# Patient Record
Sex: FEMALE | Race: WHITE | NOT HISPANIC OR LATINO | Employment: OTHER | URBAN - METROPOLITAN AREA
[De-identification: names, ages, dates, MRNs, and addresses within clinical notes are randomized per-mention and may not be internally consistent; named-entity substitution may affect disease eponyms.]

---

## 2017-04-06 ENCOUNTER — HOSPITAL ENCOUNTER (OUTPATIENT)
Dept: FAMILY MEDICINE CLINIC | Facility: CLINIC | Age: 67
Setting detail: SPECIMEN
Discharge: HOME | End: 2017-04-06
Attending: FAMILY MEDICINE | Admitting: FAMILY MEDICINE

## 2017-04-06 LAB
AMPICILLIN SUSC ISLT: ABNORMAL
AZTREONAM SUSC ISLT: ABNORMAL
BACTERIA ISLT: ABNORMAL
BACTERIA SPEC AEROBE CULT: ABNORMAL
CEFAZOLIN SUSC ISLT: ABNORMAL
CEFEPIME SUSC ISLT: ABNORMAL
CEFTRIAXONE SUSC ISLT: ABNORMAL
CIPROFLOXACIN SUSC ISLT: ABNORMAL
COLONY COUNT: ABNORMAL
ERTAPENEM SUSC ISLT: ABNORMAL
LEVOFLOXACIN SUSC ISLT: ABNORMAL
Lab: ABNORMAL
MEROPENEM SUSC ISLT: ABNORMAL
MICRO REPORT STATUS: ABNORMAL
NITROFURANTOIN SUSC ISLT: ABNORMAL
PIP+TAZO SUSC ISLT: ABNORMAL
SPECIMEN SOURCE: ABNORMAL
SUSC METH SPEC: ABNORMAL
TETRACYCLINE SUSC ISLT: ABNORMAL
TOBRAMYCIN SUSC ISLT: ABNORMAL
TRIMETHOPRIM/SULFA: ABNORMAL

## 2017-08-08 ENCOUNTER — HOSPITAL ENCOUNTER (OUTPATIENT)
Dept: FAMILY MEDICINE CLINIC | Facility: CLINIC | Age: 67
Setting detail: SPECIMEN
Discharge: HOME | End: 2017-08-08
Attending: FAMILY MEDICINE | Admitting: FAMILY MEDICINE

## 2017-08-08 LAB
ALBUMIN SERPL-MCNC: 4.4 G/DL (ref 3.5–4.8)
ALBUMIN/GLOB SERPL: 1.4 {RATIO} (ref 1–1.7)
ALP SERPL-CCNC: 77 IU/L (ref 32–91)
ALT SERPL-CCNC: 73 IU/L (ref 14–54)
ANION GAP SERPL CALC-SCNC: 17.2 MMOL/L (ref 10–20)
AST SERPL-CCNC: 62 IU/L (ref 15–41)
BASOPHILS # BLD AUTO: 0 10*3/UL (ref 0–0.2)
BASOPHILS NFR BLD AUTO: 1 % (ref 0–2)
BILIRUB SERPL-MCNC: 0.7 MG/DL (ref 0.3–1.2)
BUN SERPL-MCNC: 19 MG/DL (ref 8–20)
BUN/CREAT SERPL: 17.3 (ref 5.4–26.2)
CALCIUM SERPL-MCNC: 9.8 MG/DL (ref 8.9–10.3)
CHLORIDE SERPL-SCNC: 101 MMOL/L (ref 101–111)
CHOLEST SERPL-MCNC: 152 MG/DL
CHOLEST/HDLC SERPL: 4.1 {RATIO}
CONV CO2: 24 MMOL/L (ref 22–32)
CONV LDL CHOLESTEROL DIRECT: 89 MG/DL (ref 0–100)
CONV MICROALBUM.,U,RANDOM: 32 MG/L
CONV TOTAL PROTEIN: 7.5 G/DL (ref 6.1–7.9)
CREAT 24H UR-MCNC: 272.9 MG/DL
CREAT UR-MCNC: 1.1 MG/DL (ref 0.4–1)
DIFFERENTIAL METHOD BLD: (no result)
EOSINOPHIL # BLD AUTO: 0.2 10*3/UL (ref 0–0.3)
EOSINOPHIL # BLD AUTO: 4 % (ref 0–3)
ERYTHROCYTE [DISTWIDTH] IN BLOOD BY AUTOMATED COUNT: 14.2 % (ref 11.5–14.5)
GLOBULIN UR ELPH-MCNC: 3.1 G/DL (ref 2.5–3.8)
GLUCOSE SERPL-MCNC: 195 MG/DL (ref 65–99)
HCT VFR BLD AUTO: 39.7 % (ref 35–49)
HDLC SERPL-MCNC: 37 MG/DL
HGB BLD-MCNC: 13.4 G/DL (ref 12–15)
LDLC/HDLC SERPL: 2.4 {RATIO}
LIPID INTERPRETATION: ABNORMAL
LYMPHOCYTES # BLD AUTO: 1.7 10*3/UL (ref 0.8–4.8)
LYMPHOCYTES NFR BLD AUTO: 31 % (ref 18–42)
MCH RBC QN AUTO: 28.3 PG (ref 26–32)
MCHC RBC AUTO-ENTMCNC: 33.9 G/DL (ref 32–36)
MCV RBC AUTO: 83.5 FL (ref 80–94)
MICROALBUMIN/CREAT UR: 11.7 UG/MG
MONOCYTES # BLD AUTO: 0.4 10*3/UL (ref 0.1–1.3)
MONOCYTES NFR BLD AUTO: 7 % (ref 2–11)
NEUTROPHILS # BLD AUTO: 3.1 10*3/UL (ref 2.3–8.6)
NEUTROPHILS NFR BLD AUTO: 57 % (ref 50–75)
NRBC BLD AUTO-RTO: 0 /100{WBCS}
NRBC/RBC NFR BLD MANUAL: 0 10*3/UL
PLATELET # BLD AUTO: 237 10*3/UL (ref 150–450)
PMV BLD AUTO: 8.5 FL (ref 7.4–10.4)
POTASSIUM SERPL-SCNC: 4.2 MMOL/L (ref 3.6–5.1)
RBC # BLD AUTO: 4.75 10*6/UL (ref 4–5.4)
SODIUM SERPL-SCNC: 138 MMOL/L (ref 136–144)
TRIGL SERPL-MCNC: 220 MG/DL
TSH SERPL-ACNC: 4.56 UIU/ML (ref 0.34–5.6)
VLDLC SERPL CALC-MCNC: 26.1 MG/DL
WBC # BLD AUTO: 5.4 10*3/UL (ref 4.5–11.5)

## 2017-08-15 ENCOUNTER — HOSPITAL ENCOUNTER (OUTPATIENT)
Dept: ULTRASOUND IMAGING | Facility: HOSPITAL | Age: 67
Discharge: HOME | End: 2017-08-15
Attending: FAMILY MEDICINE | Admitting: FAMILY MEDICINE

## 2018-01-24 ENCOUNTER — HOSPITAL ENCOUNTER (OUTPATIENT)
Dept: FAMILY MEDICINE CLINIC | Facility: CLINIC | Age: 68
Setting detail: SPECIMEN
Discharge: HOME | End: 2018-01-24
Attending: FAMILY MEDICINE | Admitting: FAMILY MEDICINE

## 2018-01-24 LAB
ALBUMIN SERPL-MCNC: 4.3 G/DL (ref 3.5–4.8)
ALBUMIN/GLOB SERPL: 1.4 {RATIO} (ref 1–1.7)
ALP SERPL-CCNC: 64 IU/L (ref 32–91)
ALT SERPL-CCNC: 79 IU/L (ref 14–54)
ANION GAP SERPL CALC-SCNC: 13.7 MMOL/L (ref 10–20)
AST SERPL-CCNC: 77 IU/L (ref 15–41)
BASOPHILS # BLD AUTO: 0 10*3/UL (ref 0–0.2)
BASOPHILS NFR BLD AUTO: 1 % (ref 0–2)
BILIRUB SERPL-MCNC: 0.9 MG/DL (ref 0.3–1.2)
BUN SERPL-MCNC: 17 MG/DL (ref 8–20)
BUN/CREAT SERPL: 15.5 (ref 5.4–26.2)
CALCIUM SERPL-MCNC: 9.8 MG/DL (ref 8.9–10.3)
CHLORIDE SERPL-SCNC: 103 MMOL/L (ref 101–111)
CHOLEST SERPL-MCNC: 113 MG/DL
CHOLEST/HDLC SERPL: 3.1 {RATIO}
CONV CO2: 28 MMOL/L (ref 22–32)
CONV LDL CHOLESTEROL DIRECT: 62 MG/DL (ref 0–100)
CONV MICROALBUM.,U,RANDOM: 28 MG/L
CONV TOTAL PROTEIN: 7.4 G/DL (ref 6.1–7.9)
CREAT 24H UR-MCNC: 315.1 MG/DL
CREAT UR-MCNC: 1.1 MG/DL (ref 0.4–1)
DIFFERENTIAL METHOD BLD: (no result)
EOSINOPHIL # BLD AUTO: 0.2 10*3/UL (ref 0–0.3)
EOSINOPHIL # BLD AUTO: 3 % (ref 0–3)
ERYTHROCYTE [DISTWIDTH] IN BLOOD BY AUTOMATED COUNT: 14.2 % (ref 11.5–14.5)
GLOBULIN UR ELPH-MCNC: 3.1 G/DL (ref 2.5–3.8)
GLUCOSE SERPL-MCNC: 150 MG/DL (ref 65–99)
HCT VFR BLD AUTO: 39.5 % (ref 35–49)
HCV AB SER DONR QL: NORMAL
HCV AB SER DONR QL: NORMAL
HDLC SERPL-MCNC: 36 MG/DL
HGB BLD-MCNC: 13.3 G/DL (ref 12–15)
LDLC/HDLC SERPL: 1.7 {RATIO}
LIPID INTERPRETATION: ABNORMAL
LYMPHOCYTES # BLD AUTO: 1.6 10*3/UL (ref 0.8–4.8)
LYMPHOCYTES NFR BLD AUTO: 31 % (ref 18–42)
MCH RBC QN AUTO: 28.3 PG (ref 26–32)
MCHC RBC AUTO-ENTMCNC: 33.6 G/DL (ref 32–36)
MCV RBC AUTO: 84.3 FL (ref 80–94)
MICROALBUMIN/CREAT UR: 8.9 UG/MG
MONOCYTES # BLD AUTO: 0.4 10*3/UL (ref 0.1–1.3)
MONOCYTES NFR BLD AUTO: 8 % (ref 2–11)
NEUTROPHILS # BLD AUTO: 3.1 10*3/UL (ref 2.3–8.6)
NEUTROPHILS NFR BLD AUTO: 57 % (ref 50–75)
NRBC BLD AUTO-RTO: 0 /100{WBCS}
NRBC/RBC NFR BLD MANUAL: 0 10*3/UL
PLATELET # BLD AUTO: 275 10*3/UL (ref 150–450)
PMV BLD AUTO: 8.4 FL (ref 7.4–10.4)
POTASSIUM SERPL-SCNC: 4.7 MMOL/L (ref 3.6–5.1)
RBC # BLD AUTO: 4.69 10*6/UL (ref 4–5.4)
SODIUM SERPL-SCNC: 140 MMOL/L (ref 136–144)
TRIGL SERPL-MCNC: 165 MG/DL
VLDLC SERPL CALC-MCNC: 14.9 MG/DL
WBC # BLD AUTO: 5.3 10*3/UL (ref 4.5–11.5)

## 2018-05-16 ENCOUNTER — HOSPITAL ENCOUNTER (OUTPATIENT)
Dept: FAMILY MEDICINE CLINIC | Facility: CLINIC | Age: 68
Setting detail: SPECIMEN
Discharge: HOME | End: 2018-05-16
Attending: FAMILY MEDICINE | Admitting: FAMILY MEDICINE

## 2018-05-16 LAB
ALBUMIN SERPL-MCNC: 4.6 G/DL (ref 3.5–4.8)
ALBUMIN/GLOB SERPL: 1.5 {RATIO} (ref 1–1.7)
ALP SERPL-CCNC: 80 IU/L (ref 32–91)
ALT SERPL-CCNC: 97 IU/L (ref 14–54)
ANION GAP SERPL CALC-SCNC: 13.5 MMOL/L (ref 10–20)
AST SERPL-CCNC: 110 IU/L (ref 15–41)
BILIRUB SERPL-MCNC: 0.7 MG/DL (ref 0.3–1.2)
BUN SERPL-MCNC: 17 MG/DL (ref 8–20)
BUN/CREAT SERPL: 15.5 (ref 5.4–26.2)
CALCIUM SERPL-MCNC: 10 MG/DL (ref 8.9–10.3)
CHLORIDE SERPL-SCNC: 100 MMOL/L (ref 101–111)
CHOLEST SERPL-MCNC: 133 MG/DL
CHOLEST/HDLC SERPL: 3.6 {RATIO}
CONV CO2: 29 MMOL/L (ref 22–32)
CONV LDL CHOLESTEROL DIRECT: 78 MG/DL (ref 0–100)
CONV TOTAL PROTEIN: 7.6 G/DL (ref 6.1–7.9)
CREAT UR-MCNC: 1.1 MG/DL (ref 0.4–1)
GLOBULIN UR ELPH-MCNC: 3 G/DL (ref 2.5–3.8)
GLUCOSE SERPL-MCNC: 192 MG/DL (ref 65–99)
HDLC SERPL-MCNC: 37 MG/DL
LDLC/HDLC SERPL: 2.1 {RATIO}
LIPID INTERPRETATION: ABNORMAL
POTASSIUM SERPL-SCNC: 4.5 MMOL/L (ref 3.6–5.1)
SODIUM SERPL-SCNC: 138 MMOL/L (ref 136–144)
TRIGL SERPL-MCNC: 195 MG/DL
VLDLC SERPL CALC-MCNC: 18.3 MG/DL

## 2018-06-28 ENCOUNTER — OFFICE VISIT (OUTPATIENT)
Dept: MAMMOGRAPHY | Facility: CLINIC | Age: 68
End: 2018-06-28

## 2018-06-28 VITALS
DIASTOLIC BLOOD PRESSURE: 85 MMHG | SYSTOLIC BLOOD PRESSURE: 145 MMHG | HEART RATE: 80 BPM | HEIGHT: 63 IN | OXYGEN SATURATION: 96 % | WEIGHT: 195 LBS | TEMPERATURE: 97.8 F | BODY MASS INDEX: 34.55 KG/M2

## 2018-06-28 DIAGNOSIS — Z17.0 MALIGNANT NEOPLASM OF UPPER-OUTER QUADRANT OF LEFT BREAST IN FEMALE, ESTROGEN RECEPTOR POSITIVE (HCC): Primary | ICD-10-CM

## 2018-06-28 DIAGNOSIS — C50.412 MALIGNANT NEOPLASM OF UPPER-OUTER QUADRANT OF LEFT BREAST IN FEMALE, ESTROGEN RECEPTOR POSITIVE (HCC): Primary | ICD-10-CM

## 2018-06-28 DIAGNOSIS — C50.412 MALIGNANT NEOPLASM OF UPPER-OUTER QUADRANT OF LEFT FEMALE BREAST, UNSPECIFIED ESTROGEN RECEPTOR STATUS (HCC): Primary | ICD-10-CM

## 2018-06-28 PROCEDURE — 99205 OFFICE O/P NEW HI 60 MIN: CPT | Performed by: SURGERY

## 2018-06-28 RX ORDER — METOPROLOL TARTRATE AND HYDROCHLOROTHIAZIDE 50; 25 MG/1; MG/1
0.5 TABLET ORAL DAILY
COMMUNITY
Start: 2018-05-16 | End: 2019-08-08

## 2018-06-28 RX ORDER — FLUTICASONE PROPIONATE 50 MCG
2 SPRAY, SUSPENSION (ML) NASAL DAILY
COMMUNITY

## 2018-06-28 RX ORDER — LOPERAMIDE HYDROCHLORIDE 2 MG/1
2 TABLET ORAL 4 TIMES DAILY PRN
COMMUNITY

## 2018-06-28 RX ORDER — METFORMIN HYDROCHLORIDE 500 MG/1
500 TABLET, EXTENDED RELEASE ORAL 2 TIMES DAILY
COMMUNITY
Start: 2018-04-27 | End: 2019-09-16

## 2018-06-28 RX ORDER — ESCITALOPRAM OXALATE 20 MG/1
20 TABLET ORAL DAILY
COMMUNITY
Start: 2018-04-27 | End: 2019-07-20 | Stop reason: SDUPTHER

## 2018-06-28 RX ORDER — DIPHENHYDRAMINE HCL 25 MG
25 CAPSULE ORAL EVERY 6 HOURS PRN
COMMUNITY
End: 2019-09-16

## 2018-06-28 RX ORDER — ROSUVASTATIN CALCIUM 20 MG/1
10 TABLET, COATED ORAL NIGHTLY
COMMUNITY
End: 2019-09-16 | Stop reason: SDUPTHER

## 2018-06-28 RX ORDER — CETIRIZINE HYDROCHLORIDE 10 MG/1
10 TABLET ORAL DAILY
COMMUNITY

## 2018-06-28 RX ORDER — MELATONIN
1000 NIGHTLY
COMMUNITY

## 2018-06-28 RX ORDER — OMEPRAZOLE 40 MG/1
40 CAPSULE, DELAYED RELEASE ORAL DAILY
COMMUNITY
Start: 2018-04-27 | End: 2019-09-16 | Stop reason: SDUPTHER

## 2018-06-28 RX ORDER — LEVOTHYROXINE SODIUM 0.05 MG/1
50 TABLET ORAL EVERY MORNING
COMMUNITY
Start: 2018-04-27 | End: 2019-08-08 | Stop reason: SDUPTHER

## 2018-06-28 RX ORDER — CHOLECALCIFEROL (VITAMIN D3) 125 MCG
10 CAPSULE ORAL NIGHTLY PRN
COMMUNITY

## 2018-06-28 NOTE — PROGRESS NOTES
Chief Complaint: Mariana Ansari is a 68 y.o.. female here today for Breast Cancer (left breast)        History of Present Illness:  Patient presents with newly diagnosed breast cancer.  Approximately 4 weeks ago the patient just happened to palpate a lump in the upper outer quadrant of the left breast.  It was not particularly painful and there was no associated nipple discharge.  She thought the mass measured about 1 cm in size.  Imaging studies were obtained.  Her mammogram suggested a persistent asymmetric density in the upper outer quadrant of the left breast at the area of the palpable abnormality.  An ultrasound revealed a 1.8 cm irregular solid mass.  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  No DCIS was identified.  The tumor was ER positive at 100%, IA positive and under percent and HER-2 negative.  I have personally reviewed the imaging studies and agree with the suspicious findings.  The patient has not had any prior breast biopsies.  She did take some hormone replacement therapy for very short period of time and has been off of them for years.  The family history is negative for breast or ovarian cancer.      Review of Systems:  Review of Systems   Respiratory: Positive for shortness of breath and wheezing.    Endocrine: Positive for hot flashes.   Skin: Positive for itching.        The patient denies any noticeable changes to the skin of the breast.    Hematological: Bruises/bleeds easily.   All other systems reviewed and are negative.       Past Medical and Surgical History:  Breast Biopsy History:  Patient has had the following breast biopsies:left breast 2018-malignant   Breast Cancer HIstory:  Patient does not have a past medical history of breast cancer.  Breast Operations, and year:  None  History   Smoking Status   • Never Smoker   Smokeless Tobacco   • Never Used     Obstetric History:  Patient is postmenopausal due to removal of her uterus in the following  year:1989   Number of pregnancies:2  Number of live births: 2  Number of abortions or miscarriages: 0  Age of delivery of first child: 23  Patient did not breast feed.  Length of time taking birth control pills:2 years  Patient took hormone replacement during the following dates:Took premarin for 3 years    Past Surgical History:   Procedure Laterality Date   • HYSTERECTOMY     • KNEE SURGERY Right 2014   • KNEE SURGERY Left 2016   • ROTATOR CUFF REPAIR Left 2009   • ROTATOR CUFF REPAIR Right 2012   • TOE SURGERY      ingrown        Past Medical History:   Diagnosis Date   • Diabetes mellitus    • Hypertension    • Seasonal allergies        Prior Hospitalizations, other than for surgery or childbirth, and year:  none    Social History:  Patient is .  Patient has 1 daughters. and Patient has 1 sons.    Family History:  Family History   Problem Relation Age of Onset   • Diabetes Mother    • Heart attack Mother    • Heart failure Mother    • Hyperlipidemia Mother    • Hyperthyroidism Mother    • Hypothyroidism Mother    • Asthma Father    • COPD Father    • Heart attack Maternal Uncle    • Heart failure Maternal Uncle    • Depression Maternal Grandmother    • Heart attack Maternal Grandfather    • Heart failure Maternal Grandfather    • Alcohol abuse Paternal Grandmother    • Alcohol abuse Paternal Grandfather        Vital Signs:  Vitals:    06/28/18 1257   BP: 145/85   Pulse: 80   Temp: 97.8 °F (36.6 °C)   SpO2: 96%       Medications:    Current Outpatient Prescriptions:     Current Outpatient Prescriptions:   •  albuterol (PROAIR RESPICLICK) 108 (90 Base) MCG/ACT inhaler, Inhale Every 4 (Four) Hours As Needed., Disp: , Rfl:   •  B Complex Vitamins (VITAMIN B COMPLEX PO), Take  by mouth., Disp: , Rfl:   •  cetirizine (zyrTEC) 10 MG tablet, Take 10 mg by mouth Daily., Disp: , Rfl:   •  cholecalciferol (VITAMIN D3) 1000 units tablet, Take 1,000 Units by mouth Daily., Disp: , Rfl:   •  diphenhydrAMINE (BENADRYL)  25 mg capsule, Take 25 mg by mouth Every 6 (Six) Hours As Needed., Disp: , Rfl:   •  escitalopram (LEXAPRO) 20 MG tablet, , Disp: , Rfl:   •  fluticasone (FLONASE) 50 MCG/ACT nasal spray, 2 sprays into each nostril Daily., Disp: , Rfl:   •  levothyroxine (SYNTHROID, LEVOTHROID) 50 MCG tablet, , Disp: , Rfl:   •  loperamide (IMODIUM A-D) 2 MG tablet, Take 2 mg by mouth 4 (Four) Times a Day As Needed., Disp: , Rfl:   •  melatonin 5 MG tablet tablet, Take 10 mg by mouth., Disp: , Rfl:   •  metFORMIN ER (GLUCOPHAGE-XR) 500 MG 24 hr tablet, , Disp: , Rfl:   •  metoprolol-hydrochlorothiazide (LOPRESSOR HCT) 50-25 MG per tablet, , Disp: , Rfl:   •  omeprazole (priLOSEC) 40 MG capsule, , Disp: , Rfl:   •  rosuvastatin (CRESTOR) 20 MG tablet, Take 20 mg by mouth Daily., Disp: , Rfl:     Physical Examination:  General Appearance:   Patient is in no distress.  She is well kept and has an average build.   Psychiatric:  Patient with appropriate mood and affect. Alert and oriented to self, time, and place.    Breast, RIGHT:  medium sized, symmetric with the contralateral side.  Breast skin is without erythema, edema, rashes.  There are no visible abnormalities upon inspection during the arm-raising maneuver or with hands on hips in the sitting position. There is no nipple retraction, discharge or nipple/areolar skin changes.There are no masses palpable in the sitting or supine positions.    Breast, LEFT:  medium sized, symmetric with the contralateral side.  Breast skin is without erythema, edema, rashes.  There are no visible abnormalities upon inspection during the arm-raising maneuver or with hands on hips in the sitting position. There is no nipple retraction, discharge or nipple/areolar skin changes.There is a small biopsy scar in the lower outer quadrant but a easily palpable mass in the 1 o'clock position just a little out from the edge of the areola.  It is very close to the skin and there may be a small amount of skin  retraction associated with it but the skin does not appear to be involved by this tumor.    Lymphatic:  There is no axillary, cervical, infraclavicular, or supraclavicular adenopathy bilaterally.  Eyes:  Pupils are round and reactive to light.  Cardiovascular:  Heart rate and rhythm are regular.  Respiratory:  Lungs are clear bilaterally with no crackles or wheezes in any lung field.  Gastrointestinal:  Abdomen is soft, nondistended, and nontender.  There was no evidence of hepatosplenomegaly or abdominal mass.      Musculoskeletal:  Good strength in all 4 extremities.   There is good range of motion in both shoulders. the patient has had bilateral rotator cuff repairs.    Skin:  No new skin lesions or rashes on the skin excluding the breast (see breast exam above).    Assessment:  1. Malignant neoplasm of upper-outer quadrant of left breast in female, estrogen receptor positive          Plan:  The patient was accompanied by her  and daughter.  The face-to-face office visit lasted 1 hour with 50 minutes spent in consultation.    We began the conversation discussing her pathology report.  We talked about the origin of most of breast cancers from either the ducts or the lobules.  The difference between invasive and in situ disease was explained and the visual was drawn for her.  When invasion has occurred, the lymph nodes need to be evaluated.  When there is in situ disease the lymph nodes should be considered if the area of concern is widespread or it is high-grade.  We also discussed the significance of hormone receptors.  They often can give us some idea how the breast cancer will behave and potentially lead us to offer neoadjuvant chemotherapy.    Next we discussed the surgical options which include breast conserving therapy versus a mastectomy.  With breast conserving therapy we are talking about a lumpectomy with margins, lymph node evaluation, and radiation treatment.  The radiation treatment is  generally given to the entire breast for 6 weeks.  The side effects consist of local skin change and potential injury to nearby structures such as the lung or the heart.  A mastectomy would involve removing the breast tissues with preservation of the pectoral muscles and evaluation of the lymph nodes if indicated.  The potential for reconstruction by either an implant or autologous tissue was discussed.  This could be performed on a delayed basis or immediately depending on a multitude of factors.  The survival rates for these 2 procedures are equivalent but there are incidences where one may be favored over the other.  There are times when a lumpectomy is not possible due to the large tumor to breast ratio or the location of the tumor.  Previous radiation to the chest wall or collagen disorders such as scleroderma would make radiation treatment and possible and therefore exclude breast conserving therapy as an option.    I would like to obtain an MRI on her to be certain there is no skin involvement.  I also think it would be wise to check the other breast.  If there are no worrisome findings on this study, the patient would prefer to have breast conserving surgery and I think that would be a good option for her.  We have discussed the need for a sentinel lymph node biopsy.  The patient also understands the small chance of infection, bleeding, anesthesia, and the need to return to surgery for a positive margin.  CPT coding:    Next Appointment:  No Follow-up on file.            EMR Dragon/transcription disclaimer:    Much of this encounter note is an electronic transcription/translocation of spoken language to printed text.  The electronic translation of spoken language may permit erroneous, or at times, nonsensical words or phrases to be inadvertently transcribed.  Although I have reviewed the note from such areas, some may still exist.

## 2018-07-02 ENCOUNTER — PREP FOR SURGERY (OUTPATIENT)
Dept: OTHER | Facility: HOSPITAL | Age: 68
End: 2018-07-02

## 2018-07-02 DIAGNOSIS — Z17.0 MALIGNANT NEOPLASM OF UPPER-OUTER QUADRANT OF LEFT BREAST IN FEMALE, ESTROGEN RECEPTOR POSITIVE (HCC): Primary | ICD-10-CM

## 2018-07-02 DIAGNOSIS — C50.412 MALIGNANT NEOPLASM OF UPPER-OUTER QUADRANT OF LEFT BREAST IN FEMALE, ESTROGEN RECEPTOR POSITIVE (HCC): Primary | ICD-10-CM

## 2018-07-02 RX ORDER — LIDOCAINE AND PRILOCAINE 25; 25 MG/G; MG/G
CREAM TOPICAL ONCE
Status: CANCELLED | OUTPATIENT
Start: 2018-07-18 | End: 2018-07-18

## 2018-07-02 RX ORDER — CEFAZOLIN SODIUM 2 G/100ML
2 INJECTION, SOLUTION INTRAVENOUS ONCE
Status: CANCELLED | OUTPATIENT
Start: 2018-07-18 | End: 2018-07-18

## 2018-07-02 RX ORDER — DIAZEPAM 5 MG/1
10 TABLET ORAL ONCE
Status: CANCELLED | OUTPATIENT
Start: 2018-07-18 | End: 2018-07-18

## 2018-07-03 PROBLEM — Z17.0 MALIGNANT NEOPLASM OF UPPER-OUTER QUADRANT OF LEFT BREAST IN FEMALE, ESTROGEN RECEPTOR POSITIVE: Status: ACTIVE | Noted: 2018-07-03

## 2018-07-03 PROBLEM — C50.412 MALIGNANT NEOPLASM OF UPPER-OUTER QUADRANT OF LEFT BREAST IN FEMALE, ESTROGEN RECEPTOR POSITIVE: Status: ACTIVE | Noted: 2018-07-03

## 2018-07-06 ENCOUNTER — HOSPITAL ENCOUNTER (OUTPATIENT)
Dept: MRI IMAGING | Facility: HOSPITAL | Age: 68
Discharge: HOME | End: 2018-07-06
Attending: SURGERY | Admitting: SURGERY

## 2018-07-06 DIAGNOSIS — C50.412 MALIGNANT NEOPLASM OF UPPER-OUTER QUADRANT OF LEFT FEMALE BREAST, UNSPECIFIED ESTROGEN RECEPTOR STATUS (HCC): ICD-10-CM

## 2018-07-06 LAB — CREAT BLDA-MCNC: 1 MG/DL (ref 0.6–1.3)

## 2018-07-06 PROCEDURE — 82565 ASSAY OF CREATININE: CPT

## 2018-07-06 PROCEDURE — 0 GADOBENATE DIMEGLUMINE 529 MG/ML SOLUTION: Performed by: SURGERY

## 2018-07-06 PROCEDURE — C8908 MRI W/O FOL W/CONT, BREAST,: HCPCS

## 2018-07-06 PROCEDURE — A9577 INJ MULTIHANCE: HCPCS | Performed by: SURGERY

## 2018-07-06 PROCEDURE — 0159T HC MRI BREAST COMPUTER ANALYSIS: CPT

## 2018-07-06 RX ADMIN — GADOBENATE DIMEGLUMINE 18 ML: 529 INJECTION, SOLUTION INTRAVENOUS at 15:50

## 2018-07-11 ENCOUNTER — APPOINTMENT (OUTPATIENT)
Dept: PREADMISSION TESTING | Facility: HOSPITAL | Age: 68
End: 2018-07-11

## 2018-07-11 VITALS
DIASTOLIC BLOOD PRESSURE: 79 MMHG | WEIGHT: 194 LBS | OXYGEN SATURATION: 97 % | BODY MASS INDEX: 34.38 KG/M2 | RESPIRATION RATE: 18 BRPM | SYSTOLIC BLOOD PRESSURE: 126 MMHG | HEART RATE: 76 BPM | TEMPERATURE: 98.3 F | HEIGHT: 63 IN

## 2018-07-11 LAB
ANION GAP SERPL CALCULATED.3IONS-SCNC: 13.2 MMOL/L
BUN BLD-MCNC: 18 MG/DL (ref 8–23)
BUN/CREAT SERPL: 17.1 (ref 7–25)
CALCIUM SPEC-SCNC: 9.4 MG/DL (ref 8.6–10.5)
CHLORIDE SERPL-SCNC: 97 MMOL/L (ref 98–107)
CO2 SERPL-SCNC: 26.8 MMOL/L (ref 22–29)
CREAT BLD-MCNC: 1.05 MG/DL (ref 0.57–1)
DEPRECATED RDW RBC AUTO: 43.2 FL (ref 37–54)
ERYTHROCYTE [DISTWIDTH] IN BLOOD BY AUTOMATED COUNT: 13.6 % (ref 11.7–13)
GFR SERPL CREATININE-BSD FRML MDRD: 52 ML/MIN/1.73
GLUCOSE BLD-MCNC: 204 MG/DL (ref 65–99)
HCT VFR BLD AUTO: 40.6 % (ref 35.6–45.5)
HGB BLD-MCNC: 13.1 G/DL (ref 11.9–15.5)
MCH RBC QN AUTO: 28.2 PG (ref 26.9–32)
MCHC RBC AUTO-ENTMCNC: 32.3 G/DL (ref 32.4–36.3)
MCV RBC AUTO: 87.3 FL (ref 80.5–98.2)
PLATELET # BLD AUTO: 254 10*3/MM3 (ref 140–500)
PMV BLD AUTO: 10.5 FL (ref 6–12)
POTASSIUM BLD-SCNC: 4.2 MMOL/L (ref 3.5–5.2)
RBC # BLD AUTO: 4.65 10*6/MM3 (ref 3.9–5.2)
SODIUM BLD-SCNC: 137 MMOL/L (ref 136–145)
WBC NRBC COR # BLD: 5.35 10*3/MM3 (ref 4.5–10.7)

## 2018-07-11 PROCEDURE — 80048 BASIC METABOLIC PNL TOTAL CA: CPT | Performed by: SURGERY

## 2018-07-11 PROCEDURE — 85027 COMPLETE CBC AUTOMATED: CPT | Performed by: SURGERY

## 2018-07-11 PROCEDURE — 93005 ELECTROCARDIOGRAM TRACING: CPT

## 2018-07-11 PROCEDURE — 36415 COLL VENOUS BLD VENIPUNCTURE: CPT

## 2018-07-11 PROCEDURE — 93010 ELECTROCARDIOGRAM REPORT: CPT | Performed by: INTERNAL MEDICINE

## 2018-07-11 NOTE — DISCHARGE INSTRUCTIONS
Take the following medications the morning of surgery with a small sip of water: METOPROLOL AND OMEPRAZOLE    ARRIVE AT 1230    General Instructions:  • Do not eat solid food after midnight the night before surgery.  • You may drink clear liquids day of surgery but must stop at least one hour before your hospital arrival time.  • It is beneficial for you to have a clear drink that contains carbohydrates the day of surgery.  We suggest a 12 to 20 ounce bottle of Gatorade or Powerade for non-diabetic patients or a 12 to 20 ounce bottle of G2 or Powerade Zero for diabetic patients. (Pediatric patients, are not advised to drink a 12 to 20 ounce carbohydrate drink)    Clear liquids are liquids you can see through.  Nothing red in color.     Plain water                               Sports drinks  Sodas                                   Gelatin (Jell-O)  Fruit juices without pulp such as white grape juice and apple juice  Popsicles that contain no fruit or yogurt  Tea or coffee (no cream or milk added)  Gatorade / Powerade  G2 / Powerade Zero    • Infants may have breast milk up to four hours before surgery.  • Infants drinking formula may drink formula up to six hours before surgery.   • Patients who avoid smoking, chewing tobacco and alcohol for 4 weeks prior to surgery have a reduced risk of post-operative complications.  Quit smoking as many days before surgery as you can.  • Do not smoke, use chewing tobacco or drink alcohol the day of surgery.   • If applicable bring your C-PAP/ BI-PAP machine.  • Bring any papers given to you in the doctor’s office.  • Wear clean comfortable clothes and socks.  • Do not wear contact lenses or make-up.  Bring a case for your glasses.   • Bring crutches or walker if applicable.  • Remove all piercings.  Leave jewelry and any other valuables at home.  • Hair extensions with metal clips must be removed prior to surgery.  • The Pre-Admission Testing nurse will instruct you to bring  medications if unable to obtain an accurate list in Pre-Admission Testing.            Preventing a Surgical Site Infection:  • For 2 to 3 days before surgery, avoid shaving with a razor because the razor can irritate skin and make it easier to develop an infection.    • Any areas of open skin can increase the risk of a post-operative wound infection by allowing bacteria to enter and travel throughout the body.  Notify your surgeon if you have any skin wounds / rashes even if it is not near the expected surgical site.  The area will need assessed to determine if surgery should be delayed until it is healed.  • The night prior to surgery sleep in a clean bed with clean clothing.  Do not allow pets to sleep with you.  • Shower on the morning of surgery using a fresh bar of anti-bacterial soap (such as Dial) and clean washcloth.  Dry with a clean towel and dress in clean clothing.  • Ask your surgeon if you will be receiving antibiotics prior to surgery.  • Make sure you, your family, and all healthcare providers clean their hands with soap and water or an alcohol based hand  before caring for you or your wound.    Day of surgery:  Upon arrival, a Pre-op nurse and Anesthesiologist will review your health history, obtain vital signs, and answer questions you may have.  The only belongings needed at this time will be your home medications and if applicable your C-PAP/BI-PAP machine.  If you are staying overnight your family can leave the rest of your belongings in the car and bring them to your room later.  A Pre-op nurse will start an IV and you may receive medication in preparation for surgery, including something to help you relax.  Your family will be able to see you in the Pre-op area.  While you are in surgery your family should notify the waiting room  if they leave the waiting room area and provide a contact phone number.    Please be aware that surgery does come with discomfort.  We want to  make every effort to control your discomfort so please discuss any uncontrolled symptoms with your nurse.   Your doctor will most likely have prescribed pain medications.      If you are going home after surgery you will receive individualized written care instructions before being discharged.  A responsible adult must drive you to and from the hospital on the day of your surgery and stay with you for 24 hours.    If you are staying overnight following surgery, you will be transported to your hospital room following the recovery period.  [facility] has all private rooms.    You have received a list of surgical assistants for your reference.  If you have any questions please call Pre-Admission Testing at [phone].  Deductibles and co-payments are collected on the day of service. Please be prepared to pay the required co-pay, deductible or deposit on the day of service as defined by your plan.

## 2018-07-16 ENCOUNTER — TELEPHONE (OUTPATIENT)
Dept: MAMMOGRAPHY | Facility: CLINIC | Age: 68
End: 2018-07-16

## 2018-07-16 NOTE — TELEPHONE ENCOUNTER
I told her the MRI shows a unifocal tumor measuring 2.2 cm with no skin involvement.  I think we can proceed with breast conserving surgery.

## 2018-07-18 ENCOUNTER — ANESTHESIA (OUTPATIENT)
Dept: PERIOP | Facility: HOSPITAL | Age: 68
End: 2018-07-18

## 2018-07-18 ENCOUNTER — ANESTHESIA EVENT (OUTPATIENT)
Dept: PERIOP | Facility: HOSPITAL | Age: 68
End: 2018-07-18

## 2018-07-18 ENCOUNTER — HOSPITAL ENCOUNTER (OUTPATIENT)
Dept: NUCLEAR MEDICINE | Facility: HOSPITAL | Age: 68
Discharge: HOME | End: 2018-07-18
Attending: SURGERY

## 2018-07-18 ENCOUNTER — APPOINTMENT (OUTPATIENT)
Dept: GENERAL RADIOLOGY | Facility: HOSPITAL | Age: 68
End: 2018-07-18

## 2018-07-18 ENCOUNTER — HOSPITAL ENCOUNTER (OUTPATIENT)
Facility: HOSPITAL | Age: 68
Setting detail: HOSPITAL OUTPATIENT SURGERY
Discharge: HOME | End: 2018-07-18
Attending: SURGERY | Admitting: SURGERY

## 2018-07-18 VITALS
WEIGHT: 191.8 LBS | BODY MASS INDEX: 33.98 KG/M2 | OXYGEN SATURATION: 96 % | SYSTOLIC BLOOD PRESSURE: 133 MMHG | DIASTOLIC BLOOD PRESSURE: 95 MMHG | RESPIRATION RATE: 16 BRPM | HEIGHT: 63 IN | TEMPERATURE: 97.7 F | HEART RATE: 94 BPM

## 2018-07-18 DIAGNOSIS — Z17.0 MALIGNANT NEOPLASM OF UPPER-OUTER QUADRANT OF LEFT BREAST IN FEMALE, ESTROGEN RECEPTOR POSITIVE (HCC): ICD-10-CM

## 2018-07-18 DIAGNOSIS — C50.412 MALIGNANT NEOPLASM OF UPPER-OUTER QUADRANT OF LEFT BREAST IN FEMALE, ESTROGEN RECEPTOR POSITIVE (HCC): ICD-10-CM

## 2018-07-18 DIAGNOSIS — C50.412 MALIGNANT NEOPLASM OF UPPER-OUTER QUADRANT OF LEFT BREAST IN FEMALE, ESTROGEN RECEPTOR POSITIVE (HCC): Primary | ICD-10-CM

## 2018-07-18 DIAGNOSIS — Z17.0 MALIGNANT NEOPLASM OF UPPER-OUTER QUADRANT OF LEFT BREAST IN FEMALE, ESTROGEN RECEPTOR POSITIVE (HCC): Primary | ICD-10-CM

## 2018-07-18 LAB — GLUCOSE BLDC GLUCOMTR-MCNC: 164 MG/DL (ref 70–130)

## 2018-07-18 PROCEDURE — 19301 PARTIAL MASTECTOMY: CPT | Performed by: SURGERY

## 2018-07-18 PROCEDURE — 82962 GLUCOSE BLOOD TEST: CPT

## 2018-07-18 PROCEDURE — 25010000002 PROPOFOL 10 MG/ML EMULSION: Performed by: NURSE ANESTHETIST, CERTIFIED REGISTERED

## 2018-07-18 PROCEDURE — 25010000002 MIDAZOLAM PER 1 MG: Performed by: ANESTHESIOLOGY

## 2018-07-18 PROCEDURE — 0 TECHNETIUM FILTERED SULFUR COLLOID: Performed by: SURGERY

## 2018-07-18 PROCEDURE — A9541 TC99M SULFUR COLLOID: HCPCS | Performed by: SURGERY

## 2018-07-18 PROCEDURE — 25010000002 FENTANYL CITRATE (PF) 100 MCG/2ML SOLUTION: Performed by: NURSE ANESTHETIST, CERTIFIED REGISTERED

## 2018-07-18 PROCEDURE — 38525 BIOPSY/REMOVAL LYMPH NODES: CPT | Performed by: SURGERY

## 2018-07-18 PROCEDURE — 25010000002 ONDANSETRON PER 1 MG: Performed by: NURSE ANESTHETIST, CERTIFIED REGISTERED

## 2018-07-18 PROCEDURE — 25010000002 HYDROMORPHONE PER 4 MG: Performed by: NURSE ANESTHETIST, CERTIFIED REGISTERED

## 2018-07-18 PROCEDURE — 25010000002 DEXAMETHASONE PER 1 MG: Performed by: NURSE ANESTHETIST, CERTIFIED REGISTERED

## 2018-07-18 PROCEDURE — 25010000003 CEFAZOLIN IN DEXTROSE 2-4 GM/100ML-% SOLUTION: Performed by: SURGERY

## 2018-07-18 PROCEDURE — 38792 RA TRACER ID OF SENTINL NODE: CPT

## 2018-07-18 PROCEDURE — 88307 TISSUE EXAM BY PATHOLOGIST: CPT | Performed by: SURGERY

## 2018-07-18 PROCEDURE — 38900 IO MAP OF SENT LYMPH NODE: CPT | Performed by: SURGERY

## 2018-07-18 PROCEDURE — 76098 X-RAY EXAM SURGICAL SPECIMEN: CPT

## 2018-07-18 RX ORDER — SODIUM CHLORIDE 0.9 % (FLUSH) 0.9 %
1-10 SYRINGE (ML) INJECTION AS NEEDED
Status: DISCONTINUED | OUTPATIENT
Start: 2018-07-18 | End: 2018-07-18 | Stop reason: HOSPADM

## 2018-07-18 RX ORDER — LIDOCAINE HYDROCHLORIDE 20 MG/ML
INJECTION, SOLUTION INFILTRATION; PERINEURAL AS NEEDED
Status: DISCONTINUED | OUTPATIENT
Start: 2018-07-18 | End: 2018-07-18 | Stop reason: SURG

## 2018-07-18 RX ORDER — HYDROMORPHONE HYDROCHLORIDE 1 MG/ML
0.5 INJECTION, SOLUTION INTRAMUSCULAR; INTRAVENOUS; SUBCUTANEOUS
Status: DISCONTINUED | OUTPATIENT
Start: 2018-07-18 | End: 2018-07-18 | Stop reason: HOSPADM

## 2018-07-18 RX ORDER — MIDAZOLAM HYDROCHLORIDE 1 MG/ML
1 INJECTION INTRAMUSCULAR; INTRAVENOUS
Status: DISCONTINUED | OUTPATIENT
Start: 2018-07-18 | End: 2018-07-18 | Stop reason: HOSPADM

## 2018-07-18 RX ORDER — MAGNESIUM HYDROXIDE 1200 MG/15ML
LIQUID ORAL AS NEEDED
Status: DISCONTINUED | OUTPATIENT
Start: 2018-07-18 | End: 2018-07-18 | Stop reason: HOSPADM

## 2018-07-18 RX ORDER — FENTANYL CITRATE 50 UG/ML
50 INJECTION, SOLUTION INTRAMUSCULAR; INTRAVENOUS
Status: DISCONTINUED | OUTPATIENT
Start: 2018-07-18 | End: 2018-07-18 | Stop reason: HOSPADM

## 2018-07-18 RX ORDER — LIDOCAINE HYDROCHLORIDE 10 MG/ML
0.5 INJECTION, SOLUTION EPIDURAL; INFILTRATION; INTRACAUDAL; PERINEURAL ONCE AS NEEDED
Status: DISCONTINUED | OUTPATIENT
Start: 2018-07-18 | End: 2018-07-18 | Stop reason: HOSPADM

## 2018-07-18 RX ORDER — LABETALOL HYDROCHLORIDE 5 MG/ML
5 INJECTION, SOLUTION INTRAVENOUS
Status: DISCONTINUED | OUTPATIENT
Start: 2018-07-18 | End: 2018-07-18 | Stop reason: HOSPADM

## 2018-07-18 RX ORDER — FAMOTIDINE 10 MG/ML
20 INJECTION, SOLUTION INTRAVENOUS ONCE
Status: COMPLETED | OUTPATIENT
Start: 2018-07-18 | End: 2018-07-18

## 2018-07-18 RX ORDER — EPHEDRINE SULFATE 50 MG/ML
5 INJECTION, SOLUTION INTRAVENOUS ONCE AS NEEDED
Status: DISCONTINUED | OUTPATIENT
Start: 2018-07-18 | End: 2018-07-18 | Stop reason: HOSPADM

## 2018-07-18 RX ORDER — FLUMAZENIL 0.1 MG/ML
0.2 INJECTION INTRAVENOUS AS NEEDED
Status: DISCONTINUED | OUTPATIENT
Start: 2018-07-18 | End: 2018-07-18 | Stop reason: HOSPADM

## 2018-07-18 RX ORDER — PROPOFOL 10 MG/ML
VIAL (ML) INTRAVENOUS AS NEEDED
Status: DISCONTINUED | OUTPATIENT
Start: 2018-07-18 | End: 2018-07-18 | Stop reason: SURG

## 2018-07-18 RX ORDER — LIDOCAINE AND PRILOCAINE 25; 25 MG/G; MG/G
CREAM TOPICAL ONCE
Status: COMPLETED | OUTPATIENT
Start: 2018-07-18 | End: 2018-07-18

## 2018-07-18 RX ORDER — MIDAZOLAM HYDROCHLORIDE 1 MG/ML
2 INJECTION INTRAMUSCULAR; INTRAVENOUS
Status: DISCONTINUED | OUTPATIENT
Start: 2018-07-18 | End: 2018-07-18 | Stop reason: HOSPADM

## 2018-07-18 RX ORDER — BUPIVACAINE HYDROCHLORIDE 2.5 MG/ML
INJECTION, SOLUTION INFILTRATION; PERINEURAL AS NEEDED
Status: DISCONTINUED | OUTPATIENT
Start: 2018-07-18 | End: 2018-07-18 | Stop reason: HOSPADM

## 2018-07-18 RX ORDER — CEFAZOLIN SODIUM 2 G/100ML
2 INJECTION, SOLUTION INTRAVENOUS ONCE
Status: COMPLETED | OUTPATIENT
Start: 2018-07-18 | End: 2018-07-18

## 2018-07-18 RX ORDER — SODIUM CHLORIDE, SODIUM LACTATE, POTASSIUM CHLORIDE, CALCIUM CHLORIDE 600; 310; 30; 20 MG/100ML; MG/100ML; MG/100ML; MG/100ML
9 INJECTION, SOLUTION INTRAVENOUS CONTINUOUS
Status: DISCONTINUED | OUTPATIENT
Start: 2018-07-18 | End: 2018-07-18 | Stop reason: HOSPADM

## 2018-07-18 RX ORDER — PROMETHAZINE HYDROCHLORIDE 25 MG/ML
12.5 INJECTION, SOLUTION INTRAMUSCULAR; INTRAVENOUS ONCE AS NEEDED
Status: DISCONTINUED | OUTPATIENT
Start: 2018-07-18 | End: 2018-07-18 | Stop reason: HOSPADM

## 2018-07-18 RX ORDER — HYDROCODONE BITARTRATE AND ACETAMINOPHEN 5; 325 MG/1; MG/1
1-2 TABLET ORAL EVERY 4 HOURS PRN
Qty: 10 TABLET | Refills: 0 | Status: SHIPPED | OUTPATIENT
Start: 2018-07-18 | End: 2019-08-01

## 2018-07-18 RX ORDER — PROMETHAZINE HYDROCHLORIDE 25 MG/1
25 TABLET ORAL ONCE AS NEEDED
Status: DISCONTINUED | OUTPATIENT
Start: 2018-07-18 | End: 2018-07-18 | Stop reason: HOSPADM

## 2018-07-18 RX ORDER — FENTANYL CITRATE 50 UG/ML
INJECTION, SOLUTION INTRAMUSCULAR; INTRAVENOUS AS NEEDED
Status: DISCONTINUED | OUTPATIENT
Start: 2018-07-18 | End: 2018-07-18 | Stop reason: SURG

## 2018-07-18 RX ORDER — HYDROCODONE BITARTRATE AND ACETAMINOPHEN 7.5; 325 MG/1; MG/1
1 TABLET ORAL ONCE AS NEEDED
Status: COMPLETED | OUTPATIENT
Start: 2018-07-18 | End: 2018-07-18

## 2018-07-18 RX ORDER — ONDANSETRON 2 MG/ML
4 INJECTION INTRAMUSCULAR; INTRAVENOUS ONCE AS NEEDED
Status: DISCONTINUED | OUTPATIENT
Start: 2018-07-18 | End: 2018-07-18 | Stop reason: HOSPADM

## 2018-07-18 RX ORDER — DIAZEPAM 5 MG/1
10 TABLET ORAL ONCE
Status: COMPLETED | OUTPATIENT
Start: 2018-07-18 | End: 2018-07-18

## 2018-07-18 RX ORDER — HYDROMORPHONE HCL 110MG/55ML
PATIENT CONTROLLED ANALGESIA SYRINGE INTRAVENOUS AS NEEDED
Status: DISCONTINUED | OUTPATIENT
Start: 2018-07-18 | End: 2018-07-18 | Stop reason: SURG

## 2018-07-18 RX ORDER — OXYCODONE AND ACETAMINOPHEN 7.5; 325 MG/1; MG/1
1 TABLET ORAL ONCE AS NEEDED
Status: DISCONTINUED | OUTPATIENT
Start: 2018-07-18 | End: 2018-07-18 | Stop reason: HOSPADM

## 2018-07-18 RX ORDER — PROMETHAZINE HYDROCHLORIDE 25 MG/1
12.5 TABLET ORAL ONCE AS NEEDED
Status: DISCONTINUED | OUTPATIENT
Start: 2018-07-18 | End: 2018-07-18 | Stop reason: HOSPADM

## 2018-07-18 RX ORDER — SCOLOPAMINE TRANSDERMAL SYSTEM 1 MG/1
1 PATCH, EXTENDED RELEASE TRANSDERMAL ONCE
Status: DISCONTINUED | OUTPATIENT
Start: 2018-07-18 | End: 2018-07-18 | Stop reason: HOSPADM

## 2018-07-18 RX ORDER — DIPHENHYDRAMINE HYDROCHLORIDE 50 MG/ML
12.5 INJECTION INTRAMUSCULAR; INTRAVENOUS
Status: DISCONTINUED | OUTPATIENT
Start: 2018-07-18 | End: 2018-07-18 | Stop reason: HOSPADM

## 2018-07-18 RX ORDER — NALOXONE HCL 0.4 MG/ML
0.2 VIAL (ML) INJECTION AS NEEDED
Status: DISCONTINUED | OUTPATIENT
Start: 2018-07-18 | End: 2018-07-18 | Stop reason: HOSPADM

## 2018-07-18 RX ORDER — ONDANSETRON 2 MG/ML
INJECTION INTRAMUSCULAR; INTRAVENOUS AS NEEDED
Status: DISCONTINUED | OUTPATIENT
Start: 2018-07-18 | End: 2018-07-18 | Stop reason: SURG

## 2018-07-18 RX ORDER — DEXAMETHASONE SODIUM PHOSPHATE 10 MG/ML
INJECTION INTRAMUSCULAR; INTRAVENOUS AS NEEDED
Status: DISCONTINUED | OUTPATIENT
Start: 2018-07-18 | End: 2018-07-18 | Stop reason: SURG

## 2018-07-18 RX ORDER — ISOSULFAN BLUE 50 MG/5ML
INJECTION, SOLUTION SUBCUTANEOUS AS NEEDED
Status: DISCONTINUED | OUTPATIENT
Start: 2018-07-18 | End: 2018-07-18 | Stop reason: HOSPADM

## 2018-07-18 RX ORDER — PROMETHAZINE HYDROCHLORIDE 25 MG/1
25 SUPPOSITORY RECTAL ONCE AS NEEDED
Status: DISCONTINUED | OUTPATIENT
Start: 2018-07-18 | End: 2018-07-18 | Stop reason: HOSPADM

## 2018-07-18 RX ADMIN — ONDANSETRON 4 MG: 2 INJECTION INTRAMUSCULAR; INTRAVENOUS at 17:25

## 2018-07-18 RX ADMIN — TECHNETIUM TC 99M SULFUR COLLOID 1 DOSE: KIT at 14:40

## 2018-07-18 RX ADMIN — FAMOTIDINE 20 MG: 10 INJECTION, SOLUTION INTRAVENOUS at 14:06

## 2018-07-18 RX ADMIN — HYDROMORPHONE HYDROCHLORIDE 0.5 MG: 2 INJECTION INTRAMUSCULAR; INTRAVENOUS; SUBCUTANEOUS at 18:00

## 2018-07-18 RX ADMIN — MIDAZOLAM 1 MG: 1 INJECTION INTRAMUSCULAR; INTRAVENOUS at 15:40

## 2018-07-18 RX ADMIN — HYDROMORPHONE HYDROCHLORIDE 0.5 MG: 2 INJECTION INTRAMUSCULAR; INTRAVENOUS; SUBCUTANEOUS at 17:01

## 2018-07-18 RX ADMIN — HYDROCODONE BITARTRATE AND ACETAMINOPHEN 1 TABLET: 7.5; 325 TABLET ORAL at 18:51

## 2018-07-18 RX ADMIN — MIDAZOLAM 1 MG: 1 INJECTION INTRAMUSCULAR; INTRAVENOUS at 14:51

## 2018-07-18 RX ADMIN — SODIUM CHLORIDE, POTASSIUM CHLORIDE, SODIUM LACTATE AND CALCIUM CHLORIDE: 600; 310; 30; 20 INJECTION, SOLUTION INTRAVENOUS at 17:02

## 2018-07-18 RX ADMIN — PROPOFOL 200 MG: 10 INJECTION, EMULSION INTRAVENOUS at 16:39

## 2018-07-18 RX ADMIN — SODIUM CHLORIDE, POTASSIUM CHLORIDE, SODIUM LACTATE AND CALCIUM CHLORIDE: 600; 310; 30; 20 INJECTION, SOLUTION INTRAVENOUS at 16:29

## 2018-07-18 RX ADMIN — CEFAZOLIN SODIUM 2 G: 2 INJECTION, SOLUTION INTRAVENOUS at 16:31

## 2018-07-18 RX ADMIN — SODIUM CHLORIDE, POTASSIUM CHLORIDE, SODIUM LACTATE AND CALCIUM CHLORIDE 9 ML/HR: 600; 310; 30; 20 INJECTION, SOLUTION INTRAVENOUS at 14:07

## 2018-07-18 RX ADMIN — DIAZEPAM 10 MG: 5 TABLET ORAL at 13:53

## 2018-07-18 RX ADMIN — LIDOCAINE HYDROCHLORIDE 100 MG: 20 INJECTION, SOLUTION INFILTRATION; PERINEURAL at 16:39

## 2018-07-18 RX ADMIN — LIDOCAINE AND PRILOCAINE: 25; 25 CREAM TOPICAL at 13:01

## 2018-07-18 RX ADMIN — FENTANYL CITRATE 50 MCG: 50 INJECTION, SOLUTION INTRAMUSCULAR; INTRAVENOUS at 19:04

## 2018-07-18 RX ADMIN — SCOPALAMINE 1 PATCH: 1 PATCH, EXTENDED RELEASE TRANSDERMAL at 14:06

## 2018-07-18 RX ADMIN — DEXAMETHASONE SODIUM PHOSPHATE 8 MG: 10 INJECTION INTRAMUSCULAR; INTRAVENOUS at 16:58

## 2018-07-18 RX ADMIN — FENTANYL CITRATE 100 MCG: 50 INJECTION INTRAMUSCULAR; INTRAVENOUS at 16:32

## 2018-07-18 NOTE — H&P (VIEW-ONLY)
Chief Complaint: Mariana Ansari is a 68 y.o.. female here today for Breast Cancer (left breast)        History of Present Illness:  Patient presents with newly diagnosed breast cancer.  Approximately 4 weeks ago the patient just happened to palpate a lump in the upper outer quadrant of the left breast.  It was not particularly painful and there was no associated nipple discharge.  She thought the mass measured about 1 cm in size.  Imaging studies were obtained.  Her mammogram suggested a persistent asymmetric density in the upper outer quadrant of the left breast at the area of the palpable abnormality.  An ultrasound revealed a 1.8 cm irregular solid mass.  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  No DCIS was identified.  The tumor was ER positive at 100%, AL positive and under percent and HER-2 negative.  I have personally reviewed the imaging studies and agree with the suspicious findings.  The patient has not had any prior breast biopsies.  She did take some hormone replacement therapy for very short period of time and has been off of them for years.  The family history is negative for breast or ovarian cancer.      Review of Systems:  Review of Systems   Respiratory: Positive for shortness of breath and wheezing.    Endocrine: Positive for hot flashes.   Skin: Positive for itching.        The patient denies any noticeable changes to the skin of the breast.    Hematological: Bruises/bleeds easily.   All other systems reviewed and are negative.       Past Medical and Surgical History:  Breast Biopsy History:  Patient has had the following breast biopsies:left breast 2018-malignant   Breast Cancer HIstory:  Patient does not have a past medical history of breast cancer.  Breast Operations, and year:  None  History   Smoking Status   • Never Smoker   Smokeless Tobacco   • Never Used     Obstetric History:  Patient is postmenopausal due to removal of her uterus in the following  year:1989   Number of pregnancies:2  Number of live births: 2  Number of abortions or miscarriages: 0  Age of delivery of first child: 23  Patient did not breast feed.  Length of time taking birth control pills:2 years  Patient took hormone replacement during the following dates:Took premarin for 3 years    Past Surgical History:   Procedure Laterality Date   • HYSTERECTOMY     • KNEE SURGERY Right 2014   • KNEE SURGERY Left 2016   • ROTATOR CUFF REPAIR Left 2009   • ROTATOR CUFF REPAIR Right 2012   • TOE SURGERY      ingrown        Past Medical History:   Diagnosis Date   • Diabetes mellitus    • Hypertension    • Seasonal allergies        Prior Hospitalizations, other than for surgery or childbirth, and year:  none    Social History:  Patient is .  Patient has 1 daughters. and Patient has 1 sons.    Family History:  Family History   Problem Relation Age of Onset   • Diabetes Mother    • Heart attack Mother    • Heart failure Mother    • Hyperlipidemia Mother    • Hyperthyroidism Mother    • Hypothyroidism Mother    • Asthma Father    • COPD Father    • Heart attack Maternal Uncle    • Heart failure Maternal Uncle    • Depression Maternal Grandmother    • Heart attack Maternal Grandfather    • Heart failure Maternal Grandfather    • Alcohol abuse Paternal Grandmother    • Alcohol abuse Paternal Grandfather        Vital Signs:  Vitals:    06/28/18 1257   BP: 145/85   Pulse: 80   Temp: 97.8 °F (36.6 °C)   SpO2: 96%       Medications:    Current Outpatient Prescriptions:     Current Outpatient Prescriptions:   •  albuterol (PROAIR RESPICLICK) 108 (90 Base) MCG/ACT inhaler, Inhale Every 4 (Four) Hours As Needed., Disp: , Rfl:   •  B Complex Vitamins (VITAMIN B COMPLEX PO), Take  by mouth., Disp: , Rfl:   •  cetirizine (zyrTEC) 10 MG tablet, Take 10 mg by mouth Daily., Disp: , Rfl:   •  cholecalciferol (VITAMIN D3) 1000 units tablet, Take 1,000 Units by mouth Daily., Disp: , Rfl:   •  diphenhydrAMINE (BENADRYL)  25 mg capsule, Take 25 mg by mouth Every 6 (Six) Hours As Needed., Disp: , Rfl:   •  escitalopram (LEXAPRO) 20 MG tablet, , Disp: , Rfl:   •  fluticasone (FLONASE) 50 MCG/ACT nasal spray, 2 sprays into each nostril Daily., Disp: , Rfl:   •  levothyroxine (SYNTHROID, LEVOTHROID) 50 MCG tablet, , Disp: , Rfl:   •  loperamide (IMODIUM A-D) 2 MG tablet, Take 2 mg by mouth 4 (Four) Times a Day As Needed., Disp: , Rfl:   •  melatonin 5 MG tablet tablet, Take 10 mg by mouth., Disp: , Rfl:   •  metFORMIN ER (GLUCOPHAGE-XR) 500 MG 24 hr tablet, , Disp: , Rfl:   •  metoprolol-hydrochlorothiazide (LOPRESSOR HCT) 50-25 MG per tablet, , Disp: , Rfl:   •  omeprazole (priLOSEC) 40 MG capsule, , Disp: , Rfl:   •  rosuvastatin (CRESTOR) 20 MG tablet, Take 20 mg by mouth Daily., Disp: , Rfl:     Physical Examination:  General Appearance:   Patient is in no distress.  She is well kept and has an average build.   Psychiatric:  Patient with appropriate mood and affect. Alert and oriented to self, time, and place.    Breast, RIGHT:  medium sized, symmetric with the contralateral side.  Breast skin is without erythema, edema, rashes.  There are no visible abnormalities upon inspection during the arm-raising maneuver or with hands on hips in the sitting position. There is no nipple retraction, discharge or nipple/areolar skin changes.There are no masses palpable in the sitting or supine positions.    Breast, LEFT:  medium sized, symmetric with the contralateral side.  Breast skin is without erythema, edema, rashes.  There are no visible abnormalities upon inspection during the arm-raising maneuver or with hands on hips in the sitting position. There is no nipple retraction, discharge or nipple/areolar skin changes.There is a small biopsy scar in the lower outer quadrant but a easily palpable mass in the 1 o'clock position just a little out from the edge of the areola.  It is very close to the skin and there may be a small amount of skin  retraction associated with it but the skin does not appear to be involved by this tumor.    Lymphatic:  There is no axillary, cervical, infraclavicular, or supraclavicular adenopathy bilaterally.  Eyes:  Pupils are round and reactive to light.  Cardiovascular:  Heart rate and rhythm are regular.  Respiratory:  Lungs are clear bilaterally with no crackles or wheezes in any lung field.  Gastrointestinal:  Abdomen is soft, nondistended, and nontender.  There was no evidence of hepatosplenomegaly or abdominal mass.      Musculoskeletal:  Good strength in all 4 extremities.   There is good range of motion in both shoulders. the patient has had bilateral rotator cuff repairs.    Skin:  No new skin lesions or rashes on the skin excluding the breast (see breast exam above).    Assessment:  1. Malignant neoplasm of upper-outer quadrant of left breast in female, estrogen receptor positive          Plan:  The patient was accompanied by her  and daughter.  The face-to-face office visit lasted 1 hour with 50 minutes spent in consultation.    We began the conversation discussing her pathology report.  We talked about the origin of most of breast cancers from either the ducts or the lobules.  The difference between invasive and in situ disease was explained and the visual was drawn for her.  When invasion has occurred, the lymph nodes need to be evaluated.  When there is in situ disease the lymph nodes should be considered if the area of concern is widespread or it is high-grade.  We also discussed the significance of hormone receptors.  They often can give us some idea how the breast cancer will behave and potentially lead us to offer neoadjuvant chemotherapy.    Next we discussed the surgical options which include breast conserving therapy versus a mastectomy.  With breast conserving therapy we are talking about a lumpectomy with margins, lymph node evaluation, and radiation treatment.  The radiation treatment is  generally given to the entire breast for 6 weeks.  The side effects consist of local skin change and potential injury to nearby structures such as the lung or the heart.  A mastectomy would involve removing the breast tissues with preservation of the pectoral muscles and evaluation of the lymph nodes if indicated.  The potential for reconstruction by either an implant or autologous tissue was discussed.  This could be performed on a delayed basis or immediately depending on a multitude of factors.  The survival rates for these 2 procedures are equivalent but there are incidences where one may be favored over the other.  There are times when a lumpectomy is not possible due to the large tumor to breast ratio or the location of the tumor.  Previous radiation to the chest wall or collagen disorders such as scleroderma would make radiation treatment and possible and therefore exclude breast conserving therapy as an option.    I would like to obtain an MRI on her to be certain there is no skin involvement.  I also think it would be wise to check the other breast.  If there are no worrisome findings on this study, the patient would prefer to have breast conserving surgery and I think that would be a good option for her.  We have discussed the need for a sentinel lymph node biopsy.  The patient also understands the small chance of infection, bleeding, anesthesia, and the need to return to surgery for a positive margin.  CPT coding:    Next Appointment:  No Follow-up on file.            EMR Dragon/transcription disclaimer:    Much of this encounter note is an electronic transcription/translocation of spoken language to printed text.  The electronic translation of spoken language may permit erroneous, or at times, nonsensical words or phrases to be inadvertently transcribed.  Although I have reviewed the note from such areas, some may still exist.

## 2018-07-18 NOTE — ANESTHESIA PREPROCEDURE EVALUATION
Anesthesia Evaluation     Patient summary reviewed   history of anesthetic complications: PONV  NPO Solid Status: > 8 hours  NPO Liquid Status: > 2 hours           Airway   Mallampati: II  TM distance: >3 FB  Neck ROM: full  no difficulty expected  Dental      Pulmonary     breath sounds clear to auscultation  (+) asthma,   (-) shortness of breath, not a smoker  Cardiovascular   Exercise tolerance: good (4-7 METS)    Rhythm: regular  Rate: normal    (+) hypertension, hyperlipidemia,   (-) angina, COREA      Neuro/Psych  (+) psychiatric history Anxiety and Depression,     GI/Hepatic/Renal/Endo    (+)  GERD well controlled,  diabetes mellitus,     Musculoskeletal     Abdominal    Substance History      OB/GYN          Other      history of cancer                    Anesthesia Plan    ASA 3     general     intravenous induction   Anesthetic plan and risks discussed with patient and spouse/significant other.

## 2018-07-18 NOTE — ANESTHESIA PROCEDURE NOTES
Airway  Urgency: elective    Date/Time: 7/18/2018 4:40 PM  Airway not difficult    General Information and Staff    Patient location during procedure: OR  Anesthesiologist: GETACHEW JAMES  CRNA: OMA MENDOZA    Indications and Patient Condition  Indications for airway management: airway protection    Preoxygenated: yes  MILS not maintained throughout  Mask difficulty assessment: 0 - not attempted    Final Airway Details  Final airway type: supraglottic airway      Successful airway: unique  Size 3    Number of attempts at approach: 1    Additional Comments  LMA inserted with ease, assist to SV

## 2018-07-18 NOTE — ANESTHESIA POSTPROCEDURE EVALUATION
Patient: Mariana Ansari    Procedure Summary     Date:  07/18/18 Room / Location:  [facility] OR 02 / [facility] MAIN OR    Anesthesia Start:  1629 Anesthesia Stop:  1824    Procedure:  BREAST LUMPECTOMY WITH SENTINEL NODE BIOPSY (Left Breast) Diagnosis:       Malignant neoplasm of upper-outer quadrant of left breast in female, estrogen receptor positive (CMS/HCC)      (Malignant neoplasm of upper-outer quadrant of left breast in female, estrogen receptor positive [C50.412, Z17.0])    Surgeon:  João Zazueta MD Provider:  Jeffery Fournier MD    Anesthesia Type:  general ASA Status:  3          Anesthesia Type: general  Last vitals  BP   128/83 (07/18/18 1931)   Temp   36.5 °C (97.7 °F) (07/18/18 1925)   Pulse   92 (07/18/18 1931)   Resp   16 (07/18/18 1931)     SpO2   94 % (07/18/18 1931)     Post Anesthesia Care and Evaluation    Patient location during evaluation: PHASE II  Patient participation: complete - patient participated  Level of consciousness: awake and alert  Pain management: adequate  Airway patency: patent  Anesthetic complications: No anesthetic complications  PONV Status: none  Cardiovascular status: acceptable  Respiratory status: acceptable  Hydration status: acceptable

## 2018-07-18 NOTE — OP NOTE
Needle Localization Breast lumpectomy, [place] Node Biopsy Procedure Note    Name: Mariana Ansari  Age: 68 y.o.  Sex: female  :  1950  MRN: [id number]      Pre-operative Diagnosis:leftBreast cancer, clinical  stage IIA    Post-operative Diagnosis: Same    Procedure:left  Breast lumpectomy, [place] Node Biopsy with blue dye and radioactive sulfur colloid injection    Surgeon: João Zazueta MD    Assistants:none      Anesthesia: General    Indications: This patient recently was diagnosed with left breast cancer after presenting with a  palpable mass in the upper outer quadrant of the left breast.  Biopsy has revealed invasive ductal cancer and she prefers breast conserving surgery.  The tumor is palpable therefore it will not require needle localization.      Procedure Details   The patient was seen in the Holding Room. The risks, benefits, complications, treatment options, and expected outcomes were discussed with the patient. The possibilities of reaction to medication, pulmonary aspiration, bleeding, infection, the need for additional procedures, failure to diagnose a condition, and creating a complication requiring transfusion or operation were discussed with the patient. The patient concurred with the proposed plan, giving informed consent.  The site of surgery properly noted/marked.      The patient was  taken to the nuclear med department where a radioisotope injection was performed in the periareolar area of the affected breast  in the usual fashion.     The patient was then taken to Operating Room; identified patient as Mariana Ansari  and the procedure verified as left  Breast lumpectomy, with sentinel node biopsy, possible axillary dissection.   A Time Out was held and the above information confirmed.    5 cc of blue dye were injected into the breast near the localization site.     The breast was prepped and draped in standard fashion. Marcaine  0.50% with epinephrine was used to  anesthetize the skin at the site of the guidewire placement and in the axilla.  A total of 30 cc was used.  An axillary incision was made in the area of the hotspot, found with the neoprobe.  1 sentinel node(s) was/were found and removed.  This lymph node was blue and had counts as high as 1600. . No other nodes were found with counts over 160 and there were no other blue nodes.  Frozen section was not performed.   Skin closure was performed in layers with vicryl.     A curvilinear incision was created over the palpable lump.  Dissection was carried down around the palpable tumor.  The tumor was fairly close to the skin but clearly not involving it.  I dissected specimen as close to the skin as I possibly could.  A specimen radiograph confirmed inclusion of the preoperatively identified mammographic lesion/ clip.  Additional shave margins were obtained from areas thought to be close.  This included the medial margin.. Hemostasis was achieved with cautery.  Closure was performed  with interrupted 3-0 Vicryl sutures and a 4-0 Vicryl subcuticular closure.      Steri-Strips were applied. At the end of the operation, all sponge, instrument, and needle counts were correct.    Findings:  There were no unexpected findings.  Estimated Blood Loss:  Minimal           Drains: none          Specimens:   ID Type Source Tests Collected by Time   A : left breast mass Tissue Breast, Left TISSUE PATHOLOGY EXAM oJão Zazueta MD 7/18/2018 1726   B : left breast mass medial margin Tissue Breast, Left TISSUE PATHOLOGY EXAM João Zazueta MD 7/18/2018 1731   C : sentinel node #1 Tissue [facility] Lymph Node TISSUE PATHOLOGY EXAM João Zazueta MD 7/18/2018 4840       Complications:  None; patient tolerated the procedure well.           Condition: stable

## 2018-07-20 ENCOUNTER — TELEPHONE (OUTPATIENT)
Dept: MAMMOGRAPHY | Facility: CLINIC | Age: 68
End: 2018-07-20

## 2018-07-20 NOTE — TELEPHONE ENCOUNTER
I told her the path report showed a 2.5 cm invasive ductal cancer with some associated DCIS.  The closest margin for the invasive cancer is the anterior margin at 1 mm.  I scraped this right off of the skin and really can't take any more in this location.  The DCIS is also 1 mm from the anterior, posterior, and inferior margins.  The path report does not state whether this is high-grade DCIS or not.  I will check with the pathologist about the greater this tumor to decide whether we need to go back for a bigger margin or not.

## 2018-07-24 ENCOUNTER — TELEPHONE (OUTPATIENT)
Dept: MAMMOGRAPHY | Facility: CLINIC | Age: 68
End: 2018-07-24

## 2018-07-24 NOTE — TELEPHONE ENCOUNTER
I spoke with the patient regarding her lumpectomy.  The addended pathology report reveals that the DCIS is intermediate grade.  We have 1 mm margins in multiple areas and even greater ones around the invasive cancer.  The location of the tumor and going back to take additional margins would lead to potential the form of the I think.  Therefore I would be in favor of not taking additional margins we will just send her to the medical oncologists and the radiation oncologist.

## 2018-07-30 ENCOUNTER — OFFICE VISIT (OUTPATIENT)
Dept: MAMMOGRAPHY | Facility: CLINIC | Age: 68
End: 2018-07-30

## 2018-07-30 VITALS
DIASTOLIC BLOOD PRESSURE: 80 MMHG | HEART RATE: 85 BPM | TEMPERATURE: 97.5 F | OXYGEN SATURATION: 97 % | SYSTOLIC BLOOD PRESSURE: 112 MMHG

## 2018-07-30 DIAGNOSIS — C50.412 MALIGNANT NEOPLASM OF UPPER-OUTER QUADRANT OF LEFT BREAST IN FEMALE, ESTROGEN RECEPTOR POSITIVE (HCC): Primary | ICD-10-CM

## 2018-07-30 DIAGNOSIS — Z17.0 MALIGNANT NEOPLASM OF UPPER-OUTER QUADRANT OF LEFT BREAST IN FEMALE, ESTROGEN RECEPTOR POSITIVE (HCC): Primary | ICD-10-CM

## 2018-07-30 PROCEDURE — 99024 POSTOP FOLLOW-UP VISIT: CPT | Performed by: SURGERY

## 2018-07-30 NOTE — PROGRESS NOTES
Chief Complaint: Mariana Ansari is a  68 y.o. female, initially referred by No ref. provider found , who is here today for a postoperative visit.    History of Present Illness:  In the interim,Mariana Ansari has had the following procedure and resultant pathology report: Her lumpectomy reveals a 2.5 cm invasive ductal cancer that is grade 3.  She has associated DCIS that is grade 2.  The closest margin for invasive cancer is the anterior margin which measures a millimeter.  I took this very close to the skin in really don't feel that we can obtain any better margins in this location.  The DCIS was also close at the anterior inferior and posterior margins.  This margin was right at a millimeter.  I do not think that we would benefit from additional margins for the DCIS from the standpoint of cosmesis.  The specimen we already took out was 7 x 6 x 3 cm.    She has noted no redness, warmth,drainage, swelling at the incision site. Denies fever or chills.      Current Outpatient Prescriptions:   •  albuterol (PROAIR RESPICLICK) 108 (90 Base) MCG/ACT inhaler, Inhale 1 puff Every 4 (Four) Hours As Needed for Wheezing or Shortness of Air., Disp: , Rfl:   •  B Complex Vitamins (VITAMIN B COMPLEX PO), Take 1 tablet by mouth Every Night., Disp: , Rfl:   •  cetirizine (zyrTEC) 10 MG tablet, Take 10 mg by mouth Daily., Disp: , Rfl:   •  cholecalciferol (VITAMIN D3) 1000 units tablet, Take 1,000 Units by mouth Every Night., Disp: , Rfl:   •  diphenhydrAMINE (BENADRYL) 25 mg capsule, Take 25 mg by mouth Every 6 (Six) Hours As Needed for Sleep., Disp: , Rfl:   •  escitalopram (LEXAPRO) 20 MG tablet, Take 20 mg by mouth Daily., Disp: , Rfl:   •  fluticasone (FLONASE) 50 MCG/ACT nasal spray, 2 sprays into each nostril Daily., Disp: , Rfl:   •  HYDROcodone-acetaminophen (NORCO) 5-325 MG per tablet, Take 1-2 tablets by mouth Every 4 (Four) Hours As Needed (Pain)., Disp: 10 tablet, Rfl: 0  •  levothyroxine (SYNTHROID, LEVOTHROID) 50  MCG tablet, Take 50 mcg by mouth Every Morning., Disp: , Rfl:   •  loperamide (IMODIUM A-D) 2 MG tablet, Take 2 mg by mouth 4 (Four) Times a Day As Needed for Diarrhea., Disp: , Rfl:   •  melatonin 5 MG tablet tablet, Take 10 mg by mouth At Night As Needed., Disp: , Rfl:   •  metFORMIN ER (GLUCOPHAGE-XR) 500 MG 24 hr tablet, Take 500 mg by mouth 2 (Two) Times a Day., Disp: , Rfl:   •  metoprolol-hydrochlorothiazide (LOPRESSOR HCT) 50-25 MG per tablet, Take 0.5 tablets by mouth Daily., Disp: , Rfl:   •  omeprazole (priLOSEC) 40 MG capsule, Take 40 mg by mouth Daily., Disp: , Rfl:   •  rosuvastatin (CRESTOR) 20 MG tablet, Take 10 mg by mouth Every Night., Disp: , Rfl:   Physical examination  Left breast-I removed the Steri-Strips from both incisions.  They appear to be healing nicely without any signs of infection.  There is some significant firmness around the lumpectomy site in the low bit of bruising.  Assessment:  Left breast cancer status post breast conserving surgery.  The patient has appointments to see the medical and the radiation oncologists.  She appears to be healing quite nicely at this point in time.    Plan:  I will see her back in the office in 2 months.          EMR Dragon/transcription disclaimer:    Much of this encounter note is an electronic transcription/translocation of spoken language to printed text.  The electronic translation of spoken language may permit erroneous, or at times, nonsensical words or phrases to be inadvertently transcribed.  Although I have reviewed the note from such areas, some may still exist.

## 2018-08-06 NOTE — PROGRESS NOTES
Subjective concerned about findings    REASON FOR CONSULTATION:  Stage II a left breast cancer  Provide an opinion on any further workup or treatment                             REQUESTING PHYSICIAN:  Luna Whitfield M.D., João Zazueta M.D.    RECORDS OBTAINED:  Records of the patients history including those obtained from the referring provider were reviewed and summarized in detail.    HISTORY OF PRESENT ILLNESS:  The patient is a 68 y.o. year old female who is here for an opinion about the above issue.    History of Present Illness      Patient is a 68-year-old female with noted in late May 2018 a lump developing in the upper outer quadrant of the left breast without pain or nipple discharge.  This measured approximately 1 cm in size.  Mammogram indicated this asymmetric density in the same region and an ultrasound revealed a 1.8 cm irregular solid mass.  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  Additional history included no previous breast biopsies, a brief period of time with hormonal replacement and no history of breast or ovarian cancer with family.  She was seen by Dr. Zazueta on the 28th an MRI of both breasts was performed July 6.  Within the left anterior one third at the 12:30 position 3 cm from the nipple with irregular enhancing mass measuring 1.6 cm x 1.4 and 2.2 immediately lateral dimension.  There are other findings were seen in the left breast with no evidence of left axillary adenopathy and no findings suspicious for right breast.  The patient proceeded to a left breast lumpectomy July 18, 2018.      Pathologic findings were consistent with a 2.5 cm grade 3 invasive mammary ductal carcinoma with associated DCIS.  The closest margin was 1 mm.  Standish nodes were negative staging of pT2N0.  Dr. Zazueta saw the patient July 20 recognizing the closest margin was the anterior margin having taken this off  the skin with no further removal possible.  There was concern about the need for additional tissue though the addended pathology revealed the DCIS to be intermediate grade.  It was determined not to take additional margins and have her seen by both medical oncology and radiation therapy.  She now presents with her  .  Past Medical History:   Diagnosis Date   • Anxiety    • Anxiety and depression    • Asthma     SEASONAL ALLERGY RELATED   • Cancer of breast (CMS/HCC) 06/2018    LEFT   • Chronic kidney disease     Renal cyst, right kidney w/urology workup in past   • Depression    • Diabetes mellitus (CMS/HCC)     Type 2   • Disease of thyroid gland    • GERD (gastroesophageal reflux disease)    • History of prior pregnancies     x2   • History of transfusion     S/P HYST --AUTOLOGOUS    • Hyperlipidemia    • Hypertension    • IBS (irritable bowel syndrome)    • Seasonal allergies         Past Surgical History:   Procedure Laterality Date   • BREAST LUMPECTOMY WITH SENTINEL NODE BIOPSY Left 7/18/2018    Procedure: BREAST LUMPECTOMY WITH SENTINEL NODE BIOPSY;  Surgeon: João Zazueta MD;  Location: [facility]  Service: General   • HYSTERECTOMY  1989   • KNEE SURGERY Right 2014   • KNEE SURGERY Left 2016   • ROTATOR CUFF REPAIR Left 2009   • ROTATOR CUFF REPAIR Right 2012   • TOE SURGERY  2009    [place]         Current Outpatient Prescriptions on File Prior to Visit   Medication Sig Dispense Refill   • albuterol (PROAIR RESPICLICK) 108 (90 Base) MCG/ACT inhaler Inhale 1 puff Every 4 (Four) Hours As Needed for Wheezing or Shortness of Air.     • B Complex Vitamins (VITAMIN B COMPLEX PO) Take 1 tablet by mouth Every Night.     • cetirizine (zyrTEC) 10 MG tablet Take 10 mg by mouth Daily.     • cholecalciferol (VITAMIN D3) 1000 units tablet Take 1,000 Units by mouth Every Night.     • diphenhydrAMINE (BENADRYL) 25 mg capsule Take 25 mg by mouth Every 6 (Six) Hours As Needed for Sleep.     • escitalopram  (LEXAPRO) 20 MG tablet Take 20 mg by mouth Daily.     • fluticasone (FLONASE) 50 MCG/ACT nasal spray 2 sprays into each nostril Daily.     • HYDROcodone-acetaminophen (NORCO) 5-325 MG per tablet Take 1-2 tablets by mouth Every 4 (Four) Hours As Needed (Pain). 10 tablet 0   • levothyroxine (SYNTHROID, LEVOTHROID) 50 MCG tablet Take 50 mcg by mouth Every Morning.     • loperamide (IMODIUM A-D) 2 MG tablet Take 2 mg by mouth 4 (Four) Times a Day As Needed for Diarrhea.     • melatonin 5 MG tablet tablet Take 10 mg by mouth At Night As Needed.     • metFORMIN ER (GLUCOPHAGE-XR) 500 MG 24 hr tablet Take 500 mg by mouth 2 (Two) Times a Day.     • metoprolol-hydrochlorothiazide (LOPRESSOR HCT) 50-25 MG per tablet Take 0.5 tablets by mouth Daily.     • omeprazole (priLOSEC) 40 MG capsule Take 40 mg by mouth Daily.     • rosuvastatin (CRESTOR) 20 MG tablet Take 10 mg by mouth Every Night.       No current facility-administered medications on file prior to visit.         ALLERGIES:  No Known Allergies     Social History     Social History   • Marital status:      Spouse name: Mahin   • Number of children: 2   • Years of education: College     Occupational History   • Teacher Retired     Social History Main Topics   • Smoking status: Never Smoker   • Smokeless tobacco: Never Used   • Alcohol use 0.6 oz/week     1 Cans of beer per week      Comment: 2-3 a month, social only   • Drug use: No   • Sexual activity: Yes      Comment:      Other Topics Concern   • Not on file        Family History   Problem Relation Age of Onset   • Diabetes Mother    • Heart attack Mother    • Heart failure Mother    • Hyperlipidemia Mother    • Hyperthyroidism Mother         Has surgery   • Hypothyroidism Mother         Later in life   • Asthma Father    • COPD Father    • Heart attack Maternal Uncle    • Heart failure Maternal Uncle    • Depression Maternal Grandmother    • Heart attack Maternal Grandfather    • Heart failure  Maternal Grandfather    • Alcohol abuse Paternal Grandmother    • Alcohol abuse Paternal Grandfather    • No Known Problems Sister    • No Known Problems Daughter    • No Known Problems Son    • Malig Hyperthermia Neg Hx         Review of Systems   Constitutional: Positive for unexpected weight change.        Night sweats, excessive fatigue and weight gain   Respiratory: Positive for cough.    Skin:        Pruritus          Objective     There were no vitals filed for this visit.  No flowsheet data found.    Physical Exam   Constitutional: She is oriented to person, place, and time. She appears well-developed and well-nourished.   HENT:   Head: Normocephalic and atraumatic.   Nose: Nose normal.   Mouth/Throat: Oropharynx is clear and moist.   Eyes: Pupils are equal, round, and reactive to light. Conjunctivae and EOM are normal.   Neck: Normal range of motion. Neck supple.   Cardiovascular: Normal rate, regular rhythm, normal heart sounds and intact distal pulses.    Pulmonary/Chest: Effort normal and breath sounds normal.   Abdominal: Soft. Bowel sounds are normal.   Musculoskeletal: Normal range of motion.   Neurological: She is alert and oriented to person, place, and time.   Skin: Skin is warm and dry.   Psychiatric: She has a normal mood and affect. Her behavior is normal. Judgment and thought content normal.     Breast exam: Patient status post left breast lumpectomy well healing without evidence of inflammation or discharge from wound, status post left sentinel node assessment also without inflammation or discharge from    RECENT LABS:  Hematology WBC   Date Value Ref Range Status   07/11/2018 5.35 4.50 - 10.70 10*3/mm3 Final     RBC   Date Value Ref Range Status   07/11/2018 4.65 3.90 - 5.20 10*6/mm3 Final     Hemoglobin   Date Value Ref Range Status   07/11/2018 13.1 11.9 - 15.5 g/dL Final     Hematocrit   Date Value Ref Range Status   07/11/2018 40.6 35.6 - 45.5 % Final     Platelets   Date Value Ref Range  Status   07/11/2018 254 140 - 500 10*3/mm3 Final          Assessment/Plan      68-year-old female with previous medical history of seasonal allergies, diabetes mellitus type 2, CKD3, hypothyroidism, hyperlipidemia, hypertension, IBS, history of anxiety and depression with recent development of left breast abnormality found by the patient herself.  This led to mammogram ultrasound and stereotactic biopsy confirming invasive ductal carcinoma grade 2 though ER, NV positive HER-2 negative.  Subsequent assessments via surgical review your no additional abnormalities seen on breast MRI and the patient proceeded to lumpectomy July 18, 2018.  Her pathologic findings were consistent with a 2.5 cm grade 3 invasive mammary ductal carcinoma with associated DCIS.  The closest margin was 1 mm.  Preston nodes were negative with staging of pT2N0.  Dr. Zazueta saw the patient July 20 recognizing the closest margin was the anterior margin having taken this off the skin with no further removal possible.  There was concern about the need for additional tissue though the addended pathology revealed the DCIS to be intermediate grade.  It was ultimately determined that further surgery was not necessary.   The patient presents for medical oncology assessment and we discussed potential adjuvant therapy but in particular it is felt the patient requires further genomic assessment with Oncotype DX analysis.  We reviewed this in detail and she understands the additional information that we could obtain from such an approach in making decisions about adjuvant therapy.  Plan:  *Oncotype DX analysis  *Additional laboratory studies-FSH, LH, estradiol,, CA 15-3, CMP, TSH, hemoglobin A1c  *Radiation therapy consultation reschedule this week  * M.D. follow-up in 2-3 weeks .                  Dr. Gabrielle Baxter ([facility])

## 2018-08-07 ENCOUNTER — LAB (OUTPATIENT)
Dept: LAB | Facility: HOSPITAL | Age: 68
End: 2018-08-07

## 2018-08-07 ENCOUNTER — CONSULT (OUTPATIENT)
Dept: ONCOLOGY | Facility: CLINIC | Age: 68
End: 2018-08-07

## 2018-08-07 ENCOUNTER — CLINICAL SUPPORT (OUTPATIENT)
Dept: ONCOLOGY | Facility: CLINIC | Age: 68
End: 2018-08-07

## 2018-08-07 VITALS
DIASTOLIC BLOOD PRESSURE: 82 MMHG | HEIGHT: 63 IN | BODY MASS INDEX: 34.8 KG/M2 | RESPIRATION RATE: 16 BRPM | OXYGEN SATURATION: 96 % | WEIGHT: 196.4 LBS | HEART RATE: 91 BPM | SYSTOLIC BLOOD PRESSURE: 120 MMHG | TEMPERATURE: 98.8 F

## 2018-08-07 DIAGNOSIS — Z17.0 MALIGNANT NEOPLASM OF UPPER-OUTER QUADRANT OF LEFT BREAST IN FEMALE, ESTROGEN RECEPTOR POSITIVE (HCC): Primary | ICD-10-CM

## 2018-08-07 DIAGNOSIS — C50.412 MALIGNANT NEOPLASM OF UPPER-OUTER QUADRANT OF LEFT BREAST IN FEMALE, ESTROGEN RECEPTOR POSITIVE (HCC): Primary | ICD-10-CM

## 2018-08-07 DIAGNOSIS — C50.919 MALIGNANT NEOPLASM OF FEMALE BREAST, UNSPECIFIED ESTROGEN RECEPTOR STATUS, UNSPECIFIED LATERALITY, UNSPECIFIED SITE OF BREAST (HCC): Primary | ICD-10-CM

## 2018-08-07 DIAGNOSIS — E03.4 HYPOTHYROIDISM DUE TO ACQUIRED ATROPHY OF THYROID: ICD-10-CM

## 2018-08-07 DIAGNOSIS — E11.8 TYPE 2 DIABETES MELLITUS WITH COMPLICATION, WITHOUT LONG-TERM CURRENT USE OF INSULIN (HCC): ICD-10-CM

## 2018-08-07 PROBLEM — E11.9 TYPE 2 DIABETES MELLITUS (HCC): Status: ACTIVE | Noted: 2018-08-07

## 2018-08-07 LAB
ALBUMIN SERPL-MCNC: 4.4 G/DL (ref 3.5–5.2)
ALBUMIN/GLOB SERPL: 1.4 G/DL (ref 1.1–2.4)
ALP SERPL-CCNC: 88 U/L (ref 38–116)
ALT SERPL W P-5'-P-CCNC: 51 U/L (ref 0–33)
ANION GAP SERPL CALCULATED.3IONS-SCNC: 15.1 MMOL/L
AST SERPL-CCNC: 46 U/L (ref 0–32)
BASOPHILS # BLD AUTO: 0.03 10*3/MM3 (ref 0–0.1)
BASOPHILS NFR BLD AUTO: 0.6 % (ref 0–1.1)
BILIRUB SERPL-MCNC: 0.6 MG/DL (ref 0.1–1.2)
BUN BLD-MCNC: 19 MG/DL (ref 6–20)
BUN/CREAT SERPL: 18.6 (ref 7.3–30)
CALCIUM SPEC-SCNC: 9.8 MG/DL (ref 8.5–10.2)
CANCER AG15-3 SERPL-ACNC: 10.9 U/ML
CHLORIDE SERPL-SCNC: 98 MMOL/L (ref 98–107)
CO2 SERPL-SCNC: 27.9 MMOL/L (ref 22–29)
CREAT BLD-MCNC: 1.02 MG/DL (ref 0.6–1.1)
DEPRECATED RDW RBC AUTO: 38.8 FL (ref 37–49)
EOSINOPHIL # BLD AUTO: 0.19 10*3/MM3 (ref 0–0.36)
EOSINOPHIL NFR BLD AUTO: 3.9 % (ref 1–5)
ERYTHROCYTE [DISTWIDTH] IN BLOOD BY AUTOMATED COUNT: 12.9 % (ref 11.7–14.5)
FSH SERPL-ACNC: 57.61 MIU/ML
GFR SERPL CREATININE-BSD FRML MDRD: 54 ML/MIN/1.73
GLOBULIN UR ELPH-MCNC: 3.1 GM/DL (ref 1.8–3.5)
GLUCOSE BLD-MCNC: 194 MG/DL (ref 74–124)
HBA1C MFR BLD: 8.72 % (ref 4.8–5.6)
HCT VFR BLD AUTO: 37.2 % (ref 34–45)
HGB BLD-MCNC: 12.6 G/DL (ref 11.5–14.9)
IMM GRANULOCYTES # BLD: 0.03 10*3/MM3 (ref 0–0.03)
IMM GRANULOCYTES NFR BLD: 0.6 % (ref 0–0.5)
LH SERPL-ACNC: 27.46 MIU/ML
LYMPHOCYTES # BLD AUTO: 1.91 10*3/MM3 (ref 1–3.5)
LYMPHOCYTES NFR BLD AUTO: 39.1 % (ref 20–49)
MCH RBC QN AUTO: 28.2 PG (ref 27–33)
MCHC RBC AUTO-ENTMCNC: 33.9 G/DL (ref 32–35)
MCV RBC AUTO: 83.2 FL (ref 83–97)
MONOCYTES # BLD AUTO: 0.37 10*3/MM3 (ref 0.25–0.8)
MONOCYTES NFR BLD AUTO: 7.6 % (ref 4–12)
NEUTROPHILS # BLD AUTO: 2.36 10*3/MM3 (ref 1.5–7)
NEUTROPHILS NFR BLD AUTO: 48.2 % (ref 39–75)
NRBC BLD MANUAL-RTO: 0 /100 WBC (ref 0–0)
PLATELET # BLD AUTO: 246 10*3/MM3 (ref 150–375)
PMV BLD AUTO: 10.4 FL (ref 8.9–12.1)
POTASSIUM BLD-SCNC: 4.1 MMOL/L (ref 3.5–4.7)
PROT SERPL-MCNC: 7.5 G/DL (ref 6.3–8)
RBC # BLD AUTO: 4.47 10*6/MM3 (ref 3.9–5)
SODIUM BLD-SCNC: 141 MMOL/L (ref 134–145)
TSH SERPL DL<=0.05 MIU/L-ACNC: 4.64 MIU/ML (ref 0.27–4.2)
WBC NRBC COR # BLD: 4.89 10*3/MM3 (ref 4–10)

## 2018-08-07 PROCEDURE — 80053 COMPREHEN METABOLIC PANEL: CPT | Performed by: INTERNAL MEDICINE

## 2018-08-07 PROCEDURE — 86300 IMMUNOASSAY TUMOR CA 15-3: CPT | Performed by: INTERNAL MEDICINE

## 2018-08-07 PROCEDURE — 83001 ASSAY OF GONADOTROPIN (FSH): CPT | Performed by: INTERNAL MEDICINE

## 2018-08-07 PROCEDURE — 84443 ASSAY THYROID STIM HORMONE: CPT | Performed by: INTERNAL MEDICINE

## 2018-08-07 PROCEDURE — 99205 OFFICE O/P NEW HI 60 MIN: CPT | Performed by: INTERNAL MEDICINE

## 2018-08-07 PROCEDURE — 83036 HEMOGLOBIN GLYCOSYLATED A1C: CPT | Performed by: INTERNAL MEDICINE

## 2018-08-07 PROCEDURE — 83002 ASSAY OF GONADOTROPIN (LH): CPT | Performed by: INTERNAL MEDICINE

## 2018-08-07 NOTE — PROGRESS NOTES
NAAW met with the patient after her consultation with Dr. Guajardo about treatment for her breast cancer. She said that Dr. Guajardo has requested an oncotype test and more extensive blood work in order to decide on the best course of treatment for her. She has a consult with radiation later in the week, and knows that treatment has been recommended. She hopes that she will not need chemotherapy.     Pt said that she found her lump herself in May. She stated that she had to wait about 2 weeks between each appointment and/or test of the diagnostic process. She realizes why she has to wait for test results, and said it is better than just waiting for an appointment with a doctor. She can see that a decision is close at hand.     Pt has been  to her second  for 20 years. She said he has been exceptional in his encouragement and support of her through this process. He is encouraging, positive and patient. She has 2 children and 1 stepchild, and 8 grandchildren. They said they are all a close family. 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 She loved teaching, and only retired when she did due to some changes in the pension system. She said she and her  travel frequently, visiting [place] the [place]. [place] when she has completed treatment.     Pt is very alert, pleasant and friendly. She said she is relieved that the cancer has been surgically removed. She said she has 3 friends who have all been treated here for various types of cancer. She wanted to come to [facility] because of their positive experience.  services were explained and assistance offered as needed in the future.

## 2018-08-08 LAB — ESTRADIOL SERPL HS-MCNC: 9.9 PG/ML

## 2018-08-09 ENCOUNTER — CONSULT (OUTPATIENT)
Dept: RADIATION ONCOLOGY | Facility: HOSPITAL | Age: 68
End: 2018-08-09

## 2018-08-09 ENCOUNTER — APPOINTMENT (OUTPATIENT)
Dept: RADIATION ONCOLOGY | Facility: HOSPITAL | Age: 68
End: 2018-08-09

## 2018-08-09 VITALS
HEIGHT: 63 IN | OXYGEN SATURATION: 97 % | SYSTOLIC BLOOD PRESSURE: 139 MMHG | TEMPERATURE: 97.9 F | WEIGHT: 195 LBS | HEART RATE: 88 BPM | BODY MASS INDEX: 34.55 KG/M2 | DIASTOLIC BLOOD PRESSURE: 89 MMHG | RESPIRATION RATE: 16 BRPM

## 2018-08-09 DIAGNOSIS — C50.412 MALIGNANT NEOPLASM OF UPPER-OUTER QUADRANT OF LEFT BREAST IN FEMALE, ESTROGEN RECEPTOR POSITIVE (HCC): Primary | ICD-10-CM

## 2018-08-09 DIAGNOSIS — Z17.0 MALIGNANT NEOPLASM OF UPPER-OUTER QUADRANT OF LEFT BREAST IN FEMALE, ESTROGEN RECEPTOR POSITIVE (HCC): Primary | ICD-10-CM

## 2018-08-09 PROCEDURE — G0463 HOSPITAL OUTPT CLINIC VISIT: HCPCS | Performed by: RADIOLOGY

## 2018-08-09 PROCEDURE — 99204 OFFICE O/P NEW MOD 45 MIN: CPT | Performed by: RADIOLOGY

## 2018-08-09 PROCEDURE — 77263 THER RADIOLOGY TX PLNG CPLX: CPT | Performed by: RADIOLOGY

## 2018-08-09 NOTE — PROGRESS NOTES
DIAGNOSIS and REASON FOR CONSULTATION:  Malignant neoplasm of upper-outer quadrant of left breast in female, estrogen receptor positive (CMS/HCC) - for advice and recommendations regarding the diagnosis    Referring Provider:  João Zazueta MD  Patient Care Team:  Luna Whitfield MD as PCP - General (Family Medicine)  Michel Guajardo MD as Consulting Physician (Hematology and Oncology)  Kimberly Nguyen MD as Consulting Physician (Radiation Oncology)  João Zazueta MD as Referring Physician (Breast Surgery)    CHIEF COMPLAINT:  For advice and recommendations regarding Malignant neoplasm of upper-outer quadrant of left breast in female, estrogen receptor positive (CMS/HCC)     HISTORY OF PRESENT ILLNESS:  The patient is a 68 y.o. year old female who presented with a palpable abnormality in the left breast after a normal routine mammogram in August, 2017.  Diagnostic mammogram completed on May 24, 2018 showed an irregular area at the 12 o'clock position of the left breast measuring 1.8 x 1.0 x 1.4 cm with irregular margins.  She underwent ultrasound-guided biopsy of this area on June 7, 2018 which revealed an invasive ductal carcinoma, moderately differentiated measuring 4 mm in greatest dimension.  The tissue was found to be positive at nearly 100% for both estrogen and progesterone receptors and negative for the HER-2/mica oncogene.    She underwent an MRI of the breast on July 6, 2018 which showed the 1.6 x 1.4 x 2.2 cm mass at the 12:30 o'clock position of the left breast with a clip noted along the lateral margin.  No other areas of abnormality were appreciated in the left or right breast and no lymphadenopathy was appreciated.    She went to surgery on July 18, 2018 and underwent a left sided lumpectomy and sentinel lymph node biopsy.  The breast pathology revealed an invasive ductal carcinoma measuring 2.5 cm in greatest dimension, grade 3 with associated ductal carcinoma in situ.  The  margins were all negative though close at 1 mm.  Further medial margin showed no further evidence of malignancy and 0 of 1 sentinel nodes were involved.  Therefore she appeared to have a T2 N0 grade 3 invasive ductal carcinoma.    She has done well postoperatively and has already been to see Dr. Stout for medical oncology opinion and they decided to send off her Oncotype DX testing. She returns to get those results on August 23, 2018.  I was asked to see the patient at the request of the referring provider noted above for advice and recommendations regarding this diagnosis.     Past Medical History: she  has a past medical history of Anxiety; Anxiety and depression; Asthma; Cancer of breast (CMS/HCC) (06/2018); Chronic kidney disease; Depression; Diabetes mellitus (CMS/[facility]); Disease of thyroid gland; GERD (gastroesophageal reflux disease); History of prior pregnancies; History of transfusion; Hyperlipidemia; Hypertension; IBS (irritable bowel syndrome); and Seasonal allergies.    Past Surgical History:  she has a past surgical history that includes Rotator cuff repair (Left, 2009); Rotator cuff repair (Right, 2012); Toe Surgery (2009); Knee surgery (Right, 2014); Knee surgery (Left, 2016); Hysterectomy (1989); and breast lumpectomy with sentinel node biopsy (Left, 7/18/2018).    Meds:    Current Outpatient Prescriptions:   •  albuterol (PROAIR RESPICLICK) 108 (90 Base) MCG/ACT inhaler, Inhale 1 puff Every 4 (Four) Hours As Needed for Wheezing or Shortness of Air., Disp: , Rfl:   •  B Complex Vitamins (VITAMIN B COMPLEX PO), Take 1 tablet by mouth Every Night., Disp: , Rfl:   •  cetirizine (zyrTEC) 10 MG tablet, Take 10 mg by mouth Daily., Disp: , Rfl:   •  cholecalciferol (VITAMIN D3) 1000 units tablet, Take 1,000 Units by mouth Every Night., Disp: , Rfl:   •  diphenhydrAMINE (BENADRYL) 25 mg capsule, Take 25 mg by mouth Every 6 (Six) Hours As Needed for Sleep., Disp: , Rfl:   •  escitalopram (LEXAPRO) 20 MG  tablet, Take 20 mg by mouth Daily., Disp: , Rfl:   •  fluticasone (FLONASE) 50 MCG/ACT nasal spray, 2 sprays into each nostril Daily., Disp: , Rfl:   •  HYDROcodone-acetaminophen (NORCO) 5-325 MG per tablet, Take 1-2 tablets by mouth Every 4 (Four) Hours As Needed (Pain)., Disp: 10 tablet, Rfl: 0  •  levothyroxine (SYNTHROID, LEVOTHROID) 50 MCG tablet, Take 50 mcg by mouth Every Morning., Disp: , Rfl:   •  loperamide (IMODIUM A-D) 2 MG tablet, Take 2 mg by mouth 4 (Four) Times a Day As Needed for Diarrhea., Disp: , Rfl:   •  melatonin 5 MG tablet tablet, Take 10 mg by mouth At Night As Needed., Disp: , Rfl:   •  metFORMIN ER (GLUCOPHAGE-XR) 500 MG 24 hr tablet, Take 500 mg by mouth 2 (Two) Times a Day., Disp: , Rfl:   •  metoprolol-hydrochlorothiazide (LOPRESSOR HCT) 50-25 MG per tablet, Take 0.5 tablets by mouth Daily., Disp: , Rfl:   •  omeprazole (priLOSEC) 40 MG capsule, Take 40 mg by mouth Daily., Disp: , Rfl:   •  rosuvastatin (CRESTOR) 20 MG tablet, Take 10 mg by mouth Every Night., Disp: , Rfl:     Allergies:  No Known Allergies    Family History:  her family history includes Alcohol abuse in her paternal grandfather and paternal grandmother; Asthma in her father; COPD in her father; Depression in her maternal grandmother; Diabetes in her mother; Heart attack in her maternal grandfather, maternal uncle, and mother; Heart failure in her maternal grandfather, maternal uncle, and mother; Hyperlipidemia in her mother; Hyperthyroidism in her mother; Hypothyroidism in her mother; No Known Problems in her daughter, sister, and son.    Social History:  she  reports that she has never smoked. She has never used smokeless tobacco. She reports that she drinks about 0.6 oz of alcohol per week . She reports that she does not use drugs.    Pertinent Findings on   Review of Systems   Constitutional: Positive for diaphoresis, fatigue and unexpected weight change. Negative for appetite change, chills and fever.   HENT:    "Negative for hearing loss, lump/mass, mouth sores, nosebleeds, sore throat, tinnitus, trouble swallowing and voice change.    Eyes: Negative for eye problems and icterus.   Respiratory: Positive for cough and shortness of breath. Negative for chest tightness, hemoptysis and wheezing.    Cardiovascular: Negative for chest pain, leg swelling and palpitations.   Gastrointestinal: Positive for diarrhea. Negative for abdominal distention, abdominal pain, blood in stool, constipation, nausea, rectal pain and vomiting.   Endocrine: Negative for hot flashes.   Genitourinary: Negative for bladder incontinence, difficulty urinating, dyspareunia, dysuria, frequency, hematuria, menstrual problem, nocturia, pelvic pain, vaginal bleeding and vaginal discharge.    Musculoskeletal: Negative for arthralgias, back pain, flank pain, gait problem, myalgias, neck pain and neck stiffness.   Skin: Negative for itching, rash and wound.   Neurological: Negative for dizziness, extremity weakness, gait problem, headaches, light-headedness, numbness, seizures and speech difficulty.   Hematological: Negative for adenopathy. Does not bruise/bleed easily.   Psychiatric/Behavioral: Negative for confusion, decreased concentration, depression, sleep disturbance and suicidal ideas. The patient is not nervous/anxious.    :  Vitals:    08/09/18 0901   BP: 139/89   Pulse: 88   Resp: 16   Temp: 97.9 °F (36.6 °C)   SpO2: 97%   Weight: 88.5 kg (195 lb)   Height: 160 cm (63\")   PainSc:   2       Performance Status: (0) Fully active, able to carry on all predisease performance without restriction    Pertinent Findings on:  Physical Exam   Constitutional: She appears well-developed and well-nourished. No distress.   HENT:   Head: Normocephalic.   Neck: Normal range of motion. Neck supple.   Pulmonary/Chest: She exhibits no mass and no tenderness. Right breast exhibits no inverted nipple, no mass, no nipple discharge, no skin change and no tenderness. Left " breast exhibits no inverted nipple, no mass, no nipple discharge, no skin change and no tenderness.   Right breast is without abnormality as is the right axilla. The left breast shows a healing lumpectomy incision over the outer central aspect of the breast.  There is a significant fluid collection underlying but no warmth or erythema. There is no skin or nipple abnormality.   Musculoskeletal: Normal range of motion.   Lymphadenopathy:     She has no cervical adenopathy.     She has no axillary adenopathy.        Right axillary: No pectoral adenopathy present.        Left axillary: No pectoral adenopathy present.       Right: No supraclavicular adenopathy present.        Left: No supraclavicular adenopathy present.   Axillary incision is healing well. There is no palpable axillary, supraclavicular nor cervical lymphadenopathy appreciated. No lymphedema is noted in either upper extremity.   Skin: Skin is intact. She is not diaphoretic.   Psychiatric: She has a normal mood and affect. Her speech is normal and behavior is normal. Judgment and thought content normal. Cognition and memory are normal.       Assessment:   1. Malignant neoplasm of upper-outer quadrant of left breast in female, estrogen receptor positive (CMS/HCC)       This assessment comes from my review of the imaging, pathology, physician notes and other pertinent information as mentioned.    Plan:     We reviewed the specifics of her clinical, imaging and pathology findings today and also talked through the role of radiation therapy in her breast conservation treatment. We discussed the possibility of chemotherapy and the need to await the Oncotype DX testing results prior to finalizing our plans and she expressed understanding.    I recommended, given the above, that we embark on a course of postoperative whole breast radiation therapy consisting of 4256 cGy aimed at the breast given over 16 treatments. We will then plan on boosting the tumor bed  region as I will delineate it on her treatment planning scan with an additional 1000 cGy given in five treatments. The above course of treatment should be completed in 4 1/2 weeks.    We discussed the specifics, logistics and side effects of this course of treatment  which may involve acutely, irritation of the skin, including erythema and possibly moist desquamation, tenderness of the musculature of the chest wall and mild fatigue. We discussed the long term possibility of mild hyperpigmentation and fibrosis of the breast, mild increased incidence of rib fracture and radiation pneumonitis.  We also discussed the importance of our treatment planning process in protecting these underlying structures as much as possible, specifically heart, ribs and lung and I believe all her questions were answered to her satisfaction.      I asked her to stop by the department after her visit with Dr. Guajardo and if no chemo is planned, we can begin our treatment planning process then.    I spent greater than 45 minutes in face-to-face time with the patient and 30 minutes of that time was spent in counseling and coordination of care, including review of imaging and pathology; prognosis and differential diagnosis; indications, goals, logistics, benefits and risks of treatment as well as alternatives and surveillance options.

## 2018-08-15 LAB
CYTO UR: NORMAL
LAB AP CASE REPORT: NORMAL
LAB AP CLINICAL INFORMATION: NORMAL
LAB AP INTRADEPARTMENTAL CONSULT: NORMAL
LAB AP SYNOPTIC CHECKLIST: NORMAL
Lab: NORMAL
Lab: NORMAL
PATH REPORT.ADDENDUM SPEC: NORMAL
PATH REPORT.FINAL DX SPEC: NORMAL
PATH REPORT.GROSS SPEC: NORMAL

## 2018-08-23 ENCOUNTER — APPOINTMENT (OUTPATIENT)
Dept: LAB | Facility: HOSPITAL | Age: 68
End: 2018-08-23

## 2018-08-23 ENCOUNTER — OFFICE VISIT (OUTPATIENT)
Dept: ONCOLOGY | Facility: CLINIC | Age: 68
End: 2018-08-23

## 2018-08-23 VITALS
OXYGEN SATURATION: 98 % | TEMPERATURE: 98.5 F | RESPIRATION RATE: 18 BRPM | BODY MASS INDEX: 34.55 KG/M2 | HEIGHT: 63 IN | WEIGHT: 195 LBS | DIASTOLIC BLOOD PRESSURE: 76 MMHG | HEART RATE: 80 BPM | SYSTOLIC BLOOD PRESSURE: 144 MMHG

## 2018-08-23 DIAGNOSIS — C50.412 MALIGNANT NEOPLASM OF UPPER-OUTER QUADRANT OF LEFT BREAST IN FEMALE, ESTROGEN RECEPTOR POSITIVE (HCC): Primary | ICD-10-CM

## 2018-08-23 DIAGNOSIS — E28.39 OTHER PRIMARY OVARIAN FAILURE: ICD-10-CM

## 2018-08-23 DIAGNOSIS — Z17.0 MALIGNANT NEOPLASM OF UPPER-OUTER QUADRANT OF LEFT BREAST IN FEMALE, ESTROGEN RECEPTOR POSITIVE (HCC): Primary | ICD-10-CM

## 2018-08-23 DIAGNOSIS — E03.4 HYPOTHYROIDISM DUE TO ACQUIRED ATROPHY OF THYROID: ICD-10-CM

## 2018-08-23 DIAGNOSIS — Z79.899 HIGH RISK MEDICATION USE: ICD-10-CM

## 2018-08-23 PROCEDURE — G0463 HOSPITAL OUTPT CLINIC VISIT: HCPCS | Performed by: INTERNAL MEDICINE

## 2018-08-23 PROCEDURE — 77333 RADIATION TREATMENT AID(S): CPT | Performed by: RADIOLOGY

## 2018-08-23 PROCEDURE — 77290 THER RAD SIMULAJ FIELD CPLX: CPT | Performed by: RADIOLOGY

## 2018-08-23 PROCEDURE — 99214 OFFICE O/P EST MOD 30 MIN: CPT | Performed by: INTERNAL MEDICINE

## 2018-08-23 RX ORDER — ANASTROZOLE 1 MG/1
1 TABLET ORAL DAILY
Qty: 30 TABLET | Refills: 2 | Status: SHIPPED | OUTPATIENT
Start: 2018-08-23 | End: 2018-09-24 | Stop reason: SINTOL

## 2018-08-23 NOTE — PROGRESS NOTES
Subjective anxious about findings    REASON FOR CONSULTATION:  Stage II a left breast cancer  Provide an opinion on any further workup or treatment                             REQUESTING PHYSICIAN:  Lnua Whitfield M.D., João Zazueta M.D.    RECORDS OBTAINED:  Records of the patients history including those obtained from the referring provider were reviewed and summarized in detail.      History of Present Illness      Patient is a 68-year-old female with noted in late May 2018 a lump developing in the upper outer quadrant of the left breast without pain or nipple discharge.  This measured approximately 1 cm in size.  Mammogram indicated this asymmetric density in the same region and an ultrasound revealed a 1.8 cm irregular solid mass.  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  Additional history included no previous breast biopsies, a brief period of time with hormonal replacement and no history of breast or ovarian cancer with family.  She was seen by Dr. Zazueta on the 28th an MRI of both breasts was performed July 6.  Within the left anterior one third at the 12:30 position 3 cm from the nipple with irregular enhancing mass measuring 1.6 cm x 1.4 and 2.2 immediately lateral dimension.  There are other findings were seen in the left breast with no evidence of left axillary adenopathy and no findings suspicious for right breast.  The patient proceeded to a left breast lumpectomy July 18, 2018.      Pathologic findings were consistent with a 2.5 cm grade 3 invasive mammary ductal carcinoma with associated DCIS.  The closest margin was 1 mm.  Twisp nodes were negative staging of pT2N0.  Dr. Zazueta saw the patient July 20 recognizing the closest margin was the anterior margin having taken this off the skin with no further removal possible.  There was concern about the need for additional tissue though the addended  pathology revealed the DCIS to be intermediate grade.  It was determined not to take additional margins and have her seen by both medical oncology and radiation therapy.  She now presents with her  .      The patient's initial consultation was August 7 and potential adjuvant therapy discussed with the initial recommendation being an Oncotype DX analysis to be process.  This is now reviewed with the patient and her  may return August 23, 2018.  Her recurrence score 10 with 10 year risk of distance recurrence at 7%.  She is clearly in the low risk category additional studies revealing ER score of 10.5, MS score of 8.6 which are both positive and HER-2 score of 8.7 which is negative.   Additional studies include  LH of 27.5, FSH of 57.6, estradiol of 9.9, CA 15-3 of 10.9, TSH of 4.64 hemoglobin A1c of 8.72.   The patient is advised of the findings per her risk of recurrence and she and her  are understandably elated.  Chemotherapy is not recommended in her circumstance but anti-hormonal therapy is and we have discussed potential treatment with AI therapy being the most appropriate choice in this postmenopausal patient.  She's not additionally had any recent assessment for bone density, however we discussed having this done in the near future.   Past Medical History:   Diagnosis Date   • Anxiety    • Anxiety and depression    • Asthma     SEASONAL ALLERGY RELATED   • Cancer of breast (CMS/HCC) 06/2018    LEFT   • Chronic kidney disease     Renal cyst, right kidney w/urology workup in past   • Depression    • Diabetes mellitus (CMS/HCC)     Type 2   • Disease of thyroid gland    • GERD (gastroesophageal reflux disease)    • History of prior pregnancies     x2   • History of transfusion     S/P HYST --AUTOLOGOUS    • Hyperlipidemia    • Hypertension    • IBS (irritable bowel syndrome)    • Seasonal allergies         Past Surgical History:   Procedure Laterality Date   • BREAST LUMPECTOMY WITH SENTINEL  NODE BIOPSY Left 7/18/2018    Procedure: BREAST LUMPECTOMY WITH SENTINEL NODE BIOPSY;  Surgeon: João Zazueta MD;  Location: [facility] OR;  Service: General   • HYSTERECTOMY  1989   • KNEE SURGERY Right 2014   • KNEE SURGERY Left 2016   • ROTATOR CUFF REPAIR Left 2009   • ROTATOR CUFF REPAIR Right 2012   • TOE SURGERY  2009    ingrown         Current Outpatient Prescriptions on File Prior to Visit   Medication Sig Dispense Refill   • albuterol (PROAIR RESPICLICK) 108 (90 Base) MCG/ACT inhaler Inhale 1 puff Every 4 (Four) Hours As Needed for Wheezing or Shortness of Air.     • B Complex Vitamins (VITAMIN B COMPLEX PO) Take 1 tablet by mouth Every Night.     • cetirizine (zyrTEC) 10 MG tablet Take 10 mg by mouth Daily.     • cholecalciferol (VITAMIN D3) 1000 units tablet Take 1,000 Units by mouth Every Night.     • diphenhydrAMINE (BENADRYL) 25 mg capsule Take 25 mg by mouth Every 6 (Six) Hours As Needed for Sleep.     • escitalopram (LEXAPRO) 20 MG tablet Take 20 mg by mouth Daily.     • fluticasone (FLONASE) 50 MCG/ACT nasal spray 2 sprays into each nostril Daily.     • HYDROcodone-acetaminophen (NORCO) 5-325 MG per tablet Take 1-2 tablets by mouth Every 4 (Four) Hours As Needed (Pain). 10 tablet 0   • levothyroxine (SYNTHROID, LEVOTHROID) 50 MCG tablet Take 50 mcg by mouth Every Morning.     • loperamide (IMODIUM A-D) 2 MG tablet Take 2 mg by mouth 4 (Four) Times a Day As Needed for Diarrhea.     • melatonin 5 MG tablet tablet Take 10 mg by mouth At Night As Needed.     • metFORMIN ER (GLUCOPHAGE-XR) 500 MG 24 hr tablet Take 500 mg by mouth 2 (Two) Times a Day.     • metoprolol-hydrochlorothiazide (LOPRESSOR HCT) 50-25 MG per tablet Take 0.5 tablets by mouth Daily.     • omeprazole (priLOSEC) 40 MG capsule Take 40 mg by mouth Daily.     • rosuvastatin (CRESTOR) 20 MG tablet Take 10 mg by mouth Every Night.       No current facility-administered medications on file prior to visit.         ALLERGIES:  No  "Known Allergies     Social History     Social History   • Marital status:      Spouse name: Mahin   • Number of children: 2   • Years of education: College     Occupational History   • Teacher [employer]     Social History Main Topics   • Smoking status: Never Smoker   • Smokeless tobacco: Never Used   • Alcohol use 0.6 oz/week     1 Cans of beer per week      Comment: 2-3 a month, social only   • Drug use: No   • Sexual activity: Yes      Comment:      Other Topics Concern   • Not on file        Family History   Problem Relation Age of Onset   • Diabetes Mother    • Heart attack Mother    • Heart failure Mother    • Hyperlipidemia Mother    • Hyperthyroidism Mother         Has surgery   • Hypothyroidism Mother         Later in life   • Heart disease Mother    • Hypertension Mother    • Asthma Father    • COPD Father    • Hypertension Father    • Heart attack Maternal Uncle    • Heart failure Maternal Uncle    • Depression Maternal Grandmother    • Heart attack Maternal Grandfather    • Heart failure Maternal Grandfather    • Alcohol abuse Paternal Grandmother    • Alcohol abuse Paternal Grandfather    • Scleroderma Sister    • No Known Problems Daughter    • No Known Problems Son    • Malig Hyperthermia Neg Hx         Review of Systems   Constitutional: Positive for unexpected weight change.        Night sweats, excessive fatigue and weight gain   Respiratory: Positive for cough.    Skin:        Pruritus          Objective     Vitals:    08/23/18 1343   BP: 144/76   Pulse: 80   Resp: 18   Temp: 98.5 °F (36.9 °C)   TempSrc: Oral   SpO2: 98%   Weight: 88.5 kg (195 lb)   Height: 160 cm (62.99\")   PainSc: 0-No pain     Current Status 8/23/2018   ECOG score 0       Physical Exam   Constitutional: She is oriented to person, place, and time. She appears well-developed and well-nourished.   HENT:   Head: Normocephalic and atraumatic.   Nose: Nose normal.   Mouth/Throat: Oropharynx is clear " and moist.   Eyes: Pupils are equal, round, and reactive to light. Conjunctivae and EOM are normal.   Neck: Normal range of motion. Neck supple.   Cardiovascular: Normal rate, regular rhythm, normal heart sounds and intact distal pulses.    Pulmonary/Chest: Effort normal and breath sounds normal.   Abdominal: Soft. Bowel sounds are normal.   Musculoskeletal: Normal range of motion.   Neurological: She is alert and oriented to person, place, and time.   Skin: Skin is warm and dry.   Psychiatric: She has a normal mood and affect. Her behavior is normal. Judgment and thought content normal.     Breast exam: Patient status post left breast lumpectomy well healing without evidence of inflammation or discharge from wound, status post left sentinel node assessment also without inflammation or discharge from    RECENT LABS:  Hematology WBC   Date Value Ref Range Status   08/07/2018 4.89 4.00 - 10.00 10*3/mm3 Final     RBC   Date Value Ref Range Status   08/07/2018 4.47 3.90 - 5.00 10*6/mm3 Final     Hemoglobin   Date Value Ref Range Status   08/07/2018 12.6 11.5 - 14.9 g/dL Final     Hematocrit   Date Value Ref Range Status   08/07/2018 37.2 34.0 - 45.0 % Final     Platelets   Date Value Ref Range Status   08/07/2018 246 150 - 375 10*3/mm3 Final          Assessment/Plan      68-year-old female with previous medical history of seasonal allergies, diabetes mellitus type 2, CKD3, hypothyroidism, hyperlipidemia, hypertension, IBS, history of anxiety and depression with recent development of left breast abnormality found by the patient herself.  This led to mammogram ultrasound and stereotactic biopsy confirming invasive ductal carcinoma grade 2 though ER, WI positive HER-2 negative.  Subsequent assessments via surgical review your no additional abnormalities seen on breast MRI and the patient proceeded to lumpectomy July 18, 2018.  Her pathologic findings were consistent with a 2.5 cm grade 3 invasive mammary ductal carcinoma  with associated DCIS.  The closest margin was 1 mm.  Bergoo nodes were negative with staging of pT2N0.  Dr. Zazueta saw the patient July 20 recognizing the closest margin was the anterior margin having taken this off the skin with no further removal possible.  There was concern about the need for additional tissue though the addended pathology revealed the DCIS to be intermediate grade.  It was ultimately determined that further surgery was not necessary.   The patient presents for medical oncology assessment and we discussed potential adjuvant therapy but in particular it is felt the patient requires further genomic assessment with Oncotype DX analysis.  We reviewed this in detail and she understands the additional information that we could obtain from such an approach in making decisions about adjuvant therapy. We proceeded with additional assessment including Oncotype and laboratory studies that, fortunate, revealed that she has an excellent prognosis including a 7% risk of recurrence at 10 years with the use of tamoxifen.  Chemotherapy, therefore, is not appropriate and we have discussed an alternative therapy in this postmenopausal patient with AI therapy in particular her Arimidex over 5 years.  She is currently undergoing review by radiation therapy and this can proceed at any point as we also plan to initiate Arimidex with a trial of the medication given over the next month.  Plan:  *Arimidex 1 mg by mouth daily E scribed to pharmacy  *Bone density in 2 weeks  *M.D. review in 4 weeks.

## 2018-09-06 ENCOUNTER — HOSPITAL ENCOUNTER (OUTPATIENT)
Dept: BONE DENSITY | Facility: HOSPITAL | Age: 68
Discharge: HOME | End: 2018-09-06
Attending: INTERNAL MEDICINE | Admitting: INTERNAL MEDICINE

## 2018-09-06 ENCOUNTER — APPOINTMENT (OUTPATIENT)
Dept: RADIATION ONCOLOGY | Facility: HOSPITAL | Age: 68
End: 2018-09-06

## 2018-09-06 DIAGNOSIS — Z79.899 HIGH RISK MEDICATION USE: ICD-10-CM

## 2018-09-06 DIAGNOSIS — E28.39 OTHER PRIMARY OVARIAN FAILURE: ICD-10-CM

## 2018-09-06 PROCEDURE — 77427 RADIATION TX MANAGEMENT X5: CPT | Performed by: RADIOLOGY

## 2018-09-06 PROCEDURE — 77412 RADIATION TX DELIVERY LVL 3: CPT | Performed by: RADIOLOGY

## 2018-09-06 PROCEDURE — 77300 RADIATION THERAPY DOSE PLAN: CPT | Performed by: RADIOLOGY

## 2018-09-06 PROCEDURE — 77334 RADIATION TREATMENT AID(S): CPT | Performed by: RADIOLOGY

## 2018-09-06 PROCEDURE — 77295 3-D RADIOTHERAPY PLAN: CPT | Performed by: RADIOLOGY

## 2018-09-06 PROCEDURE — 77080 DXA BONE DENSITY AXIAL: CPT

## 2018-09-07 PROCEDURE — 77412 RADIATION TX DELIVERY LVL 3: CPT | Performed by: RADIOLOGY

## 2018-09-10 PROCEDURE — 77412 RADIATION TX DELIVERY LVL 3: CPT | Performed by: RADIOLOGY

## 2018-09-11 ENCOUNTER — RADIATION ONCOLOGY WEEKLY ASSESSMENT (OUTPATIENT)
Dept: RADIATION ONCOLOGY | Facility: HOSPITAL | Age: 68
End: 2018-09-11

## 2018-09-11 VITALS
OXYGEN SATURATION: 96 % | WEIGHT: 200 LBS | BODY MASS INDEX: 35.44 KG/M2 | HEIGHT: 63 IN | SYSTOLIC BLOOD PRESSURE: 133 MMHG | TEMPERATURE: 99.9 F | HEART RATE: 79 BPM | DIASTOLIC BLOOD PRESSURE: 85 MMHG

## 2018-09-11 DIAGNOSIS — C50.412 MALIGNANT NEOPLASM OF UPPER-OUTER QUADRANT OF LEFT BREAST IN FEMALE, ESTROGEN RECEPTOR POSITIVE (HCC): Primary | ICD-10-CM

## 2018-09-11 DIAGNOSIS — Z17.0 MALIGNANT NEOPLASM OF UPPER-OUTER QUADRANT OF LEFT BREAST IN FEMALE, ESTROGEN RECEPTOR POSITIVE (HCC): Primary | ICD-10-CM

## 2018-09-11 PROCEDURE — 77412 RADIATION TX DELIVERY LVL 3: CPT | Performed by: RADIOLOGY

## 2018-09-11 NOTE — PROGRESS NOTES
"Physician Weekly Management Note    Diagnosis:     1. Malignant neoplasm of upper-outer quadrant of left breast in female, estrogen receptor positive (CMS/HCC)      Stage IIA (cT2, cN0(sn), cM0, G3, ER: Positive, TX: Positive, HER2: Negative)    Reason for Visit: Tx: 4/21  Concurrent Chemo:   No    Notes on Treatment course, Films, Medical progress and Plan:  Doing well. HR high today. Corrected but she will check it tonight and asked Anastasia to check it tomorrow as well. Going well here. No problems or questions, cont on.    ROS - Other than as listed above, as follow:  Constitutional - Normal - Denies lack of appetite, fatigue, fever, night sweats and change in weight.  Neck - Normal - Denies neck masses, muscle weakness, neck pain, decreased range of motion and swelling of the neck.  Breasts - Normal - Denies breast masses, nipple discharge, nipple inversion and pain.  Respiratory - Normal - Denies cough, dyspnea, hemoptysis, hiccoughs, pleuritic chest pain and wheezing.  Gastrointestinal - Normal - Denies abdominal pain, constipation, diarrhea, heartburn / dyspepsia, hematemesis, hemorrhoids, melena / GI bleeding, nausea, pain / cramping, satiety and vomiting.  Musculoskeletal - Normal - Denies arthritis, bone pain, joint pain, muscle weakness and decreased range of motion.   Hematologic/Lymphatic - Normal - Denies easy bruising and tender or enlarged lymph nodes.    PYHSICAL EXAM - Other than listed above, as follows:  Vitals:    09/11/18 1536   BP: 133/85   Pulse: 79   Temp: 99.9 °F (37.7 °C)   TempSrc: Oral   SpO2: 96%   Weight: 90.7 kg (200 lb)   Height: 160 cm (62.99\")   PainSc: 0-No pain       Constitutional - Normal - No evidence of impaired alertness, inadequate appearance, premature or advanced chronologic age, uncooperativeness, altered mood and affect and disorientation.  Neck - Normal - No evidence of tender or enlarged lymph nodes, neck abnormalities, restricted range of motion and enlarged thyroid " "gland.  Breasts - Normal - No change in nipple or incision site.  Chest - Normal - No evidence of chest abnormalities and tender or enlarged lymph nodes.  Hematologic/Lymphatic -  Normal - No evidence of tender or enlarged axillae lymph nodes and tender or enlarged neck lymph nodes.    Performance Status: (1) Restricted in physically strenuous activity, ambulatory and able to do work of light nature  Problem added:  No problems updated.  Medications added: No orders of the defined types were placed in this encounter.    Ancillary referrals made: No orders of the defined types were placed in this encounter.      Technical aspects reviewed:  Weekly OBI approved if applicable? Yes  Weekly port films approved?   Yes  Change requests noted if applicable? Yes  Patient setup and plan reviewed?  Yes    Chart Reviewed:  Continue current treatment plan?   Yes  Treatment plan change requested?  No    I have reviewed and marked as \"reviewed\" the current medications, allergies and problem list in the patients EMR.    I have reviewed the patient's medical, surgical  history in detail, reviewed any pertinent lab work  and updated the computerized patient record if needed.    Patient's Care Team:  Patient Care Team:  Luna Whitfield MD as PCP - General (Family Medicine)  Michel Guajardo MD as Consulting Physician (Hematology and Oncology)  Kimberly Nguyen MD as Consulting Physician (Radiation Oncology)  João Zazueta MD as Referring Physician (Breast Surgery)    Seen and approved by:  Kimberly Nguyen MD, 09/11/2018  "

## 2018-09-12 PROCEDURE — 77336 RADIATION PHYSICS CONSULT: CPT | Performed by: RADIOLOGY

## 2018-09-12 PROCEDURE — 77412 RADIATION TX DELIVERY LVL 3: CPT | Performed by: RADIOLOGY

## 2018-09-13 PROCEDURE — 77427 RADIATION TX MANAGEMENT X5: CPT | Performed by: RADIOLOGY

## 2018-09-13 PROCEDURE — 77417 THER RADIOLOGY PORT IMAGE(S): CPT | Performed by: RADIOLOGY

## 2018-09-13 PROCEDURE — 77412 RADIATION TX DELIVERY LVL 3: CPT | Performed by: RADIOLOGY

## 2018-09-14 PROCEDURE — 77412 RADIATION TX DELIVERY LVL 3: CPT | Performed by: RADIOLOGY

## 2018-09-17 PROCEDURE — 77412 RADIATION TX DELIVERY LVL 3: CPT | Performed by: RADIOLOGY

## 2018-09-18 ENCOUNTER — RADIATION ONCOLOGY WEEKLY ASSESSMENT (OUTPATIENT)
Dept: RADIATION ONCOLOGY | Facility: HOSPITAL | Age: 68
End: 2018-09-18

## 2018-09-18 VITALS
TEMPERATURE: 98.1 F | WEIGHT: 195 LBS | HEART RATE: 76 BPM | DIASTOLIC BLOOD PRESSURE: 86 MMHG | OXYGEN SATURATION: 96 % | BODY MASS INDEX: 34.55 KG/M2 | SYSTOLIC BLOOD PRESSURE: 129 MMHG | HEIGHT: 63 IN

## 2018-09-18 DIAGNOSIS — C50.412 MALIGNANT NEOPLASM OF UPPER-OUTER QUADRANT OF LEFT BREAST IN FEMALE, ESTROGEN RECEPTOR POSITIVE (HCC): Primary | ICD-10-CM

## 2018-09-18 DIAGNOSIS — Z17.0 MALIGNANT NEOPLASM OF UPPER-OUTER QUADRANT OF LEFT BREAST IN FEMALE, ESTROGEN RECEPTOR POSITIVE (HCC): Primary | ICD-10-CM

## 2018-09-18 PROCEDURE — 77412 RADIATION TX DELIVERY LVL 3: CPT | Performed by: RADIOLOGY

## 2018-09-19 PROCEDURE — 77412 RADIATION TX DELIVERY LVL 3: CPT | Performed by: RADIOLOGY

## 2018-09-19 PROCEDURE — 77336 RADIATION PHYSICS CONSULT: CPT | Performed by: RADIOLOGY

## 2018-09-20 PROCEDURE — 77412 RADIATION TX DELIVERY LVL 3: CPT | Performed by: RADIOLOGY

## 2018-09-20 PROCEDURE — 77417 THER RADIOLOGY PORT IMAGE(S): CPT | Performed by: RADIOLOGY

## 2018-09-20 PROCEDURE — 77427 RADIATION TX MANAGEMENT X5: CPT | Performed by: RADIOLOGY

## 2018-09-21 PROCEDURE — 77412 RADIATION TX DELIVERY LVL 3: CPT | Performed by: RADIOLOGY

## 2018-09-24 ENCOUNTER — OFFICE VISIT (OUTPATIENT)
Dept: ONCOLOGY | Facility: CLINIC | Age: 68
End: 2018-09-24

## 2018-09-24 ENCOUNTER — LAB (OUTPATIENT)
Dept: LAB | Facility: HOSPITAL | Age: 68
End: 2018-09-24

## 2018-09-24 VITALS
WEIGHT: 194.6 LBS | OXYGEN SATURATION: 96 % | RESPIRATION RATE: 16 BRPM | HEART RATE: 69 BPM | BODY MASS INDEX: 34.48 KG/M2 | SYSTOLIC BLOOD PRESSURE: 124 MMHG | HEIGHT: 63 IN | DIASTOLIC BLOOD PRESSURE: 84 MMHG | TEMPERATURE: 99.2 F

## 2018-09-24 DIAGNOSIS — C50.412 MALIGNANT NEOPLASM OF UPPER-OUTER QUADRANT OF LEFT BREAST IN FEMALE, ESTROGEN RECEPTOR POSITIVE (HCC): Primary | ICD-10-CM

## 2018-09-24 DIAGNOSIS — Z17.0 MALIGNANT NEOPLASM OF UPPER-OUTER QUADRANT OF LEFT BREAST IN FEMALE, ESTROGEN RECEPTOR POSITIVE (HCC): Primary | ICD-10-CM

## 2018-09-24 DIAGNOSIS — E03.4 HYPOTHYROIDISM DUE TO ACQUIRED ATROPHY OF THYROID: ICD-10-CM

## 2018-09-24 DIAGNOSIS — M81.0 AGE-RELATED OSTEOPOROSIS WITHOUT CURRENT PATHOLOGICAL FRACTURE: ICD-10-CM

## 2018-09-24 LAB
25(OH)D3 SERPL-MCNC: 45.5 NG/ML (ref 30–100)
ALBUMIN SERPL-MCNC: 4.8 G/DL (ref 3.5–5.2)
ALBUMIN/GLOB SERPL: 1.6 G/DL (ref 1.1–2.4)
ALP SERPL-CCNC: 93 U/L (ref 38–116)
ALT SERPL W P-5'-P-CCNC: 86 U/L (ref 0–33)
ANION GAP SERPL CALCULATED.3IONS-SCNC: 14.7 MMOL/L
AST SERPL-CCNC: 92 U/L (ref 0–32)
BASOPHILS # BLD AUTO: 0.03 10*3/MM3 (ref 0–0.1)
BASOPHILS NFR BLD AUTO: 0.6 % (ref 0–1.1)
BILIRUB SERPL-MCNC: 0.6 MG/DL (ref 0.1–1.2)
BUN BLD-MCNC: 22 MG/DL (ref 6–20)
BUN/CREAT SERPL: 20.8 (ref 7.3–30)
CALCIUM SPEC-SCNC: 10.3 MG/DL (ref 8.5–10.2)
CHLORIDE SERPL-SCNC: 97 MMOL/L (ref 98–107)
CO2 SERPL-SCNC: 29.3 MMOL/L (ref 22–29)
CREAT BLD-MCNC: 1.06 MG/DL (ref 0.6–1.1)
DEPRECATED RDW RBC AUTO: 40.1 FL (ref 37–49)
EOSINOPHIL # BLD AUTO: 0.2 10*3/MM3 (ref 0–0.36)
EOSINOPHIL NFR BLD AUTO: 3.9 % (ref 1–5)
ERYTHROCYTE [DISTWIDTH] IN BLOOD BY AUTOMATED COUNT: 13.2 % (ref 11.7–14.5)
GFR SERPL CREATININE-BSD FRML MDRD: 52 ML/MIN/1.73
GLOBULIN UR ELPH-MCNC: 3 GM/DL (ref 1.8–3.5)
GLUCOSE BLD-MCNC: 216 MG/DL (ref 74–124)
HCT VFR BLD AUTO: 37.6 % (ref 34–45)
HGB BLD-MCNC: 12.9 G/DL (ref 11.5–14.9)
IMM GRANULOCYTES # BLD: 0.02 10*3/MM3 (ref 0–0.03)
IMM GRANULOCYTES NFR BLD: 0.4 % (ref 0–0.5)
LYMPHOCYTES # BLD AUTO: 1.16 10*3/MM3 (ref 1–3.5)
LYMPHOCYTES NFR BLD AUTO: 22.7 % (ref 20–49)
MCH RBC QN AUTO: 28.7 PG (ref 27–33)
MCHC RBC AUTO-ENTMCNC: 34.3 G/DL (ref 32–35)
MCV RBC AUTO: 83.6 FL (ref 83–97)
MONOCYTES # BLD AUTO: 0.35 10*3/MM3 (ref 0.25–0.8)
MONOCYTES NFR BLD AUTO: 6.8 % (ref 4–12)
NEUTROPHILS # BLD AUTO: 3.35 10*3/MM3 (ref 1.5–7)
NEUTROPHILS NFR BLD AUTO: 65.6 % (ref 39–75)
NRBC BLD MANUAL-RTO: 0 /100 WBC (ref 0–0)
PLATELET # BLD AUTO: 236 10*3/MM3 (ref 150–375)
PMV BLD AUTO: 10.6 FL (ref 8.9–12.1)
POTASSIUM BLD-SCNC: 4.6 MMOL/L (ref 3.5–4.7)
PROT SERPL-MCNC: 7.8 G/DL (ref 6.3–8)
RBC # BLD AUTO: 4.5 10*6/MM3 (ref 3.9–5)
SODIUM BLD-SCNC: 141 MMOL/L (ref 134–145)
TSH SERPL DL<=0.05 MIU/L-ACNC: 2.8 MIU/ML (ref 0.27–4.2)
WBC NRBC COR # BLD: 5.11 10*3/MM3 (ref 4–10)

## 2018-09-24 PROCEDURE — 80053 COMPREHEN METABOLIC PANEL: CPT | Performed by: INTERNAL MEDICINE

## 2018-09-24 PROCEDURE — 99214 OFFICE O/P EST MOD 30 MIN: CPT | Performed by: INTERNAL MEDICINE

## 2018-09-24 PROCEDURE — 36415 COLL VENOUS BLD VENIPUNCTURE: CPT | Performed by: INTERNAL MEDICINE

## 2018-09-24 PROCEDURE — 77412 RADIATION TX DELIVERY LVL 3: CPT | Performed by: RADIOLOGY

## 2018-09-24 PROCEDURE — 82306 VITAMIN D 25 HYDROXY: CPT | Performed by: INTERNAL MEDICINE

## 2018-09-24 PROCEDURE — 85025 COMPLETE CBC W/AUTO DIFF WBC: CPT | Performed by: INTERNAL MEDICINE

## 2018-09-24 PROCEDURE — 84443 ASSAY THYROID STIM HORMONE: CPT | Performed by: INTERNAL MEDICINE

## 2018-09-24 RX ORDER — TAMOXIFEN CITRATE 20 MG/1
20 TABLET ORAL DAILY
Qty: 30 TABLET | Refills: 5 | Status: SHIPPED | OUTPATIENT
Start: 2018-09-24 | End: 2018-10-24

## 2018-09-24 NOTE — PROGRESS NOTES
Subjective discussed findings per bone density  REASON FOR CONSULTATION:  Stage II a left breast cancer  Provide an opinion on any further workup or treatment                             REQUESTING PHYSICIAN:  Luan Whitfield M.D., João Zazueta M.D.        History of Present Illness      Patient is a 68-year-old female with noted in late May 2018 a lump developing in the upper outer quadrant of the left breast without pain or nipple discharge.  This measured approximately 1 cm in size.  Mammogram indicated this asymmetric density in the same region and an ultrasound revealed a 1.8 cm irregular solid mass.  Biopsy was consistent with invasive ductal carcinoma grade 2 without DCIS with a tumor %, [place] positive and HER-2 negative.  Additional history included no previous breast biopsies, a brief period of time with hormonal replacement and no history of breast or ovarian cancer with family.  She was seen by Dr. Zazueta on the 28th an MRI of both breasts was performed July 6.  Within the left anterior one third at the 12:30 position 3 cm from the nipple with irregular enhancing mass measuring 1.6 cm x 1.4 and 2.2 immediately lateral dimension.  There are other findings were seen in the left breast with no evidence of left axillary adenopathy and no findings suspicious for right breast.  The patient proceeded to a left breast lumpectomy July 18, 2018.      Pathologic findings were consistent with a 2.5 cm grade 3 invasive mammary ductal carcinoma with associated DCIS.  The closest margin was 1 mm.  Dequincy nodes were negative staging of pT2N0.  Dr. Zazueta saw the patient July 20 recognizing the closest margin was the anterior margin having taken this off the skin with no further removal possible.  There was concern about the need for additional tissue though the addended pathology revealed the DCIS to be intermediate grade.  It was determined not to take additional margins and have her seen by both medical  oncology and radiation therapy.  She now presents with her  .      The patient's initial consultation was August 7 and potential adjuvant therapy discussed with the initial recommendation being an Oncotype DX analysis to be process.  This is now reviewed with the patient and her  may return August 23, 2018.  Her recurrence score 10 with 10 year risk of distance recurrence at 7%.  She is clearly in the low risk category additional studies revealing ER score of 10.5, ME score of 8.6 which are both positive and HER-2 score of 8.7 which is negative.   Additional studies include  LH of 27.5, FSH of 57.6, estradiol of 9.9, CA 15-3 of 10.9, TSH of 4.64 hemoglobin A1c of 8.72.   The patient is advised of the findings per her risk of recurrence and she and her  are understandably elated.  Chemotherapy is not recommended in her circumstance but anti-hormonal therapy is and we have discussed potential treatment with AI therapy being the most appropriate choice in this postmenopausal patient.  She's not additionally had any recent assessment for bone density, however we discussed having this done in the near future.     As result the patient was scheduled for bone density and this was obtained September 6 revealing evidence of osteoporosis involving the lumbar with T score of -3.4 and right hip with T score of -2.7.  The patient has been using Arimidex which we'll discontinue and we discussed institution of tamoxifen at this point.  She'll also need assessment of her vitamin D level which we'll have drawn her lab today.  Past Medical History:   Diagnosis Date   • Anxiety    • Anxiety and depression    • Asthma     SEASONAL ALLERGY RELATED   • Cancer of breast (CMS/HCC) 06/2018    LEFT   • Chronic kidney disease     Renal cyst, right kidney w/urology workup in past   • Depression    • Diabetes mellitus (CMS/[facility])     Type 2   • Disease of thyroid gland    • GERD (gastroesophageal reflux disease)    • History  of prior pregnancies     x2   • History of transfusion     S/P HYST --AUTOLOGOUS    • Hyperlipidemia    • Hypertension    • IBS (irritable bowel syndrome)    • Seasonal allergies         Past Surgical History:   Procedure Laterality Date   • BREAST LUMPECTOMY WITH SENTINEL NODE BIOPSY Left 7/18/2018    Procedure: BREAST LUMPECTOMY WITH SENTINEL NODE BIOPSY;  Surgeon: João Zazueta MD;  Location: [facility];  Service: General   • HYSTERECTOMY  1989   • KNEE SURGERY Right 2014   • KNEE SURGERY Left 2016   • ROTATOR CUFF REPAIR Left 2009   • ROTATOR CUFF REPAIR Right 2012   • TOE SURGERY  2009    [place]         Current Outpatient Prescriptions on File Prior to Visit   Medication Sig Dispense Refill   • albuterol (PROAIR RESPICLICK) 108 (90 Base) MCG/ACT inhaler Inhale 1 puff Every 4 (Four) Hours As Needed for Wheezing or Shortness of Air.     • B Complex Vitamins (VITAMIN B COMPLEX PO) Take 1 tablet by mouth Every Night.     • cetirizine (zyrTEC) 10 MG tablet Take 10 mg by mouth Daily.     • cholecalciferol (VITAMIN D3) 1000 units tablet Take 1,000 Units by mouth Every Night.     • diphenhydrAMINE (BENADRYL) 25 mg capsule Take 25 mg by mouth Every 6 (Six) Hours As Needed for Sleep.     • escitalopram (LEXAPRO) 20 MG tablet Take 20 mg by mouth Daily.     • fluticasone (FLONASE) 50 MCG/ACT nasal spray 2 sprays into each nostril Daily.     • levothyroxine (SYNTHROID, LEVOTHROID) 50 MCG tablet Take 50 mcg by mouth Every Morning.     • loperamide (IMODIUM A-D) 2 MG tablet Take 2 mg by mouth 4 (Four) Times a Day As Needed for Diarrhea.     • melatonin 5 MG tablet tablet Take 10 mg by mouth At Night As Needed.     • metFORMIN ER (GLUCOPHAGE-XR) 500 MG 24 hr tablet Take 500 mg by mouth 2 (Two) Times a Day.     • metoprolol-hydrochlorothiazide (LOPRESSOR HCT) 50-25 MG per tablet Take 0.5 tablets by mouth Daily.     • omeprazole (priLOSEC) 40 MG capsule Take 40 mg by mouth Daily.     • rosuvastatin (CRESTOR) 20 MG  tablet Take 10 mg by mouth Every Night.     • [DISCONTINUED] anastrozole (ARIMIDEX) 1 MG tablet Take 1 tablet by mouth Daily. 30 tablet 2   • HYDROcodone-acetaminophen (NORCO) 5-325 MG per tablet Take 1-2 tablets by mouth Every 4 (Four) Hours As Needed (Pain). 10 tablet 0     No current facility-administered medications on file prior to visit.         ALLERGIES:  No Known Allergies     Social History     Social History   • Marital status:      Spouse name: Mahin   • Number of children: 2   • Years of education: College     Occupational History   • Teacher [employer]     Social History Main Topics   • Smoking status: Never Smoker   • Smokeless tobacco: Never Used   • Alcohol use 0.6 oz/week     1 Cans of beer per week      Comment: 2-3 a month, social only   • Drug use: No   • Sexual activity: Yes      Comment:      Other Topics Concern   • Not on file        Family History   Problem Relation Age of Onset   • Diabetes Mother    • Heart attack Mother    • Heart failure Mother    • Hyperlipidemia Mother    • Hyperthyroidism Mother         Has surgery   • Hypothyroidism Mother         Later in life   • Heart disease Mother    • Hypertension Mother    • Asthma Father    • COPD Father    • Hypertension Father    • Heart attack Maternal Uncle    • Heart failure Maternal Uncle    • Depression Maternal Grandmother    • Heart attack Maternal Grandfather    • Heart failure Maternal Grandfather    • Alcohol abuse Paternal Grandmother    • Alcohol abuse Paternal Grandfather    • Scleroderma Sister    • No Known Problems Daughter    • No Known Problems Son    • Malig Hyperthermia Neg Hx         Review of Systems   Constitutional: Positive for unexpected weight change.        Night sweats, excessive fatigue and weight gain   Respiratory: Positive for cough.    Skin:        Pruritus          Objective     Vitals:    09/24/18 1003   BP: 124/84   Pulse: 69   Resp: 16   Temp: 99.2 °F (37.3 °C)  "  TempSrc: Oral   SpO2: 96%   Weight: 88.3 kg (194 lb 9.6 oz)   Height: 161 cm (63.39\")   PainSc: 0-No pain     Current Status 9/24/2018   ECOG score 0       Physical Exam   Constitutional: She is oriented to person, place, and time. She appears well-developed and well-nourished.   HENT:   Head: Normocephalic and atraumatic.   Nose: Nose normal.   Mouth/Throat: Oropharynx is clear and moist.   Eyes: Pupils are equal, round, and reactive to light. Conjunctivae and EOM are normal.   Neck: Normal range of motion. Neck supple.   Cardiovascular: Normal rate, regular rhythm, normal heart sounds and intact distal pulses.    Pulmonary/Chest: Effort normal and breath sounds normal.   Abdominal: Soft. Bowel sounds are normal.   Musculoskeletal: Normal range of motion.   Neurological: She is alert and oriented to person, place, and time.   Skin: Skin is warm and dry.   Psychiatric: She has a normal mood and affect. Her behavior is normal. Judgment and thought content normal.     Breast exam: Patient status post left breast lumpectomy well healing without evidence of inflammation or discharge from wound, status post left sentinel node assessment also without inflammation or discharge from    RECENT LABS:  Hematology WBC   Date Value Ref Range Status   09/24/2018 5.11 4.00 - 10.00 10*3/mm3 Final     RBC   Date Value Ref Range Status   09/24/2018 4.50 3.90 - 5.00 10*6/mm3 Final     Hemoglobin   Date Value Ref Range Status   09/24/2018 12.9 11.5 - 14.9 g/dL Final     Hematocrit   Date Value Ref Range Status   09/24/2018 37.6 34.0 - 45.0 % Final     Platelets   Date Value Ref Range Status   09/24/2018 236 150 - 375 10*3/mm3 Final      BONE MINERAL DENSITOMETRY September 06, 2018     HISTORY:  68 years-old female. Menopause at age 63. Breast cancer.     COMPARISON:  None.     TECHNIQUE: The T score compares the patient's bone mineral density with  the peak bone mass of young normal patients. Patients with T-scores  between 1.0 and " 2.5 standard deviations below the mean are osteopenic.  Patients with T-scores greater than 2.5 standard deviation below the  mean are osteoporotic.     The Z score compares the patient's bone mineral density with sex and age  matched patients. Z score of -2.0 and lower is an indication of low  mineral density for the patient's age.     FINDINGS: Lumbar T score is  -3.4.     Left hip density is lowest at the  femoral neck where the T score is  -1.7.      Right hip density is lowest at the  femoral neck where the T score is  -2.7.      IMPRESSION:  Osteoporosis.    Assessment/Plan      68-year-old female with previous medical history of seasonal allergies, diabetes mellitus type 2, CKD3, hypothyroidism, hyperlipidemia, hypertension, IBS, history of anxiety and depression with recent development of left breast abnormality found by the patient herself.  This led to mammogram ultrasound and stereotactic biopsy confirming invasive ductal carcinoma grade 2 though ER, [place] positive HER-2 negative.  Subsequent assessments via surgical review your no additional abnormalities seen on breast MRI and the patient proceeded to lumpectomy July 18, 2018.  Her pathologic findings were consistent with a 2.5 cm grade 3 invasive mammary ductal carcinoma with associated DCIS.  The closest margin was 1 mm.  [place] nodes were negative with staging of pT2N0.  Dr. Zazueta saw the patient July 20 recognizing the closest margin was the anterior margin having taken this off the skin with no further removal possible.  There was concern about the need for additional tissue though the addended pathology revealed the DCIS to be intermediate grade.  It was ultimately determined that further surgery was not necessary.   The patient presents for medical oncology assessment and we discussed potential adjuvant therapy but in particular it is felt the patient requires further genomic assessment with Oncotype DX analysis.  We reviewed this in detail and  she understands the additional information that we could obtain from such an approach in making decisions about adjuvant therapy. We proceeded with additional assessment including Oncotype and laboratory studies that, fortunate, revealed that she has an excellent prognosis including a 7% risk of recurrence at 10 years with the use of tamoxifen.  Chemotherapy, therefore, is not appropriate and we have discussed an alternative therapy in this postmenopausal patient with AI therapy in particular her Arimidex over 5 years.  She is currently undergoing review by radiation therapy and this can proceed at any point as we also plan to initiate Arimidex with a trial of the medication given over the next month.  We went on to have the patient tested for bone density finding evidence, unfortunate, of osteoporosis.  She's been tolerating Arimidex well without discontinue this and institute Tamoxifen.  Plan:  *[place] Tamoxifen 20 mrem by mouth daily-E-scribed pharmacy  *Discontinue Arimidex  *Return to lab today to obtain TSH, vitamin D level, CMP   *Submitted for approval the use of Reclast  *Follow-up in 4 weeks to assess tolerance of Tamoxifen and provide Reclast

## 2018-09-25 PROCEDURE — 77412 RADIATION TX DELIVERY LVL 3: CPT | Performed by: RADIOLOGY

## 2018-09-26 PROCEDURE — 77412 RADIATION TX DELIVERY LVL 3: CPT | Performed by: RADIOLOGY

## 2018-09-26 PROCEDURE — 77336 RADIATION PHYSICS CONSULT: CPT | Performed by: RADIOLOGY

## 2018-09-27 ENCOUNTER — RADIATION ONCOLOGY WEEKLY ASSESSMENT (OUTPATIENT)
Dept: RADIATION ONCOLOGY | Facility: HOSPITAL | Age: 68
End: 2018-09-27

## 2018-09-27 VITALS
HEART RATE: 82 BPM | OXYGEN SATURATION: 95 % | WEIGHT: 193 LBS | BODY MASS INDEX: 34.2 KG/M2 | TEMPERATURE: 97.9 F | DIASTOLIC BLOOD PRESSURE: 93 MMHG | SYSTOLIC BLOOD PRESSURE: 138 MMHG | HEIGHT: 63 IN

## 2018-09-27 DIAGNOSIS — C50.412 MALIGNANT NEOPLASM OF UPPER-OUTER QUADRANT OF LEFT BREAST IN FEMALE, ESTROGEN RECEPTOR POSITIVE (HCC): Primary | ICD-10-CM

## 2018-09-27 DIAGNOSIS — Z17.0 MALIGNANT NEOPLASM OF UPPER-OUTER QUADRANT OF LEFT BREAST IN FEMALE, ESTROGEN RECEPTOR POSITIVE (HCC): Primary | ICD-10-CM

## 2018-09-27 PROCEDURE — 77412 RADIATION TX DELIVERY LVL 3: CPT | Performed by: RADIOLOGY

## 2018-09-27 PROCEDURE — 77417 THER RADIOLOGY PORT IMAGE(S): CPT | Performed by: RADIOLOGY

## 2018-09-27 PROCEDURE — 77427 RADIATION TX MANAGEMENT X5: CPT | Performed by: RADIOLOGY

## 2018-09-27 NOTE — PROGRESS NOTES
"Physician Weekly Management Note    Diagnosis:     1. Malignant neoplasm of upper-outer quadrant of left breast in female, estrogen receptor positive (CMS/HCC)      Stage IIA (cT2, cN0(sn), cM0, G3, ER: Positive, NE: Positive, HER2: Negative)    Reason for Visit: Tx: 16/21  Concurrent Chemo:   No    Notes on Treatment course, Films, Medical progress and Plan:  Doing well. Some muscle cramps in upper back/rib cage regions. Not sure if related to RT. Also started anti-hormonal therapy. Will watch after treatment - may be positional and/or muscle tension during treatment. Discussed follow up in 4 weeks. No problems or questions, cont on.    ROS - Other than as listed above, as follow:  Constitutional - Normal - Denies lack of appetite, fatigue, fever, night sweats and change in weight.  Neck - Normal - Denies neck masses, muscle weakness, neck pain, decreased range of motion and swelling of the neck.  Breasts - Normal - Denies breast masses, nipple discharge, nipple inversion and pain.  Respiratory - Normal - Denies cough, dyspnea, hemoptysis, hiccoughs, pleuritic chest pain and wheezing.  Gastrointestinal - Normal - Denies abdominal pain, constipation, diarrhea, heartburn / dyspepsia, hematemesis, hemorrhoids, melena / GI bleeding, nausea, pain / cramping, satiety and vomiting.  Musculoskeletal - Normal - Denies arthritis, bone pain, joint pain, muscle weakness and decreased range of motion.   Hematologic/Lymphatic - Normal - Denies easy bruising and tender or enlarged lymph nodes.    PYHSICAL EXAM - Other than listed above, as follows:  Vitals:    09/27/18 1519   BP: 138/93   Pulse: 82   Temp: 97.9 °F (36.6 °C)   TempSrc: Oral   SpO2: 95%   Weight: 87.5 kg (193 lb)   Height: 161 cm (63.39\")   PainSc:   4   PainLoc: Breast       Constitutional - Normal - No evidence of impaired alertness, inadequate appearance, premature or advanced chronologic age, uncooperativeness, altered mood and affect and disorientation.  Neck " "- Normal - No evidence of tender or enlarged lymph nodes, neck abnormalities, restricted range of motion and enlarged thyroid gland.  Breasts - Normal - No change in nipple or incision site.  Chest - Normal - No evidence of chest abnormalities and tender or enlarged lymph nodes.  Hematologic/Lymphatic -  Normal - No evidence of tender or enlarged axillae lymph nodes and tender or enlarged neck lymph nodes.    Performance Status: (1) Restricted in physically strenuous activity, ambulatory and able to do work of light nature  Problem added:  No problems updated.  Medications added: No orders of the defined types were placed in this encounter.    Ancillary referrals made: No orders of the defined types were placed in this encounter.      Technical aspects reviewed:  Weekly OBI approved if applicable? Yes  Weekly port films approved?   Yes  Change requests noted if applicable? Yes  Patient setup and plan reviewed?  Yes    Chart Reviewed:  Continue current treatment plan?   Yes  Treatment plan change requested?  No    I have reviewed and marked as \"reviewed\" the current medications, allergies and problem list in the patients EMR.    I have reviewed the patient's medical, surgical  history in detail, reviewed any pertinent lab work  and updated the computerized patient record if needed.    Patient's Care Team:  Patient Care Team:  Luna Whitfield MD as PCP - General (Family Medicine)  Michel Guajardo MD as Consulting Physician (Hematology and Oncology)  Kimberly Nguyen MD as Consulting Physician (Radiation Oncology)  João Zazueta MD as Referring Physician (Breast Surgery)    Seen and approved by:  Kimberly Nguyen MD, 09/11/2018  "

## 2018-09-28 PROCEDURE — G6002 STEREOSCOPIC X-RAY GUIDANCE: HCPCS | Performed by: RADIOLOGY

## 2018-09-28 PROCEDURE — 77280 THER RAD SIMULAJ FIELD SMPL: CPT | Performed by: RADIOLOGY

## 2018-09-28 PROCEDURE — 77412 RADIATION TX DELIVERY LVL 3: CPT | Performed by: RADIOLOGY

## 2018-10-01 ENCOUNTER — APPOINTMENT (OUTPATIENT)
Dept: RADIATION ONCOLOGY | Facility: HOSPITAL | Age: 68
End: 2018-10-01

## 2018-10-01 PROCEDURE — 77412 RADIATION TX DELIVERY LVL 3: CPT | Performed by: RADIOLOGY

## 2018-10-02 PROCEDURE — 77412 RADIATION TX DELIVERY LVL 3: CPT | Performed by: RADIOLOGY

## 2018-10-03 PROCEDURE — 77336 RADIATION PHYSICS CONSULT: CPT | Performed by: RADIOLOGY

## 2018-10-03 PROCEDURE — 77412 RADIATION TX DELIVERY LVL 3: CPT | Performed by: RADIOLOGY

## 2018-10-04 ENCOUNTER — RADIATION ONCOLOGY WEEKLY ASSESSMENT (OUTPATIENT)
Dept: RADIATION ONCOLOGY | Facility: HOSPITAL | Age: 68
End: 2018-10-04

## 2018-10-04 VITALS
DIASTOLIC BLOOD PRESSURE: 79 MMHG | TEMPERATURE: 97.9 F | BODY MASS INDEX: 34.38 KG/M2 | SYSTOLIC BLOOD PRESSURE: 129 MMHG | OXYGEN SATURATION: 96 % | HEIGHT: 63 IN | WEIGHT: 194 LBS | HEART RATE: 82 BPM

## 2018-10-04 DIAGNOSIS — Z17.0 MALIGNANT NEOPLASM OF UPPER-OUTER QUADRANT OF LEFT BREAST IN FEMALE, ESTROGEN RECEPTOR POSITIVE (HCC): Primary | ICD-10-CM

## 2018-10-04 DIAGNOSIS — C50.412 MALIGNANT NEOPLASM OF UPPER-OUTER QUADRANT OF LEFT BREAST IN FEMALE, ESTROGEN RECEPTOR POSITIVE (HCC): Primary | ICD-10-CM

## 2018-10-04 PROCEDURE — 77417 THER RADIOLOGY PORT IMAGE(S): CPT | Performed by: RADIOLOGY

## 2018-10-04 PROCEDURE — 77336 RADIATION PHYSICS CONSULT: CPT | Performed by: RADIOLOGY

## 2018-10-04 PROCEDURE — 77412 RADIATION TX DELIVERY LVL 3: CPT | Performed by: RADIOLOGY

## 2018-10-04 NOTE — PROGRESS NOTES
"Physician Weekly Management Note    Diagnosis:     1. Malignant neoplasm of upper-outer quadrant of left breast in female, estrogen receptor positive (CMS/HCC)    Stage IIA (cT2, cN0(sn), cM0, G3, ER: Positive, [place]: Positive, HER2: Negative)    Reason for Visit: Radiation (21/21)    Concurrent Chemo:   No    Notes on Treatment course, Films, Medical progress and Plan:  Doing well. Skin good, mild erythema. Follow up discussed. No problems or questions, cont on.    ROS - Other than as listed above, as follow:  Constitutional - Normal - Denies lack of appetite, fatigue, fever, night sweats and change in weight.  Neck - Normal - Denies neck masses, muscle weakness, neck pain, decreased range of motion and swelling of the neck.  Breasts - Normal - Denies breast masses, nipple discharge, nipple inversion and pain.  Respiratory - Normal - Denies cough, dyspnea, hemoptysis, hiccoughs, pleuritic chest pain and wheezing.  Gastrointestinal - Normal - Denies abdominal pain, constipation, diarrhea, heartburn / dyspepsia, hematemesis, hemorrhoids, melena / GI bleeding, nausea, pain / cramping, satiety and vomiting.  Musculoskeletal - Normal - Denies arthritis, bone pain, joint pain, muscle weakness and decreased range of motion.   Hematologic/Lymphatic - Normal - Denies easy bruising and tender or enlarged lymph nodes.    PYHSICAL EXAM - Other than listed above, as follows:  Vitals:    10/04/18 1511   BP: 129/79   Pulse: 82   Temp: 97.9 °F (36.6 °C)   TempSrc: Oral   SpO2: 96%   Weight: 88 kg (194 lb)   Height: 161 cm (63.39\")       Constitutional - Normal - No evidence of impaired alertness, inadequate appearance, premature or advanced chronologic age, uncooperativeness, altered mood and affect and disorientation.  Neck - Normal - No evidence of tender or enlarged lymph nodes, neck abnormalities, restricted range of motion and enlarged thyroid gland.  Breasts - Normal - No change in nipple or incision site.  Chest - Normal - No " "evidence of chest abnormalities and tender or enlarged lymph nodes.  Hematologic/Lymphatic -  Normal - No evidence of tender or enlarged axillae lymph nodes and tender or enlarged neck lymph nodes.    Performance Status: (1) Restricted in physically strenuous activity, ambulatory and able to do work of light nature  Problem added:  No problems updated.  Medications added: No orders of the defined types were placed in this encounter.    Ancillary referrals made: No orders of the defined types were placed in this encounter.      Technical aspects reviewed:  Weekly OBI approved if applicable? Yes  Weekly port films approved?   Yes  Change requests noted if applicable? Yes  Patient setup and plan reviewed?  Yes    Chart Reviewed:  Continue current treatment plan?   Yes  Treatment plan change requested?  No    I have reviewed and marked as \"reviewed\" the current medications, allergies and problem list in the patients EMR.    I have reviewed the patient's medical, surgical  history in detail, reviewed any pertinent lab work  and updated the computerized patient record if needed.    Patient's Care Team:  Patient Care Team:  Luna Whitfield MD as PCP - General (Family Medicine)  Michel Guajardo MD as Consulting Physician (Hematology and Oncology)  Kimberly Nguyen MD as Consulting Physician (Radiation Oncology)  João Zazueta MD as Referring Physician (Breast Surgery)    Seen and approved by:  Kimberly Nguyen MD, 10/04/2018  "

## 2018-10-11 ENCOUNTER — DOCUMENTATION (OUTPATIENT)
Dept: RADIATION ONCOLOGY | Facility: HOSPITAL | Age: 68
End: 2018-10-11

## 2018-10-11 NOTE — PROGRESS NOTES
RADIATION THERAPY TREATMENT SUMMARY    Diagnosis: Malignant neoplasm of upper-outer quadrant of left breast in female, estrogen receptor positive    Patient Care Team:  Luna Whitfield MD as PCP - General (Family Medicine)  Michel Guajardo MD as Consulting Physician (Hematology and Oncology)  Kimberly Nguyen MD as Consulting Physician (Radiation Oncology)  João Zazueta MD as Referring Physician (Breast Surgery)    Mariana Ansari has recently completed the course of radiation therapy prescribed for the above-mentioned diagnosis.  Below, please find the specifics of the course of treatment delivered:    Radiation Details:    Dates of treatment:  9/6/2018 - 10/4/2018.  Treatment Site - LEFT BREAST  Treatment Intent - Curative  Total Dose in cGy - 4256  Number of Treatments - 16  Dose per fraction - 266 cGy per fraction  Fractions per day - 1 fx/day  Fractions per week - 5 fx/week  Treatment Type - Tangents  Energy - 6 MVP, 18 MVP  Normalization - Calc point, Prescribed at 100%  Imaging/Field Verification - Simulation before first treatment to verify field, blocking, placement and positioning, Simulation for all ports of change, Weekly port films of all ports    Treatment Site - LEFT TUMOR BED BOOST  Treatment Intent - Curative  Total Dose in cGy - 1000  Number of Treatments - 5  Dose per fraction - 200 cGy per fraction  Fractions per day - 1 fx/day  Fractions per week - 5 fx/week  Treatment Type - 3D  Energy - 6 MVP  Normalization - Calc point, Prescribed at 95%  Imaging/Field Verification - Simulation before first treatment to verify field, blocking, placement and positioning, Simulation for all ports of change, Weekly port films of all ports    Tolerance and Toxicities: She tolerated the treatments very well, with only the mild skin reaction, requiring no treatment breaks. She completed the treatments in 27 elapsed days.    Follow-up Plans:  I have asked the patient to return to see me in  approximately 4 weeks.  I have also made a referral to our Survivorship Clinic.

## 2018-10-24 NOTE — PROGRESS NOTES
"Physician Weekly Management Note    Diagnosis:     No diagnosis found.  Stage IIA (cT2, cN0(sn), cM0, G3, ER: Positive, W[place]: Positive, HER2: Negative)    Reason for Visit: Tx: 10/21  Concurrent Chemo:   No    Notes on Treatment course, Films, Medical progress and Plan:  Doing well. Breast without change. Energy ok. No problems or questions, cont on.    ROS - Other than as listed above, as follow:  Constitutional - Normal - Denies lack of appetite, fatigue, fever, night sweats and change in weight.  Neck - Normal - Denies neck masses, muscle weakness, neck pain, decreased range of motion and swelling of the neck.  Breasts - Normal - Denies breast masses, nipple discharge, nipple inversion and pain.  Respiratory - Normal - Denies cough, dyspnea, hemoptysis, hiccoughs, pleuritic chest pain and wheezing.  Gastrointestinal - Normal - Denies abdominal pain, constipation, diarrhea, heartburn / dyspepsia, hematemesis, hemorrhoids, melena / GI bleeding, nausea, pain / cramping, satiety and vomiting.  Musculoskeletal - Normal - Denies arthritis, bone pain, joint pain, muscle weakness and decreased range of motion.   Hematologic/Lymphatic - Normal - Denies easy bruising and tender or enlarged lymph nodes.    PYHSICAL EXAM - Other than listed above, as follows:  Vitals:    09/18/18 1529   BP: 129/86   Pulse: 76   Temp: 98.1 °F (36.7 °C)   TempSrc: Oral   SpO2: 96%   Weight: 88.5 kg (195 lb)   Height: 160 cm (62.99\")   PainSc:   4   PainLoc: Head       Constitutional - Normal - No evidence of impaired alertness, inadequate appearance, premature or advanced chronologic age, uncooperativeness, altered mood and affect and disorientation.  Neck - Normal - No evidence of tender or enlarged lymph nodes, neck abnormalities, restricted range of motion and enlarged thyroid gland.  Breasts - Normal - No change in nipple or incision site.  Chest - Normal - No evidence of chest abnormalities and tender or enlarged lymph " "nodes.  Hematologic/Lymphatic -  Normal - No evidence of tender or enlarged axillae lymph nodes and tender or enlarged neck lymph nodes.    Performance Status: (1) Restricted in physically strenuous activity, ambulatory and able to do work of light nature  Problem added:  No problems updated.  Medications added: No orders of the defined types were placed in this encounter.    Ancillary referrals made: No orders of the defined types were placed in this encounter.      Technical aspects reviewed:  Weekly OBI approved if applicable? Yes  Weekly port films approved?   Yes  Change requests noted if applicable? Yes  Patient setup and plan reviewed?  Yes    Chart Reviewed:  Continue current treatment plan?   Yes  Treatment plan change requested?  No    I have reviewed and marked as \"reviewed\" the current medications, allergies and problem list in the patients EMR.    I have reviewed the patient's medical, surgical  history in detail, reviewed any pertinent lab work  and updated the computerized patient record if needed.    Patient's Care Team:  Patient Care Team:  Luna Whitfield MD as PCP - General (Family Medicine)  Michel Guajardo MD as Consulting Physician (Hematology and Oncology)  Kimberly Nguyen MD as Consulting Physician (Radiation Oncology)  João Zazueta MD as Referring Physician (Breast Surgery)    Seen and approved by:  Kimberly Nguyen MD, 09/11/2018  "

## 2018-10-26 DIAGNOSIS — M81.0 AGE-RELATED OSTEOPOROSIS WITHOUT CURRENT PATHOLOGICAL FRACTURE: Primary | ICD-10-CM

## 2018-10-26 DIAGNOSIS — Z17.0 MALIGNANT NEOPLASM OF UPPER-OUTER QUADRANT OF LEFT BREAST IN FEMALE, ESTROGEN RECEPTOR POSITIVE (HCC): ICD-10-CM

## 2018-10-26 DIAGNOSIS — C50.412 MALIGNANT NEOPLASM OF UPPER-OUTER QUADRANT OF LEFT BREAST IN FEMALE, ESTROGEN RECEPTOR POSITIVE (HCC): ICD-10-CM

## 2018-10-26 RX ORDER — ZOLEDRONIC ACID 5 MG/100ML
5 INJECTION, SOLUTION INTRAVENOUS ONCE
Status: CANCELLED | OUTPATIENT
Start: 2018-10-29

## 2018-10-26 RX ORDER — SODIUM CHLORIDE 9 MG/ML
250 INJECTION, SOLUTION INTRAVENOUS ONCE
Status: CANCELLED | OUTPATIENT
Start: 2018-10-29

## 2018-10-28 NOTE — PROGRESS NOTES
Subjective tolerating tamoxifen without any side effects thus far  REASON FOR CONSULTATION:  Stage II a left breast cancer  Provide an opinion on any further workup or treatment                             REQUESTING PHYSICIAN:  Luna Whitfield M.D., João Zazueta M.D.        History of Present Illness      Patient is a 68-year-old female with noted in late May 2018 a lump developing in the upper outer quadrant of the left breast without pain or nipple discharge.  This measured approximately 1 cm in size.  Mammogram indicated this asymmetric density in the same region and an ultrasound revealed a 1.8 cm irregular solid mass.  Biopsy was consistent with invasive ductal carcinoma grade 2 without DCIS with a tumor %, UT positive and HER-2 negative.  Additional history included no previous breast biopsies, a brief period of time with hormonal replacement and no history of breast or ovarian cancer with family.  She was seen by Dr. Zazueta on the 28th an MRI of both breasts was performed July 6.  Within the left anterior one third at the 12:30 position 3 cm from the nipple with irregular enhancing mass measuring 1.6 cm x 1.4 and 2.2 immediately lateral dimension.  There are other findings were seen in the left breast with no evidence of left axillary adenopathy and no findings suspicious for right breast.  The patient proceeded to a left breast lumpectomy July 18, 2018.      Pathologic findings were consistent with a 2.5 cm grade 3 invasive mammary ductal carcinoma with associated DCIS.  The closest margin was 1 mm.  [place] nodes were negative staging of pT2N0.  Dr. Zazueta saw the patient July 20 recognizing the closest margin was the anterior margin having taken this off the skin with no further removal possible.  There was concern about the need for additional tissue though the addended pathology revealed the DCIS to be intermediate grade.  It was determined not to take additional margins and have her  seen by both medical oncology and radiation therapy.  She now presents with her  .      The patient's initial consultation was August 7 and potential adjuvant therapy discussed with the initial recommendation being an Oncotype DX analysis to be process.  This is now reviewed with the patient and her  may return August 23, 2018.  Her recurrence score 10 with 10 year risk of distance recurrence at 7%.  She is clearly in the low risk category additional studies revealing ER score of 10.5, MO score of 8.6 which are both positive and HER-2 score of 8.7 which is negative.   Additional studies include  LH of 27.5, FSH of 57.6, estradiol of 9.9, CA 15-3 of 10.9, TSH of 4.64 hemoglobin A1c of 8.72.   The patient is advised of the findings per her risk of recurrence and she and her  are understandably elated.  Chemotherapy is not recommended in her circumstance but anti-hormonal therapy is and we have discussed potential treatment with AI therapy being the most appropriate choice in this postmenopausal patient.  She's not additionally had any recent assessment for bone density, however we discussed having this done in the near future.     As result the patient was scheduled for bone density and this was obtained September 6 revealing evidence of osteoporosis involving the lumbar with T score of -3.4 and right hip with T score of -2.7.  The patient has been using Arimidex which we'll discontinue and we discussed institution of tamoxifen at this point.  She'll also need assessment of her vitamin D level which we went on to measure documenting a level of 45.5.                                      The patient is now seen a month after switching to tamoxifen and is tolerating it well thus far without significant hot flashes.  We have also discussed the use of Reclast under the circumstances and she is agreeable to treatment today.  Past Medical History:   Diagnosis Date   • Anxiety    • Anxiety and depression     • Asthma     SEASONAL ALLERGY RELATED   • Cancer of breast (CMS/HCC) 06/2018    LEFT   • Chronic kidney disease     Renal cyst, right kidney w/urology workup in past   • Depression    • Diabetes mellitus (CMS/HCC)     Type 2   • Disease of thyroid gland    • GERD (gastroesophageal reflux disease)    • History of prior pregnancies     x2   • History of transfusion     S/P HYST --AUTOLOGOUS    • Hyperlipidemia    • Hypertension    • IBS (irritable bowel syndrome)    • Seasonal allergies         Past Surgical History:   Procedure Laterality Date   • BREAST LUMPECTOMY WITH SENTINEL NODE BIOPSY Left 7/18/2018    Procedure: BREAST LUMPECTOMY WITH SENTINEL NODE BIOPSY;  Surgeon: João Zazueta MD;  Location: [facility] OR;  Service: General   • HYSTERECTOMY  1989   • KNEE SURGERY Right 2014   • KNEE SURGERY Left 2016   • ROTATOR CUFF REPAIR Left 2009   • ROTATOR CUFF REPAIR Right 2012   • TOE SURGERY  2009    [place]         Current Outpatient Prescriptions on File Prior to Visit   Medication Sig Dispense Refill   • albuterol (PROAIR RESPICLICK) 108 (90 Base) MCG/ACT inhaler Inhale 1 puff Every 4 (Four) Hours As Needed for Wheezing or Shortness of Air.     • B Complex Vitamins (VITAMIN B COMPLEX PO) Take 1 tablet by mouth Every Night.     • cetirizine (zyrTEC) 10 MG tablet Take 10 mg by mouth Daily.     • cholecalciferol (VITAMIN D3) 1000 units tablet Take 1,000 Units by mouth Every Night.     • diphenhydrAMINE (BENADRYL) 25 mg capsule Take 25 mg by mouth Every 6 (Six) Hours As Needed for Sleep.     • escitalopram (LEXAPRO) 20 MG tablet Take 20 mg by mouth Daily.     • fluticasone (FLONASE) 50 MCG/ACT nasal spray 2 sprays into each nostril Daily.     • levothyroxine (SYNTHROID, LEVOTHROID) 50 MCG tablet Take 50 mcg by mouth Every Morning.     • loperamide (IMODIUM A-D) 2 MG tablet Take 2 mg by mouth 4 (Four) Times a Day As Needed for Diarrhea.     • melatonin 5 MG tablet tablet Take 10 mg by mouth At Night As  Needed.     • metFORMIN ER (GLUCOPHAGE-XR) 500 MG 24 hr tablet Take 500 mg by mouth 2 (Two) Times a Day.     • metoprolol-hydrochlorothiazide (LOPRESSOR HCT) 50-25 MG per tablet Take 0.5 tablets by mouth Daily.     • omeprazole (priLOSEC) 40 MG capsule Take 40 mg by mouth Daily.     • rosuvastatin (CRESTOR) 20 MG tablet Take 10 mg by mouth Every Night.     • HYDROcodone-acetaminophen (NORCO) 5-325 MG per tablet Take 1-2 tablets by mouth Every 4 (Four) Hours As Needed (Pain). 10 tablet 0     Current Facility-Administered Medications on File Prior to Visit   Medication Dose Route Frequency Provider Last Rate Last Dose   • [COMPLETED] sodium chloride 0.9 % infusion 250 mL  250 mL Intravenous Once Michel Guajardo MD   Stopped at 10/29/18 1127   • [COMPLETED] zoledronic acid (RECLAST) infusion 5 mg  5 mg Intravenous Once Michel Guajardo MD   Stopped at 10/29/18 1125        ALLERGIES:  No Known Allergies     Social History     Social History   • Marital status:      Spouse name: Mahin   • Number of children: 2   • Years of education: College     Occupational History   • Teacher [email or web address]     Social History Main Topics   • Smoking status: Never Smoker   • Smokeless tobacco: Never Used   • Alcohol use 0.6 oz/week     1 Cans of beer per week      Comment: 2-3 a month, social only   • Drug use: No   • Sexual activity: Yes      Comment:      Other Topics Concern   • Not on file        Family History   Problem Relation Age of Onset   • Diabetes Mother    • Heart attack Mother    • Heart failure Mother    • Hyperlipidemia Mother    • Hyperthyroidism Mother         Has surgery   • Hypothyroidism Mother         Later in life   • Heart disease Mother    • Hypertension Mother    • Asthma Father    • COPD Father    • Hypertension Father    • Heart attack Maternal Uncle    • Heart failure Maternal Uncle    • Depression Maternal Grandmother    • Heart attack Maternal Grandfather    • Heart  "failure Maternal Grandfather    • Alcohol abuse Paternal Grandmother    • Alcohol abuse Paternal Grandfather    • Scleroderma Sister    • No Known Problems Daughter    • No Known Problems Son    • Malig Hyperthermia Neg Hx         Review of Systems   Constitutional: Positive for unexpected weight change.        Night sweats, excessive fatigue and weight gain   Respiratory: Positive for cough.    Skin:        Pruritus          Objective     Vitals:    10/29/18 0940   BP: 120/70   Resp: 16   Temp: 99.3 °F (37.4 °C)   SpO2: 97%  Comment: at rest   Weight: 87.6 kg (193 lb 3.2 oz)   Height: 161 cm (63.39\")   PainSc: 0-No pain     Current Status 10/29/2018   ECOG score 0       Physical Exam   Constitutional: She is oriented to person, place, and time. She appears well-developed and well-nourished.   HENT:   Head: Normocephalic and atraumatic.   Nose: Nose normal.   Mouth/Throat: Oropharynx is clear and moist.   Eyes: Pupils are equal, round, and reactive to light. Conjunctivae and EOM are normal.   Neck: Normal range of motion. Neck supple.   Cardiovascular: Normal rate, regular rhythm, normal heart sounds and intact distal pulses.    Pulmonary/Chest: Effort normal and breath sounds normal.   Abdominal: Soft. Bowel sounds are normal.   Musculoskeletal: Normal range of motion.   Neurological: She is alert and oriented to person, place, and time.   Skin: Skin is warm and dry.   Psychiatric: She has a normal mood and affect. Her behavior is normal. Judgment and thought content normal.     Breast exam: Patient status post left breast lumpectomy well healing without evidence of inflammation or discharge from wound, status post left sentinel node assessment also without inflammation or discharge from    RECENT LABS:  Hematology WBC   Date Value Ref Range Status   10/29/2018 5.01 4.00 - 10.00 10*3/mm3 Final     RBC   Date Value Ref Range Status   10/29/2018 4.45 3.90 - 5.00 10*6/mm3 Final     Hemoglobin   Date Value Ref Range " Status   10/29/2018 12.6 11.5 - 14.9 g/dL Final     Hematocrit   Date Value Ref Range Status   10/29/2018 37.4 34.0 - 45.0 % Final     Platelets   Date Value Ref Range Status   10/29/2018 256 150 - 375 10*3/mm3 Final      BONE MINERAL DENSITOMETRY September 06, 2018     HISTORY:  68 years-old female. Menopause at age 63. Breast cancer.     COMPARISON:  None.     TECHNIQUE: The T score compares the patient's bone mineral density with  the peak bone mass of young normal patients. Patients with T-scores  between 1.0 and 2.5 standard deviations below the mean are osteopenic.  Patients with T-scores greater than 2.5 standard deviation below the  mean are osteoporotic.     The Z score compares the patient's bone mineral density with sex and age  matched patients. Z score of -2.0 and lower is an indication of low  mineral density for the patient's age.     FINDINGS: Lumbar T score is  -3.4.     Left hip density is lowest at the  femoral neck where the T score is  -1.7.      Right hip density is lowest at the  femoral neck where the T score is  -2.7.      IMPRESSION:  Osteoporosis.    Assessment/Plan      68-year-old female with previous medical history of seasonal allergies, diabetes mellitus type 2, CKD3, hypothyroidism, hyperlipidemia, hypertension, IBS, history of anxiety and depression with recent development of left breast abnormality found by the patient herself.  This led to mammogram ultrasound and stereotactic biopsy confirming invasive ductal carcinoma grade 2 though ER, ND positive HER-2 negative.  Subsequent assessments via surgical review your no additional abnormalities seen on breast MRI and the patient proceeded to lumpectomy July 18, 2018.  Her pathologic findings were consistent with a 2.5 cm grade 3 invasive mammary ductal carcinoma with associated DCIS.  The closest margin was 1 mm.  [place] nodes were negative with staging of pT2N0.  Dr. Zazueta saw the patient July 20 recognizing the closest margin  was the anterior margin having taken this off the skin with no further removal possible.  There was concern about the need for additional tissue though the addended pathology revealed the DCIS to be intermediate grade.  It was ultimately determined that further surgery was not necessary.   The patient presents for medical oncology assessment and we discussed potential adjuvant therapy but in particular it is felt the patient requires further genomic assessment with Oncotype DX analysis.  We reviewed this in detail and she understands the additional information that we could obtain from such an approach in making decisions about adjuvant therapy. We proceeded with additional assessment including Oncotype and laboratory studies that, fortunate, revealed that she has an excellent prognosis including a 7% risk of recurrence at 10 years with the use of tamoxifen.  Chemotherapy, therefore, is not appropriate and we have discussed an alternative therapy in this postmenopausal patient with AI therapy in particular her Arimidex over 5 years.  She is currently undergoing review by radiation therapy and this can proceed at any point as we also plan to initiate Arimidex with a trial of the medication given over the next month.  We went on to have the patient tested for bone density finding evidence, unfortunate, of osteoporosis.  She's been tolerating Arimidex well without discontinue this and institute Tamoxifen.  The patient went on to institute treatment and underwent testing for TSH, vitamin D level and serum chemistries.  These are found to be acceptable and she now next returns October 29 tolerating tamoxifen well.  Plan:  *Continue Tamoxifen with 3 month follow-up planned  *Continue other medications including Lexapro  *Reclast ×1 today and then to yearly

## 2018-10-29 ENCOUNTER — OFFICE VISIT (OUTPATIENT)
Dept: RADIATION ONCOLOGY | Facility: HOSPITAL | Age: 68
End: 2018-10-29

## 2018-10-29 ENCOUNTER — LAB (OUTPATIENT)
Dept: LAB | Facility: HOSPITAL | Age: 68
End: 2018-10-29

## 2018-10-29 ENCOUNTER — OFFICE VISIT (OUTPATIENT)
Dept: ONCOLOGY | Facility: CLINIC | Age: 68
End: 2018-10-29

## 2018-10-29 ENCOUNTER — INFUSION (OUTPATIENT)
Dept: ONCOLOGY | Facility: HOSPITAL | Age: 68
End: 2018-10-29

## 2018-10-29 VITALS
BODY MASS INDEX: 34.55 KG/M2 | OXYGEN SATURATION: 95 % | DIASTOLIC BLOOD PRESSURE: 86 MMHG | SYSTOLIC BLOOD PRESSURE: 136 MMHG | HEART RATE: 73 BPM | HEIGHT: 63 IN | TEMPERATURE: 97.8 F | WEIGHT: 195 LBS

## 2018-10-29 VITALS
OXYGEN SATURATION: 97 % | DIASTOLIC BLOOD PRESSURE: 70 MMHG | HEIGHT: 63 IN | TEMPERATURE: 99.3 F | SYSTOLIC BLOOD PRESSURE: 120 MMHG | WEIGHT: 193.2 LBS | RESPIRATION RATE: 16 BRPM | BODY MASS INDEX: 34.23 KG/M2

## 2018-10-29 DIAGNOSIS — Z17.0 MALIGNANT NEOPLASM OF UPPER-OUTER QUADRANT OF LEFT BREAST IN FEMALE, ESTROGEN RECEPTOR POSITIVE (HCC): ICD-10-CM

## 2018-10-29 DIAGNOSIS — C50.412 MALIGNANT NEOPLASM OF UPPER-OUTER QUADRANT OF LEFT BREAST IN FEMALE, ESTROGEN RECEPTOR POSITIVE (HCC): ICD-10-CM

## 2018-10-29 DIAGNOSIS — Z79.899 HIGH RISK MEDICATION USE: ICD-10-CM

## 2018-10-29 DIAGNOSIS — M81.0 AGE-RELATED OSTEOPOROSIS WITHOUT CURRENT PATHOLOGICAL FRACTURE: ICD-10-CM

## 2018-10-29 DIAGNOSIS — C50.412 MALIGNANT NEOPLASM OF UPPER-OUTER QUADRANT OF LEFT BREAST IN FEMALE, ESTROGEN RECEPTOR POSITIVE (HCC): Primary | ICD-10-CM

## 2018-10-29 DIAGNOSIS — M81.0 AGE-RELATED OSTEOPOROSIS WITHOUT CURRENT PATHOLOGICAL FRACTURE: Primary | ICD-10-CM

## 2018-10-29 DIAGNOSIS — Z17.0 MALIGNANT NEOPLASM OF UPPER-OUTER QUADRANT OF LEFT BREAST IN FEMALE, ESTROGEN RECEPTOR POSITIVE (HCC): Primary | ICD-10-CM

## 2018-10-29 LAB
ALBUMIN SERPL-MCNC: 4.4 G/DL (ref 3.5–5.2)
ALBUMIN/GLOB SERPL: 1.8 G/DL (ref 1.1–2.4)
ALP SERPL-CCNC: 80 U/L (ref 38–116)
ALT SERPL W P-5'-P-CCNC: 54 U/L (ref 0–33)
ANION GAP SERPL CALCULATED.3IONS-SCNC: 14.5 MMOL/L
AST SERPL-CCNC: 71 U/L (ref 0–32)
BASOPHILS # BLD AUTO: 0.02 10*3/MM3 (ref 0–0.1)
BASOPHILS NFR BLD AUTO: 0.4 % (ref 0–1.1)
BILIRUB SERPL-MCNC: 0.5 MG/DL (ref 0.1–1.2)
BUN BLD-MCNC: 21 MG/DL (ref 6–20)
BUN/CREAT SERPL: 18.3 (ref 7.3–30)
CALCIUM SPEC-SCNC: 9.6 MG/DL (ref 8.5–10.2)
CHLORIDE SERPL-SCNC: 101 MMOL/L (ref 98–107)
CO2 SERPL-SCNC: 25.5 MMOL/L (ref 22–29)
CREAT BLD-MCNC: 1.15 MG/DL (ref 0.6–1.1)
DEPRECATED RDW RBC AUTO: 41.3 FL (ref 37–49)
EOSINOPHIL # BLD AUTO: 0.14 10*3/MM3 (ref 0–0.36)
EOSINOPHIL NFR BLD AUTO: 2.8 % (ref 1–5)
ERYTHROCYTE [DISTWIDTH] IN BLOOD BY AUTOMATED COUNT: 13.4 % (ref 11.7–14.5)
GFR SERPL CREATININE-BSD FRML MDRD: 47 ML/MIN/1.73
GLOBULIN UR ELPH-MCNC: 2.4 GM/DL (ref 1.8–3.5)
GLUCOSE BLD-MCNC: 258 MG/DL (ref 74–124)
HCT VFR BLD AUTO: 37.4 % (ref 34–45)
HGB BLD-MCNC: 12.6 G/DL (ref 11.5–14.9)
IMM GRANULOCYTES # BLD: 0.02 10*3/MM3 (ref 0–0.03)
IMM GRANULOCYTES NFR BLD: 0.4 % (ref 0–0.5)
LYMPHOCYTES # BLD AUTO: 1.06 10*3/MM3 (ref 1–3.5)
LYMPHOCYTES NFR BLD AUTO: 21.2 % (ref 20–49)
MAGNESIUM SERPL-MCNC: 1.7 MG/DL (ref 1.8–2.5)
MCH RBC QN AUTO: 28.3 PG (ref 27–33)
MCHC RBC AUTO-ENTMCNC: 33.7 G/DL (ref 32–35)
MCV RBC AUTO: 84 FL (ref 83–97)
MONOCYTES # BLD AUTO: 0.39 10*3/MM3 (ref 0.25–0.8)
MONOCYTES NFR BLD AUTO: 7.8 % (ref 4–12)
NEUTROPHILS # BLD AUTO: 3.38 10*3/MM3 (ref 1.5–7)
NEUTROPHILS NFR BLD AUTO: 67.4 % (ref 39–75)
NRBC BLD MANUAL-RTO: 0 /100 WBC (ref 0–0)
PHOSPHATE SERPL-MCNC: 3.5 MG/DL (ref 2.5–4.5)
PLATELET # BLD AUTO: 256 10*3/MM3 (ref 150–375)
PMV BLD AUTO: 10.2 FL (ref 8.9–12.1)
POTASSIUM BLD-SCNC: 5.1 MMOL/L (ref 3.5–4.7)
PROT SERPL-MCNC: 6.8 G/DL (ref 6.3–8)
RBC # BLD AUTO: 4.45 10*6/MM3 (ref 3.9–5)
SODIUM BLD-SCNC: 141 MMOL/L (ref 134–145)
WBC NRBC COR # BLD: 5.01 10*3/MM3 (ref 4–10)

## 2018-10-29 PROCEDURE — 84100 ASSAY OF PHOSPHORUS: CPT | Performed by: INTERNAL MEDICINE

## 2018-10-29 PROCEDURE — 85025 COMPLETE CBC W/AUTO DIFF WBC: CPT | Performed by: INTERNAL MEDICINE

## 2018-10-29 PROCEDURE — 99214 OFFICE O/P EST MOD 30 MIN: CPT | Performed by: INTERNAL MEDICINE

## 2018-10-29 PROCEDURE — 36415 COLL VENOUS BLD VENIPUNCTURE: CPT | Performed by: INTERNAL MEDICINE

## 2018-10-29 PROCEDURE — 25010000002 ZOLEDRONIC ACID 5 MG/100ML SOLUTION: Performed by: INTERNAL MEDICINE

## 2018-10-29 PROCEDURE — 99024 POSTOP FOLLOW-UP VISIT: CPT | Performed by: RADIOLOGY

## 2018-10-29 PROCEDURE — 83735 ASSAY OF MAGNESIUM: CPT | Performed by: INTERNAL MEDICINE

## 2018-10-29 PROCEDURE — 96374 THER/PROPH/DIAG INJ IV PUSH: CPT | Performed by: INTERNAL MEDICINE

## 2018-10-29 PROCEDURE — 80053 COMPREHEN METABOLIC PANEL: CPT | Performed by: INTERNAL MEDICINE

## 2018-10-29 RX ORDER — INFLUENZA VACCINE, ADJUVANTED 15; 15; 15 UG/.5ML; UG/.5ML; UG/.5ML
INJECTION, SUSPENSION INTRAMUSCULAR
Refills: 0 | COMMUNITY
Start: 2018-10-26 | End: 2019-09-16

## 2018-10-29 RX ORDER — ZOLEDRONIC ACID 5 MG/100ML
5 INJECTION, SOLUTION INTRAVENOUS ONCE
Status: COMPLETED | OUTPATIENT
Start: 2018-10-29 | End: 2018-10-29

## 2018-10-29 RX ORDER — SODIUM CHLORIDE 9 MG/ML
250 INJECTION, SOLUTION INTRAVENOUS ONCE
Status: COMPLETED | OUTPATIENT
Start: 2018-10-29 | End: 2018-10-29

## 2018-10-29 RX ORDER — SODIUM CHLORIDE 9 MG/ML
250 INJECTION, SOLUTION INTRAVENOUS ONCE
Status: CANCELLED | OUTPATIENT
Start: 2018-10-29

## 2018-10-29 RX ORDER — HEPATITIS A VACCINE 1440 [IU]/ML
INJECTION, SUSPENSION INTRAMUSCULAR
Refills: 0 | COMMUNITY
Start: 2018-10-26 | End: 2019-09-16

## 2018-10-29 RX ORDER — ZOLEDRONIC ACID 5 MG/100ML
5 INJECTION, SOLUTION INTRAVENOUS ONCE
Status: CANCELLED | OUTPATIENT
Start: 2018-10-29

## 2018-10-29 RX ADMIN — ZOLEDRONIC ACID 5 MG: 0.05 INJECTION, SOLUTION INTRAVENOUS at 11:03

## 2018-10-29 RX ADMIN — SODIUM CHLORIDE 250 ML: 900 INJECTION, SOLUTION INTRAVENOUS at 11:03

## 2018-10-29 NOTE — PROGRESS NOTES
DIAGNOSIS and REASON FOR FOLLOW UP:   1. Malignant neoplasm of upper-outer quadrant of left breast in female, estrogen receptor positive (CMS/HCC)      Patient Care Team:  Luna Whitfield MD as PCP - General (Family Medicine)  Michel Guajardo MD as Consulting Physician (Hematology and Oncology)  Kimberly Nguyen MD as Consulting Physician (Radiation Oncology)  João Zazueta MD as Referring Physician (Breast Surgery)    CHIEF COMPLAINT:  4 week follow up  I had the pleasure of seeing Mariana Ansari  back in the department today, now approximately 4 weeks out from the radiation therapy portion of her treatment for the above mentioned diagnosis. Clinically she is doing wonderfully well.  Her energy has improved and she states her performance status is nearly back to baseline.  She has no new complaints regarding the breast.  She is tolerating the Tamoxifen well with only mild hot flashes which are bearable.     Past Medical History: she  has a past medical history of Anxiety; Anxiety and depression; Asthma; Cancer of breast (CMS/HCC) (06/2018); Chronic kidney disease; Depression; Diabetes mellitus (CMS/[facility]); Disease of thyroid gland; GERD (gastroesophageal reflux disease); History of prior pregnancies; History of transfusion; Hyperlipidemia; Hypertension; IBS (irritable bowel syndrome); and Seasonal allergies.    Past Surgical History:  she has a past surgical history that includes Rotator cuff repair (Left, 2009); Rotator cuff repair (Right, 2012); Toe Surgery (2009); Knee surgery (Right, 2014); Knee surgery (Left, 2016); Hysterectomy (1989); and breast lumpectomy with sentinel node biopsy (Left, 7/18/2018).    Meds:    Current Outpatient Prescriptions:   •  albuterol (PROAIR RESPICLICK) 108 (90 Base) MCG/ACT inhaler, Inhale 1 puff Every 4 (Four) Hours As Needed for Wheezing or Shortness of Air., Disp: , Rfl:   •  B Complex Vitamins (VITAMIN B COMPLEX PO), Take 1 tablet by mouth Every Night., Disp: ,  Rfl:   •  cetirizine (zyrTEC) 10 MG tablet, Take 10 mg by mouth Daily., Disp: , Rfl:   •  cholecalciferol (VITAMIN D3) 1000 units tablet, Take 1,000 Units by mouth Every Night., Disp: , Rfl:   •  diphenhydrAMINE (BENADRYL) 25 mg capsule, Take 25 mg by mouth Every 6 (Six) Hours As Needed for Sleep., Disp: , Rfl:   •  escitalopram (LEXAPRO) 20 MG tablet, Take 20 mg by mouth Daily., Disp: , Rfl:   •  FLUAD 0.5 ML suspension prefilled syringe, ADM 0.5ML IM UTD, Disp: , Rfl: 0  •  fluticasone (FLONASE) 50 MCG/ACT nasal spray, 2 sprays into each nostril Daily., Disp: , Rfl:   •  HAVRIX 1440 EL U/ML vaccine, ADM 1ML IM UTD, Disp: , Rfl: 0  •  HYDROcodone-acetaminophen (NORCO) 5-325 MG per tablet, Take 1-2 tablets by mouth Every 4 (Four) Hours As Needed (Pain)., Disp: 10 tablet, Rfl: 0  •  levothyroxine (SYNTHROID, LEVOTHROID) 50 MCG tablet, Take 50 mcg by mouth Every Morning., Disp: , Rfl:   •  loperamide (IMODIUM A-D) 2 MG tablet, Take 2 mg by mouth 4 (Four) Times a Day As Needed for Diarrhea., Disp: , Rfl:   •  melatonin 5 MG tablet tablet, Take 10 mg by mouth At Night As Needed., Disp: , Rfl:   •  metFORMIN ER (GLUCOPHAGE-XR) 500 MG 24 hr tablet, Take 500 mg by mouth 2 (Two) Times a Day., Disp: , Rfl:   •  metoprolol-hydrochlorothiazide (LOPRESSOR HCT) 50-25 MG per tablet, Take 0.5 tablets by mouth Daily., Disp: , Rfl:   •  omeprazole (priLOSEC) 40 MG capsule, Take 40 mg by mouth Daily., Disp: , Rfl:   •  rosuvastatin (CRESTOR) 20 MG tablet, Take 10 mg by mouth Every Night., Disp: , Rfl:   No current facility-administered medications for this visit.     Allergies:  No Known Allergies    Family History:  her family history includes Alcohol abuse in her paternal grandfather and paternal grandmother; Asthma in her father; COPD in her father; Depression in her maternal grandmother; Diabetes in her mother; Heart attack in her maternal grandfather, maternal uncle, and mother; Heart disease in her mother; Heart failure in her  "maternal grandfather, maternal uncle, and mother; Hyperlipidemia in her mother; Hypertension in her father and mother; Hyperthyroidism in her mother; Hypothyroidism in her mother; No Known Problems in her daughter and son; Scleroderma in her sister.    Social History:  she  reports that she has never smoked. She has never used smokeless tobacco. She reports that she drinks about 0.6 oz of alcohol per week . She reports that she does not use drugs.    Pertinent Findings on Review of Systems:  Fourteen systems were reviewed and are negative other than as mentioned above.    Pertinent Findings on Physical Exam:  Vitals:    10/29/18 1143   BP: 136/86   Pulse: 73   Temp: 97.8 °F (36.6 °C)   TempSrc: Oral   SpO2: 95%   Weight: 88.5 kg (195 lb)   Height: 161 cm (63.39\")   PainSc: 0-No pain       Performance Status:  (1) Restricted in physically strenuous activity, ambulatory and able to do work of light nature    Physical examination of the right breast reveals no abnormality and the right axilla is benign, without lymphadenopathy.     The left breast shows the well-healed lumpectomy incision and only slight and mild hyperpigmentation from the radiation therapy. The breast is quite soft and easy to examine. There are no worrisome nodules appreciated. The nipple is without change.     The axilla is benign on the left and there is no cervical or supraclavicular lymphadenopathy. There is no lymphedema noted in either upper extremity.    Assessment:    Malignant neoplasm of upper-outer quadrant of left breast in female, estrogen receptor positive (CMS/HCC)  Stage IIA (cT2, cN0(sn), cM0, G3, ER: Positive, [place]: Positive, HER2: Negative)   Doing wonderfully well, 4 weeks out from radiation therapy    Plan:   We reviewed today the current NCCN guidelines for her surveillance and follow up, specifically the need for physician visit every 4 to 6 months for 5 years, annual mammogram, annual gynecologic assessment, continued monitoring " of bone health and achieving and maintaining an ideal BMI.      She does continue on the Tamoxifen without difficulty.  She returns to see the [facility] physicians on January 21, 2019 and understands she will continue follow-up through that office while on the medication. She is also in the process of scheduling her follow up with Dr. Zazueta for this month.    Regarding her mammographic follow-up, she knows to continue on with her annual bilateral mammogram somewhere in July or August and she will move that imaging to [facility]. We discussed that scheduling process but she also knows to call if she needs help with an order.  Given the above, I have not given her an appointment to return here, but encouraged her to call if I can be of any further help in her care and thank you again for allowing us to participate in her care.

## 2018-10-31 ENCOUNTER — DOCUMENTATION (OUTPATIENT)
Dept: ONCOLOGY | Facility: CLINIC | Age: 68
End: 2018-10-31

## 2018-10-31 RX ORDER — TAMOXIFEN CITRATE 20 MG/1
20 TABLET ORAL DAILY
Qty: 90 TABLET | Refills: 2 | Status: SHIPPED | OUTPATIENT
Start: 2018-10-31 | End: 2019-09-12 | Stop reason: SDUPTHER

## 2018-10-31 NOTE — PROGRESS NOTES
Pt was in the office on 10/29/18 and [facility] speak with [facility]. Pt would like her Tamoxifen to be filled for a 90 day supply and sent to [facility]. I sent a staff message to Dr Guajardo for authorization. See below.    Michel Guajardo MD sent to Kiana Moreno.             We prescribed the tamoxifen dosing which can certainly be given is a 90 day supply. ThanksCAROLINA    Previous Messages      ----- Message -----   From: Kiana Moreno   Sent: 10/29/2018   1:27 PM   To: Michel Guajardo MD   Subject: 3 mo supply of Tamoxifen                         Mariana is asking for a 3 month supply of all medications you prescribe for her. The only one I see is the Tamoxifen. Can this be filled for a 3 mo supply?   Are there any others??   Let me know when you have a moment and I will take care of this.     Thank you,   Kiana         I have escribed a 90 day supply of Tamoxifen to [facility] in [place], [place].

## 2019-01-18 NOTE — PROGRESS NOTES
Subjective again tolerating tamoxifen, discussed weight loss, exercise, recognition of slowly worsening long-term bilateral hand tremor  REASON FOR FOLLOW-UP  Stage II a left breast cancer        History of Present Illness      Patient is a 68-year-old female with noted in late May 2018 a lump developing in the upper outer quadrant of the left breast without pain or nipple discharge.  This measured approximately 1 cm in size.  Mammogram indicated this asymmetric density in the same region and an ultrasound revealed a 1.8 cm irregular solid mass.  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  Additional history included no previous breast biopsies, a brief period of time with hormonal replacement and no history of breast or ovarian cancer with family.  She was seen by Dr. Zazueta on the 28th an MRI of both breasts was performed July 6.  Within the left anterior one third at the 12:30 position 3 cm from the nipple with irregular enhancing mass measuring 1.6 cm x 1.4 and 2.2 immediately lateral dimension.  There are other findings were seen in the left breast with no evidence of left axillary adenopathy and no findings suspicious for right breast.  The patient proceeded to a left breast lumpectomy July 18, 2018.      Pathologic findings were consistent with a 2.5 cm grade 3 invasive mammary ductal carcinoma with associated DCIS.  The closest margin was 1 mm.  [place] nodes were negative staging of pT2N0.  Dr. Zazueta saw the patient July 20 recognizing the closest margin was the anterior margin having taken this off the skin with no further removal possible.  There was concern about the need for additional tissue though the addended pathology revealed the DCIS to be intermediate grade.  It was determined not to take additional margins and have her seen by both medical oncology and radiation therapy.  She now presents with her   .      The patient's initial consultation was August 7 and potential adjuvant therapy discussed with the initial recommendation being an Oncotype DX analysis to be process.  This is now reviewed with the patient and her  may return August 23, 2018.  Her recurrence score 10 with 10 year risk of distance recurrence at 7%.  She is clearly in the low risk category additional studies revealing ER score of 10.5, IN score of 8.6 which are both positive and HER-2 score of 8.7 which is negative.   Additional studies include  LH of 27.5, FSH of 57.6, estradiol of 9.9, CA 15-3 of 10.9, TSH of 4.64 hemoglobin A1c of 8.72.   The patient is advised of the findings per her risk of recurrence and she and her  are understandably elated.  Chemotherapy is not recommended in her circumstance but anti-hormonal therapy is and we have discussed potential treatment with AI therapy being the most appropriate choice in this postmenopausal patient.  She's not additionally had any recent assessment for bone density, however we discussed having this done in the near future.     As result the patient was scheduled for bone density and this was obtained September 6 revealing evidence of osteoporosis involving the lumbar with T score of -3.4 and right hip with T score of -2.7.  The patient has been using Arimidex which we'll discontinue and we discussed institution of tamoxifen at this point.  She'll also need assessment of her vitamin D level which we went on to measure documenting a level of 45.5.                                      The patient is now seen a month after switching to tamoxifen and is tolerating it well thus far without significant hot flashes.  We have also discussed the use of Reclast under the circumstances and she is agreeable to treatment when seen October 29, 2018.   She did have myalgias and arthralgias following Reclast for several days but these then resolved.  As she seen January 21, 2019 she is  feeling well and we have discussed weight loss, exercise and also she and her 's recognition of slowly worsening bilateral hand tremor left greater than right.  This been present much before her diagnosis of breast cancer and actually is an issue in several members of her family.   Past Medical History:   Diagnosis Date   • Anxiety    • Anxiety and depression    • Asthma     SEASONAL ALLERGY RELATED   • Cancer of breast (CMS/HCC) 06/2018    LEFT   • Chronic kidney disease     Renal cyst, right kidney w/urology workup in past   • Depression    • Diabetes mellitus (CMS/HCC)     Type 2   • Disease of thyroid gland    • GERD (gastroesophageal reflux disease)    • History of prior pregnancies     x2   • History of transfusion     S/P HYST --AUTOLOGOUS    • Hyperlipidemia    • Hypertension    • IBS (irritable bowel syndrome)    • Seasonal allergies         Past Surgical History:   Procedure Laterality Date   • BREAST LUMPECTOMY WITH SENTINEL NODE BIOPSY Left 7/18/2018    Procedure: BREAST LUMPECTOMY WITH SENTINEL NODE BIOPSY;  Surgeon: João Zazueta MD;  Location: [facility];  Service: General   • HYSTERECTOMY  1989   • KNEE SURGERY Right 2014   • KNEE SURGERY Left 2016   • ROTATOR CUFF REPAIR Left 2009   • ROTATOR CUFF REPAIR Right 2012   • TOE SURGERY  2009    ingrown         Current Outpatient Medications on File Prior to Visit   Medication Sig Dispense Refill   • albuterol (PROAIR RESPICLICK) 108 (90 Base) MCG/ACT inhaler Inhale 1 puff Every 4 (Four) Hours As Needed for Wheezing or Shortness of Air.     • B Complex Vitamins (VITAMIN B COMPLEX PO) Take 1 tablet by mouth Every Night.     • cetirizine (zyrTEC) 10 MG tablet Take 10 mg by mouth Daily.     • cholecalciferol (VITAMIN D3) 1000 units tablet Take 1,000 Units by mouth Every Night.     • diphenhydrAMINE (BENADRYL) 25 mg capsule Take 25 mg by mouth Every 6 (Six) Hours As Needed for Sleep.     • escitalopram (LEXAPRO) 20 MG tablet Take 20 mg by  mouth Daily.     • FLUAD 0.5 ML suspension prefilled syringe ADM 0.5ML IM UTD  0   • fluticasone (FLONASE) 50 MCG/ACT nasal spray 2 sprays into each nostril Daily.     • HAVRIX 1440 EL U/ML vaccine ADM 1ML IM UTD  0   • HYDROcodone-acetaminophen (NORCO) 5-325 MG per tablet Take 1-2 tablets by mouth Every 4 (Four) Hours As Needed (Pain). 10 tablet 0   • levothyroxine (SYNTHROID, LEVOTHROID) 50 MCG tablet Take 50 mcg by mouth Every Morning.     • loperamide (IMODIUM A-D) 2 MG tablet Take 2 mg by mouth 4 (Four) Times a Day As Needed for Diarrhea.     • melatonin 5 MG tablet tablet Take 10 mg by mouth At Night As Needed.     • metFORMIN ER (GLUCOPHAGE-XR) 500 MG 24 hr tablet Take 500 mg by mouth 2 (Two) Times a Day.     • metoprolol-hydrochlorothiazide (LOPRESSOR HCT) 50-25 MG per tablet Take 0.5 tablets by mouth Daily.     • omeprazole (priLOSEC) 40 MG capsule Take 40 mg by mouth Daily.     • rosuvastatin (CRESTOR) 20 MG tablet Take 10 mg by mouth Every Night.     • tamoxifen (NOLVADEX) 20 MG chemo tablet Take 1 tablet by mouth Daily. 90 tablet 2     No current facility-administered medications on file prior to visit.         ALLERGIES:  No Known Allergies     Social History     Socioeconomic History   • Marital status:      Spouse name: Mahin   • Number of children: 2   • Years of education: College   • Highest education level: Not on file   Occupational History   • Occupation: Teacher     Employer: [employer]   Tobacco Use   • Smoking status: Never Smoker   • Smokeless tobacco: Never Used   Substance and Sexual Activity   • Alcohol use: Yes     Alcohol/week: 0.6 oz     Types: 1 Cans of beer per week     Comment: 2-3 a month, social only   • Drug use: No   • Sexual activity: Yes     Comment:         Family History   Problem Relation Age of Onset   • Diabetes Mother    • Heart attack Mother    • Heart failure Mother    • Hyperlipidemia Mother    • Hyperthyroidism Mother         Has  "surgery   • Hypothyroidism Mother         Later in life   • Heart disease Mother    • Hypertension Mother    • Asthma Father    • COPD Father    • Hypertension Father    • Heart attack Maternal Uncle    • Heart failure Maternal Uncle    • Depression Maternal Grandmother    • Heart attack Maternal Grandfather    • Heart failure Maternal Grandfather    • Alcohol abuse Paternal Grandmother    • Alcohol abuse Paternal Grandfather    • Scleroderma Sister    • No Known Problems Daughter    • No Known Problems Son    • Malig Hyperthermia Neg Hx         Review of Systems   Constitutional: Negative for unexpected weight change.        Modest hot flashes   Skin:        Pruritus   Neurological: Positive for tremors.          Objective     Vitals:    01/21/19 1355   Height: 161 cm (63.39\")     Current Status 10/29/2018   ECOG score 0       Physical Exam   Constitutional: She is oriented to person, place, and time. She appears well-developed and well-nourished.   HENT:   Head: Normocephalic and atraumatic.   Nose: Nose normal.   Mouth/Throat: Oropharynx is clear and moist.   Eyes: Conjunctivae and EOM are normal. Pupils are equal, round, and reactive to light.   Neck: Normal range of motion. Neck supple.   Cardiovascular: Normal rate, regular rhythm, normal heart sounds and intact distal pulses.   Pulmonary/Chest: Effort normal and breath sounds normal.   Abdominal: Soft. Bowel sounds are normal.   Musculoskeletal: Normal range of motion.   Neurological: She is alert and oriented to person, place, and time.   Resting tremor noted left greater than right upper extremities   Skin: Skin is warm and dry.   Psychiatric: She has a normal mood and affect. Her behavior is normal. Judgment and thought content normal.     Breast exam: Patient status post left breast lumpectomy well healing without evidence of inflammation or discharge from wound, status post left sentinel node assessment also without inflammation or discharge    RECENT " LABS:  Hematology WBC   Date Value Ref Range Status   01/21/2019 6.91 4.00 - 10.00 10*3/mm3 Final     RBC   Date Value Ref Range Status   01/21/2019 4.31 3.90 - 5.00 10*6/mm3 Final     Hemoglobin   Date Value Ref Range Status   01/21/2019 12.1 11.5 - 14.9 g/dL Final     Hematocrit   Date Value Ref Range Status   01/21/2019 36.8 34.0 - 45.0 % Final     Platelets   Date Value Ref Range Status   01/21/2019 245 150 - 375 10*3/mm3 Final      BONE MINERAL DENSITOMETRY September 06, 2018     HISTORY:  68 years-old female. Menopause at age 63. Breast cancer.     COMPARISON:  None.     TECHNIQUE: The T score compares the patient's bone mineral density with  the peak bone mass of young normal patients. Patients with T-scores  between 1.0 and 2.5 standard deviations below the mean are osteopenic.  Patients with T-scores greater than 2.5 standard deviation below the  mean are osteoporotic.     The Z score compares the patient's bone mineral density with sex and age  matched patients. Z score of -2.0 and lower is an indication of low  mineral density for the patient's age.     FINDINGS: Lumbar T score is  -3.4.     Left hip density is lowest at the  femoral neck where the T score is  -1.7.      Right hip density is lowest at the  femoral neck where the T score is  -2.7.      IMPRESSION:  Osteoporosis.    Assessment/Plan      68-year-old female with previous medical history of seasonal allergies, diabetes mellitus type 2, CKD3, hypothyroidism, hyperlipidemia, hypertension, IBS, history of anxiety and depression with recent development of left breast abnormality found by the patient herself.  This led to mammogram ultrasound and stereotactic biopsy confirming invasive ductal carcinoma grade 2 though ER, CO positive HER-2 negative.  Subsequent assessments via surgical review your no additional abnormalities seen on breast MRI and the patient proceeded to lumpectomy July 18, 2018.  Her pathologic findings were consistent with a 2.5  cm grade 3 invasive mammary ductal carcinoma with associated DCIS.  The closest margin was 1 mm.  [place] nodes were negative with staging of pT2N0.  Dr. Zazueta saw the patient July 20 recognizing the closest margin was the anterior margin having taken this off the skin with no further removal possible.  There was concern about the need for additional tissue though the addended pathology revealed the DCIS to be intermediate grade.  It was ultimately determined that further surgery was not necessary.   The patient presents for medical oncology assessment and we discussed potential adjuvant therapy but in particular it is felt the patient requires further genomic assessment with Oncotype DX analysis.  We reviewed this in detail and she understands the additional information that we could obtain from such an approach in making decisions about adjuvant therapy. We proceeded with additional assessment including Oncotype and laboratory studies that, fortunate, revealed that she has an excellent prognosis including a 7% risk of recurrence at 10 years with the use of tamoxifen.  Chemotherapy, therefore, is not appropriate and we have discussed an alternative therapy in this postmenopausal patient with AI therapy in particular her Arimidex over 5 years.  She is currently undergoing review by radiation therapy and this can proceed at any point as we also plan to initiate Arimidex with a trial of the medication given over the next month.  We went on to have the patient tested for bone density finding evidence, unfortunate, of osteoporosis.  She's been tolerating Arimidex well without discontinue this and institute Tamoxifen.  The patient went on to institute treatment and underwent testing for TSH, vitamin D level and serum chemistries.  These are found to be acceptable and she now next returns October 29 tolerating tamoxifen well. We went on to continue with Reclast at that visit.  The patient did have myalgias and  arthralgias following the treatment for several days that resolved.     As she is seen January 21, 2019 she is tolerating tamoxifen overall well though we have discussed that she should continue to exercise, manage her diet and try to achieve weight loss overall.  Additionally she has what appears to be essential tremor left greater than right.  Plans to see her primary care to review these issues but will start exercising immediately.  We'll plan to see her back here in approximately 6 months, plan repeat Reclast in late October and then yearly follow-ups as she completes 5 years of tamoxifen.

## 2019-01-21 ENCOUNTER — LAB (OUTPATIENT)
Dept: LAB | Facility: HOSPITAL | Age: 69
End: 2019-01-21

## 2019-01-21 ENCOUNTER — OFFICE VISIT (OUTPATIENT)
Dept: ONCOLOGY | Facility: CLINIC | Age: 69
End: 2019-01-21

## 2019-01-21 ENCOUNTER — OFFICE VISIT (OUTPATIENT)
Dept: MAMMOGRAPHY | Facility: CLINIC | Age: 69
End: 2019-01-21

## 2019-01-21 VITALS
HEART RATE: 91 BPM | BODY MASS INDEX: 33.68 KG/M2 | TEMPERATURE: 99.2 F | HEIGHT: 63 IN | WEIGHT: 190.1 LBS | OXYGEN SATURATION: 95 % | DIASTOLIC BLOOD PRESSURE: 75 MMHG | RESPIRATION RATE: 14 BRPM | SYSTOLIC BLOOD PRESSURE: 126 MMHG

## 2019-01-21 VITALS
OXYGEN SATURATION: 97 % | HEART RATE: 76 BPM | DIASTOLIC BLOOD PRESSURE: 70 MMHG | SYSTOLIC BLOOD PRESSURE: 110 MMHG | TEMPERATURE: 97.6 F

## 2019-01-21 DIAGNOSIS — M81.0 AGE-RELATED OSTEOPOROSIS WITHOUT CURRENT PATHOLOGICAL FRACTURE: ICD-10-CM

## 2019-01-21 DIAGNOSIS — C50.412 MALIGNANT NEOPLASM OF UPPER-OUTER QUADRANT OF LEFT BREAST IN FEMALE, ESTROGEN RECEPTOR POSITIVE (HCC): ICD-10-CM

## 2019-01-21 DIAGNOSIS — Z17.0 MALIGNANT NEOPLASM OF UPPER-OUTER QUADRANT OF LEFT BREAST IN FEMALE, ESTROGEN RECEPTOR POSITIVE (HCC): ICD-10-CM

## 2019-01-21 DIAGNOSIS — C50.412 MALIGNANT NEOPLASM OF UPPER-OUTER QUADRANT OF LEFT BREAST IN FEMALE, ESTROGEN RECEPTOR POSITIVE (HCC): Primary | ICD-10-CM

## 2019-01-21 DIAGNOSIS — Z17.0 MALIGNANT NEOPLASM OF UPPER-OUTER QUADRANT OF LEFT BREAST IN FEMALE, ESTROGEN RECEPTOR POSITIVE (HCC): Primary | ICD-10-CM

## 2019-01-21 DIAGNOSIS — R25.1 TREMOR OF BOTH HANDS: Primary | ICD-10-CM

## 2019-01-21 LAB
BASOPHILS # BLD AUTO: 0.03 10*3/MM3 (ref 0–0.1)
BASOPHILS NFR BLD AUTO: 0.4 % (ref 0–1.1)
DEPRECATED RDW RBC AUTO: 42.3 FL (ref 37–49)
EOSINOPHIL # BLD AUTO: 0.14 10*3/MM3 (ref 0–0.36)
EOSINOPHIL NFR BLD AUTO: 2 % (ref 1–5)
ERYTHROCYTE [DISTWIDTH] IN BLOOD BY AUTOMATED COUNT: 13.4 % (ref 11.7–14.5)
HCT VFR BLD AUTO: 36.8 % (ref 34–45)
HGB BLD-MCNC: 12.1 G/DL (ref 11.5–14.9)
IMM GRANULOCYTES # BLD AUTO: 0.04 10*3/MM3 (ref 0–0.03)
IMM GRANULOCYTES NFR BLD AUTO: 0.6 % (ref 0–0.5)
LYMPHOCYTES # BLD AUTO: 1.27 10*3/MM3 (ref 1–3.5)
LYMPHOCYTES NFR BLD AUTO: 18.4 % (ref 20–49)
MCH RBC QN AUTO: 28.1 PG (ref 27–33)
MCHC RBC AUTO-ENTMCNC: 32.9 G/DL (ref 32–35)
MCV RBC AUTO: 85.4 FL (ref 83–97)
MONOCYTES # BLD AUTO: 0.42 10*3/MM3 (ref 0.25–0.8)
MONOCYTES NFR BLD AUTO: 6.1 % (ref 4–12)
NEUTROPHILS # BLD AUTO: 5.01 10*3/MM3 (ref 1.5–7)
NEUTROPHILS NFR BLD AUTO: 72.5 % (ref 39–75)
NRBC BLD AUTO-RTO: 0 /100 WBC (ref 0–0)
PLATELET # BLD AUTO: 245 10*3/MM3 (ref 150–375)
PMV BLD AUTO: 10.6 FL (ref 8.9–12.1)
RBC # BLD AUTO: 4.31 10*6/MM3 (ref 3.9–5)
WBC NRBC COR # BLD: 6.91 10*3/MM3 (ref 4–10)

## 2019-01-21 PROCEDURE — 99212 OFFICE O/P EST SF 10 MIN: CPT | Performed by: SURGERY

## 2019-01-21 PROCEDURE — 36415 COLL VENOUS BLD VENIPUNCTURE: CPT | Performed by: INTERNAL MEDICINE

## 2019-01-21 PROCEDURE — 99214 OFFICE O/P EST MOD 30 MIN: CPT | Performed by: INTERNAL MEDICINE

## 2019-01-21 PROCEDURE — 85025 COMPLETE CBC W/AUTO DIFF WBC: CPT | Performed by: INTERNAL MEDICINE

## 2019-01-21 NOTE — PROGRESS NOTES
Subjective   Mariana Ansari is a 68 y.o. female     History of Present Illness she is 5 months out from her left breast conserving surgery.  She completed her radiation treatments in early November.  She has also been on hormone blocking therapy and following with the medical oncologists.  She has not had any problems at her surgical sites nor has she palpated anything in her breasts.        Review of Systems extremities-the patient has not had any problems with lymphedema.  Constitutional-she denies any weight loss, weight gain,.  She has had some hot flashes I think from her hormone blocking therapy.  Past Medical History:   Diagnosis Date   • Anxiety    • Anxiety and depression    • Asthma     SEASONAL ALLERGY RELATED   • Cancer of breast (CMS/HCC) 06/2018    LEFT   • Chronic kidney disease     Renal cyst, right kidney w/urology workup in past   • Depression    • Diabetes mellitus (CMS/HCC)     Type 2   • Disease of thyroid gland    • GERD (gastroesophageal reflux disease)    • History of prior pregnancies     x2   • History of transfusion     S/P HYST --AUTOLOGOUS    • Hyperlipidemia    • Hypertension    • IBS (irritable bowel syndrome)    • Seasonal allergies      Past Surgical History:   Procedure Laterality Date   • BREAST LUMPECTOMY WITH SENTINEL NODE BIOPSY Left 7/18/2018    Procedure: BREAST LUMPECTOMY WITH SENTINEL NODE BIOPSY;  Surgeon: João Zazueta MD;  Location: [facility] OR;  Service: General   • HYSTERECTOMY  1989   • KNEE SURGERY Right 2014   • KNEE SURGERY Left 2016   • ROTATOR CUFF REPAIR Left 2009   • ROTATOR CUFF REPAIR Right 2012   • TOE SURGERY  2009    ingrown      Family History   Problem Relation Age of Onset   • Diabetes Mother    • Heart attack Mother    • Heart failure Mother    • Hyperlipidemia Mother    • Hyperthyroidism Mother         Has surgery   • Hypothyroidism Mother         Later in life   • Heart disease Mother    • Hypertension Mother    • Asthma Father    • COPD  Father    • Hypertension Father    • Heart attack Maternal Uncle    • Heart failure Maternal Uncle    • Depression Maternal Grandmother    • Heart attack Maternal Grandfather    • Heart failure Maternal Grandfather    • Alcohol abuse Paternal Grandmother    • Alcohol abuse Paternal Grandfather    • Scleroderma Sister    • No Known Problems Daughter    • No Known Problems Son    • Malig Hyperthermia Neg Hx      Social History     Socioeconomic History   • Marital status:      Spouse name: Mahin   • Number of children: 2   • Years of education: College   • Highest education level: Not on file   Social Needs   • Financial resource strain: Not on file   • Food insecurity - worry: Not on file   • Food insecurity - inability: Not on file   • Transportation needs - medical: Not on file   • Transportation needs - non-medical: Not on file   Occupational History   • Occupation: Teacher     Employer: [id number]   Tobacco Use   • Smoking status: Never Smoker   • Smokeless tobacco: Never Used   Substance and Sexual Activity   • Alcohol use: Yes     Alcohol/week: 0.6 oz     Types: 1 Cans of beer per week     Comment: 2-3 a month, social only   • Drug use: No   • Sexual activity: Yes     Comment:    Other Topics Concern   • Not on file   Social History Narrative   • Not on file       Objective   Physical Exam  Vital signs-blood pressure 110/70, pulse 76, temperature 97.6  Left breast-there is some mild hyperpigmentation from her radiation.  I do not palpate any suspicious masses in the breast.  The lumpectomy scar is healing nicely near the edge of the areola in the upper outer quadrant.  The sentinel node incision is also healing well.  Extremities-no evidence of left upper extremity swelling  Lymphatics-no left axillary adenopathy  Assessment/Plan   She is doing quite well following her breast conserving surgery.  She will continue to follow with the medical oncologists and I will see her on an  as-needed basis.    The encounter diagnosis was Malignant neoplasm of upper-outer quadrant of left breast in female, estrogen receptor positive (CMS/HCC).

## 2019-01-31 ENCOUNTER — HOSPITAL ENCOUNTER (OUTPATIENT)
Dept: FAMILY MEDICINE CLINIC | Facility: CLINIC | Age: 69
Setting detail: SPECIMEN
Discharge: HOME | End: 2019-01-31
Attending: FAMILY MEDICINE | Admitting: FAMILY MEDICINE

## 2019-01-31 LAB
ALBUMIN SERPL-MCNC: 4 G/DL (ref 3.5–4.8)
ALBUMIN/GLOB SERPL: 1.3 {RATIO} (ref 1–1.7)
ALP SERPL-CCNC: 64 IU/L (ref 32–91)
ALT SERPL-CCNC: 77 IU/L (ref 14–54)
ANION GAP SERPL CALC-SCNC: 18.7 MMOL/L (ref 10–20)
AST SERPL-CCNC: 154 IU/L (ref 15–41)
BASOPHILS # BLD AUTO: 0 10*3/UL (ref 0–0.2)
BASOPHILS NFR BLD AUTO: 1 % (ref 0–2)
BILIRUB SERPL-MCNC: 0.5 MG/DL (ref 0.3–1.2)
BUN SERPL-MCNC: 15 MG/DL (ref 8–20)
BUN/CREAT SERPL: 13.6 (ref 5.4–26.2)
CALCIUM SERPL-MCNC: 9.8 MG/DL (ref 8.9–10.3)
CHLORIDE SERPL-SCNC: 99 MMOL/L (ref 101–111)
CHOLEST SERPL-MCNC: 113 MG/DL
CHOLEST/HDLC SERPL: 4.1 {RATIO}
CONV CO2: 25 MMOL/L (ref 22–32)
CONV LDL CHOLESTEROL DIRECT: 60 MG/DL (ref 0–100)
CONV MICROALBUM.,U,RANDOM: 50 MG/L
CONV TOTAL PROTEIN: 7.2 G/DL (ref 6.1–7.9)
CREAT 24H UR-MCNC: 246 MG/DL
CREAT UR-MCNC: 1.1 MG/DL (ref 0.4–1)
DIFFERENTIAL METHOD BLD: (no result)
EOSINOPHIL # BLD AUTO: 0.2 10*3/UL (ref 0–0.3)
EOSINOPHIL # BLD AUTO: 3 % (ref 0–3)
ERYTHROCYTE [DISTWIDTH] IN BLOOD BY AUTOMATED COUNT: 14.1 % (ref 11.5–14.5)
GLOBULIN UR ELPH-MCNC: 3.2 G/DL (ref 2.5–3.8)
GLUCOSE SERPL-MCNC: 235 MG/DL (ref 65–99)
HCT VFR BLD AUTO: 39.4 % (ref 35–49)
HDLC SERPL-MCNC: 28 MG/DL
HGB BLD-MCNC: 13.1 G/DL (ref 12–15)
LDLC/HDLC SERPL: 2.2 {RATIO}
LIPID INTERPRETATION: ABNORMAL
LYMPHOCYTES # BLD AUTO: 1.2 10*3/UL (ref 0.8–4.8)
LYMPHOCYTES NFR BLD AUTO: 22 % (ref 18–42)
MCH RBC QN AUTO: 28.5 PG (ref 26–32)
MCHC RBC AUTO-ENTMCNC: 33.3 G/DL (ref 32–36)
MCV RBC AUTO: 85.6 FL (ref 80–94)
MICROALBUMIN/CREAT UR: 20.3 UG/MG
MONOCYTES # BLD AUTO: 0.3 10*3/UL (ref 0.1–1.3)
MONOCYTES NFR BLD AUTO: 6 % (ref 2–11)
NEUTROPHILS # BLD AUTO: 3.8 10*3/UL (ref 2.3–8.6)
NEUTROPHILS NFR BLD AUTO: 68 % (ref 50–75)
NRBC BLD AUTO-RTO: 0 /100{WBCS}
NRBC/RBC NFR BLD MANUAL: 0 10*3/UL
PLATELET # BLD AUTO: 266 10*3/UL (ref 150–450)
PMV BLD AUTO: 8.8 FL (ref 7.4–10.4)
POTASSIUM SERPL-SCNC: 4.7 MMOL/L (ref 3.6–5.1)
RBC # BLD AUTO: 4.61 10*6/UL (ref 4–5.4)
SODIUM SERPL-SCNC: 138 MMOL/L (ref 136–144)
TRIGL SERPL-MCNC: 209 MG/DL
VLDLC SERPL CALC-MCNC: 24.7 MG/DL
WBC # BLD AUTO: 5.5 10*3/UL (ref 4.5–11.5)

## 2019-02-18 ENCOUNTER — OFFICE (OUTPATIENT)
Dept: URBAN - METROPOLITAN AREA CLINIC 64 | Facility: CLINIC | Age: 69
End: 2019-02-18

## 2019-02-18 VITALS
HEART RATE: 78 BPM | DIASTOLIC BLOOD PRESSURE: 67 MMHG | HEIGHT: 63 IN | SYSTOLIC BLOOD PRESSURE: 116 MMHG | WEIGHT: 186 LBS

## 2019-02-18 DIAGNOSIS — R94.5 ABNORMAL RESULTS OF LIVER FUNCTION STUDIES: ICD-10-CM

## 2019-02-18 PROCEDURE — 99203 OFFICE O/P NEW LOW 30 MIN: CPT | Performed by: NURSE PRACTITIONER

## 2019-04-15 ENCOUNTER — OFFICE (OUTPATIENT)
Dept: URBAN - METROPOLITAN AREA CLINIC 64 | Facility: CLINIC | Age: 69
End: 2019-04-15

## 2019-04-15 VITALS
WEIGHT: 184 LBS | HEIGHT: 63 IN | DIASTOLIC BLOOD PRESSURE: 67 MMHG | HEART RATE: 95 BPM | SYSTOLIC BLOOD PRESSURE: 118 MMHG

## 2019-04-15 DIAGNOSIS — R94.5 ABNORMAL RESULTS OF LIVER FUNCTION STUDIES: ICD-10-CM

## 2019-04-15 PROCEDURE — 99214 OFFICE O/P EST MOD 30 MIN: CPT | Performed by: NURSE PRACTITIONER

## 2019-04-16 ENCOUNTER — HOSPITAL ENCOUNTER (OUTPATIENT)
Dept: PREADMISSION TESTING | Facility: HOSPITAL | Age: 69
Discharge: HOME | End: 2019-04-17
Attending: INTERNAL MEDICINE | Admitting: INTERNAL MEDICINE

## 2019-04-16 LAB
ALBUMIN SERPL-MCNC: 3.9 G/DL (ref 3.5–4.8)
ALBUMIN/GLOB SERPL: 1.3 {RATIO} (ref 1–1.7)
ALP SERPL-CCNC: 44 IU/L (ref 32–91)
ALT SERPL-CCNC: 41 IU/L (ref 14–54)
ANION GAP SERPL CALC-SCNC: 15.5 MMOL/L (ref 10–20)
AST SERPL-CCNC: 60 IU/L (ref 15–41)
BASOPHILS # BLD AUTO: 0 10*3/UL (ref 0–0.2)
BASOPHILS NFR BLD AUTO: 1 % (ref 0–2)
BILIRUB SERPL-MCNC: 0.7 MG/DL (ref 0.3–1.2)
BUN SERPL-MCNC: 17 MG/DL (ref 8–20)
BUN/CREAT SERPL: 14.2 (ref 5.4–26.2)
CALCIUM SERPL-MCNC: 9.3 MG/DL (ref 8.9–10.3)
CHLORIDE SERPL-SCNC: 103 MMOL/L (ref 101–111)
CONV CO2: 26 MMOL/L (ref 22–32)
CONV TOTAL PROTEIN: 7 G/DL (ref 6.1–7.9)
CREAT UR-MCNC: 1.2 MG/DL (ref 0.4–1)
DIFFERENTIAL METHOD BLD: (no result)
EOSINOPHIL # BLD AUTO: 0.2 10*3/UL (ref 0–0.3)
EOSINOPHIL # BLD AUTO: 3 % (ref 0–3)
ERYTHROCYTE [DISTWIDTH] IN BLOOD BY AUTOMATED COUNT: 14.3 % (ref 11.5–14.5)
GLOBULIN UR ELPH-MCNC: 3.1 G/DL (ref 2.5–3.8)
GLUCOSE SERPL-MCNC: 162 MG/DL (ref 65–99)
HCT VFR BLD AUTO: 36.2 % (ref 35–49)
HGB BLD-MCNC: 12 G/DL (ref 12–15)
LYMPHOCYTES # BLD AUTO: 1.1 10*3/UL (ref 0.8–4.8)
LYMPHOCYTES NFR BLD AUTO: 17 % (ref 18–42)
MCH RBC QN AUTO: 28.7 PG (ref 26–32)
MCHC RBC AUTO-ENTMCNC: 33.1 G/DL (ref 32–36)
MCV RBC AUTO: 86.8 FL (ref 80–94)
MONOCYTES # BLD AUTO: 0.4 10*3/UL (ref 0.1–1.3)
MONOCYTES NFR BLD AUTO: 6 % (ref 2–11)
NEUTROPHILS # BLD AUTO: 4.8 10*3/UL (ref 2.3–8.6)
NEUTROPHILS NFR BLD AUTO: 73 % (ref 50–75)
NRBC BLD AUTO-RTO: 0 /100{WBCS}
NRBC/RBC NFR BLD MANUAL: 0 10*3/UL
PLATELET # BLD AUTO: 274 10*3/UL (ref 150–450)
PMV BLD AUTO: 8.7 FL (ref 7.4–10.4)
POTASSIUM SERPL-SCNC: 4.5 MMOL/L (ref 3.6–5.1)
RBC # BLD AUTO: 4.17 10*6/UL (ref 4–5.4)
SODIUM SERPL-SCNC: 140 MMOL/L (ref 136–144)
WBC # BLD AUTO: 6.5 10*3/UL (ref 4.5–11.5)

## 2019-04-17 ENCOUNTER — ON CAMPUS - OUTPATIENT (OUTPATIENT)
Dept: URBAN - METROPOLITAN AREA HOSPITAL 85 | Facility: HOSPITAL | Age: 69
End: 2019-04-17

## 2019-04-17 ENCOUNTER — HOSPITAL ENCOUNTER (OUTPATIENT)
Dept: GASTROENTEROLOGY | Facility: HOSPITAL | Age: 69
Setting detail: HOSPITAL OUTPATIENT SURGERY
Discharge: HOME | End: 2019-04-17
Attending: INTERNAL MEDICINE | Admitting: INTERNAL MEDICINE

## 2019-04-17 DIAGNOSIS — R94.5 ABNORMAL RESULTS OF LIVER FUNCTION STUDIES: ICD-10-CM

## 2019-04-17 DIAGNOSIS — K75.81 NONALCOHOLIC STEATOHEPATITIS (NASH): ICD-10-CM

## 2019-04-17 DIAGNOSIS — K86.81 EXOCRINE PANCREATIC INSUFFICIENCY: ICD-10-CM

## 2019-04-17 DIAGNOSIS — K86.89 OTHER SPECIFIED DISEASES OF PANCREAS: ICD-10-CM

## 2019-04-17 DIAGNOSIS — K82.4 CHOLESTEROLOSIS OF GALLBLADDER: ICD-10-CM

## 2019-04-17 LAB — GLUCOSE BLD-MCNC: ABNORMAL MG/DL (ref 70–105)

## 2019-04-17 PROCEDURE — 43238 EGD US FINE NEEDLE BX/ASPIR: CPT | Performed by: INTERNAL MEDICINE

## 2019-05-28 ENCOUNTER — OFFICE (OUTPATIENT)
Dept: URBAN - METROPOLITAN AREA CLINIC 64 | Facility: CLINIC | Age: 69
End: 2019-05-28

## 2019-05-28 VITALS
DIASTOLIC BLOOD PRESSURE: 73 MMHG | WEIGHT: 187 LBS | HEIGHT: 63 IN | HEART RATE: 79 BPM | SYSTOLIC BLOOD PRESSURE: 116 MMHG

## 2019-05-28 DIAGNOSIS — R93.2 ABNORMAL FINDINGS ON DIAGNOSTIC IMAGING OF LIVER AND BILIARY: ICD-10-CM

## 2019-05-28 DIAGNOSIS — K75.81 NONALCOHOLIC STEATOHEPATITIS (NASH): ICD-10-CM

## 2019-05-28 PROCEDURE — 99213 OFFICE O/P EST LOW 20 MIN: CPT | Performed by: NURSE PRACTITIONER

## 2019-07-20 RX ORDER — ESCITALOPRAM OXALATE 20 MG/1
TABLET ORAL
Qty: 90 TABLET | Refills: 0 | Status: SHIPPED | OUTPATIENT
Start: 2019-07-20 | End: 2019-09-16 | Stop reason: SDUPTHER

## 2019-08-01 ENCOUNTER — OFFICE VISIT (OUTPATIENT)
Dept: ONCOLOGY | Facility: CLINIC | Age: 69
End: 2019-08-01

## 2019-08-01 ENCOUNTER — LAB (OUTPATIENT)
Dept: LAB | Facility: HOSPITAL | Age: 69
End: 2019-08-01

## 2019-08-01 VITALS
DIASTOLIC BLOOD PRESSURE: 74 MMHG | TEMPERATURE: 98.7 F | RESPIRATION RATE: 18 BRPM | SYSTOLIC BLOOD PRESSURE: 118 MMHG | WEIGHT: 185.9 LBS | HEART RATE: 80 BPM | OXYGEN SATURATION: 99 % | HEIGHT: 63 IN | BODY MASS INDEX: 32.94 KG/M2

## 2019-08-01 DIAGNOSIS — E11.8 TYPE 2 DIABETES MELLITUS WITH COMPLICATION, WITHOUT LONG-TERM CURRENT USE OF INSULIN (HCC): ICD-10-CM

## 2019-08-01 DIAGNOSIS — Z17.0 MALIGNANT NEOPLASM OF UPPER-OUTER QUADRANT OF LEFT BREAST IN FEMALE, ESTROGEN RECEPTOR POSITIVE (HCC): Primary | ICD-10-CM

## 2019-08-01 DIAGNOSIS — R93.3 ABNORMAL FINDINGS ON DIAGNOSTIC IMAGING OF OTHER PARTS OF DIGESTIVE TRACT: ICD-10-CM

## 2019-08-01 DIAGNOSIS — C50.412 MALIGNANT NEOPLASM OF UPPER-OUTER QUADRANT OF LEFT BREAST IN FEMALE, ESTROGEN RECEPTOR POSITIVE (HCC): Primary | ICD-10-CM

## 2019-08-01 DIAGNOSIS — D64.9 ANEMIA, UNSPECIFIED TYPE: ICD-10-CM

## 2019-08-01 DIAGNOSIS — E11.9 TYPE 2 DIABETES MELLITUS WITHOUT COMPLICATION, WITHOUT LONG-TERM CURRENT USE OF INSULIN (HCC): ICD-10-CM

## 2019-08-01 LAB
ALBUMIN SERPL-MCNC: 4.5 G/DL (ref 3.5–5.2)
ALBUMIN/GLOB SERPL: 1.4 G/DL
ALP SERPL-CCNC: 68 U/L (ref 39–117)
ALT SERPL W P-5'-P-CCNC: 45 U/L (ref 1–33)
ANION GAP SERPL CALCULATED.3IONS-SCNC: 13.8 MMOL/L (ref 5–15)
AST SERPL-CCNC: 99 U/L (ref 1–32)
BASOPHILS # BLD AUTO: 0.02 10*3/MM3 (ref 0–0.2)
BASOPHILS NFR BLD AUTO: 0.4 % (ref 0–1.5)
BILIRUB SERPL-MCNC: 0.4 MG/DL (ref 0.2–1.2)
BUN BLD-MCNC: 21 MG/DL (ref 8–23)
BUN/CREAT SERPL: 15.4 (ref 7–25)
CALCIUM SPEC-SCNC: 9.9 MG/DL (ref 8.6–10.5)
CHLORIDE SERPL-SCNC: 104 MMOL/L (ref 98–107)
CHOLEST SERPL-MCNC: 113 MG/DL (ref 0–200)
CO2 SERPL-SCNC: 26.2 MMOL/L (ref 22–29)
CREAT BLD-MCNC: 1.36 MG/DL (ref 0.57–1)
DEPRECATED RDW RBC AUTO: 39.9 FL (ref 37–54)
EOSINOPHIL # BLD AUTO: 0.1 10*3/MM3 (ref 0–0.4)
EOSINOPHIL NFR BLD AUTO: 1.9 % (ref 0.3–6.2)
ERYTHROCYTE [DISTWIDTH] IN BLOOD BY AUTOMATED COUNT: 12.8 % (ref 12.3–15.4)
FERRITIN SERPL-MCNC: 298 NG/ML (ref 13–150)
FOLATE SERPL-MCNC: >20 NG/ML (ref 4.78–24.2)
GFR SERPL CREATININE-BSD FRML MDRD: 39 ML/MIN/1.73
GLOBULIN UR ELPH-MCNC: 3.2 GM/DL
GLUCOSE BLD-MCNC: 193 MG/DL (ref 65–99)
HBA1C MFR BLD: 7.1 % (ref 4.8–5.6)
HCT VFR BLD AUTO: 35.3 % (ref 34–46.6)
HDLC SERPL-MCNC: 24 MG/DL (ref 40–60)
HGB BLD-MCNC: 11.7 G/DL (ref 12–15.9)
IMM GRANULOCYTES # BLD AUTO: 0.06 10*3/MM3 (ref 0–0.05)
IMM GRANULOCYTES NFR BLD AUTO: 1.1 % (ref 0–0.5)
IRON 24H UR-MRATE: 116 MCG/DL (ref 37–145)
IRON SATN MFR SERPL: 24 % (ref 20–50)
LDLC SERPL CALC-MCNC: 36 MG/DL (ref 0–100)
LDLC/HDLC SERPL: 1.52 {RATIO}
LYMPHOCYTES # BLD AUTO: 1.06 10*3/MM3 (ref 0.7–3.1)
LYMPHOCYTES NFR BLD AUTO: 20.3 % (ref 19.6–45.3)
MCH RBC QN AUTO: 28.4 PG (ref 26.6–33)
MCHC RBC AUTO-ENTMCNC: 33.1 G/DL (ref 31.5–35.7)
MCV RBC AUTO: 85.7 FL (ref 79–97)
MONOCYTES # BLD AUTO: 0.33 10*3/MM3 (ref 0.1–0.9)
MONOCYTES NFR BLD AUTO: 6.3 % (ref 5–12)
NEUTROPHILS # BLD AUTO: 3.65 10*3/MM3 (ref 1.7–7)
NEUTROPHILS NFR BLD AUTO: 70 % (ref 42.7–76)
NRBC BLD AUTO-RTO: 0 /100 WBC (ref 0–0.2)
PLATELET # BLD AUTO: 236 10*3/MM3 (ref 140–450)
PMV BLD AUTO: 10.8 FL (ref 6–12)
POTASSIUM BLD-SCNC: 5 MMOL/L (ref 3.5–5.2)
PROT SERPL-MCNC: 7.7 G/DL (ref 6–8.5)
RBC # BLD AUTO: 4.12 10*6/MM3 (ref 3.77–5.28)
SODIUM BLD-SCNC: 144 MMOL/L (ref 136–145)
TIBC SERPL-MCNC: 477 MCG/DL (ref 298–536)
TRANSFERRIN SERPL-MCNC: 320 MG/DL (ref 200–360)
TRIGL SERPL-MCNC: 263 MG/DL (ref 0–150)
TSH SERPL DL<=0.05 MIU/L-ACNC: 4.76 MIU/ML (ref 0.27–4.2)
VIT B12 BLD-MCNC: 494 PG/ML (ref 211–946)
VLDLC SERPL-MCNC: 52.6 MG/DL (ref 5–40)
WBC NRBC COR # BLD: 5.22 10*3/MM3 (ref 3.4–10.8)

## 2019-08-01 PROCEDURE — 80061 LIPID PANEL: CPT | Performed by: INTERNAL MEDICINE

## 2019-08-01 PROCEDURE — 84466 ASSAY OF TRANSFERRIN: CPT | Performed by: INTERNAL MEDICINE

## 2019-08-01 PROCEDURE — 82728 ASSAY OF FERRITIN: CPT | Performed by: INTERNAL MEDICINE

## 2019-08-01 PROCEDURE — 84443 ASSAY THYROID STIM HORMONE: CPT | Performed by: INTERNAL MEDICINE

## 2019-08-01 PROCEDURE — 36415 COLL VENOUS BLD VENIPUNCTURE: CPT | Performed by: INTERNAL MEDICINE

## 2019-08-01 PROCEDURE — 82607 VITAMIN B-12: CPT | Performed by: INTERNAL MEDICINE

## 2019-08-01 PROCEDURE — 99214 OFFICE O/P EST MOD 30 MIN: CPT | Performed by: INTERNAL MEDICINE

## 2019-08-01 PROCEDURE — 85025 COMPLETE CBC W/AUTO DIFF WBC: CPT | Performed by: INTERNAL MEDICINE

## 2019-08-01 PROCEDURE — 80053 COMPREHEN METABOLIC PANEL: CPT | Performed by: INTERNAL MEDICINE

## 2019-08-01 PROCEDURE — 83036 HEMOGLOBIN GLYCOSYLATED A1C: CPT | Performed by: INTERNAL MEDICINE

## 2019-08-01 PROCEDURE — 82746 ASSAY OF FOLIC ACID SERUM: CPT | Performed by: INTERNAL MEDICINE

## 2019-08-01 PROCEDURE — 83540 ASSAY OF IRON: CPT | Performed by: INTERNAL MEDICINE

## 2019-08-03 LAB
Lab: NORMAL
METHYLMALONATE SERPL-SCNC: 273 NMOL/L (ref 0–378)

## 2019-08-08 RX ORDER — PROPRANOLOL HCL 60 MG
CAPSULE, EXTENDED RELEASE 24HR ORAL
Qty: 90 CAPSULE | Refills: 0 | Status: SHIPPED | OUTPATIENT
Start: 2019-08-08 | End: 2019-09-16 | Stop reason: SDUPTHER

## 2019-08-08 RX ORDER — LEVOTHYROXINE SODIUM 0.05 MG/1
TABLET ORAL
Qty: 90 TABLET | Refills: 0 | Status: SHIPPED | OUTPATIENT
Start: 2019-08-08 | End: 2019-09-16

## 2019-08-08 RX ORDER — LISINOPRIL AND HYDROCHLOROTHIAZIDE 20; 12.5 MG/1; MG/1
TABLET ORAL
Qty: 90 TABLET | Refills: 0 | Status: SHIPPED | OUTPATIENT
Start: 2019-08-08 | End: 2019-09-16 | Stop reason: SDUPTHER

## 2019-08-11 DIAGNOSIS — E11.8 TYPE 2 DIABETES MELLITUS WITH COMPLICATION, WITHOUT LONG-TERM CURRENT USE OF INSULIN (HCC): Primary | ICD-10-CM

## 2019-08-11 RX ORDER — SITAGLIPTIN AND METFORMIN HYDROCHLORIDE 1000; 50 MG/1; MG/1
TABLET, FILM COATED, EXTENDED RELEASE ORAL
Qty: 60 TABLET | Refills: 0 | Status: SHIPPED | OUTPATIENT
Start: 2019-08-11 | End: 2019-09-16 | Stop reason: SDUPTHER

## 2019-09-12 ENCOUNTER — OFFICE VISIT (OUTPATIENT)
Dept: ONCOLOGY | Facility: CLINIC | Age: 69
End: 2019-09-12

## 2019-09-12 ENCOUNTER — LAB (OUTPATIENT)
Dept: LAB | Facility: HOSPITAL | Age: 69
End: 2019-09-12

## 2019-09-12 ENCOUNTER — TELEPHONE (OUTPATIENT)
Dept: ONCOLOGY | Facility: CLINIC | Age: 69
End: 2019-09-12

## 2019-09-12 VITALS
WEIGHT: 183.3 LBS | SYSTOLIC BLOOD PRESSURE: 121 MMHG | TEMPERATURE: 98.4 F | HEART RATE: 84 BPM | DIASTOLIC BLOOD PRESSURE: 74 MMHG | OXYGEN SATURATION: 95 % | HEIGHT: 63 IN | RESPIRATION RATE: 16 BRPM | BODY MASS INDEX: 32.48 KG/M2

## 2019-09-12 DIAGNOSIS — C50.412 MALIGNANT NEOPLASM OF UPPER-OUTER QUADRANT OF LEFT BREAST IN FEMALE, ESTROGEN RECEPTOR POSITIVE (HCC): ICD-10-CM

## 2019-09-12 DIAGNOSIS — Z17.0 MALIGNANT NEOPLASM OF UPPER-OUTER QUADRANT OF LEFT BREAST IN FEMALE, ESTROGEN RECEPTOR POSITIVE (HCC): Primary | ICD-10-CM

## 2019-09-12 DIAGNOSIS — N30.00 ACUTE CYSTITIS WITHOUT HEMATURIA: Primary | ICD-10-CM

## 2019-09-12 DIAGNOSIS — D64.9 ANEMIA, UNSPECIFIED TYPE: ICD-10-CM

## 2019-09-12 DIAGNOSIS — C50.412 MALIGNANT NEOPLASM OF UPPER-OUTER QUADRANT OF LEFT BREAST IN FEMALE, ESTROGEN RECEPTOR POSITIVE (HCC): Primary | ICD-10-CM

## 2019-09-12 DIAGNOSIS — Z17.0 MALIGNANT NEOPLASM OF UPPER-OUTER QUADRANT OF LEFT BREAST IN FEMALE, ESTROGEN RECEPTOR POSITIVE (HCC): ICD-10-CM

## 2019-09-12 DIAGNOSIS — E11.8 TYPE 2 DIABETES MELLITUS WITH COMPLICATION, WITHOUT LONG-TERM CURRENT USE OF INSULIN (HCC): ICD-10-CM

## 2019-09-12 LAB
BACTERIA UR QL AUTO: ABNORMAL /HPF
BASOPHILS # BLD AUTO: 0.03 10*3/MM3 (ref 0–0.2)
BASOPHILS NFR BLD AUTO: 0.5 % (ref 0–1.5)
BILIRUB UR QL STRIP: ABNORMAL
CLARITY UR: ABNORMAL
COLOR UR: ABNORMAL
DEPRECATED RDW RBC AUTO: 42.1 FL (ref 37–54)
EOSINOPHIL # BLD AUTO: 0.52 10*3/MM3 (ref 0–0.4)
EOSINOPHIL NFR BLD AUTO: 9 % (ref 0.3–6.2)
ERYTHROCYTE [DISTWIDTH] IN BLOOD BY AUTOMATED COUNT: 13 % (ref 12.3–15.4)
GLUCOSE UR STRIP-MCNC: ABNORMAL MG/DL
GRAN CASTS URNS QL MICRO: ABNORMAL /LPF
HCT VFR BLD AUTO: 35.1 % (ref 34–46.6)
HGB BLD-MCNC: 11.4 G/DL (ref 12–15.9)
HGB UR QL STRIP.AUTO: NEGATIVE
IMM GRANULOCYTES # BLD AUTO: 0.07 10*3/MM3 (ref 0–0.05)
IMM GRANULOCYTES NFR BLD AUTO: 1.2 % (ref 0–0.5)
KETONES UR QL STRIP: ABNORMAL
LEUKOCYTE ESTERASE UR QL STRIP.AUTO: NEGATIVE
LYMPHOCYTES # BLD AUTO: 1.49 10*3/MM3 (ref 0.7–3.1)
LYMPHOCYTES NFR BLD AUTO: 25.9 % (ref 19.6–45.3)
MCH RBC QN AUTO: 28.6 PG (ref 26.6–33)
MCHC RBC AUTO-ENTMCNC: 32.5 G/DL (ref 31.5–35.7)
MCV RBC AUTO: 88 FL (ref 79–97)
MONOCYTES # BLD AUTO: 0.43 10*3/MM3 (ref 0.1–0.9)
MONOCYTES NFR BLD AUTO: 7.5 % (ref 5–12)
NEUTROPHILS # BLD AUTO: 3.21 10*3/MM3 (ref 1.7–7)
NEUTROPHILS NFR BLD AUTO: 55.9 % (ref 42.7–76)
NITRITE UR QL STRIP: POSITIVE
NRBC BLD AUTO-RTO: 0 /100 WBC (ref 0–0.2)
PH UR STRIP.AUTO: <=5 [PH] (ref 4.5–8)
PLATELET # BLD AUTO: 230 10*3/MM3 (ref 140–450)
PMV BLD AUTO: 10.6 FL (ref 6–12)
PROT UR QL STRIP: ABNORMAL
RBC # BLD AUTO: 3.99 10*6/MM3 (ref 3.77–5.28)
RBC # UR: ABNORMAL /HPF
REF LAB TEST METHOD: ABNORMAL
SP GR UR STRIP: 1.02 (ref 1–1.03)
SQUAMOUS #/AREA URNS HPF: ABNORMAL /HPF
UROBILINOGEN UR QL STRIP: ABNORMAL
WBC NRBC COR # BLD: 5.75 10*3/MM3 (ref 3.4–10.8)
WBC UR QL AUTO: ABNORMAL /HPF

## 2019-09-12 PROCEDURE — 36415 COLL VENOUS BLD VENIPUNCTURE: CPT | Performed by: INTERNAL MEDICINE

## 2019-09-12 PROCEDURE — 85025 COMPLETE CBC W/AUTO DIFF WBC: CPT | Performed by: INTERNAL MEDICINE

## 2019-09-12 PROCEDURE — 87086 URINE CULTURE/COLONY COUNT: CPT | Performed by: INTERNAL MEDICINE

## 2019-09-12 PROCEDURE — 81001 URINALYSIS AUTO W/SCOPE: CPT | Performed by: INTERNAL MEDICINE

## 2019-09-12 PROCEDURE — 99214 OFFICE O/P EST MOD 30 MIN: CPT | Performed by: INTERNAL MEDICINE

## 2019-09-12 PROCEDURE — G0463 HOSPITAL OUTPT CLINIC VISIT: HCPCS | Performed by: INTERNAL MEDICINE

## 2019-09-12 RX ORDER — CIPROFLOXACIN 500 MG/1
500 TABLET, FILM COATED ORAL 2 TIMES DAILY
Qty: 14 TABLET | Refills: 0 | Status: SHIPPED | OUTPATIENT
Start: 2019-09-12 | End: 2019-09-17

## 2019-09-12 RX ORDER — TAMOXIFEN CITRATE 20 MG/1
20 TABLET ORAL DAILY
Qty: 90 TABLET | Refills: 2 | Status: SHIPPED | OUTPATIENT
Start: 2019-09-12 | End: 2020-06-08

## 2019-09-12 NOTE — PROGRESS NOTES
Subjective Today the patient reports that she has been having severe fatigue since the last visit.      History of Present Illness      Patient is a 69-year-old female with oted in late May 2018 a lump developing in the upper outer quadrant of the left breast without pain or nipple discharge.  This measured approximately 1 cm in size.  Mammogram indicated this asymmetric density in the same region and an ultrasound revealed a 1.8 cm irregular solid mass.  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  Additional history included no previous breast biopsies, a brief period of time with hormonal replacement and no history of breast or ovarian cancer with family.  She was seen by Dr. Zazueta on the 28th an MRI of both breasts was performed July 6.  Within the left anterior one third at the 12:30 position 3 cm from the nipple with irregular enhancing mass measuring 1.6 cm x 1.4 and 2.2 immediately lateral dimension.  There are other findings were seen in the left breast with no evidence of left axillary adenopathy and no findings suspicious for right breast.  The patient proceeded to a left breast lumpectomy July 18, 2018.      Pathologic findings were consistent with a 2.5 cm grade 3 invasive mammary ductal carcinoma with associated DCIS.  The closest margin was 1 mm.  [place] nodes were negative staging of pT2N0.  Dr. Zazueat saw the patient July 20 recognizing the closest margin was the anterior margin having taken this off the skin with no further removal possible.  There was concern about the need for additional tissue though the addended pathology revealed the DCIS to be intermediate grade.  It was determined not to take additional margins and have her seen by both medical oncology and radiation therapy.  She now presents with her  .      The patient's initial consultation was August 7 and potential adjuvant therapy  discussed with the initial recommendation being an Oncotype DX analysis to be process.  This is now reviewed with the patient and her  may return August 23, 2018.  Her recurrence score 10 with 10 year risk of distance recurrence at 7%.  She is clearly in the low risk category additional studies revealing ER score of 10.5, RI score of 8.6 which are both positive and HER-2 score of 8.7 which is negative.   Additional studies include  LH of 27.5, FSH of 57.6, estradiol of 9.9, CA 15-3 of 10.9, TSH of 4.64 hemoglobin A1c of 8.72.   The patient is advised of the findings per her risk of recurrence and she and her  are understandably elated.  Chemotherapy is not recommended in her circumstance but anti-hormonal therapy is and we have discussed potential treatment with AI therapy being the most appropriate choice in this postmenopausal patient.  She's not additionally had any recent assessment for bone density, however we discussed having this done in the near future.     As result the patient was scheduled for bone density and this was obtained September 6 revealing evidence of osteoporosis involving the lumbar with T score of -3.4 and right hip with T score of -2.7.  The patient has been using Arimidex which we'll discontinue and we discussed institution of tamoxifen at this point.  She'll also need assessment of her vitamin D level which we went on to measure documenting a level of 45.5.                                      The patient is now seen a month after switching to tamoxifen and is tolerating it well thus far without significant hot flashes.  We have also discussed the use of Reclast under the circumstances and she is agreeable to treatment when seen October 29, 2018.   She did have myalgias and arthralgias following Reclast for several days but these then resolved.  As she seen January 21, 2019 she is feeling well and we have discussed weight loss, exercise and also she and her 's  recognition of slowly worsening bilateral hand tremor left greater than right.  This been present much before her diagnosis of breast cancer and actually is an issue in several members of her family.   The patient is next seen August 1, 2019.  In the interval she been found to have elevated liver function tests and ultimately liver biopsy that was significant for mild steatohepatitis.  She has been adjusting her diet but indicates that she is also has significant fatigue and while these might be related she believes the fatigue is in part secondary to tamoxifen.  Follow-up testing included total cholesterol 113, triglycerides of 263 with HDL down to 24, VLDL at 52.6, ferritin of 298, normal iron profile, CMP with glucose 193, creatinine 1.36 and hemoglobin A1c of 7.10.  She notes that her fatigue is not improved off tamoxifen.  We have discussed her findings indicating better control needed for her diabetes and thyroid dysfunction.  Past Medical History:   Diagnosis Date   • Anxiety    • Anxiety and depression    • Asthma     SEASONAL ALLERGY RELATED   • Cancer of breast (CMS/HCC) 06/2018    LEFT   • Chronic kidney disease     Renal cyst, right kidney w/urology workup in past   • Depression    • Diabetes mellitus (CMS/HCC)     Type 2   • Disease of thyroid gland    • GERD (gastroesophageal reflux disease)    • History of prior pregnancies     x2   • History of transfusion     S/P HYST --AUTOLOGOUS    • Hyperlipidemia    • Hypertension    • IBS (irritable bowel syndrome)    • Seasonal allergies         Past Surgical History:   Procedure Laterality Date   • BREAST LUMPECTOMY WITH SENTINEL NODE BIOPSY Left 7/18/2018    Procedure: BREAST LUMPECTOMY WITH SENTINEL NODE BIOPSY;  Surgeon: João Zazueta MD;  Location: [facility];  Service: General   • HYSTERECTOMY  1989   • KNEE SURGERY Right 2014   • KNEE SURGERY Left 2016   • ROTATOR CUFF REPAIR Left 2009   • ROTATOR CUFF REPAIR Right 2012   • TOE SURGERY  2009     [place]         Current Outpatient Medications on File Prior to Visit   Medication Sig Dispense Refill   • albuterol (PROAIR RESPICLICK) 108 (90 Base) MCG/ACT inhaler Inhale 1 puff Every 4 (Four) Hours As Needed for Wheezing or Shortness of Air.     • B Complex Vitamins (VITAMIN B COMPLEX PO) Take 1 tablet by mouth Every Night.     • cetirizine (zyrTEC) 10 MG tablet Take 10 mg by mouth Daily.     • cholecalciferol (VITAMIN D3) 1000 units tablet Take 1,000 Units by mouth Every Night.     • diphenhydrAMINE (BENADRYL) 25 mg capsule Take 25 mg by mouth Every 6 (Six) Hours As Needed for Sleep.     • escitalopram (LEXAPRO) 20 MG tablet TAKE ONE TABLET BY MOUTH DAILY 90 tablet 0   • FLUAD 0.5 ML suspension prefilled syringe ADM 0.5ML IM UTD  0   • fluticasone (FLONASE) 50 MCG/ACT nasal spray 2 sprays into each nostril Daily.     • HAVRIX 1440 EL U/ML vaccine ADM 1ML IM UTD  0   • JANUMET XR  MG tablet TAKE ONE TABLET BY MOUTH TWICE A DAY 60 tablet 0   • levothyroxine (SYNTHROID, LEVOTHROID) 50 MCG tablet TAKE ONE TABLET BY MOUTH DAILY 90 tablet 0   • lisinopril-hydrochlorothiazide (PRINZIDE,ZESTORETIC) 20-12.5 MG per tablet TAKE ONE TABLET BY MOUTH DAILY 90 tablet 0   • loperamide (IMODIUM A-D) 2 MG tablet Take 2 mg by mouth 4 (Four) Times a Day As Needed for Diarrhea.     • melatonin 5 MG tablet tablet Take 10 mg by mouth At Night As Needed.     • metFORMIN ER (GLUCOPHAGE-XR) 500 MG 24 hr tablet Take 500 mg by mouth 2 (Two) Times a Day.     • omeprazole (priLOSEC) 40 MG capsule Take 40 mg by mouth Daily.     • propranolol LA (INDERAL LA) 60 MG 24 hr capsule TAKE ONE CAPSULE BY MOUTH DAILY 90 capsule 0   • rosuvastatin (CRESTOR) 20 MG tablet Take 10 mg by mouth Every Night.     • [DISCONTINUED] tamoxifen (NOLVADEX) 20 MG chemo tablet Take 1 tablet by mouth Daily. 90 tablet 2     No current facility-administered medications on file prior to visit.         ALLERGIES:  No Known Allergies     Social History  "    Socioeconomic History   • Marital status:      Spouse name: Mahin   • Number of children: 2   • Years of education: College   • Highest education level: Not on file   Occupational History   • Occupation: Teacher     Employer: [employer]   Tobacco Use   • Smoking status: Never Smoker   • Smokeless tobacco: Never Used   Substance and Sexual Activity   • Alcohol use: Yes     Alcohol/week: 0.6 oz     Types: 1 Cans of beer per week     Comment: 2-3 a month, social only   • Drug use: No   • Sexual activity: Yes     Comment:         Family History   Problem Relation Age of Onset   • Diabetes Mother    • Heart attack Mother    • Heart failure Mother    • Hyperlipidemia Mother    • Hyperthyroidism Mother         Has surgery   • Hypothyroidism Mother         Later in life   • Heart disease Mother    • Hypertension Mother    • Asthma Father    • COPD Father    • Hypertension Father    • Heart attack Maternal Uncle    • Heart failure Maternal Uncle    • Depression Maternal Grandmother    • Heart attack Maternal Grandfather    • Heart failure Maternal Grandfather    • Alcohol abuse Paternal Grandmother    • Alcohol abuse Paternal Grandfather    • Scleroderma Sister    • No Known Problems Daughter    • No Known Problems Son    • Malig Hyperthermia Neg Hx         Review of Systems   Constitutional: Positive for fatigue. Negative for unexpected weight change.        Modest hot flashes   Respiratory: Negative for chest tightness, shortness of breath and wheezing.    Gastrointestinal: Negative for abdominal pain, constipation, diarrhea, nausea and vomiting.   Skin:        Pruritus   Neurological: Positive for tremors and weakness.          Objective     Vitals:    09/12/19 1052   BP: 121/74   Pulse: 84   Resp: 16   Temp: 98.4 °F (36.9 °C)   SpO2: 95%   Weight: 83.1 kg (183 lb 4.8 oz)   Height: 161 cm (63.39\")   PainSc:   6   PainLoc: Comment: from uti     Current Status 9/12/2019   ECOG score 0 "       Physical Exam   Constitutional: She is oriented to person, place, and time. She appears well-developed and well-nourished.   HENT:   Head: Normocephalic and atraumatic.   Nose: Nose normal.   Mouth/Throat: Oropharynx is clear and moist.   Eyes: Conjunctivae and EOM are normal. Pupils are equal, round, and reactive to light.   Neck: Normal range of motion. Neck supple.   Cardiovascular: Normal rate, regular rhythm, normal heart sounds and intact distal pulses.   Pulmonary/Chest: Effort normal and breath sounds normal.   Abdominal: Soft. Bowel sounds are normal.   Musculoskeletal: Normal range of motion.   Neurological: She is alert and oriented to person, place, and time.   Resting tremor noted left greater than right upper extremities   Skin: Skin is warm and dry.   Psychiatric: She has a normal mood and affect. Her behavior is normal. Judgment and thought content normal.     Breast exam: Patient status post left breast lumpectomy well healing without evidence of inflammation or discharge from wound, status post left sentinel node assessment also without inflammation or discharge    RECENT LABS:  Hematology WBC   Date Value Ref Range Status   09/12/2019 5.75 3.40 - 10.80 10*3/mm3 Final     RBC   Date Value Ref Range Status   09/12/2019 3.99 3.77 - 5.28 10*6/mm3 Final     Hemoglobin   Date Value Ref Range Status   09/12/2019 11.4 (L) 12.0 - 15.9 g/dL Final     Hematocrit   Date Value Ref Range Status   09/12/2019 35.1 34.0 - 46.6 % Final     Platelets   Date Value Ref Range Status   09/12/2019 230 140 - 450 10*3/mm3 Final      BONE MINERAL DENSITOMETRY September 06, 2018     HISTORY:  68 years-old female. Menopause at age 63. Breast cancer.     COMPARISON:  None.     TECHNIQUE: The T score compares the patient's bone mineral density with  the peak bone mass of young normal patients. Patients with T-scores  between 1.0 and 2.5 standard deviations below the mean are osteopenic.  Patients with T-scores greater  than 2.5 standard deviation below the  mean are osteoporotic.     The Z score compares the patient's bone mineral density with sex and age  matched patients. Z score of -2.0 and lower is an indication of low  mineral density for the patient's age.     FINDINGS: Lumbar T score is  -3.4.     Left hip density is lowest at the  femoral neck where the T score is  -1.7.      Right hip density is lowest at the  femoral neck where the T score is  -2.7.      IMPRESSION:  Osteoporosis.    Assessment/Plan      69 year-old female with previous medical history of seasonal allergies, diabetes mellitus type 2, CKD3, hypothyroidism, hyperlipidemia, hypertension, IBS, history of anxiety and depression with recent development of left breast abnormality found by the patient herself.  This led to mammogram ultrasound and stereotactic biopsy confirming invasive ductal carcinoma grade 2 though ER, LA positive HER-2 negative.  Subsequent assessments via surgical review your no additional abnormalities seen on breast MRI and the patient proceeded to lumpectomy July 18, 2018.  Her pathologic findings were consistent with a 2.5 cm grade 3 invasive mammary ductal carcinoma with associated DCIS.  The closest margin was 1 mm.  [place] nodes were negative with staging of pT2N0.  Dr. Zazueta saw the patient July 20 recognizing the closest margin was the anterior margin having taken this off the skin with no further removal possible.  There was concern about the need for additional tissue though the addended pathology revealed the DCIS to be intermediate grade.  It was ultimately determined that further surgery was not necessary.   The patient presents for medical oncology assessment and we discussed potential adjuvant therapy but in particular it is felt the patient requires further genomic assessment with Oncotype DX analysis.  We reviewed this in detail and she understands the additional information that we could obtain from such an approach  in making decisions about adjuvant therapy. We proceeded with additional assessment including Oncotype and laboratory studies that, fortunate, revealed that she has an excellent prognosis including a 7% risk of recurrence at 10 years with the use of tamoxifen.  Chemotherapy, therefore, is not appropriate and we have discussed an alternative therapy in this postmenopausal patient with AI therapy in particular her Arimidex over 5 years.  She is currently undergoing review by radiation therapy and this can proceed at any point as we also plan to initiate Arimidex with a trial of the medication given over the next month.  We went on to have the patient tested for bone density finding evidence, unfortunate, of osteoporosis.  She's been tolerating Arimidex well without discontinue this and institute Tamoxifen.  The patient went on to institute treatment and underwent testing for TSH, vitamin D level and serum chemistries.  These are found to be acceptable and she now next returns October 29 tolerating tamoxifen well. We went on to continue with Reclast at that visit.  The patient did have myalgias and arthralgias following the treatment for several days that resolved.     As she is seen January 21, 2019 she is tolerating tamoxifen overall well though we have discussed that she should continue to exercise, manage her diet and try to achieve weight loss overall.  Additionally she has what appears to be essential tremor left greater than right.  Plans to see her primary care to review these issues but will start exercising immediately.  We'll plan to see her back here in approximately 6 months, plan repeat Reclast in late October and then yearly follow-ups as she completes 5 years of tamoxifen.    The patient is next seen August 1, 2019 with complaints of fatigue and recent diagnostics that are consistent with mild to moderate steatohepatitis on liver biopsy.  It is not felt likely tamoxifen is the major issue here but we do  plan to hold it briefly to see if she does not improve as we investigate both her liver function tests and etiology of her mild anemia.  It is hoped this might improve her degree of fatigue.  We plan additional laboratory studies as above we have discussed results with she and her  in some detail when seen September 12, 2019.  She needs better control of her diabetes as well as thyroid dysfunction and is urged to return to primary care to be assessed.  A copy this note will be sent to Dr. Whitfield and she will be asked otherwise to restart tamoxifen assessment approximately 3 months.      The patient additionally has symptoms of UTI.  We will plan to reassess and potentially treat her likely with ciprofloxacin if her urinalysis is abnormal.

## 2019-09-12 NOTE — TELEPHONE ENCOUNTER
I spoke with the pt's , the pt was taking a nap. I advised him that Dr. Guajardo sent in a script for Cipro to the pt's pharmacy. The pt's  v/u and advised me he would explain this to his wife.

## 2019-09-13 LAB — BACTERIA SPEC AEROBE CULT: NORMAL

## 2019-09-15 PROBLEM — I10 HYPERTENSION: Status: ACTIVE | Noted: 2019-09-15

## 2019-09-15 PROBLEM — C50.919 INFILTRATING DUCTAL CARCINOMA OF BREAST (HCC): Status: ACTIVE | Noted: 2018-06-13

## 2019-09-15 PROBLEM — K21.9 GASTROESOPHAGEAL REFLUX DISEASE: Status: ACTIVE | Noted: 2019-09-15

## 2019-09-15 PROBLEM — F32.A DEPRESSION: Status: ACTIVE | Noted: 2019-09-15

## 2019-09-15 PROBLEM — E78.5 HYPERLIPIDEMIA: Status: ACTIVE | Noted: 2019-09-15

## 2019-09-15 PROBLEM — Z00.00 ENCOUNTER FOR GENERAL ADULT MEDICAL EXAMINATION WITHOUT ABNORMAL FINDINGS: Status: ACTIVE | Noted: 2018-01-24

## 2019-09-15 PROBLEM — E11.9 DIABETES MELLITUS: Status: RESOLVED | Noted: 2019-08-01 | Resolved: 2019-09-15

## 2019-09-15 PROBLEM — J30.9 ALLERGIC RHINITIS: Status: ACTIVE | Noted: 2019-09-15

## 2019-09-15 PROBLEM — R25.1 TREMOR: Status: ACTIVE | Noted: 2019-02-06

## 2019-09-16 ENCOUNTER — OFFICE VISIT (OUTPATIENT)
Dept: FAMILY MEDICINE CLINIC | Facility: CLINIC | Age: 69
End: 2019-09-16

## 2019-09-16 VITALS
OXYGEN SATURATION: 96 % | HEART RATE: 91 BPM | BODY MASS INDEX: 32.6 KG/M2 | WEIGHT: 184 LBS | HEIGHT: 63 IN | SYSTOLIC BLOOD PRESSURE: 101 MMHG | RESPIRATION RATE: 16 BRPM | TEMPERATURE: 98.2 F | DIASTOLIC BLOOD PRESSURE: 62 MMHG

## 2019-09-16 DIAGNOSIS — K76.0 FATTY LIVER: ICD-10-CM

## 2019-09-16 DIAGNOSIS — F33.1 MODERATE EPISODE OF RECURRENT MAJOR DEPRESSIVE DISORDER (HCC): ICD-10-CM

## 2019-09-16 DIAGNOSIS — E03.4 HYPOTHYROIDISM DUE TO ACQUIRED ATROPHY OF THYROID: ICD-10-CM

## 2019-09-16 DIAGNOSIS — N18.2 CHRONIC KIDNEY DISEASE, STAGE II (MILD): ICD-10-CM

## 2019-09-16 DIAGNOSIS — Z23 NEED FOR VACCINATION: ICD-10-CM

## 2019-09-16 DIAGNOSIS — E78.2 MIXED HYPERLIPIDEMIA: ICD-10-CM

## 2019-09-16 DIAGNOSIS — I10 ESSENTIAL HYPERTENSION: ICD-10-CM

## 2019-09-16 DIAGNOSIS — E55.9 VITAMIN D DEFICIENCY: ICD-10-CM

## 2019-09-16 DIAGNOSIS — E11.8 TYPE 2 DIABETES MELLITUS WITH COMPLICATION, WITHOUT LONG-TERM CURRENT USE OF INSULIN (HCC): Primary | ICD-10-CM

## 2019-09-16 DIAGNOSIS — K21.9 GASTROESOPHAGEAL REFLUX DISEASE WITHOUT ESOPHAGITIS: ICD-10-CM

## 2019-09-16 LAB
ALBUMIN SERPL-MCNC: 3.9 G/DL (ref 3.5–4.8)
ALBUMIN/GLOB SERPL: 1.3 G/DL (ref 1–1.7)
ALP SERPL-CCNC: 57 U/L (ref 32–91)
ALT SERPL W P-5'-P-CCNC: 102 U/L (ref 14–54)
ANION GAP SERPL CALCULATED.3IONS-SCNC: 17.8 MMOL/L (ref 5–15)
AST SERPL-CCNC: 216 U/L (ref 15–41)
BILIRUB SERPL-MCNC: 0.8 MG/DL (ref 0.3–1.2)
BUN BLD-MCNC: 19 MG/DL (ref 8–20)
BUN/CREAT SERPL: 9.5 (ref 5.4–26.2)
CALCIUM SPEC-SCNC: 9.1 MG/DL (ref 8.9–10.3)
CHLORIDE SERPL-SCNC: 108 MMOL/L (ref 101–111)
CO2 SERPL-SCNC: 22 MMOL/L (ref 22–32)
CREAT BLD-MCNC: 2 MG/DL (ref 0.4–1)
GFR SERPL CREATININE-BSD FRML MDRD: 25 ML/MIN/1.73
GLOBULIN UR ELPH-MCNC: 3 GM/DL (ref 2.5–3.8)
GLUCOSE BLD-MCNC: 303 MG/DL (ref 65–99)
POTASSIUM BLD-SCNC: 5.8 MMOL/L (ref 3.6–5.1)
PROT SERPL-MCNC: 6.9 G/DL (ref 6.1–7.9)
SODIUM BLD-SCNC: 142 MMOL/L (ref 136–144)

## 2019-09-16 PROCEDURE — 82306 VITAMIN D 25 HYDROXY: CPT | Performed by: FAMILY MEDICINE

## 2019-09-16 PROCEDURE — G0009 ADMIN PNEUMOCOCCAL VACCINE: HCPCS | Performed by: FAMILY MEDICINE

## 2019-09-16 PROCEDURE — 36415 COLL VENOUS BLD VENIPUNCTURE: CPT | Performed by: FAMILY MEDICINE

## 2019-09-16 PROCEDURE — 90653 IIV ADJUVANT VACCINE IM: CPT | Performed by: FAMILY MEDICINE

## 2019-09-16 PROCEDURE — 80053 COMPREHEN METABOLIC PANEL: CPT | Performed by: FAMILY MEDICINE

## 2019-09-16 PROCEDURE — 90732 PPSV23 VACC 2 YRS+ SUBQ/IM: CPT | Performed by: FAMILY MEDICINE

## 2019-09-16 PROCEDURE — 99214 OFFICE O/P EST MOD 30 MIN: CPT | Performed by: FAMILY MEDICINE

## 2019-09-16 PROCEDURE — G0008 ADMIN INFLUENZA VIRUS VAC: HCPCS | Performed by: FAMILY MEDICINE

## 2019-09-16 RX ORDER — PROPRANOLOL HCL 60 MG
60 CAPSULE, EXTENDED RELEASE 24HR ORAL DAILY
Qty: 90 CAPSULE | Refills: 1 | Status: SHIPPED | OUTPATIENT
Start: 2019-09-16 | End: 2019-12-13 | Stop reason: SDUPTHER

## 2019-09-16 RX ORDER — LISINOPRIL AND HYDROCHLOROTHIAZIDE 20; 12.5 MG/1; MG/1
1 TABLET ORAL DAILY
Qty: 90 TABLET | Refills: 1 | Status: SHIPPED | OUTPATIENT
Start: 2019-09-16 | End: 2019-09-26

## 2019-09-16 RX ORDER — ROSUVASTATIN CALCIUM 20 MG/1
10 TABLET, COATED ORAL NIGHTLY
Qty: 90 TABLET | Refills: 1 | Status: SHIPPED | OUTPATIENT
Start: 2019-09-16 | End: 2019-12-13 | Stop reason: SDUPTHER

## 2019-09-16 RX ORDER — ESCITALOPRAM OXALATE 20 MG/1
20 TABLET ORAL DAILY
Qty: 90 TABLET | Refills: 1 | Status: SHIPPED | OUTPATIENT
Start: 2019-09-16 | End: 2019-12-13 | Stop reason: SDUPTHER

## 2019-09-16 RX ORDER — LEVOTHYROXINE SODIUM 0.07 MG/1
75 TABLET ORAL DAILY
Qty: 90 TABLET | Refills: 0 | Status: SHIPPED | OUTPATIENT
Start: 2019-09-16 | End: 2019-12-13 | Stop reason: SDUPTHER

## 2019-09-16 RX ORDER — OMEPRAZOLE 40 MG/1
40 CAPSULE, DELAYED RELEASE ORAL DAILY
Qty: 90 CAPSULE | Refills: 1 | Status: SHIPPED | OUTPATIENT
Start: 2019-09-16 | End: 2019-12-13 | Stop reason: SDUPTHER

## 2019-09-16 NOTE — PROGRESS NOTES
Subjective   Chief Complaint   Patient presents with   • Med Refill   • Hyperlipidemia   • Hypertension   • Hypothyroidism   • Diabetes     aMriana Ansari is a 69 y.o. female.     Patient Care Team:  Luna Whitfield MD as PCP - General (Family Medicine)  Luna Whitfield MD as PCP - Family Medicine  Michel Guajardo MD as Consulting Physician (Hematology and Oncology)    She is coming in today to follow-up on her chronic medical problems.  We are addressing her diabetes, hypertension, hyperlipidemia, hypothyroidism, GERD, depression, chronic kidney disease, and fatty liver.  He is followed by oncologist due to history of breast cancer and she was seen there recently and had fasting blood work done in early August.  She would like to review and address those results.  She reports that for quite some time she has been feeling pretty tired and she wonders if some of her medical issues are causing that.  She reports being compliant with all of her medications and she denies any side effects.Patient denies any chest pain, shortness of breath, dizziness, nausea, vomiting, or diarrhea. No visual issues reported. No headaches, numbness or tingling. No urinary issues. Patient has good appetite and denies any weight changes. No swelling reported. No emotional issues.           The following portions of the patient's history were reviewed and updated as appropriate: allergies, current medications, past family history, past medical history, past social history, past surgical history and problem list.  Past Medical History:   Diagnosis Date   • Anxiety    • Anxiety and depression    • Asthma     SEASONAL ALLERGY RELATED   • Cancer of breast (CMS/HCC) 06/2018    LEFT   • Chronic kidney disease     Renal cyst, right kidney w/urology workup in past   • Depression    • Diabetes mellitus (CMS/[facility])     Type 2   • Disease of thyroid gland    • GERD (gastroesophageal reflux disease)    • History of prior pregnancies     x2   •  History of transfusion     S/P HYST --AUTOLOGOUS    • Hyperlipidemia    • Hypertension    • IBS (irritable bowel syndrome)    • Seasonal allergies      Past Surgical History:   Procedure Laterality Date   • BREAST LUMPECTOMY WITH SENTINEL NODE BIOPSY Left 7/18/2018    Procedure: BREAST LUMPECTOMY WITH SENTINEL NODE BIOPSY;  Surgeon: João Zazueta MD;  Location: [facility] OR;  Service: General   • COLONOSCOPY      refusing; negative cologuard 8/24/2017   • HYSTERECTOMY  1989   • KNEE SURGERY Right 2014   • KNEE SURGERY Left 2016   • LIVER BIOPSY  2019    fatty liver   • ROTATOR CUFF REPAIR Left 2009   • ROTATOR CUFF REPAIR Right 2012   • TOE SURGERY  2009    ingrown      The patient has a family history of  Family History   Problem Relation Age of Onset   • Diabetes Mother    • Heart attack Mother    • Heart failure Mother    • Hyperlipidemia Mother    • Hyperthyroidism Mother         Has surgery   • Hypothyroidism Mother         Later in life   • Heart disease Mother    • Hypertension Mother    • Asthma Father    • COPD Father    • Hypertension Father    • Heart attack Maternal Uncle    • Heart failure Maternal Uncle    • Depression Maternal Grandmother    • Heart attack Maternal Grandfather    • Heart failure Maternal Grandfather    • Alcohol abuse Paternal Grandmother    • Alcohol abuse Paternal Grandfather    • Scleroderma Sister    • No Known Problems Daughter    • No Known Problems Son    • Malig Hyperthermia Neg Hx      Social History     Socioeconomic History   • Marital status:      Spouse name: Mahin   • Number of children: 2   • Years of education: College   • Highest education level: Not on file   Occupational History   • Occupation: Teacher     Employer: [employer]   Tobacco Use   • Smoking status: Never Smoker   • Smokeless tobacco: Never Used   Substance and Sexual Activity   • Alcohol use: Yes     Alcohol/week: 0.6 oz     Types: 1 Cans of beer per week     Comment:  "2-3 a month, social only   • Drug use: No   • Sexual activity: Yes     Comment:        Review of Systems   Constitutional: Positive for fatigue. Negative for activity change and fever.   Respiratory: Negative for cough, shortness of breath and wheezing.    Cardiovascular: Negative for chest pain, palpitations and leg swelling.   Gastrointestinal: Negative for constipation, diarrhea and indigestion.   Endocrine: Negative for cold intolerance, heat intolerance, polydipsia and polyphagia.   Skin: Negative for color change, dry skin and rash.   Neurological: Negative for tremors and headache.   Psychiatric/Behavioral: Positive for stress. Negative for sleep disturbance and depressed mood. The patient is not nervous/anxious.      Visit Vitals  /62 (BP Location: Right arm, Patient Position: Sitting, Cuff Size: Adult)   Pulse 91   Temp 98.2 °F (36.8 °C) (Oral)   Resp 16   Ht 160 cm (63\")   Wt 83.5 kg (184 lb)   SpO2 96%   BMI 32.59 kg/m²       Current Outpatient Medications:   •  albuterol (PROAIR RESPICLICK) 108 (90 Base) MCG/ACT inhaler, Inhale 1 puff Every 4 (Four) Hours As Needed for Wheezing or Shortness of Air., Disp: , Rfl:   •  B Complex Vitamins (VITAMIN B COMPLEX PO), Take 1 tablet by mouth Every Night., Disp: , Rfl:   •  cetirizine (zyrTEC) 10 MG tablet, Take 10 mg by mouth Daily., Disp: , Rfl:   •  cholecalciferol (VITAMIN D3) 1000 units tablet, Take 1,000 Units by mouth Every Night., Disp: , Rfl:   •  ciprofloxacin (CIPRO) 500 MG tablet, Take 1 tablet by mouth 2 (Two) Times a Day for 7 days. 1 tablet, Disp: 14 tablet, Rfl: 0  •  escitalopram (LEXAPRO) 20 MG tablet, Take 1 tablet by mouth Daily., Disp: 90 tablet, Rfl: 1  •  fluticasone (FLONASE) 50 MCG/ACT nasal spray, 2 sprays into each nostril Daily., Disp: , Rfl:   •  levothyroxine (SYNTHROID, LEVOTHROID) 75 MCG tablet, Take 1 tablet by mouth Daily., Disp: 90 tablet, Rfl: 0  •  lisinopril-hydrochlorothiazide (PRINZIDE,ZESTORETIC) 20-12.5 MG per " tablet, Take 1 tablet by mouth Daily., Disp: 90 tablet, Rfl: 1  •  loperamide (IMODIUM A-D) 2 MG tablet, Take 2 mg by mouth 4 (Four) Times a Day As Needed for Diarrhea., Disp: , Rfl:   •  melatonin 5 MG tablet tablet, Take 10 mg by mouth At Night As Needed., Disp: , Rfl:   •  omeprazole (priLOSEC) 40 MG capsule, Take 1 capsule by mouth Daily., Disp: 90 capsule, Rfl: 1  •  propranolol LA (INDERAL LA) 60 MG 24 hr capsule, Take 1 capsule by mouth Daily., Disp: 90 capsule, Rfl: 1  •  rosuvastatin (CRESTOR) 20 MG tablet, Take 0.5 tablets by mouth Every Night., Disp: 90 tablet, Rfl: 1  •  SITagliptin-metFORMIN HCl ER (JANUMET XR)  MG tablet, Take 1 tablet by mouth 2 (Two) Times a Day., Disp: 180 tablet, Rfl: 1  •  tamoxifen (NOLVADEX) 20 MG chemo tablet, Take 1 tablet by mouth Daily., Disp: 90 tablet, Rfl: 2  No current facility-administered medications for this visit.     Objective   Physical Exam   Constitutional: She is oriented to person, place, and time. She appears well-developed and well-nourished.   HENT:   Head: Normocephalic and atraumatic.   Eyes: Conjunctivae and EOM are normal. Pupils are equal, round, and reactive to light.   Neck: Normal range of motion. Neck supple.   Cardiovascular: Normal rate, regular rhythm, normal heart sounds and intact distal pulses. Exam reveals no gallop.   No murmur heard.  Pulmonary/Chest: Effort normal and breath sounds normal. No respiratory distress. She has no rales. She exhibits no tenderness.   Musculoskeletal: Normal range of motion. She exhibits no edema or deformity.   Neurological: She is alert and oriented to person, place, and time.   Skin: Skin is warm and dry.   Nursing note and vitals reviewed.           Office Visit on 09/12/2019   Component Date Value Ref Range Status   • Urine Culture 09/12/2019 25,000 CFU/mL Mixed Leslie Isolated   Final   • Color, UA 09/12/2019 Orange* Yellow, Straw Final   • Appearance,  09/12/2019 Slightly Cloudy* Clear Final   •  pH, UA 09/12/2019 <=5.0  4.5 - 8.0 Final   • Specific Gravity, UA 09/12/2019 1.020  1.002 - 1.030 Final   • Glucose, UA 09/12/2019 100 mg/dL (Trace)* Negative Final   • Ketones, UA 09/12/2019 Trace* Negative Final   • Bilirubin, UA 09/12/2019 Small (1+)* Negative Final   • Blood, UA 09/12/2019 Negative  Negative Final   • Protein, UA 09/12/2019 30 mg/dL (1+)* Negative Final   • Leuk Esterase, UA 09/12/2019 Negative  Negative Final   • Nitrite, UA 09/12/2019 Positive* Negative Final   • Urobilinogen, UA 09/12/2019 2.0 E.U./dL* 0.2 - 1.0 E.U./dL Final   • RBC, UA 09/12/2019 3-5* 0 - 2 /HPF Final   • WBC, UA 09/12/2019 6-12* 0 - 5 /HPF Final   • Bacteria, UA 09/12/2019 1+* Negative /HPF Final   • Squamous Epithelial Cells, UA 09/12/2019 4-6* 0 - 3 /HPF Final   • Granular Casts, UA 09/12/2019 0-2  0 - 2 /LPF Final   • Methodology 09/12/2019 Manual Light Microscopy   Final   Lab on 09/12/2019   Component Date Value Ref Range Status   • WBC 09/12/2019 5.75  3.40 - 10.80 10*3/mm3 Final   • RBC 09/12/2019 3.99  3.77 - 5.28 10*6/mm3 Final   • Hemoglobin 09/12/2019 11.4* 12.0 - 15.9 g/dL Final   • Hematocrit 09/12/2019 35.1  34.0 - 46.6 % Final   • MCV 09/12/2019 88.0  79.0 - 97.0 fL Final   • MCH 09/12/2019 28.6  26.6 - 33.0 pg Final   • MCHC 09/12/2019 32.5  31.5 - 35.7 g/dL Final   • RDW 09/12/2019 13.0  12.3 - 15.4 % Final   • RDW-SD 09/12/2019 42.1  37.0 - 54.0 fl Final   • MPV 09/12/2019 10.6  6.0 - 12.0 fL Final   • Platelets 09/12/2019 230  140 - 450 10*3/mm3 Final   • Neutrophil % 09/12/2019 55.9  42.7 - 76.0 % Final   • Lymphocyte % 09/12/2019 25.9  19.6 - 45.3 % Final   • Monocyte % 09/12/2019 7.5  5.0 - 12.0 % Final   • Eosinophil % 09/12/2019 9.0* 0.3 - 6.2 % Final   • Basophil % 09/12/2019 0.5  0.0 - 1.5 % Final   • Immature Grans % 09/12/2019 1.2* 0.0 - 0.5 % Final   • Neutrophils, Absolute 09/12/2019 3.21  1.70 - 7.00 10*3/mm3 Final   • Lymphocytes, Absolute 09/12/2019 1.49  0.70 - 3.10 10*3/mm3 Final   •  Monocytes, Absolute 09/12/2019 0.43  0.10 - 0.90 10*3/mm3 Final   • Eosinophils, Absolute 09/12/2019 0.52* 0.00 - 0.40 10*3/mm3 Final   • Basophils, Absolute 09/12/2019 0.03  0.00 - 0.20 10*3/mm3 Final   • Immature Grans, Absolute 09/12/2019 0.07* 0.00 - 0.05 10*3/mm3 Final   • nRBC 09/12/2019 0.0  0.0 - 0.2 /100 WBC Final         Lab Results   Component Value Date    BUN 21 08/01/2019    CREATININE 1.36 (H) 08/01/2019    EGFRIFNONA 39 (L) 08/01/2019     08/01/2019    K 5.0 08/01/2019     08/01/2019    CALCIUM 9.9 08/01/2019    ALBUMIN 4.50 08/01/2019    BILITOT 0.4 08/01/2019    ALKPHOS 68 08/01/2019    AST 99 (H) 08/01/2019    ALT 45 (H) 08/01/2019    TRIG 263 (H) 08/01/2019    HDL 24 (L) 08/01/2019    VLDL 52.6 (H) 08/01/2019    LDL 36 08/01/2019    LDLHDL 1.52 08/01/2019    WBC 5.75 09/12/2019    RBC 3.99 09/12/2019    HCT 35.1 09/12/2019    MCV 88.0 09/12/2019    MCH 28.6 09/12/2019    TSH 4.760 (H) 08/01/2019          Assessment/Plan   Problems Addressed this Visit        Cardiovascular and Mediastinum    Hyperlipidemia    Relevant Medications    rosuvastatin (CRESTOR) 20 MG tablet    Hypertension    Relevant Medications    lisinopril-hydrochlorothiazide (PRINZIDE,ZESTORETIC) 20-12.5 MG per tablet    propranolol LA (INDERAL LA) 60 MG 24 hr capsule       Digestive    Gastroesophageal reflux disease    Relevant Medications    omeprazole (priLOSEC) 40 MG capsule    Fatty liver    Relevant Orders    Comprehensive Metabolic Panel    Vitamin D deficiency    Relevant Orders    Vitamin D 25 Hydroxy       Endocrine    Type 2 diabetes mellitus (CMS/HCC) - Primary    Relevant Medications    SITagliptin-metFORMIN HCl ER (JANUMET XR)  MG tablet    Hypothyroidism due to acquired atrophy of thyroid    Relevant Medications    levothyroxine (SYNTHROID, LEVOTHROID) 75 MCG tablet    propranolol LA (INDERAL LA) 60 MG 24 hr capsule       Genitourinary    Chronic kidney disease, stage II (mild)       Other     Depression    Relevant Medications    escitalopram (LEXAPRO) 20 MG tablet    Need for vaccination    Relevant Medications    pneumococcal polysaccharide 23-valent (PNEUMOVAX-23) vaccine 0.5 mL (Completed)    Other Relevant Orders    Fluad Tri 65yr ([id number]) (Completed)        I reviewed and discussed her recent labs.  Her hemoglobin A1c was 7.1, which is an improvement from a year ago when it was 8.7.  She will continue Janumet and work on her diet.  Her TSH is elevated meaning that her thyroid is not well controlled I will be increasing her levothyroxine from 50 mcg to 75 mcg.  The rest of her blood work shows slightly elevated liver tests and creatinine.  She recently had liver biopsy and she has fatty liver.  I will be rechecking her metabolic panel.  Her depression and acid reflux symptoms are well controlled with her current medications.  Medication refill was given.  Her immunization was also reviewed and she was given Pneumovax and flu shot today.         Requested Prescriptions     Signed Prescriptions Disp Refills   • levothyroxine (SYNTHROID, LEVOTHROID) 75 MCG tablet 90 tablet 0     Sig: Take 1 tablet by mouth Daily.   • escitalopram (LEXAPRO) 20 MG tablet 90 tablet 1     Sig: Take 1 tablet by mouth Daily.   • SITagliptin-metFORMIN HCl ER (JANUMET XR)  MG tablet 180 tablet 1     Sig: Take 1 tablet by mouth 2 (Two) Times a Day.   • lisinopril-hydrochlorothiazide (PRINZIDE,ZESTORETIC) 20-12.5 MG per tablet 90 tablet 1     Sig: Take 1 tablet by mouth Daily.   • omeprazole (priLOSEC) 40 MG capsule 90 capsule 1     Sig: Take 1 capsule by mouth Daily.   • propranolol LA (INDERAL LA) 60 MG 24 hr capsule 90 capsule 1     Sig: Take 1 capsule by mouth Daily.   • rosuvastatin (CRESTOR) 20 MG tablet 90 tablet 1     Sig: Take 0.5 tablets by mouth Every Night.

## 2019-09-17 ENCOUNTER — TELEPHONE (OUTPATIENT)
Dept: FAMILY MEDICINE CLINIC | Facility: CLINIC | Age: 69
End: 2019-09-17

## 2019-09-17 DIAGNOSIS — N18.2 CHRONIC KIDNEY DISEASE, STAGE II (MILD): ICD-10-CM

## 2019-09-17 DIAGNOSIS — N18.30 CKD (CHRONIC KIDNEY DISEASE) STAGE 3, GFR 30-59 ML/MIN (HCC): Primary | ICD-10-CM

## 2019-09-17 LAB — 25(OH)D3 SERPL-MCNC: 47 NG/ML (ref 30–100)

## 2019-09-17 RX ORDER — GLIPIZIDE 5 MG/1
5 TABLET, FILM COATED, EXTENDED RELEASE ORAL DAILY
Qty: 90 TABLET | Refills: 0 | Status: SHIPPED | OUTPATIENT
Start: 2019-09-17 | End: 2019-12-13 | Stop reason: SDUPTHER

## 2019-09-17 NOTE — TELEPHONE ENCOUNTER
Yes, please stop taking all of those supplements.  Have the metabolic panel recheck this week as already advised.

## 2019-09-20 LAB
ALBUMIN SERPL-MCNC: 3.8 G/DL (ref 3.5–4.8)
ALBUMIN/GLOB SERPL: 1.3 G/DL (ref 1–1.7)
ALP SERPL-CCNC: 53 U/L (ref 32–91)
ALT SERPL W P-5'-P-CCNC: 99 U/L (ref 14–54)
ANION GAP SERPL CALCULATED.3IONS-SCNC: 17.7 MMOL/L (ref 5–15)
AST SERPL-CCNC: 177 U/L (ref 15–41)
BILIRUB SERPL-MCNC: 0.7 MG/DL (ref 0.3–1.2)
BUN BLD-MCNC: 17 MG/DL (ref 8–20)
BUN/CREAT SERPL: 10.6 (ref 5.4–26.2)
CALCIUM SPEC-SCNC: 9.2 MG/DL (ref 8.9–10.3)
CHLORIDE SERPL-SCNC: 104 MMOL/L (ref 101–111)
CO2 SERPL-SCNC: 22 MMOL/L (ref 22–32)
CREAT BLD-MCNC: 1.6 MG/DL (ref 0.4–1)
GFR SERPL CREATININE-BSD FRML MDRD: 32 ML/MIN/1.73
GLOBULIN UR ELPH-MCNC: 3 GM/DL (ref 2.5–3.8)
GLUCOSE BLD-MCNC: 174 MG/DL (ref 65–99)
POTASSIUM BLD-SCNC: 5.7 MMOL/L (ref 3.6–5.1)
PROT SERPL-MCNC: 6.8 G/DL (ref 6.1–7.9)
SODIUM BLD-SCNC: 138 MMOL/L (ref 136–144)

## 2019-09-20 PROCEDURE — 36415 COLL VENOUS BLD VENIPUNCTURE: CPT | Performed by: FAMILY MEDICINE

## 2019-09-20 PROCEDURE — 80053 COMPREHEN METABOLIC PANEL: CPT | Performed by: FAMILY MEDICINE

## 2019-09-25 DIAGNOSIS — I10 ESSENTIAL HYPERTENSION: Primary | ICD-10-CM

## 2019-09-26 ENCOUNTER — TELEPHONE (OUTPATIENT)
Dept: FAMILY MEDICINE CLINIC | Facility: CLINIC | Age: 69
End: 2019-09-26

## 2019-09-26 LAB
ALBUMIN SERPL-MCNC: 4.1 G/DL (ref 3.5–4.8)
ALBUMIN/GLOB SERPL: 1.4 G/DL (ref 1–1.7)
ALP SERPL-CCNC: 53 U/L (ref 32–91)
ALT SERPL W P-5'-P-CCNC: 52 U/L (ref 14–54)
ANION GAP SERPL CALCULATED.3IONS-SCNC: 16.9 MMOL/L (ref 5–15)
AST SERPL-CCNC: 69 U/L (ref 15–41)
BILIRUB SERPL-MCNC: 0.4 MG/DL (ref 0.3–1.2)
BUN BLD-MCNC: 20 MG/DL (ref 8–20)
BUN/CREAT SERPL: 14.3 (ref 5.4–26.2)
CALCIUM SPEC-SCNC: 9.3 MG/DL (ref 8.9–10.3)
CHLORIDE SERPL-SCNC: 104 MMOL/L (ref 101–111)
CO2 SERPL-SCNC: 24 MMOL/L (ref 22–32)
CREAT BLD-MCNC: 1.4 MG/DL (ref 0.4–1)
GFR SERPL CREATININE-BSD FRML MDRD: 37 ML/MIN/1.73
GLOBULIN UR ELPH-MCNC: 2.9 GM/DL (ref 2.5–3.8)
GLUCOSE BLD-MCNC: 176 MG/DL (ref 65–99)
POTASSIUM BLD-SCNC: 4.9 MMOL/L (ref 3.6–5.1)
PROT SERPL-MCNC: 7 G/DL (ref 6.1–7.9)
SODIUM BLD-SCNC: 140 MMOL/L (ref 136–144)

## 2019-09-26 PROCEDURE — 80053 COMPREHEN METABOLIC PANEL: CPT | Performed by: FAMILY MEDICINE

## 2019-09-26 PROCEDURE — 36415 COLL VENOUS BLD VENIPUNCTURE: CPT | Performed by: FAMILY MEDICINE

## 2019-09-26 RX ORDER — AMLODIPINE BESYLATE 5 MG/1
5 TABLET ORAL DAILY
Qty: 30 TABLET | Refills: 0 | Status: SHIPPED | OUTPATIENT
Start: 2019-09-26 | End: 2019-10-09

## 2019-09-26 NOTE — TELEPHONE ENCOUNTER
Her blood pressure readings look good.  Stay off lisinopril as already advised for now.  Continue to monitor her blood pressure.  Metabolic panel results pending.

## 2019-09-26 NOTE — TELEPHONE ENCOUNTER
Her blood pressure is coming slightly up, however not dangerous.  I was waiting for the results of the blood work today.  I already signed off, please see CMP for further details.  I sent in prescription for amlodipine.

## 2019-10-08 ENCOUNTER — TELEPHONE (OUTPATIENT)
Dept: FAMILY MEDICINE CLINIC | Facility: CLINIC | Age: 69
End: 2019-10-08

## 2019-10-08 NOTE — TELEPHONE ENCOUNTER
The swelling in her feet and ankles might be possibly a side effect to a new blood pressure medicine, which I recently put her on.   how much longer issue going to be in [place]?  I would really like to see her in the office for evaluation.

## 2019-10-09 RX ORDER — HYDROCHLOROTHIAZIDE 25 MG/1
25 TABLET ORAL DAILY
Qty: 90 TABLET | Refills: 0 | Status: SHIPPED | OUTPATIENT
Start: 2019-10-09 | End: 2019-12-13 | Stop reason: SDUPTHER

## 2019-10-09 NOTE — TELEPHONE ENCOUNTER
Stop amlodipine, I sent in prescription for a water pill which will work on swelling and the blood pressure.  If the swelling is really due to a lot of pain it should go away within the next week.  If she has any other symptoms or her symptoms are not getting any better she will need to be checked there.  Otherwise follow-up with me when she is back in town.

## 2019-10-09 NOTE — TELEPHONE ENCOUNTER
Patient says they will be back one day next week, do not have a set day. Will use [facility] [place]  [phone]

## 2019-11-15 RX ORDER — AMLODIPINE BESYLATE 5 MG/1
TABLET ORAL
Qty: 30 TABLET | Refills: 0 | OUTPATIENT
Start: 2019-11-15

## 2019-12-05 NOTE — PROGRESS NOTES
Subjective Today the patient states that her fatigue has improved since her last visit.    History of Present Illness      Patient is a 69-year-old female with oted in late May 2018 a lump developing in the upper outer quadrant of the left breast without pain or nipple discharge.  This measured approximately 1 cm in size.  Mammogram indicated this asymmetric density in the same region and an ultrasound revealed a 1.8 cm irregular solid mass.  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  Additional history included no previous breast biopsies, a brief period of time with hormonal replacement and no history of breast or ovarian cancer with family.  She was seen by Dr. Zazueta on the 28th an MRI of both breasts was performed July 6.  Within the left anterior one third at the 12:30 position 3 cm from the nipple with irregular enhancing mass measuring 1.6 cm x 1.4 and 2.2 immediately lateral dimension.  There are other findings were seen in the left breast with no evidence of left axillary adenopathy and no findings suspicious for right breast.  The patient proceeded to a left breast lumpectomy July 18, 2018.      Pathologic findings were consistent with a 2.5 cm grade 3 invasive mammary ductal carcinoma with associated DCIS.  The closest margin was 1 mm.  [place] nodes were negative staging of pT2N0.  Dr. Zazueta saw the patient July 20 recognizing the closest margin was the anterior margin having taken this off the skin with no further removal possible.  There was concern about the need for additional tissue though the addended pathology revealed the DCIS to be intermediate grade.  It was determined not to take additional margins and have her seen by both medical oncology and radiation therapy.  She now presents with her  .      The patient's initial consultation was August 7 and potential adjuvant therapy discussed with the  initial recommendation being an Oncotype DX analysis to be process.  This is now reviewed with the patient and her  may return August 23, 2018.  Her recurrence score 10 with 10 year risk of distance recurrence at 7%.  She is clearly in the low risk category additional studies revealing ER score of 10.5, OR score of 8.6 which are both positive and HER-2 score of 8.7 which is negative.   Additional studies include  LH of 27.5, FSH of 57.6, estradiol of 9.9, CA 15-3 of 10.9, TSH of 4.64 hemoglobin A1c of 8.72.   The patient is advised of the findings per her risk of recurrence and she and her  are understandably elated.  Chemotherapy is not recommended in her circumstance but anti-hormonal therapy is and we have discussed potential treatment with AI therapy being the most appropriate choice in this postmenopausal patient.  She's not additionally had any recent assessment for bone density, however we discussed having this done in the near future.     As result the patient was scheduled for bone density and this was obtained September 6 revealing evidence of osteoporosis involving the lumbar with T score of -3.4 and right hip with T score of -2.7.  The patient has been using Arimidex which we'll discontinue and we discussed institution of tamoxifen at this point.  She'll also need assessment of her vitamin D level which we went on to measure documenting a level of 45.5.                                      The patient is now seen a month after switching to tamoxifen and is tolerating it well thus far without significant hot flashes.  We have also discussed the use of Reclast under the circumstances and she is agreeable to treatment when seen October 29, 2018.   She did have myalgias and arthralgias following Reclast for several days but these then resolved.  As she seen January 21, 2019 she is feeling well and we have discussed weight loss, exercise and also she and her 's recognition of slowly  worsening bilateral hand tremor left greater than right.  This been present much before her diagnosis of breast cancer and actually is an issue in several members of her family.   The patient is next seen August 1, 2019.  In the interval she been found to have elevated liver function tests and ultimately liver biopsy that was significant for mild steatohepatitis.  She has been adjusting her diet but indicates that she is also has significant fatigue and while these might be related she believes the fatigue is in part secondary to tamoxifen.  Follow-up testing included total cholesterol 113, triglycerides of 263 with HDL down to 24, VLDL at 52.6, ferritin of 298, normal iron profile, CMP with glucose 193, creatinine 1.36 and hemoglobin A1c of 7.10.  She notes that her fatigue is not improved off tamoxifen.  We have discussed her findings indicating better control needed for her diabetes and thyroid dysfunction and also went on to treat a urinary tract infection.  She did see primary care who adjusted medications for thyroid hypertension.  The patient when seen December 12 feels considerably better and is thrilled to see the difference in her functioning.  She is having no difficulty with tamoxifen at this point.  Past Medical History:   Diagnosis Date   • Anxiety    • Anxiety and depression    • Asthma     SEASONAL ALLERGY RELATED   • Cancer of breast (CMS/HCC) 06/2018    LEFT   • Chronic kidney disease     Renal cyst, right kidney w/urology workup in past   • Depression    • Diabetes mellitus (CMS/HCC)     Type 2   • Disease of thyroid gland    • GERD (gastroesophageal reflux disease)    • History of prior pregnancies     x2   • History of transfusion     S/P HYST --AUTOLOGOUS    • Hyperlipidemia    • Hypertension    • IBS (irritable bowel syndrome)    • Seasonal allergies         Past Surgical History:   Procedure Laterality Date   • BREAST LUMPECTOMY WITH SENTINEL NODE BIOPSY Left 7/18/2018    Procedure: BREAST  LUMPECTOMY WITH SENTINEL NODE BIOPSY;  Surgeon: João Zazueta MD;  Location: [facility] OR;  Service: General   • COLONOSCOPY      refusing; negative cologuard 8/24/2017   • HYSTERECTOMY  1989   • KNEE SURGERY Right 2014   • KNEE SURGERY Left 2016   • LIVER BIOPSY  2019    fatty liver   • ROTATOR CUFF REPAIR Left 2009   • ROTATOR CUFF REPAIR Right 2012   • TOE SURGERY  2009    ingrown         Current Outpatient Medications on File Prior to Visit   Medication Sig Dispense Refill   • albuterol (PROAIR RESPICLICK) 108 (90 Base) MCG/ACT inhaler Inhale 1 puff Every 4 (Four) Hours As Needed for Wheezing or Shortness of Air.     • B Complex Vitamins (VITAMIN B COMPLEX PO) Take 1 tablet by mouth Every Night.     • cetirizine (zyrTEC) 10 MG tablet Take 10 mg by mouth Daily.     • cholecalciferol (VITAMIN D3) 1000 units tablet Take 1,000 Units by mouth Every Night.     • escitalopram (LEXAPRO) 20 MG tablet Take 1 tablet by mouth Daily. 90 tablet 1   • fluticasone (FLONASE) 50 MCG/ACT nasal spray 2 sprays into each nostril Daily.     • glipiZIDE (GLIPIZIDE XL) 5 MG ER tablet Take 1 tablet by mouth Daily. 90 tablet 0   • hydroCHLOROthiazide (HYDRODIURIL) 25 MG tablet Take 1 tablet by mouth Daily. 90 tablet 0   • levothyroxine (SYNTHROID, LEVOTHROID) 75 MCG tablet Take 1 tablet by mouth Daily. 90 tablet 0   • loperamide (IMODIUM A-D) 2 MG tablet Take 2 mg by mouth 4 (Four) Times a Day As Needed for Diarrhea.     • melatonin 5 MG tablet tablet Take 10 mg by mouth At Night As Needed.     • omeprazole (priLOSEC) 40 MG capsule Take 1 capsule by mouth Daily. 90 capsule 1   • propranolol LA (INDERAL LA) 60 MG 24 hr capsule Take 1 capsule by mouth Daily. 90 capsule 1   • rosuvastatin (CRESTOR) 20 MG tablet Take 0.5 tablets by mouth Every Night. 90 tablet 1   • SITagliptin-metFORMIN HCl ER (JANUMET XR)  MG tablet Take 1 tablet by mouth 2 (Two) Times a Day. 180 tablet 1   • tamoxifen (NOLVADEX) 20 MG chemo tablet Take 1  tablet by mouth Daily. 90 tablet 2     No current facility-administered medications on file prior to visit.         ALLERGIES:    Allergies   Allergen Reactions   • Amlodipine Swelling        Social History     Socioeconomic History   • Marital status:      Spouse name: Mahin   • Number of children: 2   • Years of education: College   • Highest education level: Not on file   Occupational History   • Occupation: Teacher     Employer: [employer]   Tobacco Use   • Smoking status: Never Smoker   • Smokeless tobacco: Never Used   Substance and Sexual Activity   • Alcohol use: Yes     Alcohol/week: 0.6 oz     Types: 1 Cans of beer per week     Comment: 2-3 a month, social only   • Drug use: No   • Sexual activity: Yes     Comment:         Family History   Problem Relation Age of Onset   • Diabetes Mother    • Heart attack Mother    • Heart failure Mother    • Hyperlipidemia Mother    • Hyperthyroidism Mother         Has surgery   • Hypothyroidism Mother         Later in life   • Heart disease Mother    • Hypertension Mother    • Asthma Father    • COPD Father    • Hypertension Father    • Heart attack Maternal Uncle    • Heart failure Maternal Uncle    • Depression Maternal Grandmother    • Heart attack Maternal Grandfather    • Heart failure Maternal Grandfather    • Alcohol abuse Paternal Grandmother    • Alcohol abuse Paternal Grandfather    • Scleroderma Sister    • No Known Problems Daughter    • No Known Problems Son    • Malig Hyperthermia Neg Hx         Review of Systems   Constitutional: Negative for fatigue and unexpected weight change.        Modest hot flashes   Respiratory: Negative for chest tightness, shortness of breath and wheezing.    Gastrointestinal: Negative for abdominal pain, constipation, diarrhea, nausea and vomiting.   Skin:        Pruritus   Neurological: Positive for tremors. Negative for weakness.          Objective     There were no vitals filed for this  visit.  Current Status 9/12/2019   ECOG score 0       Physical Exam   Constitutional: She is oriented to person, place, and time. She appears well-developed and well-nourished.   HENT:   Head: Normocephalic and atraumatic.   Nose: Nose normal.   Mouth/Throat: Oropharynx is clear and moist.   Eyes: Conjunctivae and EOM are normal. Pupils are equal, round, and reactive to light.   Neck: Normal range of motion. Neck supple.   Cardiovascular: Normal rate, regular rhythm, normal heart sounds and intact distal pulses.   Pulmonary/Chest: Effort normal and breath sounds normal.   Abdominal: Soft. Bowel sounds are normal.   Musculoskeletal: Normal range of motion.   Neurological: She is alert and oriented to person, place, and time.   Resting tremor noted left greater than right upper extremities   Skin: Skin is warm and dry.   Psychiatric: She has a normal mood and affect. Her behavior is normal. Judgment and thought content normal.     Breast exam: Patient status post left breast lumpectomy well healing without evidence of inflammation or discharge from wound, status post left sentinel node assessment also without inflammation or discharge    RECENT LABS:  Hematology WBC   Date Value Ref Range Status   09/12/2019 5.75 3.40 - 10.80 10*3/mm3 Final     RBC   Date Value Ref Range Status   09/12/2019 3.99 3.77 - 5.28 10*6/mm3 Final     Hemoglobin   Date Value Ref Range Status   09/12/2019 11.4 (L) 12.0 - 15.9 g/dL Final     Hematocrit   Date Value Ref Range Status   09/12/2019 35.1 34.0 - 46.6 % Final     Platelets   Date Value Ref Range Status   09/12/2019 230 140 - 450 10*3/mm3 Final      BONE MINERAL DENSITOMETRY September 06, 2018     HISTORY:  68 years-old female. Menopause at age 63. Breast cancer.     COMPARISON:  None.     TECHNIQUE: The T score compares the patient's bone mineral density with  the peak bone mass of young normal patients. Patients with T-scores  between 1.0 and 2.5 standard deviations below the mean are  osteopenic.  Patients with T-scores greater than 2.5 standard deviation below the  mean are osteoporotic.     The Z score compares the patient's bone mineral density with sex and age  matched patients. Z score of -2.0 and lower is an indication of low  mineral density for the patient's age.     FINDINGS: Lumbar T score is  -3.4.     Left hip density is lowest at the  femoral neck where the T score is  -1.7.      Right hip density is lowest at the  femoral neck where the T score is  -2.7.      IMPRESSION:  Osteoporosis.    Assessment/Plan      69 year-old female with previous medical history of seasonal allergies, diabetes mellitus type 2, CKD3, hypothyroidism, hyperlipidemia, hypertension, IBS, history of anxiety and depression with recent development of left breast abnormality found by the patient herself.  This led to mammogram ultrasound and stereotactic biopsy confirming invasive ductal carcinoma grade 2 though ER, [place] positive HER-2 negative.  Subsequent assessments via surgical review your no additional abnormalities seen on breast MRI and the patient proceeded to lumpectomy July 18, 2018.  Her pathologic findings were consistent with a 2.5 cm grade 3 invasive mammary ductal carcinoma with associated DCIS.  The closest margin was 1 mm.  [place] nodes were negative with staging of pT2N0.  Dr. Zazueta saw the patient July 20 recognizing the closest margin was the anterior margin having taken this off the skin with no further removal possible.  There was concern about the need for additional tissue though the addended pathology revealed the DCIS to be intermediate grade.  It was ultimately determined that further surgery was not necessary.   The patient presents for medical oncology assessment and we discussed potential adjuvant therapy but in particular it is felt the patient requires further genomic assessment with Oncotype DX analysis.  We reviewed this in detail and she understands the additional information  that we could obtain from such an approach in making decisions about adjuvant therapy. We proceeded with additional assessment including Oncotype and laboratory studies that, fortunate, revealed that she has an excellent prognosis including a 7% risk of recurrence at 10 years with the use of tamoxifen.  Chemotherapy, therefore, is not appropriate and we have discussed an alternative therapy in this postmenopausal patient with AI therapy in particular her Arimidex over 5 years.  She is currently undergoing review by radiation therapy and this can proceed at any point as we also plan to initiate Arimidex with a trial of the medication given over the next month.  We went on to have the patient tested for bone density finding evidence, unfortunate, of osteoporosis.  She's been tolerating Arimidex well without discontinue this and institute Tamoxifen.  The patient went on to institute treatment and underwent testing for TSH, vitamin D level and serum chemistries.  These are found to be acceptable and she now next returns October 29 tolerating tamoxifen well. We went on to continue with Reclast at that visit.  The patient did have myalgias and arthralgias following the treatment for several days that resolved.     As she is seen January 21, 2019 she is tolerating tamoxifen overall well though we have discussed that she should continue to exercise, manage her diet and try to achieve weight loss overall.  Additionally she has what appears to be essential tremor left greater than right.  Plans to see her primary care to review these issues but will start exercising immediately.  We'll plan to see her back here in approximately 6 months, plan repeat Reclast in late October and then yearly follow-ups as she completes 5 years of tamoxifen.    The patient is next seen August 1, 2019 with complaints of fatigue and recent diagnostics that are consistent with mild to moderate steatohepatitis on liver biopsy.  It is not felt likely  tamoxifen is the major issue here but we do plan to hold it briefly to see if she does not improve as we investigate both her liver function tests and etiology of her mild anemia.  It is hoped this might improve her degree of fatigue.  We plan additional laboratory studies as above we have discussed results with she and her  in some detail when seen September 12, 2019.  She needs better control of her diabetes as well as thyroid dysfunction and is urged to return to primary care to be assessed.  A copy this note will be sent to Dr. Whitfield and she will be asked otherwise to restart tamoxifen assessment approximately 3 months.    The patient was found to have symptoms of UTI and ultimately was treated with ciprofloxacin.  Thereafter she has follow-up with primary care and more effectively treated her blood pressure and thyroid dysfunction.  She is feeling considerably improved overall and not having difficulty tolerating tamoxifen.  Plan:    *Continue current medications    *Follow-up 6 months, MD, CBC

## 2019-12-09 ENCOUNTER — TELEPHONE (OUTPATIENT)
Dept: FAMILY MEDICINE CLINIC | Facility: CLINIC | Age: 69
End: 2019-12-09

## 2019-12-09 DIAGNOSIS — E03.4 HYPOTHYROIDISM DUE TO ACQUIRED ATROPHY OF THYROID: ICD-10-CM

## 2019-12-09 DIAGNOSIS — E78.2 MIXED HYPERLIPIDEMIA: Primary | ICD-10-CM

## 2019-12-09 DIAGNOSIS — I10 ESSENTIAL HYPERTENSION: ICD-10-CM

## 2019-12-09 DIAGNOSIS — E11.9 TYPE 2 DIABETES MELLITUS WITHOUT COMPLICATION, WITHOUT LONG-TERM CURRENT USE OF INSULIN (HCC): ICD-10-CM

## 2019-12-09 DIAGNOSIS — E55.9 VITAMIN D DEFICIENCY: ICD-10-CM

## 2019-12-09 PROCEDURE — 36415 COLL VENOUS BLD VENIPUNCTURE: CPT | Performed by: FAMILY MEDICINE

## 2019-12-09 PROCEDURE — 82570 ASSAY OF URINE CREATININE: CPT | Performed by: FAMILY MEDICINE

## 2019-12-09 PROCEDURE — 82043 UR ALBUMIN QUANTITATIVE: CPT | Performed by: FAMILY MEDICINE

## 2019-12-09 PROCEDURE — 84443 ASSAY THYROID STIM HORMONE: CPT | Performed by: FAMILY MEDICINE

## 2019-12-09 PROCEDURE — 83036 HEMOGLOBIN GLYCOSYLATED A1C: CPT | Performed by: FAMILY MEDICINE

## 2019-12-09 PROCEDURE — 85025 COMPLETE CBC W/AUTO DIFF WBC: CPT | Performed by: FAMILY MEDICINE

## 2019-12-09 PROCEDURE — 80061 LIPID PANEL: CPT | Performed by: FAMILY MEDICINE

## 2019-12-09 PROCEDURE — 80053 COMPREHEN METABOLIC PANEL: CPT | Performed by: FAMILY MEDICINE

## 2019-12-09 PROCEDURE — 82306 VITAMIN D 25 HYDROXY: CPT | Performed by: FAMILY MEDICINE

## 2019-12-10 LAB
25(OH)D3 SERPL-MCNC: 39.2 NG/ML (ref 30–100)
ALBUMIN SERPL-MCNC: 4.2 G/DL (ref 3.5–5.2)
ALBUMIN UR-MCNC: <1.2 MG/DL
ALBUMIN/GLOB SERPL: 1.3 G/DL
ALP SERPL-CCNC: 51 U/L (ref 39–117)
ALT SERPL W P-5'-P-CCNC: 45 U/L (ref 1–33)
ANION GAP SERPL CALCULATED.3IONS-SCNC: 13.4 MMOL/L (ref 5–15)
AST SERPL-CCNC: 66 U/L (ref 1–32)
BASOPHILS # BLD AUTO: 0.04 10*3/MM3 (ref 0–0.2)
BASOPHILS NFR BLD AUTO: 0.8 % (ref 0–1.5)
BILIRUB SERPL-MCNC: 0.4 MG/DL (ref 0.2–1.2)
BUN BLD-MCNC: 20 MG/DL (ref 8–23)
BUN/CREAT SERPL: 17.7 (ref 7–25)
CALCIUM SPEC-SCNC: 9.8 MG/DL (ref 8.6–10.5)
CHLORIDE SERPL-SCNC: 101 MMOL/L (ref 98–107)
CHOLEST SERPL-MCNC: 107 MG/DL (ref 0–200)
CO2 SERPL-SCNC: 26.6 MMOL/L (ref 22–29)
CREAT BLD-MCNC: 1.13 MG/DL (ref 0.57–1)
CREAT UR-MCNC: 113.6 MG/DL
DEPRECATED RDW RBC AUTO: 41 FL (ref 37–54)
EOSINOPHIL # BLD AUTO: 0.2 10*3/MM3 (ref 0–0.4)
EOSINOPHIL NFR BLD AUTO: 3.8 % (ref 0.3–6.2)
ERYTHROCYTE [DISTWIDTH] IN BLOOD BY AUTOMATED COUNT: 13.2 % (ref 12.3–15.4)
GFR SERPL CREATININE-BSD FRML MDRD: 48 ML/MIN/1.73
GLOBULIN UR ELPH-MCNC: 3.3 GM/DL
GLUCOSE BLD-MCNC: 150 MG/DL (ref 65–99)
HBA1C MFR BLD: 6.4 % (ref 3.5–5.6)
HCT VFR BLD AUTO: 36.1 % (ref 34–46.6)
HDLC SERPL-MCNC: 32 MG/DL (ref 40–60)
HGB BLD-MCNC: 11.8 G/DL (ref 12–15.9)
IMM GRANULOCYTES # BLD AUTO: 0.01 10*3/MM3 (ref 0–0.05)
IMM GRANULOCYTES NFR BLD AUTO: 0.2 % (ref 0–0.5)
LDLC SERPL CALC-MCNC: 42 MG/DL (ref 0–100)
LDLC/HDLC SERPL: 1.31 {RATIO}
LYMPHOCYTES # BLD AUTO: 1.31 10*3/MM3 (ref 0.7–3.1)
LYMPHOCYTES NFR BLD AUTO: 25.2 % (ref 19.6–45.3)
MCH RBC QN AUTO: 28.2 PG (ref 26.6–33)
MCHC RBC AUTO-ENTMCNC: 32.7 G/DL (ref 31.5–35.7)
MCV RBC AUTO: 86.4 FL (ref 79–97)
MICROALBUMIN/CREAT UR: NORMAL MG/G{CREAT}
MONOCYTES # BLD AUTO: 0.35 10*3/MM3 (ref 0.1–0.9)
MONOCYTES NFR BLD AUTO: 6.7 % (ref 5–12)
NEUTROPHILS # BLD AUTO: 3.29 10*3/MM3 (ref 1.7–7)
NEUTROPHILS NFR BLD AUTO: 63.3 % (ref 42.7–76)
NRBC BLD AUTO-RTO: 0 /100 WBC (ref 0–0.2)
PLATELET # BLD AUTO: 266 10*3/MM3 (ref 140–450)
PMV BLD AUTO: 10.4 FL (ref 6–12)
POTASSIUM BLD-SCNC: 4.9 MMOL/L (ref 3.5–5.2)
PROT SERPL-MCNC: 7.5 G/DL (ref 6–8.5)
RBC # BLD AUTO: 4.18 10*6/MM3 (ref 3.77–5.28)
SODIUM BLD-SCNC: 141 MMOL/L (ref 136–145)
TRIGL SERPL-MCNC: 165 MG/DL (ref 0–150)
TSH SERPL DL<=0.05 MIU/L-ACNC: 1.47 UIU/ML (ref 0.27–4.2)
VLDLC SERPL-MCNC: 33 MG/DL (ref 5–40)
WBC NRBC COR # BLD: 5.2 10*3/MM3 (ref 3.4–10.8)

## 2019-12-12 ENCOUNTER — OFFICE VISIT (OUTPATIENT)
Dept: ONCOLOGY | Facility: CLINIC | Age: 69
End: 2019-12-12

## 2019-12-12 ENCOUNTER — APPOINTMENT (OUTPATIENT)
Dept: LAB | Facility: HOSPITAL | Age: 69
End: 2019-12-12

## 2019-12-12 VITALS
RESPIRATION RATE: 18 BRPM | TEMPERATURE: 98.5 F | WEIGHT: 185.4 LBS | BODY MASS INDEX: 32.85 KG/M2 | HEART RATE: 89 BPM | DIASTOLIC BLOOD PRESSURE: 78 MMHG | SYSTOLIC BLOOD PRESSURE: 127 MMHG | OXYGEN SATURATION: 95 % | HEIGHT: 63 IN

## 2019-12-12 DIAGNOSIS — C50.912 INFILTRATING DUCTAL CARCINOMA OF LEFT BREAST (HCC): Primary | ICD-10-CM

## 2019-12-12 DIAGNOSIS — C50.412 MALIGNANT NEOPLASM OF UPPER-OUTER QUADRANT OF LEFT BREAST IN FEMALE, ESTROGEN RECEPTOR POSITIVE (HCC): Primary | ICD-10-CM

## 2019-12-12 DIAGNOSIS — Z17.0 MALIGNANT NEOPLASM OF UPPER-OUTER QUADRANT OF LEFT BREAST IN FEMALE, ESTROGEN RECEPTOR POSITIVE (HCC): Primary | ICD-10-CM

## 2019-12-12 DIAGNOSIS — Z17.0 MALIGNANT NEOPLASM OF UPPER-OUTER QUADRANT OF LEFT BREAST IN FEMALE, ESTROGEN RECEPTOR POSITIVE (HCC): ICD-10-CM

## 2019-12-12 DIAGNOSIS — C50.412 MALIGNANT NEOPLASM OF UPPER-OUTER QUADRANT OF LEFT BREAST IN FEMALE, ESTROGEN RECEPTOR POSITIVE (HCC): ICD-10-CM

## 2019-12-12 LAB
BASOPHILS # BLD AUTO: 0.04 10*3/MM3 (ref 0–0.2)
BASOPHILS NFR BLD AUTO: 0.7 % (ref 0–1.5)
DEPRECATED RDW RBC AUTO: 42.7 FL (ref 37–54)
EOSINOPHIL # BLD AUTO: 0.22 10*3/MM3 (ref 0–0.4)
EOSINOPHIL NFR BLD AUTO: 3.9 % (ref 0.3–6.2)
ERYTHROCYTE [DISTWIDTH] IN BLOOD BY AUTOMATED COUNT: 13.5 % (ref 12.3–15.4)
HCT VFR BLD AUTO: 34.8 % (ref 34–46.6)
HGB BLD-MCNC: 11.3 G/DL (ref 12–15.9)
IMM GRANULOCYTES # BLD AUTO: 0.03 10*3/MM3 (ref 0–0.05)
IMM GRANULOCYTES NFR BLD AUTO: 0.5 % (ref 0–0.5)
LYMPHOCYTES # BLD AUTO: 1.32 10*3/MM3 (ref 0.7–3.1)
LYMPHOCYTES NFR BLD AUTO: 23.6 % (ref 19.6–45.3)
MCH RBC QN AUTO: 28.1 PG (ref 26.6–33)
MCHC RBC AUTO-ENTMCNC: 32.5 G/DL (ref 31.5–35.7)
MCV RBC AUTO: 86.6 FL (ref 79–97)
MONOCYTES # BLD AUTO: 0.38 10*3/MM3 (ref 0.1–0.9)
MONOCYTES NFR BLD AUTO: 6.8 % (ref 5–12)
NEUTROPHILS # BLD AUTO: 3.6 10*3/MM3 (ref 1.7–7)
NEUTROPHILS NFR BLD AUTO: 64.5 % (ref 42.7–76)
NRBC BLD AUTO-RTO: 0 /100 WBC (ref 0–0.2)
PLATELET # BLD AUTO: 233 10*3/MM3 (ref 140–450)
PMV BLD AUTO: 10 FL (ref 6–12)
RBC # BLD AUTO: 4.02 10*6/MM3 (ref 3.77–5.28)
WBC NRBC COR # BLD: 5.59 10*3/MM3 (ref 3.4–10.8)

## 2019-12-12 PROCEDURE — 85025 COMPLETE CBC W/AUTO DIFF WBC: CPT

## 2019-12-12 PROCEDURE — 36416 COLLJ CAPILLARY BLOOD SPEC: CPT

## 2019-12-12 PROCEDURE — 99213 OFFICE O/P EST LOW 20 MIN: CPT | Performed by: INTERNAL MEDICINE

## 2019-12-13 ENCOUNTER — OFFICE VISIT (OUTPATIENT)
Dept: FAMILY MEDICINE CLINIC | Facility: CLINIC | Age: 69
End: 2019-12-13

## 2019-12-13 VITALS
TEMPERATURE: 98.1 F | BODY MASS INDEX: 32.25 KG/M2 | WEIGHT: 182 LBS | HEART RATE: 101 BPM | RESPIRATION RATE: 16 BRPM | HEIGHT: 63 IN | DIASTOLIC BLOOD PRESSURE: 78 MMHG | OXYGEN SATURATION: 95 % | SYSTOLIC BLOOD PRESSURE: 128 MMHG

## 2019-12-13 DIAGNOSIS — E11.8 TYPE 2 DIABETES MELLITUS WITH COMPLICATION, WITHOUT LONG-TERM CURRENT USE OF INSULIN (HCC): ICD-10-CM

## 2019-12-13 DIAGNOSIS — E78.2 MIXED HYPERLIPIDEMIA: ICD-10-CM

## 2019-12-13 DIAGNOSIS — E03.9 ACQUIRED HYPOTHYROIDISM: ICD-10-CM

## 2019-12-13 DIAGNOSIS — J40 BRONCHITIS: ICD-10-CM

## 2019-12-13 DIAGNOSIS — I10 ESSENTIAL HYPERTENSION: ICD-10-CM

## 2019-12-13 DIAGNOSIS — E11.9 TYPE 2 DIABETES MELLITUS WITHOUT COMPLICATION, WITHOUT LONG-TERM CURRENT USE OF INSULIN (HCC): Primary | ICD-10-CM

## 2019-12-13 PROCEDURE — 99214 OFFICE O/P EST MOD 30 MIN: CPT | Performed by: FAMILY MEDICINE

## 2019-12-13 RX ORDER — LEVOTHYROXINE SODIUM 0.07 MG/1
75 TABLET ORAL DAILY
Qty: 90 TABLET | Refills: 1 | Status: SHIPPED | OUTPATIENT
Start: 2019-12-13 | End: 2020-06-05

## 2019-12-13 RX ORDER — GLIPIZIDE 5 MG/1
5 TABLET, FILM COATED, EXTENDED RELEASE ORAL DAILY
Qty: 90 TABLET | Refills: 1 | Status: SHIPPED | OUTPATIENT
Start: 2019-12-13 | End: 2020-06-05

## 2019-12-13 RX ORDER — AZITHROMYCIN 250 MG/1
250 TABLET, FILM COATED ORAL DAILY
Qty: 6 TABLET | Refills: 0 | Status: SHIPPED | OUTPATIENT
Start: 2019-12-13 | End: 2019-12-18

## 2019-12-13 RX ORDER — HYDROCHLOROTHIAZIDE 25 MG/1
25 TABLET ORAL DAILY
Qty: 90 TABLET | Refills: 1 | Status: SHIPPED | OUTPATIENT
Start: 2019-12-13 | End: 2020-07-31 | Stop reason: SDUPTHER

## 2019-12-13 RX ORDER — METHYLPREDNISOLONE 4 MG/1
TABLET ORAL
Qty: 1 EACH | Refills: 0 | Status: SHIPPED | OUTPATIENT
Start: 2019-12-13 | End: 2020-07-31

## 2019-12-13 RX ORDER — ROSUVASTATIN CALCIUM 20 MG/1
10 TABLET, COATED ORAL NIGHTLY
Qty: 90 TABLET | Refills: 1 | Status: SHIPPED | OUTPATIENT
Start: 2019-12-13 | End: 2020-02-10

## 2019-12-13 RX ORDER — OMEPRAZOLE 40 MG/1
40 CAPSULE, DELAYED RELEASE ORAL DAILY
Qty: 90 CAPSULE | Refills: 1 | Status: SHIPPED | OUTPATIENT
Start: 2019-12-13 | End: 2020-07-31 | Stop reason: SDUPTHER

## 2019-12-13 RX ORDER — ESCITALOPRAM OXALATE 20 MG/1
20 TABLET ORAL DAILY
Qty: 90 TABLET | Refills: 1 | Status: SHIPPED | OUTPATIENT
Start: 2019-12-13 | End: 2020-07-31 | Stop reason: SDUPTHER

## 2019-12-13 RX ORDER — PROPRANOLOL HCL 60 MG
60 CAPSULE, EXTENDED RELEASE 24HR ORAL DAILY
Qty: 90 CAPSULE | Refills: 1 | Status: SHIPPED | OUTPATIENT
Start: 2019-12-13 | End: 2020-07-31 | Stop reason: SDUPTHER

## 2019-12-13 RX ORDER — DEXTROMETHORPHAN HYDROBROMIDE AND PROMETHAZINE HYDROCHLORIDE 15; 6.25 MG/5ML; MG/5ML
5 SYRUP ORAL 4 TIMES DAILY PRN
Qty: 120 ML | Refills: 0 | Status: SHIPPED | OUTPATIENT
Start: 2019-12-13 | End: 2020-07-31

## 2019-12-13 NOTE — PROGRESS NOTES
Subjective   Chief Complaint   Patient presents with   • Follow-up     discuss labs   • Hypertension   • Hyperlipidemia   • Hypothyroidism   • Diabetes   • Depression   • Cough     Mariana Ansari is a 69 y.o. female.     Patient Care Team:  Luna Whitfield MD as PCP - General (Family Medicine)  Luna Whitfield MD as PCP - Family Medicine  Michel Guajardo MD as Consulting Physician (Hematology and Oncology)    She is coming in today to follow-up on her chronic medical problems and she also wants to discuss her recent labs.  We are addressing her diabetes, hypertension, hyperlipidemia, hypothyroidism, depression, and breathing issues.  She takes all of her medications as directed and she denies any side effects.  She is also followed by oncologist due to breast cancer.  She is on tamoxifen and she is doing very well from that standpoint.  She also wants to talk about her cough, which started couple of days ago.  It has moved to her chest, but she also feels some pressure in her upper respiratory.  She has history of breathing issues and history of previous bronchitis. Patient denies any chest pain, shortness of breath, dizziness, nausea, vomiting, or diarrhea. No visual issues reported. No headaches, numbness or tingling. No urinary issues. Patient has good appetite and denies any weight changes. No swelling reported. No emotional issues.         The following portions of the patient's history were reviewed and updated as appropriate: allergies, current medications, past family history, past medical history, past social history, past surgical history and problem list.  Past Medical History:   Diagnosis Date   • Anxiety    • Anxiety and depression    • Asthma     SEASONAL ALLERGY RELATED   • Cancer of breast (CMS/HCC) 06/2018    LEFT   • Chronic kidney disease     Renal cyst, right kidney w/urology workup in past   • Depression    • Diabetes mellitus (CMS/[facility])     Type 2   • Disease of thyroid gland    • GERD  (gastroesophageal reflux disease)    • History of prior pregnancies     x2   • History of transfusion     S/P HYST --AUTOLOGOUS    • Hyperlipidemia    • Hypertension    • IBS (irritable bowel syndrome)    • Seasonal allergies      Past Surgical History:   Procedure Laterality Date   • BREAST LUMPECTOMY WITH SENTINEL NODE BIOPSY Left 7/18/2018    Procedure: BREAST LUMPECTOMY WITH SENTINEL NODE BIOPSY;  Surgeon: João Zazueta MD;  Location: [facility] OR;  Service: General   • COLONOSCOPY      refusing; negative cologuard 8/24/2017   • HYSTERECTOMY  1989   • KNEE SURGERY Right 2014   • KNEE SURGERY Left 2016   • LIVER BIOPSY  2019    fatty liver   • ROTATOR CUFF REPAIR Left 2009   • ROTATOR CUFF REPAIR Right 2012   • TOE SURGERY  2009    ingrown      The patient has a family history of  Family History   Problem Relation Age of Onset   • Diabetes Mother    • Heart attack Mother    • Heart failure Mother    • Hyperlipidemia Mother    • Hyperthyroidism Mother         Has surgery   • Hypothyroidism Mother         Later in life   • Heart disease Mother    • Hypertension Mother    • Asthma Father    • COPD Father    • Hypertension Father    • Heart attack Maternal Uncle    • Heart failure Maternal Uncle    • Depression Maternal Grandmother    • Heart attack Maternal Grandfather    • Heart failure Maternal Grandfather    • Alcohol abuse Paternal Grandmother    • Alcohol abuse Paternal Grandfather    • Scleroderma Sister    • No Known Problems Daughter    • No Known Problems Son    • Malig Hyperthermia Neg Hx      Social History     Socioeconomic History   • Marital status:      Spouse name: Mahin   • Number of children: 2   • Years of education: College   • Highest education level: Not on file   Occupational History   • Occupation: Teacher     Employer: [employer]   Tobacco Use   • Smoking status: Never Smoker   • Smokeless tobacco: Never Used   Substance and Sexual Activity   • Alcohol use:  "Yes     Alcohol/week: 1.0 standard drinks     Types: 1 Cans of beer per week     Comment: 2-3 a month, social only   • Drug use: No   • Sexual activity: Yes     Comment:        Review of Systems   Constitutional: Negative for activity change, fatigue and fever.   HENT: Positive for congestion, postnasal drip and sinus pressure. Negative for facial swelling, sore throat and swollen glands.    Respiratory: Positive for cough and wheezing. Negative for choking, chest tightness and shortness of breath.    Cardiovascular: Negative for chest pain, palpitations and leg swelling.   Gastrointestinal: Negative for constipation, diarrhea and indigestion.   Endocrine: Negative for cold intolerance, heat intolerance, polydipsia, polyphagia and polyuria.   Skin: Negative for color change, dry skin and rash.   Allergic/Immunologic: Positive for environmental allergies.   Neurological: Negative for tremors and headache.     Visit Vitals  /78 (BP Location: Left arm, Patient Position: Sitting, Cuff Size: Adult)   Pulse 101   Temp 98.1 °F (36.7 °C) (Oral)   Resp 16   Ht 160 cm (62.99\")   Wt 82.6 kg (182 lb)   SpO2 95%   BMI 32.25 kg/m²       Current Outpatient Medications:   •  albuterol (PROAIR RESPICLICK) 108 (90 Base) MCG/ACT inhaler, Inhale 1 puff Every 4 (Four) Hours As Needed for Wheezing or Shortness of Air., Disp: , Rfl:   •  azithromycin (ZITHROMAX) 250 MG tablet, Take 1 tablet by mouth Daily for 5 days. Take 2 tablets the first day, then 1 tablet daily for 4 days., Disp: 6 tablet, Rfl: 0  •  B Complex Vitamins (VITAMIN B COMPLEX PO), Take 1 tablet by mouth Every Night., Disp: , Rfl:   •  cetirizine (zyrTEC) 10 MG tablet, Take 10 mg by mouth Daily., Disp: , Rfl:   •  cholecalciferol (VITAMIN D3) 1000 units tablet, Take 1,000 Units by mouth Every Night., Disp: , Rfl:   •  escitalopram (LEXAPRO) 20 MG tablet, Take 1 tablet by mouth Daily., Disp: 90 tablet, Rfl: 1  •  fluticasone (FLONASE) 50 MCG/ACT nasal spray, 2 " sprays into each nostril Daily., Disp: , Rfl:   •  glipizide (GLIPIZIDE XL) 5 MG ER tablet, Take 1 tablet by mouth Daily., Disp: 90 tablet, Rfl: 1  •  hydroCHLOROthiazide (HYDRODIURIL) 25 MG tablet, Take 1 tablet by mouth Daily., Disp: 90 tablet, Rfl: 1  •  levothyroxine (SYNTHROID, LEVOTHROID) 75 MCG tablet, Take 1 tablet by mouth Daily., Disp: 90 tablet, Rfl: 1  •  loperamide (IMODIUM A-D) 2 MG tablet, Take 2 mg by mouth 4 (Four) Times a Day As Needed for Diarrhea., Disp: , Rfl:   •  melatonin 5 MG tablet tablet, Take 10 mg by mouth At Night As Needed., Disp: , Rfl:   •  methylPREDNISolone (MEDROL) 4 MG tablet, Take as directed on package instructions., Disp: 1 each, Rfl: 0  •  omeprazole (priLOSEC) 40 MG capsule, Take 1 capsule by mouth Daily., Disp: 90 capsule, Rfl: 1  •  promethazine-dextromethorphan (PROMETHAZINE-DM) 6.25-15 MG/5ML syrup, Take 5 mL by mouth 4 (Four) Times a Day As Needed for Cough., Disp: 120 mL, Rfl: 0  •  propranolol LA (INDERAL LA) 60 MG 24 hr capsule, Take 1 capsule by mouth Daily., Disp: 90 capsule, Rfl: 1  •  rosuvastatin (CRESTOR) 20 MG tablet, Take 0.5 tablets by mouth Every Night., Disp: 90 tablet, Rfl: 1  •  SITagliptin-metFORMIN HCl ER (JANUMET XR)  MG tablet, Take 1 tablet by mouth 2 (Two) Times a Day., Disp: 180 tablet, Rfl: 1  •  tamoxifen (NOLVADEX) 20 MG chemo tablet, Take 1 tablet by mouth Daily., Disp: 90 tablet, Rfl: 2    Objective   Physical Exam   Constitutional: She is oriented to person, place, and time. She appears well-developed and well-nourished.   HENT:   Head: Normocephalic and atraumatic.   Some congestion and wet cough noted.   Eyes: Pupils are equal, round, and reactive to light. Conjunctivae and EOM are normal.   Neck: Normal range of motion. Neck supple.   Cardiovascular: Normal rate, regular rhythm, normal heart sounds and intact distal pulses. Exam reveals no gallop.   No murmur heard.  Pulmonary/Chest: Effort normal and breath sounds normal. No  respiratory distress. She has no rales. She exhibits no tenderness.   Slightly prolonged expiratory phase.   Musculoskeletal: Normal range of motion. She exhibits no edema or deformity.   Neurological: She is alert and oriented to person, place, and time.   Skin: Skin is warm and dry.   Nursing note and vitals reviewed.           Appointment on 12/12/2019   Component Date Value Ref Range Status   • WBC 12/12/2019 5.59  3.40 - 10.80 10*3/mm3 Final   • RBC 12/12/2019 4.02  3.77 - 5.28 10*6/mm3 Final   • Hemoglobin 12/12/2019 11.3* 12.0 - 15.9 g/dL Final   • Hematocrit 12/12/2019 34.8  34.0 - 46.6 % Final   • MCV 12/12/2019 86.6  79.0 - 97.0 fL Final   • MCH 12/12/2019 28.1  26.6 - 33.0 pg Final   • MCHC 12/12/2019 32.5  31.5 - 35.7 g/dL Final   • RDW 12/12/2019 13.5  12.3 - 15.4 % Final   • RDW-SD 12/12/2019 42.7  37.0 - 54.0 fl Final   • MPV 12/12/2019 10.0  6.0 - 12.0 fL Final   • Platelets 12/12/2019 233  140 - 450 10*3/mm3 Final   • Neutrophil % 12/12/2019 64.5  42.7 - 76.0 % Final   • Lymphocyte % 12/12/2019 23.6  19.6 - 45.3 % Final   • Monocyte % 12/12/2019 6.8  5.0 - 12.0 % Final   • Eosinophil % 12/12/2019 3.9  0.3 - 6.2 % Final   • Basophil % 12/12/2019 0.7  0.0 - 1.5 % Final   • Immature Grans % 12/12/2019 0.5  0.0 - 0.5 % Final   • Neutrophils, Absolute 12/12/2019 3.60  1.70 - 7.00 10*3/mm3 Final   • Lymphocytes, Absolute 12/12/2019 1.32  0.70 - 3.10 10*3/mm3 Final   • Monocytes, Absolute 12/12/2019 0.38  0.10 - 0.90 10*3/mm3 Final   • Eosinophils, Absolute 12/12/2019 0.22  0.00 - 0.40 10*3/mm3 Final   • Basophils, Absolute 12/12/2019 0.04  0.00 - 0.20 10*3/mm3 Final   • Immature Grans, Absolute 12/12/2019 0.03  0.00 - 0.05 10*3/mm3 Final   • nRBC 12/12/2019 0.0  0.0 - 0.2 /100 WBC Final   Orders Only on 12/09/2019   Component Date Value Ref Range Status   • WBC 12/09/2019 5.20  3.40 - 10.80 10*3/mm3 Final   • RBC 12/09/2019 4.18  3.77 - 5.28 10*6/mm3 Final   • Hemoglobin 12/09/2019 11.8* 12.0 - 15.9 g/dL  Final   • Hematocrit 12/09/2019 36.1  34.0 - 46.6 % Final   • MCV 12/09/2019 86.4  79.0 - 97.0 fL Final   • MCH 12/09/2019 28.2  26.6 - 33.0 pg Final   • MCHC 12/09/2019 32.7  31.5 - 35.7 g/dL Final   • RDW 12/09/2019 13.2  12.3 - 15.4 % Final   • RDW-SD 12/09/2019 41.0  37.0 - 54.0 fl Final   • MPV 12/09/2019 10.4  6.0 - 12.0 fL Final   • Platelets 12/09/2019 266  140 - 450 10*3/mm3 Final   • Neutrophil % 12/09/2019 63.3  42.7 - 76.0 % Final   • Lymphocyte % 12/09/2019 25.2  19.6 - 45.3 % Final   • Monocyte % 12/09/2019 6.7  5.0 - 12.0 % Final   • Eosinophil % 12/09/2019 3.8  0.3 - 6.2 % Final   • Basophil % 12/09/2019 0.8  0.0 - 1.5 % Final   • Immature Grans % 12/09/2019 0.2  0.0 - 0.5 % Final   • Neutrophils, Absolute 12/09/2019 3.29  1.70 - 7.00 10*3/mm3 Final   • Lymphocytes, Absolute 12/09/2019 1.31  0.70 - 3.10 10*3/mm3 Final   • Monocytes, Absolute 12/09/2019 0.35  0.10 - 0.90 10*3/mm3 Final   • Eosinophils, Absolute 12/09/2019 0.20  0.00 - 0.40 10*3/mm3 Final   • Basophils, Absolute 12/09/2019 0.04  0.00 - 0.20 10*3/mm3 Final   • Immature Grans, Absolute 12/09/2019 0.01  0.00 - 0.05 10*3/mm3 Final   • nRBC 12/09/2019 0.0  0.0 - 0.2 /100 WBC Final   • Glucose 12/09/2019 150* 65 - 99 mg/dL Final   • BUN 12/09/2019 20  8 - 23 mg/dL Final   • Creatinine 12/09/2019 1.13* 0.57 - 1.00 mg/dL Final   • Sodium 12/09/2019 141  136 - 145 mmol/L Final   • Potassium 12/09/2019 4.9  3.5 - 5.2 mmol/L Final   • Chloride 12/09/2019 101  98 - 107 mmol/L Final   • CO2 12/09/2019 26.6  22.0 - 29.0 mmol/L Final   • Calcium 12/09/2019 9.8  8.6 - 10.5 mg/dL Final   • Total Protein 12/09/2019 7.5  6.0 - 8.5 g/dL Final   • Albumin 12/09/2019 4.20  3.50 - 5.20 g/dL Final   • ALT (SGPT) 12/09/2019 45* 1 - 33 U/L Final   • AST (SGOT) 12/09/2019 66* 1 - 32 U/L Final   • Alkaline Phosphatase 12/09/2019 51  39 - 117 U/L Final   • Total Bilirubin 12/09/2019 0.4  0.2 - 1.2 mg/dL Final   • eGFR Non  Amer 12/09/2019 48* >60  mL/min/1.73 Final   • Globulin 12/09/2019 3.3  gm/dL Final   • A/G Ratio 12/09/2019 1.3  g/dL Final   • BUN/Creatinine Ratio 12/09/2019 17.7  7.0 - 25.0 Final   • Anion Gap 12/09/2019 13.4  5.0 - 15.0 mmol/L Final   • Hemoglobin A1C 12/09/2019 6.4* 3.5 - 5.6 % Final   • Microalbumin/Creatinine Ratio 12/09/2019    Final    Unable to calculate   • Creatinine, Urine 12/09/2019 113.6  mg/dL Final   • Microalbumin, Urine 12/09/2019 <1.2  mg/dL Final   • Total Cholesterol 12/09/2019 107  0 - 200 mg/dL Final   • Triglycerides 12/09/2019 165* 0 - 150 mg/dL Final   • HDL Cholesterol 12/09/2019 32* 40 - 60 mg/dL Final   • LDL Cholesterol  12/09/2019 42  0 - 100 mg/dL Final   • VLDL Cholesterol 12/09/2019 33  5 - 40 mg/dL Final   • LDL/HDL Ratio 12/09/2019 1.31   Final   • TSH 12/09/2019 1.470  0.270 - 4.200 uIU/mL Final   • 25 Hydroxy, Vitamin D 12/09/2019 39.2  30.0 - 100.0 ng/ml Final         Lab Results   Component Value Date    BUN 20 12/09/2019    CREATININE 1.13 (H) 12/09/2019    EGFRIFNONA 48 (L) 12/09/2019     12/09/2019    K 4.9 12/09/2019     12/09/2019    CALCIUM 9.8 12/09/2019    ALBUMIN 4.20 12/09/2019    BILITOT 0.4 12/09/2019    ALKPHOS 51 12/09/2019    AST 66 (H) 12/09/2019    ALT 45 (H) 12/09/2019    TRIG 165 (H) 12/09/2019    HDL 32 (L) 12/09/2019    VLDL 33 12/09/2019    LDL 42 12/09/2019    LDLHDL 1.31 12/09/2019    MICROALBUR <1.2 12/09/2019    WBC 5.59 12/12/2019    RBC 4.02 12/12/2019    HCT 34.8 12/12/2019    MCV 86.6 12/12/2019    MCH 28.1 12/12/2019    TSH 1.470 12/09/2019    [id number] 39.2 12/09/2019          Assessment/Plan   Problems Addressed this Visit        Cardiovascular and Mediastinum    Hyperlipidemia    Relevant Medications    rosuvastatin (CRESTOR) 20 MG tablet    Hypertension    Relevant Medications    propranolol LA (INDERAL LA) 60 MG 24 hr capsule    hydroCHLOROthiazide (HYDRODIURIL) 25 MG tablet       Respiratory    Bronchitis    Relevant Medications     promethazine-dextromethorphan (PROMETHAZINE-DM) 6.25-15 MG/5ML syrup       Endocrine    Type 2 diabetes mellitus with complication, without long-term current use of insulin (CMS/[facility]) - Primary    Relevant Medications    SITagliptin-metFORMIN HCl ER (JANUMET XR)  MG tablet    glipizide (GLIPIZIDE XL) 5 MG ER tablet    Hypothyroidism    Relevant Medications    propranolol LA (INDERAL LA) 60 MG 24 hr capsule    methylPREDNISolone (MEDROL) 4 MG tablet    levothyroxine (SYNTHROID, LEVOTHROID) 75 MCG tablet        I reviewed her chronic medical problems and her medications.  Her labs were also reviewed and discussed.  Her creatinine has come down significantly, but is still slightly elevated.  Her liver tests have improved a lot, but once again slightly stay elevated.  Her chronic medical problems are well controlled.  She will continue her current medications.  If it comes to the upper respiratory symptoms those are consistent with acute bronchitis.  I will start her on antibiotic and a steroid.  She will continue her inhalers as well.             Requested Prescriptions     Signed Prescriptions Disp Refills   • SITagliptin-metFORMIN HCl ER (JANUMET XR)  MG tablet 180 tablet 1     Sig: Take 1 tablet by mouth 2 (Two) Times a Day.   • rosuvastatin (CRESTOR) 20 MG tablet 90 tablet 1     Sig: Take 0.5 tablets by mouth Every Night.   • omeprazole (priLOSEC) 40 MG capsule 90 capsule 1     Sig: Take 1 capsule by mouth Daily.   • propranolol LA (INDERAL LA) 60 MG 24 hr capsule 90 capsule 1     Sig: Take 1 capsule by mouth Daily.   • methylPREDNISolone (MEDROL) 4 MG tablet 1 each 0     Sig: Take as directed on package instructions.   • azithromycin (ZITHROMAX) 250 MG tablet 6 tablet 0     Sig: Take 1 tablet by mouth Daily for 5 days. Take 2 tablets the first day, then 1 tablet daily for 4 days.   • promethazine-dextromethorphan (PROMETHAZINE-DM) 6.25-15 MG/5ML syrup 120 mL 0     Sig: Take 5 mL by mouth 4 (Four)  Times a Day As Needed for Cough.   • levothyroxine (SYNTHROID, LEVOTHROID) 75 MCG tablet 90 tablet 1     Sig: Take 1 tablet by mouth Daily.   • hydroCHLOROthiazide (HYDRODIURIL) 25 MG tablet 90 tablet 1     Sig: Take 1 tablet by mouth Daily.   • glipizide (GLIPIZIDE XL) 5 MG ER tablet 90 tablet 1     Sig: Take 1 tablet by mouth Daily.   • escitalopram (LEXAPRO) 20 MG tablet 90 tablet 1     Sig: Take 1 tablet by mouth Daily.

## 2019-12-18 ENCOUNTER — TELEPHONE (OUTPATIENT)
Dept: FAMILY MEDICINE CLINIC | Facility: CLINIC | Age: 69
End: 2019-12-18

## 2019-12-19 RX ORDER — DOXYCYCLINE HYCLATE 100 MG
100 TABLET ORAL 2 TIMES DAILY
Qty: 20 TABLET | Refills: 0 | Status: SHIPPED | OUTPATIENT
Start: 2019-12-19 | End: 2019-12-29

## 2020-02-10 RX ORDER — ROSUVASTATIN CALCIUM 20 MG/1
TABLET, COATED ORAL
Qty: 90 TABLET | Refills: 0 | Status: SHIPPED | OUTPATIENT
Start: 2020-02-10 | End: 2020-07-16

## 2020-06-05 RX ORDER — LEVOTHYROXINE SODIUM 0.07 MG/1
TABLET ORAL
Qty: 90 TABLET | Refills: 0 | Status: SHIPPED | OUTPATIENT
Start: 2020-06-05 | End: 2020-07-31

## 2020-06-05 RX ORDER — GLIPIZIDE 5 MG/1
TABLET, FILM COATED, EXTENDED RELEASE ORAL
Qty: 90 TABLET | Refills: 0 | Status: SHIPPED | OUTPATIENT
Start: 2020-06-05 | End: 2020-09-11

## 2020-06-08 RX ORDER — TAMOXIFEN CITRATE 20 MG/1
TABLET ORAL
Qty: 90 TABLET | Refills: 0 | Status: SHIPPED | OUTPATIENT
Start: 2020-06-08 | End: 2020-08-04 | Stop reason: SDUPTHER

## 2020-06-15 ENCOUNTER — APPOINTMENT (OUTPATIENT)
Dept: LAB | Facility: HOSPITAL | Age: 70
End: 2020-06-15

## 2020-07-16 RX ORDER — ROSUVASTATIN CALCIUM 20 MG/1
TABLET, COATED ORAL
Qty: 30 TABLET | Refills: 0 | Status: SHIPPED | OUTPATIENT
Start: 2020-07-16 | End: 2020-07-31 | Stop reason: SDUPTHER

## 2020-07-21 ENCOUNTER — TELEPHONE (OUTPATIENT)
Dept: ONCOLOGY | Facility: CLINIC | Age: 70
End: 2020-07-21

## 2020-07-21 ENCOUNTER — TELEPHONE (OUTPATIENT)
Dept: FAMILY MEDICINE CLINIC | Facility: CLINIC | Age: 70
End: 2020-07-21

## 2020-07-21 DIAGNOSIS — E03.9 ACQUIRED HYPOTHYROIDISM: ICD-10-CM

## 2020-07-21 DIAGNOSIS — E78.2 MIXED HYPERLIPIDEMIA: Primary | ICD-10-CM

## 2020-07-21 DIAGNOSIS — E55.9 VITAMIN D DEFICIENCY: ICD-10-CM

## 2020-07-21 DIAGNOSIS — E11.8 TYPE 2 DIABETES MELLITUS WITH COMPLICATION, WITHOUT LONG-TERM CURRENT USE OF INSULIN (HCC): ICD-10-CM

## 2020-07-21 NOTE — TELEPHONE ENCOUNTER
Patient wants to reschedule 6/15  appointment -6 month follow up  that was canceled.    [phone]

## 2020-07-28 ENCOUNTER — LAB (OUTPATIENT)
Dept: FAMILY MEDICINE CLINIC | Facility: CLINIC | Age: 70
End: 2020-07-28

## 2020-07-28 LAB
25(OH)D3 SERPL-MCNC: 68.4 NG/ML (ref 30–100)
ALBUMIN SERPL-MCNC: 4.7 G/DL (ref 3.5–5.2)
ALBUMIN UR-MCNC: <1.2 MG/DL
ALBUMIN/GLOB SERPL: 1.6 G/DL
ALP SERPL-CCNC: 64 U/L (ref 39–117)
ALT SERPL W P-5'-P-CCNC: 54 U/L (ref 1–33)
ANION GAP SERPL CALCULATED.3IONS-SCNC: 13.9 MMOL/L (ref 5–15)
AST SERPL-CCNC: 108 U/L (ref 1–32)
BASOPHILS # BLD AUTO: 0.03 10*3/MM3 (ref 0–0.2)
BASOPHILS NFR BLD AUTO: 0.5 % (ref 0–1.5)
BILIRUB SERPL-MCNC: 0.4 MG/DL (ref 0–1.2)
BILIRUB UR QL STRIP: NEGATIVE
BUN SERPL-MCNC: 15 MG/DL (ref 8–23)
BUN/CREAT SERPL: 10.3 (ref 7–25)
CALCIUM SPEC-SCNC: 10.2 MG/DL (ref 8.6–10.5)
CHLORIDE SERPL-SCNC: 101 MMOL/L (ref 98–107)
CHOLEST SERPL-MCNC: 99 MG/DL (ref 0–200)
CLARITY UR: CLEAR
CO2 SERPL-SCNC: 26.1 MMOL/L (ref 22–29)
COLOR UR: YELLOW
CREAT SERPL-MCNC: 1.45 MG/DL (ref 0.57–1)
CREAT UR-MCNC: 118.7 MG/DL
DEPRECATED RDW RBC AUTO: 41.6 FL (ref 37–54)
EOSINOPHIL # BLD AUTO: 0.2 10*3/MM3 (ref 0–0.4)
EOSINOPHIL NFR BLD AUTO: 3.2 % (ref 0.3–6.2)
ERYTHROCYTE [DISTWIDTH] IN BLOOD BY AUTOMATED COUNT: 13.5 % (ref 12.3–15.4)
GFR SERPL CREATININE-BSD FRML MDRD: 36 ML/MIN/1.73
GLOBULIN UR ELPH-MCNC: 2.9 GM/DL
GLUCOSE SERPL-MCNC: 184 MG/DL (ref 65–99)
GLUCOSE UR STRIP-MCNC: NEGATIVE MG/DL
HBA1C MFR BLD: 6.7 % (ref 3.5–5.6)
HCT VFR BLD AUTO: 36.8 % (ref 34–46.6)
HDLC SERPL-MCNC: 30 MG/DL (ref 40–60)
HGB BLD-MCNC: 12 G/DL (ref 12–15.9)
HGB UR QL STRIP.AUTO: NEGATIVE
IMM GRANULOCYTES # BLD AUTO: 0.01 10*3/MM3 (ref 0–0.05)
IMM GRANULOCYTES NFR BLD AUTO: 0.2 % (ref 0–0.5)
KETONES UR QL STRIP: NEGATIVE
LDLC SERPL CALC-MCNC: 33 MG/DL (ref 0–100)
LDLC/HDLC SERPL: 1.09 {RATIO}
LEUKOCYTE ESTERASE UR QL STRIP.AUTO: NEGATIVE
LYMPHOCYTES # BLD AUTO: 1.44 10*3/MM3 (ref 0.7–3.1)
LYMPHOCYTES NFR BLD AUTO: 22.9 % (ref 19.6–45.3)
MCH RBC QN AUTO: 28.2 PG (ref 26.6–33)
MCHC RBC AUTO-ENTMCNC: 32.6 G/DL (ref 31.5–35.7)
MCV RBC AUTO: 86.6 FL (ref 79–97)
MICROALBUMIN/CREAT UR: NORMAL MG/G{CREAT}
MONOCYTES # BLD AUTO: 0.45 10*3/MM3 (ref 0.1–0.9)
MONOCYTES NFR BLD AUTO: 7.1 % (ref 5–12)
NEUTROPHILS NFR BLD AUTO: 4.17 10*3/MM3 (ref 1.7–7)
NEUTROPHILS NFR BLD AUTO: 66.1 % (ref 42.7–76)
NITRITE UR QL STRIP: NEGATIVE
NRBC BLD AUTO-RTO: 0 /100 WBC (ref 0–0.2)
PH UR STRIP.AUTO: 5.5 [PH] (ref 5–8)
PLATELET # BLD AUTO: 281 10*3/MM3 (ref 140–450)
PMV BLD AUTO: 11.2 FL (ref 6–12)
POTASSIUM SERPL-SCNC: 5.5 MMOL/L (ref 3.5–5.2)
PROT SERPL-MCNC: 7.6 G/DL (ref 6–8.5)
PROT UR QL STRIP: NEGATIVE
RBC # BLD AUTO: 4.25 10*6/MM3 (ref 3.77–5.28)
SODIUM SERPL-SCNC: 141 MMOL/L (ref 136–145)
SP GR UR STRIP: 1.02 (ref 1–1.03)
TRIGL SERPL-MCNC: 181 MG/DL (ref 0–150)
TSH SERPL DL<=0.05 MIU/L-ACNC: 5.99 UIU/ML (ref 0.27–4.2)
UROBILINOGEN UR QL STRIP: NORMAL
VLDLC SERPL-MCNC: 36.2 MG/DL (ref 5–40)
WBC # BLD AUTO: 6.3 10*3/MM3 (ref 3.4–10.8)

## 2020-07-28 PROCEDURE — 82570 ASSAY OF URINE CREATININE: CPT | Performed by: FAMILY MEDICINE

## 2020-07-28 PROCEDURE — 80053 COMPREHEN METABOLIC PANEL: CPT | Performed by: FAMILY MEDICINE

## 2020-07-28 PROCEDURE — 81003 URINALYSIS AUTO W/O SCOPE: CPT | Performed by: FAMILY MEDICINE

## 2020-07-28 PROCEDURE — 84443 ASSAY THYROID STIM HORMONE: CPT | Performed by: FAMILY MEDICINE

## 2020-07-28 PROCEDURE — 82306 VITAMIN D 25 HYDROXY: CPT | Performed by: FAMILY MEDICINE

## 2020-07-28 PROCEDURE — 36415 COLL VENOUS BLD VENIPUNCTURE: CPT | Performed by: FAMILY MEDICINE

## 2020-07-28 PROCEDURE — 82043 UR ALBUMIN QUANTITATIVE: CPT | Performed by: FAMILY MEDICINE

## 2020-07-28 PROCEDURE — 85025 COMPLETE CBC W/AUTO DIFF WBC: CPT | Performed by: FAMILY MEDICINE

## 2020-07-28 PROCEDURE — 83036 HEMOGLOBIN GLYCOSYLATED A1C: CPT | Performed by: FAMILY MEDICINE

## 2020-07-28 PROCEDURE — 80061 LIPID PANEL: CPT | Performed by: FAMILY MEDICINE

## 2020-07-31 ENCOUNTER — TELEMEDICINE (OUTPATIENT)
Dept: FAMILY MEDICINE CLINIC | Facility: CLINIC | Age: 70
End: 2020-07-31

## 2020-07-31 VITALS — SYSTOLIC BLOOD PRESSURE: 120 MMHG | DIASTOLIC BLOOD PRESSURE: 80 MMHG

## 2020-07-31 DIAGNOSIS — Z12.11 COLON CANCER SCREENING: ICD-10-CM

## 2020-07-31 DIAGNOSIS — E11.8 TYPE 2 DIABETES MELLITUS WITH COMPLICATION, WITHOUT LONG-TERM CURRENT USE OF INSULIN (HCC): ICD-10-CM

## 2020-07-31 DIAGNOSIS — F33.1 MODERATE EPISODE OF RECURRENT MAJOR DEPRESSIVE DISORDER (HCC): ICD-10-CM

## 2020-07-31 DIAGNOSIS — C50.912 INFILTRATING DUCTAL CARCINOMA OF LEFT BREAST (HCC): ICD-10-CM

## 2020-07-31 DIAGNOSIS — Z00.00 MEDICARE ANNUAL WELLNESS VISIT, SUBSEQUENT: Primary | ICD-10-CM

## 2020-07-31 PROCEDURE — G0439 PPPS, SUBSEQ VISIT: HCPCS | Performed by: FAMILY MEDICINE

## 2020-07-31 RX ORDER — LEVOTHYROXINE SODIUM 88 UG/1
88 TABLET ORAL DAILY
Qty: 90 TABLET | Refills: 1 | Status: SHIPPED | OUTPATIENT
Start: 2020-07-31 | End: 2021-01-07

## 2020-07-31 RX ORDER — OMEPRAZOLE 40 MG/1
40 CAPSULE, DELAYED RELEASE ORAL DAILY
Qty: 90 CAPSULE | Refills: 1 | Status: SHIPPED | OUTPATIENT
Start: 2020-07-31 | End: 2021-03-17 | Stop reason: SDUPTHER

## 2020-07-31 RX ORDER — HYDROCHLOROTHIAZIDE 25 MG/1
25 TABLET ORAL DAILY
Qty: 90 TABLET | Refills: 1 | Status: SHIPPED | OUTPATIENT
Start: 2020-07-31 | End: 2021-01-07

## 2020-07-31 RX ORDER — ROSUVASTATIN CALCIUM 20 MG/1
20 TABLET, COATED ORAL
Qty: 90 TABLET | Refills: 1 | Status: SHIPPED | OUTPATIENT
Start: 2020-07-31 | End: 2021-03-17 | Stop reason: SDUPTHER

## 2020-07-31 RX ORDER — PROPRANOLOL HCL 60 MG
60 CAPSULE, EXTENDED RELEASE 24HR ORAL DAILY
Qty: 90 CAPSULE | Refills: 1 | Status: SHIPPED | OUTPATIENT
Start: 2020-07-31 | End: 2021-02-11

## 2020-07-31 RX ORDER — ESCITALOPRAM OXALATE 20 MG/1
20 TABLET ORAL DAILY
Qty: 90 TABLET | Refills: 1 | Status: SHIPPED | OUTPATIENT
Start: 2020-07-31 | End: 2021-03-17 | Stop reason: SDUPTHER

## 2020-07-31 NOTE — PROGRESS NOTES
Subsequent Medicare Wellness Visit   The ABC's of the Annual Wellness Visit    Chief Complaint   Patient presents with   • Medicare Wellness-subsequent     You have chosen to receive care through a telehealth visit.  Do you consent to use a video/audio connection for your medical care today? Yes      HPI:  Patient is being evaluated today through telehealth medicine appointment via the video in view of COVID-19 pandemic.  Mariana Ansari, -1950, is a 70 y.o. female who presents for a Subsequent Medicare Wellness Visit.  She reports doing well and she denies any new issues or concerns.  She has been staying mainly at home since the COVID-19 pandemic started.  She is trying to keep up with a healthy lifestyle and work on healthy diet as well.  She also would like to discuss her recent blood work done in our office.  She wants to review all of her medicines which she takes regularly and denies any side effects.  She needs refills on her medications as well.  She denies any issues with depression or anxiety, she denies any memory problems or any balance issues or falls.  She has been feeling some fatigue and tiredness however. Patient denies any chest pain, shortness of breath, dizziness, nausea, vomiting, or diarrhea. No visual issues reported. No headaches, numbness or tingling. No urinary issues. Patient has good appetite and denies any weight changes. No swelling reported. No emotional issues.      Recent Hospitalizations:  No hospitalization(s) within the last year..    Current Medical Providers:  Patient Care Team:  Luna Whitfield MD as PCP - General (Family Medicine)  Luna Whitfield MD as PCP - Family Medicine  Michel Guajardo MD as Consulting Physician (Hematology and Oncology)  Luna Whitfield MD as Referring Physician (Family Medicine)    Health Habits and Functional and Cognitive Screening and Depression Screening:  Functional & Cognitive Status 2020   Do you have difficulty preparing  food and eating? No   Do you have difficulty bathing yourself, getting dressed or grooming yourself? No   Do you have difficulty using the toilet? No   Do you have difficulty moving around from place to place? No   Do you have trouble with steps or getting out of a bed or a chair? No   Current Diet Diabetic Diet   Dental Exam Up to date   Eye Exam Up to date   Exercise (times per week) 0 times per week   Current Exercise Activities Include None   Do you need help using the phone?  No   Are you deaf or do you have serious difficulty hearing?  No   Do you need help with transportation? No   Do you need help shopping? No   Do you need help preparing meals?  No   Do you need help with housework?  No   Do you need help with laundry? No   Do you need help taking your medications? No   Do you need help managing money? No   Do you ever drive or ride in a car without wearing a seat belt? No   Have you felt unusual stress, anger or loneliness in the last month? No   Who do you live with? Spouse   If you need help, do you have trouble finding someone available to you? No   Have you been bothered in the last four weeks by sexual problems? No   Do you have difficulty concentrating, remembering or making decisions? No       Compared to one year ago, the patient feels her physical health is the same and her mental health is the same.    Depression Screen:  PHQ-2/PHQ-9 Depression Screening 7/31/2020   Little interest or pleasure in doing things 0   Feeling down, depressed, or hopeless 0   Trouble falling or staying asleep, or sleeping too much 0   Feeling tired or having little energy 0   Poor appetite or overeating 0   Feeling bad about yourself - or that you are a failure or have let yourself or your family down 0   Trouble concentrating on things, such as reading the newspaper or watching television 0   Moving or speaking so slowly that other people could have noticed. Or the opposite - being so fidgety or restless that you have  been moving around a lot more than usual 0   Thoughts that you would be better off dead, or of hurting yourself in some way 0   Total Score 0   If you checked off any problems, how difficult have these problems made it for you to do your work, take care of things at home, or get along with other people? Not difficult at all         Past Medical/Family/Social History:  The following portions of the patient's history were reviewed and updated as appropriate: allergies, current medications, past family history, past medical history, past social history, past surgical history and problem list.    Allergies   Allergen Reactions   • Amlodipine Swelling         Current Outpatient Medications:   •  albuterol (PROAIR RESPICLICK) 108 (90 Base) MCG/ACT inhaler, Inhale 1 puff Every 4 (Four) Hours As Needed for Wheezing or Shortness of Air., Disp: , Rfl:   •  B Complex Vitamins (VITAMIN B COMPLEX PO), Take 1 tablet by mouth Every Night., Disp: , Rfl:   •  cetirizine (zyrTEC) 10 MG tablet, Take 10 mg by mouth Daily., Disp: , Rfl:   •  cholecalciferol (VITAMIN D3) 1000 units tablet, Take 1,000 Units by mouth Every Night., Disp: , Rfl:   •  escitalopram (LEXAPRO) 20 MG tablet, Take 1 tablet by mouth Daily., Disp: 90 tablet, Rfl: 1  •  fluticasone (FLONASE) 50 MCG/ACT nasal spray, 2 sprays into each nostril Daily., Disp: , Rfl:   •  glipizide (GLUCOTROL XL) 5 MG ER tablet, TAKE ONE TABLET BY MOUTH DAILY, Disp: 90 tablet, Rfl: 0  •  hydroCHLOROthiazide (HYDRODIURIL) 25 MG tablet, Take 1 tablet by mouth Daily., Disp: 90 tablet, Rfl: 1  •  levothyroxine (Synthroid) 88 MCG tablet, Take 1 tablet by mouth Daily., Disp: 90 tablet, Rfl: 1  •  loperamide (IMODIUM A-D) 2 MG tablet, Take 2 mg by mouth 4 (Four) Times a Day As Needed for Diarrhea., Disp: , Rfl:   •  melatonin 5 MG tablet tablet, Take 10 mg by mouth At Night As Needed., Disp: , Rfl:   •  omeprazole (priLOSEC) 40 MG capsule, Take 1 capsule by mouth Daily., Disp: 90 capsule, Rfl:  1  •  propranolol LA (INDERAL LA) 60 MG 24 hr capsule, Take 1 capsule by mouth Daily., Disp: 90 capsule, Rfl: 1  •  rosuvastatin (CRESTOR) 20 MG tablet, Take 1 tablet by mouth every night at bedtime., Disp: 90 tablet, Rfl: 1  •  SITagliptin-metFORMIN HCl ER (Janumet XR)  MG tablet, Take 1 tablet by mouth 2 (Two) Times a Day., Disp: 180 tablet, Rfl: 1  •  tamoxifen (NOLVADEX) 20 MG chemo tablet, TAKE ONE TABLET BY MOUTH DAILY, Disp: 90 tablet, Rfl: 0    Aspirin use counseling: Does not need ASA (and currently is not on it)    Current medication list contains no high risk medications. Some potential harmful drug interactions identified. Plan of action: monitoring    Family History   Problem Relation Age of Onset   • Diabetes Mother    • Heart attack Mother    • Heart failure Mother    • Hyperlipidemia Mother    • Hyperthyroidism Mother         Has surgery   • Hypothyroidism Mother         Later in life   • Heart disease Mother    • Hypertension Mother    • Asthma Father    • COPD Father    • Hypertension Father    • Heart attack Maternal Uncle    • Heart failure Maternal Uncle    • Depression Maternal Grandmother    • Heart attack Maternal Grandfather    • Heart failure Maternal Grandfather    • Alcohol abuse Paternal Grandmother    • Alcohol abuse Paternal Grandfather    • Scleroderma Sister    • No Known Problems Daughter    • No Known Problems Son    • Malig Hyperthermia Neg Hx        Social History     Tobacco Use   • Smoking status: Never Smoker   • Smokeless tobacco: Never Used   Substance Use Topics   • Alcohol use: Yes     Alcohol/week: 1.0 standard drinks     Types: 1 Cans of beer per week     Comment: 2-3 a month, social only       Past Surgical History:   Procedure Laterality Date   • BREAST LUMPECTOMY WITH SENTINEL NODE BIOPSY Left 7/18/2018    Procedure: BREAST LUMPECTOMY WITH SENTINEL NODE BIOPSY;  Surgeon: João Zazueta MD;  Location: [facility] OR;  Service: General   • COLONOSCOPY       refusing; negative cologuard 8/24/2017   • HYSTERECTOMY  1989   • KNEE SURGERY Right 2014   • KNEE SURGERY Left 2016   • LIVER BIOPSY  2019    fatty liver   • ROTATOR CUFF REPAIR Left 2009   • ROTATOR CUFF REPAIR Right 2012   • TOE SURGERY  2009    ingrown        Patient Active Problem List   Diagnosis   • Malignant neoplasm of upper-outer quadrant of left breast in female, estrogen receptor positive (CMS/HCC)   • Type 2 diabetes mellitus with complication, without long-term current use of insulin (CMS/HCC)   • Hypothyroidism due to acquired atrophy of thyroid   • High risk medication use   • Age-related osteoporosis without current pathological fracture   • Tremor of both hands   • Anemia   • Allergic rhinitis   • Asthma   • Chronic kidney disease, stage II (mild)   • Depression   • Medicare annual wellness visit, subsequent   • Gastroesophageal reflux disease   • Hyperlipidemia   • Hypertension   • Hypothyroidism   • Infiltrating ductal carcinoma of breast (CMS/HCC)   • Renal cyst, right   • Fatty liver   • Tremor   • Vitamin D deficiency   • Need for vaccination   • Bronchitis   • Moderate episode of recurrent major depressive disorder (CMS/HCC)   • Colon cancer screening       Review of Systems   Constitutional: Positive for fatigue. Negative for activity change, appetite change and fever.   Respiratory: Negative for cough, shortness of breath and wheezing.    Cardiovascular: Negative for chest pain, palpitations and leg swelling.   Gastrointestinal: Negative for constipation, diarrhea, nausea, vomiting and indigestion.   Endocrine: Negative for cold intolerance, heat intolerance, polydipsia and polyphagia.   Skin: Negative for color change, dry skin, rash and skin lesions.   Neurological: Negative for tremors and headache.   Psychiatric/Behavioral: Negative for agitation, behavioral problems, decreased concentration, dysphoric mood, hallucinations, self-injury, sleep disturbance, suicidal ideas, negative for  hyperactivity, depressed mood and stress. The patient is not nervous/anxious.        Objective     Vitals:    07/31/20 1300   BP: 120/80       Patient's There is no height or weight on file to calculate BMI. BMI is above normal parameters. Recommendations include: educational material.      No exam data present    The patient has no evidence of cognitve impairment.     Physical Exam   Constitutional: She is oriented to person, place, and time. She appears well-developed and well-nourished. No distress.   HENT:   Head: Normocephalic and atraumatic.   Eyes: Pupils are equal, round, and reactive to light. Conjunctivae and EOM are normal.   Neck: Normal range of motion. Neck supple.   Pulmonary/Chest: Effort normal. No respiratory distress.   Musculoskeletal: Normal range of motion.   Neurological: She is alert and oriented to person, place, and time. No cranial nerve deficit.   Skin: She is not diaphoretic.   Psychiatric: She has a normal mood and affect. Judgment and thought content normal.       Recent Lab Results:     Lab Results   Component Value Date    CHOL 99 07/28/2020    TRIG 181 (H) 07/28/2020    HDL 30 (L) 07/28/2020    VLDL 36.2 07/28/2020    LDLHDL 1.09 07/28/2020       Assessment/Plan   Age-appropriate Screening Schedule:  Refer to the list below for future screening recommendations based on patient's age, sex and/or medical conditions.      Health Maintenance   Topic Date Due   • ZOSTER VACCINE (1 of 2) 03/13/2001   • DIABETIC FOOT EXAM  06/28/2018   • DIABETIC EYE EXAM  06/28/2018   • MAMMOGRAM  07/06/2019   • INFLUENZA VACCINE  08/01/2020   • DXA SCAN  09/06/2020   • HEMOGLOBIN A1C  01/28/2021   • LIPID PANEL  07/28/2021   • URINE MICROALBUMIN  07/28/2021   • TDAP/TD VACCINES (2 - Td) 06/23/2025   • COLONOSCOPY  06/24/2027       Medicare Risks and Personalized Health Plan:  Advance Directive Discussion  Breast Cancer/Mammogram Screening  Cardiovascular risk  Colon Cancer Screening  Dementia/Memory    Depression/Dysphoria  Diabetic Lab Screening   Fall Risk  Immunizations Discussed/Encouraged (specific immunizations; Influenza )      CMS-Preventive Services Quick Reference  Medicare Preventive Services Addressed:  Annual Wellness Visit (AWV)  Bone Density Measurements  Cardiovascular Disease Screening Tests (may do this order every 5 years in beneficiaries without signs or symptoms of cardiovascular disease)  Screening Mammography     Advance Care Planning:  ACP discussion was held with the patient during this visit. Patient has an advance directive in EMR which is still valid.     Diagnoses and all orders for this visit:    1. Medicare annual wellness visit, subsequent (Primary)    2. Infiltrating ductal carcinoma of left breast (CMS/formerly [facility])    3. Type 2 diabetes mellitus with complication, without long-term current use of insulin (CMS/formerly [facility])  -     Basic Metabolic Panel  -     SITagliptin-metFORMIN HCl ER (Janumet XR)  MG tablet; Take 1 tablet by mouth 2 (Two) Times a Day.  Dispense: 180 tablet; Refill: 1    4. Moderate episode of recurrent major depressive disorder (CMS/formerly [facility])    5. Colon cancer screening  -     Cologuard - Stool, Per Rectum; Future    Other orders  -     levothyroxine (Synthroid) 88 MCG tablet; Take 1 tablet by mouth Daily.  Dispense: 90 tablet; Refill: 1  -     rosuvastatin (CRESTOR) 20 MG tablet; Take 1 tablet by mouth every night at bedtime.  Dispense: 90 tablet; Refill: 1  -     propranolol LA (INDERAL LA) 60 MG 24 hr capsule; Take 1 capsule by mouth Daily.  Dispense: 90 capsule; Refill: 1  -     omeprazole (priLOSEC) 40 MG capsule; Take 1 capsule by mouth Daily.  Dispense: 90 capsule; Refill: 1  -     hydroCHLOROthiazide (HYDRODIURIL) 25 MG tablet; Take 1 tablet by mouth Daily.  Dispense: 90 tablet; Refill: 1  -     escitalopram (LEXAPRO) 20 MG tablet; Take 1 tablet by mouth Daily.  Dispense: 90 tablet; Refill: 1    Medicare wellness exam was done today.  I reviewed her medical  problems and her medications.  I also reviewed her recent labs and those were discussed with the patient.  Her diabetes is very well controlled.  Her TSH is elevated and it means that her thyroid is not ideally controlled.  I will be increasing the dose of her levothyroxine from 75 mcg to 88 mcg.  I also reviewed her health maintenance.  She has never had colonoscopy due to refusal, but she had negative Cologuard in June 2017 and she is due for the repeat test and order was put in.  Her last mammogram was 2 years ago when she was originally diagnosed with breast cancer and she has been followed by oncologist since then.  I advised her to check with them about that as she will need mammogram soon.  She gets her bone densities through her oncologist.  She is up to speed with her immunization.  Her recent blood work showed slightly elevated creatinine and potassium.  Her kidney tests also stay slightly elevated, but this was already addressed last year and she had a liver biopsy which showed fatty liver.  I will be rechecking her metabolic panel in couple of weeks and she was advised to keep up with hydration.  Healthy lifestyle was discussed and reinforced.    Patient was evaluated today through telehealth medicine appointment via the video.  Total evaluation time was 25 minutes.    An After Visit Summary and PPPS with all of these plans were given to the patient.      Follow Up:  No follow-ups on file.

## 2020-08-04 ENCOUNTER — OFFICE VISIT (OUTPATIENT)
Dept: ONCOLOGY | Facility: CLINIC | Age: 70
End: 2020-08-04

## 2020-08-04 ENCOUNTER — LAB (OUTPATIENT)
Dept: LAB | Facility: HOSPITAL | Age: 70
End: 2020-08-04

## 2020-08-04 VITALS
TEMPERATURE: 97.5 F | RESPIRATION RATE: 16 BRPM | HEIGHT: 63 IN | HEART RATE: 94 BPM | OXYGEN SATURATION: 94 % | DIASTOLIC BLOOD PRESSURE: 80 MMHG | SYSTOLIC BLOOD PRESSURE: 115 MMHG | BODY MASS INDEX: 32.6 KG/M2 | WEIGHT: 184 LBS

## 2020-08-04 VITALS — HEIGHT: 63 IN | BODY MASS INDEX: 32.25 KG/M2

## 2020-08-04 DIAGNOSIS — Z17.0 MALIGNANT NEOPLASM OF UPPER-OUTER QUADRANT OF LEFT BREAST IN FEMALE, ESTROGEN RECEPTOR POSITIVE (HCC): ICD-10-CM

## 2020-08-04 DIAGNOSIS — Z12.31 ENCOUNTER FOR SCREENING MAMMOGRAM FOR MALIGNANT NEOPLASM OF BREAST: ICD-10-CM

## 2020-08-04 DIAGNOSIS — C50.412 MALIGNANT NEOPLASM OF UPPER-OUTER QUADRANT OF LEFT BREAST IN FEMALE, ESTROGEN RECEPTOR POSITIVE (HCC): ICD-10-CM

## 2020-08-04 DIAGNOSIS — Z79.899 HIGH RISK MEDICATION USE: ICD-10-CM

## 2020-08-04 DIAGNOSIS — C50.412 MALIGNANT NEOPLASM OF UPPER-OUTER QUADRANT OF LEFT BREAST IN FEMALE, ESTROGEN RECEPTOR POSITIVE (HCC): Primary | ICD-10-CM

## 2020-08-04 DIAGNOSIS — M81.0 AGE-RELATED OSTEOPOROSIS WITHOUT CURRENT PATHOLOGICAL FRACTURE: ICD-10-CM

## 2020-08-04 DIAGNOSIS — Z17.0 MALIGNANT NEOPLASM OF UPPER-OUTER QUADRANT OF LEFT BREAST IN FEMALE, ESTROGEN RECEPTOR POSITIVE (HCC): Primary | ICD-10-CM

## 2020-08-04 DIAGNOSIS — C50.912 INFILTRATING DUCTAL CARCINOMA OF LEFT BREAST (HCC): Primary | ICD-10-CM

## 2020-08-04 DIAGNOSIS — Z12.39 BREAST SCREENING: ICD-10-CM

## 2020-08-04 DIAGNOSIS — C50.912 INFILTRATING DUCTAL CARCINOMA OF LEFT BREAST (HCC): ICD-10-CM

## 2020-08-04 LAB
BASOPHILS # BLD AUTO: 0.03 10*3/MM3 (ref 0–0.2)
BASOPHILS NFR BLD AUTO: 0.5 % (ref 0–1.5)
DEPRECATED RDW RBC AUTO: 41.1 FL (ref 37–54)
EOSINOPHIL # BLD AUTO: 0.17 10*3/MM3 (ref 0–0.4)
EOSINOPHIL NFR BLD AUTO: 2.6 % (ref 0.3–6.2)
ERYTHROCYTE [DISTWIDTH] IN BLOOD BY AUTOMATED COUNT: 13.2 % (ref 12.3–15.4)
HCT VFR BLD AUTO: 37.4 % (ref 34–46.6)
HGB BLD-MCNC: 12.2 G/DL (ref 12–15.9)
IMM GRANULOCYTES # BLD AUTO: 0.06 10*3/MM3 (ref 0–0.05)
IMM GRANULOCYTES NFR BLD AUTO: 0.9 % (ref 0–0.5)
LYMPHOCYTES # BLD AUTO: 1.56 10*3/MM3 (ref 0.7–3.1)
LYMPHOCYTES NFR BLD AUTO: 24.1 % (ref 19.6–45.3)
MCH RBC QN AUTO: 27.7 PG (ref 26.6–33)
MCHC RBC AUTO-ENTMCNC: 32.6 G/DL (ref 31.5–35.7)
MCV RBC AUTO: 85 FL (ref 79–97)
MONOCYTES # BLD AUTO: 0.39 10*3/MM3 (ref 0.1–0.9)
MONOCYTES NFR BLD AUTO: 6 % (ref 5–12)
NEUTROPHILS NFR BLD AUTO: 4.25 10*3/MM3 (ref 1.7–7)
NEUTROPHILS NFR BLD AUTO: 65.9 % (ref 42.7–76)
NRBC BLD AUTO-RTO: 0 /100 WBC (ref 0–0.2)
PLATELET # BLD AUTO: 257 10*3/MM3 (ref 140–450)
PMV BLD AUTO: 10.3 FL (ref 6–12)
RBC # BLD AUTO: 4.4 10*6/MM3 (ref 3.77–5.28)
WBC # BLD AUTO: 6.46 10*3/MM3 (ref 3.4–10.8)

## 2020-08-04 PROCEDURE — 85025 COMPLETE CBC W/AUTO DIFF WBC: CPT

## 2020-08-04 PROCEDURE — 36415 COLL VENOUS BLD VENIPUNCTURE: CPT

## 2020-08-04 PROCEDURE — 99214 OFFICE O/P EST MOD 30 MIN: CPT | Performed by: NURSE PRACTITIONER

## 2020-08-04 RX ORDER — TAMOXIFEN CITRATE 20 MG/1
20 TABLET ORAL DAILY
Qty: 90 TABLET | Refills: 1 | Status: SHIPPED | OUTPATIENT
Start: 2020-08-04 | End: 2021-03-24

## 2020-08-04 NOTE — PROGRESS NOTES
Subjective      History of Present Illness      Patient is a 69-year-old female with oted in late May 2018 a lump developing in the upper outer quadrant of the left breast without pain or nipple discharge.  This measured approximately 1 cm in size.  Mammogram indicated this asymmetric density in the same region and an ultrasound revealed a 1.8 cm irregular solid mass.  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  Additional history included no previous breast biopsies, a brief period of time with hormonal replacement and no history of breast or ovarian cancer with family.  She was seen by Dr. Zazueta on the 28th an MRI of both breasts was performed July 6.  Within the left anterior one third at the 12:30 position 3 cm from the nipple with irregular enhancing mass measuring 1.6 cm x 1.4 and 2.2 immediately lateral dimension.  There are other findings were seen in the left breast with no evidence of left axillary adenopathy and no findings suspicious for right breast.  The patient proceeded to a left breast lumpectomy July 18, 2018.      Pathologic findings were consistent with a 2.5 cm grade 3 invasive mammary ductal carcinoma with associated DCIS.  The closest margin was 1 mm.  [place] nodes were negative staging of pT2N0.  Dr. Zazueta saw the patient July 20 recognizing the closest margin was the anterior margin having taken this off the skin with no further removal possible.  There was concern about the need for additional tissue though the addended pathology revealed the DCIS to be intermediate grade.  It was determined not to take additional margins and have her seen by both medical oncology and radiation therapy.  She now presents with her  .      The patient's initial consultation was August 7 and potential adjuvant therapy discussed with the initial recommendation being an Oncotype DX analysis to be process.  This  is now reviewed with the patient and her  may return August 23, 2018.  Her recurrence score 10 with 10 year risk of distance recurrence at 7%.  She is clearly in the low risk category additional studies revealing ER score of 10.5, NM score of 8.6 which are both positive and HER-2 score of 8.7 which is negative.   Additional studies include  LH of 27.5, FSH of 57.6, estradiol of 9.9, CA 15-3 of 10.9, TSH of 4.64 hemoglobin A1c of 8.72.   The patient is advised of the findings per her risk of recurrence and she and her  are understandably elated.  Chemotherapy is not recommended in her circumstance but anti-hormonal therapy is and we have discussed potential treatment with AI therapy being the most appropriate choice in this postmenopausal patient.  She's not additionally had any recent assessment for bone density, however we discussed having this done in the near future.     As result the patient was scheduled for bone density and this was obtained September 6 revealing evidence of osteoporosis involving the lumbar with T score of -3.4 and right hip with T score of -2.7.  The patient has been using Arimidex which we'll discontinue and we discussed institution of tamoxifen at this point.  She'll also need assessment of her vitamin D level which we went on to measure documenting a level of 45.5.                                      The patient is now seen a month after switching to tamoxifen and is tolerating it well thus far without significant hot flashes.  We have also discussed the use of Reclast under the circumstances and she is agreeable to treatment when seen October 29, 2018.   She did have myalgias and arthralgias following Reclast for several days but these then resolved.  As she seen January 21, 2019 she is feeling well and we have discussed weight loss, exercise and also she and her 's recognition of slowly worsening bilateral hand tremor left greater than right.  This been present  much before her diagnosis of breast cancer and actually is an issue in several members of her family.   The patient is next seen August 1, 2019.  In the interval she been found to have elevated liver function tests and ultimately liver biopsy that was significant for mild steatohepatitis.  She has been adjusting her diet but indicates that she is also has significant fatigue and while these might be related she believes the fatigue is in part secondary to tamoxifen.  Follow-up testing included total cholesterol 113, triglycerides of 263 with HDL down to 24, VLDL at 52.6, ferritin of 298, normal iron profile, CMP with glucose 193, creatinine 1.36 and hemoglobin A1c of 7.10.  She notes that her fatigue is not improved off tamoxifen.  We have discussed her findings indicating better control needed for her diabetes and thyroid dysfunction and also went on to treat a urinary tract infection.  She did see primary care who adjusted medications for thyroid hypertension.  The patient when seen December 12 feels considerably better and is thrilled to see the difference in her functioning.  She is having no difficulty with tamoxifen at this point.  Patient is seen on 8/4/2020 in follow-up continuing on tamoxifen.  She denies new pain, concerns on self breast exam, or change in level of fatigue.  She does have some discomfort on her scar line however that has been consistent.  She continues follow-up with primary care on levothyroxine.  Past Medical History:   Diagnosis Date   • Anxiety    • Anxiety and depression    • Asthma     SEASONAL ALLERGY RELATED   • Cancer of breast (CMS/HCC) 06/2018    LEFT   • Chronic kidney disease     Renal cyst, right kidney w/urology workup in past   • Depression    • Diabetes mellitus (CMS/HCC)     Type 2   • Disease of thyroid gland    • GERD (gastroesophageal reflux disease)    • History of prior pregnancies     x2   • History of transfusion     S/P HYST --AUTOLOGOUS    • Hyperlipidemia    •  Hypertension    • IBS (irritable bowel syndrome)    • Seasonal allergies         Past Surgical History:   Procedure Laterality Date   • BREAST LUMPECTOMY WITH SENTINEL NODE BIOPSY Left 7/18/2018    Procedure: BREAST LUMPECTOMY WITH SENTINEL NODE BIOPSY;  Surgeon: João Zazueta MD;  Location: [facility] OR;  Service: General   • COLONOSCOPY      refusing; negative cologuard 8/24/2017   • HYSTERECTOMY  1989   • KNEE SURGERY Right 2014   • KNEE SURGERY Left 2016   • LIVER BIOPSY  2019    fatty liver   • ROTATOR CUFF REPAIR Left 2009   • ROTATOR CUFF REPAIR Right 2012   • TOE SURGERY  2009    ingrown         Current Outpatient Medications on File Prior to Visit   Medication Sig Dispense Refill   • albuterol (PROAIR RESPICLICK) 108 (90 Base) MCG/ACT inhaler Inhale 1 puff Every 4 (Four) Hours As Needed for Wheezing or Shortness of Air.     • B Complex Vitamins (VITAMIN B COMPLEX PO) Take 1 tablet by mouth Every Night.     • cetirizine (zyrTEC) 10 MG tablet Take 10 mg by mouth Daily.     • cholecalciferol (VITAMIN D3) 1000 units tablet Take 1,000 Units by mouth Every Night.     • escitalopram (LEXAPRO) 20 MG tablet Take 1 tablet by mouth Daily. 90 tablet 1   • fluticasone (FLONASE) 50 MCG/ACT nasal spray 2 sprays into each nostril Daily.     • glipizide (GLUCOTROL XL) 5 MG ER tablet TAKE ONE TABLET BY MOUTH DAILY 90 tablet 0   • hydroCHLOROthiazide (HYDRODIURIL) 25 MG tablet Take 1 tablet by mouth Daily. 90 tablet 1   • levothyroxine (Synthroid) 88 MCG tablet Take 1 tablet by mouth Daily. 90 tablet 1   • loperamide (IMODIUM A-D) 2 MG tablet Take 2 mg by mouth 4 (Four) Times a Day As Needed for Diarrhea.     • melatonin 5 MG tablet tablet Take 10 mg by mouth At Night As Needed.     • omeprazole (priLOSEC) 40 MG capsule Take 1 capsule by mouth Daily. 90 capsule 1   • propranolol LA (INDERAL LA) 60 MG 24 hr capsule Take 1 capsule by mouth Daily. 90 capsule 1   • rosuvastatin (CRESTOR) 20 MG tablet Take 1 tablet by  mouth every night at bedtime. 90 tablet 1   • SITagliptin-metFORMIN HCl ER (Janumet XR)  MG tablet Take 1 tablet by mouth 2 (Two) Times a Day. 180 tablet 1   • [DISCONTINUED] tamoxifen (NOLVADEX) 20 MG chemo tablet TAKE ONE TABLET BY MOUTH DAILY 90 tablet 0     No current facility-administered medications on file prior to visit.         ALLERGIES:    Allergies   Allergen Reactions   • Amlodipine Swelling        Social History     Socioeconomic History   • Marital status:      Spouse name: Mahin   • Number of children: 2   • Years of education: College   • Highest education level: Not on file   Occupational History   • Occupation: Teacher     Employer: [employer]   Tobacco Use   • Smoking status: Never Smoker   • Smokeless tobacco: Never Used   Substance and Sexual Activity   • Alcohol use: Yes     Alcohol/week: 1.0 standard drinks     Types: 1 Cans of beer per week     Comment: 2-3 a month, social only   • Drug use: No   • Sexual activity: Yes     Comment:         Family History   Problem Relation Age of Onset   • Diabetes Mother    • Heart attack Mother    • Heart failure Mother    • Hyperlipidemia Mother    • Hyperthyroidism Mother         Has surgery   • Hypothyroidism Mother         Later in life   • Heart disease Mother    • Hypertension Mother    • Asthma Father    • COPD Father    • Hypertension Father    • Heart attack Maternal Uncle    • Heart failure Maternal Uncle    • Depression Maternal Grandmother    • Heart attack Maternal Grandfather    • Heart failure Maternal Grandfather    • Alcohol abuse Paternal Grandmother    • Alcohol abuse Paternal Grandfather    • Scleroderma Sister    • No Known Problems Daughter    • No Known Problems Son    • Malig Hyperthermia Neg Hx         Review of Systems   Constitutional: Negative for activity change, appetite change, fatigue and unexpected weight change.        Modest hot flashes   Respiratory: Negative for chest tightness,  "shortness of breath and wheezing.    Cardiovascular: Negative for chest pain and leg swelling.   Gastrointestinal: Negative for abdominal pain, constipation, diarrhea, nausea and vomiting.   Genitourinary: Negative.    Musculoskeletal: Negative.    Skin:        Pruritus   Neurological: Positive for tremors. Negative for weakness.   Hematological: Negative for adenopathy.          Objective     Vitals:    08/04/20 1354   BP: 115/80   Pulse: 94   Resp: 16   Temp: 97.5 °F (36.4 °C)   TempSrc: Temporal   SpO2: 94%   Weight: 83.5 kg (184 lb)   Height: 160 cm (62.99\")   PainSc: 0-No pain     Current Status 8/4/2020   ECOG score 0       Physical Exam   Constitutional: She is oriented to person, place, and time. She appears well-developed and well-nourished.   HENT:   Head: Normocephalic and atraumatic.   Nose: Nose normal.   Mouth/Throat: Oropharynx is clear and moist.   Eyes: Pupils are equal, round, and reactive to light. Conjunctivae and EOM are normal.   Neck: Normal range of motion. Neck supple.   Cardiovascular: Normal rate, regular rhythm, normal heart sounds and intact distal pulses.   Pulmonary/Chest: Effort normal and breath sounds normal.   Abdominal: Soft. Bowel sounds are normal.   Musculoskeletal: Normal range of motion.   Neurological: She is alert and oriented to person, place, and time.   Resting tremor noted left greater than right upper extremities   Skin: Skin is warm and dry.   Psychiatric: She has a normal mood and affect. Her behavior is normal. Judgment and thought content normal.     Breast exam: Patient status post left breast lumpectomy, tissue thickening noted at scar line with slight tenderness  (Stable per patient).  Right breast exam unremarkable.    RECENT LABS:  Hematology WBC   Date Value Ref Range Status   08/04/2020 6.46 3.40 - 10.80 10*3/mm3 Final     RBC   Date Value Ref Range Status   08/04/2020 4.40 3.77 - 5.28 10*6/mm3 Final     Hemoglobin   Date Value Ref Range Status   08/04/2020 " 12.2 12.0 - 15.9 g/dL Final     Hematocrit   Date Value Ref Range Status   08/04/2020 37.4 34.0 - 46.6 % Final     Platelets   Date Value Ref Range Status   08/04/2020 257 140 - 450 10*3/mm3 Final      BONE MINERAL DENSITOMETRY September 06, 2018     HISTORY:  68 years-old female. Menopause at age 63. Breast cancer.     COMPARISON:  None.     TECHNIQUE: The T score compares the patient's bone mineral density with  the peak bone mass of young normal patients. Patients with T-scores  between 1.0 and 2.5 standard deviations below the mean are osteopenic.  Patients with T-scores greater than 2.5 standard deviation below the  mean are osteoporotic.     The Z score compares the patient's bone mineral density with sex and age  matched patients. Z score of -2.0 and lower is an indication of low  mineral density for the patient's age.     FINDINGS: Lumbar T score is  -3.4.     Left hip density is lowest at the  femoral neck where the T score is  -1.7.      Right hip density is lowest at the  femoral neck where the T score is  -2.7.      IMPRESSION:  Osteoporosis.    Assessment/Plan      69 year-old female with previous medical history of seasonal allergies, diabetes mellitus type 2, CKD3, hypothyroidism, hyperlipidemia, hypertension, IBS, history of anxiety and depression with recent development of left breast abnormality found by the patient herself.  This led to mammogram ultrasound and stereotactic biopsy confirming invasive ductal carcinoma grade 2 though ER, IN positive HER-2 negative.  Subsequent assessments via surgical review your no additional abnormalities seen on breast MRI and the patient proceeded to lumpectomy July 18, 2018.  Her pathologic findings were consistent with a 2.5 cm grade 3 invasive mammary ductal carcinoma with associated DCIS.  The closest margin was 1 mm.  Bag[place] nodes were negative with staging of pT2N0.  Dr. Zazueta saw the patient July 20 recognizing the closest margin was the anterior  margin having taken this off the skin with no further removal possible.  There was concern about the need for additional tissue though the addended pathology revealed the DCIS to be intermediate grade.  It was ultimately determined that further surgery was not necessary.   The patient presents for medical oncology assessment and we discussed potential adjuvant therapy but in particular it is felt the patient requires further genomic assessment with Oncotype DX analysis.  We reviewed this in detail and she understands the additional information that we could obtain from such an approach in making decisions about adjuvant therapy. We proceeded with additional assessment including Oncotype and laboratory studies that, fortunate, revealed that she has an excellent prognosis including a 7% risk of recurrence at 10 years with the use of tamoxifen.  Chemotherapy, therefore, is not appropriate and we have discussed an alternative therapy in this postmenopausal patient with AI therapy in particular her Arimidex over 5 years.  She is currently undergoing review by radiation therapy and this can proceed at any point as we also plan to initiate Arimidex with a trial of the medication given over the next month.  We went on to have the patient tested for bone density finding evidence, unfortunate, of osteoporosis.  She's been tolerating Arimidex well without discontinue this and institute Tamoxifen.  The patient went on to institute treatment and underwent testing for TSH, vitamin D level and serum chemistries.  These are found to be acceptable and she now next returns October 29 tolerating tamoxifen well. We went on to continue with Reclast at that visit.  The patient did have myalgias and arthralgias following the treatment for several days that resolved.     As she is seen January 21, 2019 she is tolerating tamoxifen overall well though we have discussed that she should continue to exercise, manage her diet and try to achieve  weight loss overall.  Additionally she has what appears to be essential tremor left greater than right.  Plans to see her primary care to review these issues but will start exercising immediately.  We'll plan to see her back here in approximately 6 months, plan repeat Reclast in late October and then yearly follow-ups as she completes 5 years of tamoxifen.    The patient is next seen August 1, 2019 with complaints of fatigue and recent diagnostics that are consistent with mild to moderate steatohepatitis on liver biopsy.  It is not felt likely tamoxifen is the major issue here but we do plan to hold it briefly to see if she does not improve as we investigate both her liver function tests and etiology of her mild anemia.  It is hoped this might improve her degree of fatigue.  We plan additional laboratory studies as above we have discussed results with she and her  in some detail when seen September 12, 2019.  She needs better control of her diabetes as well as thyroid dysfunction and is urged to return to primary care to be assessed.  A copy this note will be sent to Dr. Whitfield and she will be asked otherwise to restart tamoxifen assessment approximately 3 months.    The patient was found to have symptoms of UTI and ultimately was treated with ciprofloxacin.  Thereafter she has follow-up with primary care and more effectively treated her blood pressure and thyroid dysfunction.  She is feeling considerably improved overall and not having difficulty tolerating tamoxifen.  She is seen on 8/4/2020 doing well overall.  She continues on tamoxifen with no known side effects.  We have scheduled a mammogram and DEXA scan to be performed.  Patient will notify us for any new concerns and will follow up with Dr. Guajardo in 6 months.      Plan:    *Continue tamoxifen daily.    *DEXA scan in 2 months.    *Bilateral screening mammogram in next few weeks.    *Follow-up 6 months, MD, CBC

## 2020-09-11 RX ORDER — GLIPIZIDE 5 MG/1
TABLET, FILM COATED, EXTENDED RELEASE ORAL
Qty: 90 TABLET | Refills: 1 | Status: SHIPPED | OUTPATIENT
Start: 2020-09-11 | End: 2021-03-17 | Stop reason: SDUPTHER

## 2020-10-14 ENCOUNTER — HOSPITAL ENCOUNTER (OUTPATIENT)
Dept: BONE DENSITY | Facility: HOSPITAL | Age: 70
Discharge: HOME | End: 2020-10-14

## 2020-10-14 ENCOUNTER — HOSPITAL ENCOUNTER (OUTPATIENT)
Dept: MAMMOGRAPHY | Facility: HOSPITAL | Age: 70
Discharge: HOME | End: 2020-10-14

## 2020-10-14 DIAGNOSIS — Z12.39 BREAST SCREENING: ICD-10-CM

## 2020-10-14 DIAGNOSIS — Z17.0 MALIGNANT NEOPLASM OF UPPER-OUTER QUADRANT OF LEFT BREAST IN FEMALE, ESTROGEN RECEPTOR POSITIVE (HCC): ICD-10-CM

## 2020-10-14 DIAGNOSIS — C50.912 INFILTRATING DUCTAL CARCINOMA OF LEFT BREAST (HCC): ICD-10-CM

## 2020-10-14 DIAGNOSIS — C50.412 MALIGNANT NEOPLASM OF UPPER-OUTER QUADRANT OF LEFT BREAST IN FEMALE, ESTROGEN RECEPTOR POSITIVE (HCC): ICD-10-CM

## 2020-10-14 DIAGNOSIS — M81.0 AGE-RELATED OSTEOPOROSIS WITHOUT CURRENT PATHOLOGICAL FRACTURE: ICD-10-CM

## 2020-10-14 DIAGNOSIS — Z79.899 HIGH RISK MEDICATION USE: ICD-10-CM

## 2020-10-14 DIAGNOSIS — Z12.31 ENCOUNTER FOR SCREENING MAMMOGRAM FOR MALIGNANT NEOPLASM OF BREAST: ICD-10-CM

## 2020-10-14 PROCEDURE — 77080 DXA BONE DENSITY AXIAL: CPT

## 2020-10-14 PROCEDURE — 77067 SCR MAMMO BI INCL CAD: CPT

## 2020-10-20 ENCOUNTER — TELEPHONE (OUTPATIENT)
Dept: ONCOLOGY | Facility: CLINIC | Age: 70
End: 2020-10-20

## 2020-10-20 NOTE — TELEPHONE ENCOUNTER
Attempted to call patient regarding DEXA and MAMMO results, if she calls back and I am unable to be reached, can give her the results.

## 2021-01-07 RX ORDER — HYDROCHLOROTHIAZIDE 25 MG/1
TABLET ORAL
Qty: 90 TABLET | Refills: 0 | Status: SHIPPED | OUTPATIENT
Start: 2021-01-07 | End: 2021-03-17 | Stop reason: SDUPTHER

## 2021-01-07 RX ORDER — LEVOTHYROXINE SODIUM 88 UG/1
TABLET ORAL
Qty: 90 TABLET | Refills: 0 | Status: SHIPPED | OUTPATIENT
Start: 2021-01-07 | End: 2021-03-17 | Stop reason: SDUPTHER

## 2021-02-11 RX ORDER — PROPRANOLOL HCL 60 MG
CAPSULE, EXTENDED RELEASE 24HR ORAL
Qty: 30 CAPSULE | Refills: 0 | Status: SHIPPED | OUTPATIENT
Start: 2021-02-11 | End: 2021-03-17 | Stop reason: SDUPTHER

## 2021-03-08 ENCOUNTER — TELEPHONE (OUTPATIENT)
Dept: ONCOLOGY | Facility: CLINIC | Age: 71
End: 2021-03-08

## 2021-03-08 NOTE — TELEPHONE ENCOUNTER
Caller: SUSANA YI    Relationship to patient: SELF    Best call back number: [phone]    Chief complaint: PT STATES SHE WAS UNAWARE OF HER APPT ON 03/03 AND IS CALLING TO RESCHEDULE    Type of visit: LABS AND FOLLOW UP    Requested date: NEXT AVAILABLE    If rescheduling, when is the original appointment: 03/03

## 2021-03-10 ENCOUNTER — TELEPHONE (OUTPATIENT)
Dept: FAMILY MEDICINE CLINIC | Facility: CLINIC | Age: 71
End: 2021-03-10

## 2021-03-10 ENCOUNTER — LAB (OUTPATIENT)
Dept: FAMILY MEDICINE CLINIC | Facility: CLINIC | Age: 71
End: 2021-03-10

## 2021-03-10 DIAGNOSIS — E78.2 MIXED HYPERLIPIDEMIA: Primary | ICD-10-CM

## 2021-03-10 DIAGNOSIS — E03.4 HYPOTHYROIDISM DUE TO ACQUIRED ATROPHY OF THYROID: ICD-10-CM

## 2021-03-10 DIAGNOSIS — Z17.0 MALIGNANT NEOPLASM OF UPPER-OUTER QUADRANT OF LEFT BREAST IN FEMALE, ESTROGEN RECEPTOR POSITIVE (HCC): Primary | ICD-10-CM

## 2021-03-10 DIAGNOSIS — E55.9 VITAMIN D DEFICIENCY: ICD-10-CM

## 2021-03-10 DIAGNOSIS — E11.8 TYPE 2 DIABETES MELLITUS WITH COMPLICATION, WITHOUT LONG-TERM CURRENT USE OF INSULIN (HCC): ICD-10-CM

## 2021-03-10 DIAGNOSIS — C50.412 MALIGNANT NEOPLASM OF UPPER-OUTER QUADRANT OF LEFT BREAST IN FEMALE, ESTROGEN RECEPTOR POSITIVE (HCC): Primary | ICD-10-CM

## 2021-03-10 DIAGNOSIS — I10 ESSENTIAL HYPERTENSION: ICD-10-CM

## 2021-03-10 LAB — HBA1C MFR BLD: 8.6 % (ref 3.5–5.6)

## 2021-03-10 PROCEDURE — 82306 VITAMIN D 25 HYDROXY: CPT | Performed by: FAMILY MEDICINE

## 2021-03-10 PROCEDURE — 87086 URINE CULTURE/COLONY COUNT: CPT | Performed by: FAMILY MEDICINE

## 2021-03-10 PROCEDURE — 85025 COMPLETE CBC W/AUTO DIFF WBC: CPT | Performed by: FAMILY MEDICINE

## 2021-03-10 PROCEDURE — 82043 UR ALBUMIN QUANTITATIVE: CPT | Performed by: FAMILY MEDICINE

## 2021-03-10 PROCEDURE — 80061 LIPID PANEL: CPT | Performed by: FAMILY MEDICINE

## 2021-03-10 PROCEDURE — 83036 HEMOGLOBIN GLYCOSYLATED A1C: CPT | Performed by: FAMILY MEDICINE

## 2021-03-10 PROCEDURE — 80053 COMPREHEN METABOLIC PANEL: CPT | Performed by: FAMILY MEDICINE

## 2021-03-10 PROCEDURE — 84443 ASSAY THYROID STIM HORMONE: CPT | Performed by: FAMILY MEDICINE

## 2021-03-10 PROCEDURE — 82570 ASSAY OF URINE CREATININE: CPT | Performed by: FAMILY MEDICINE

## 2021-03-10 PROCEDURE — 36415 COLL VENOUS BLD VENIPUNCTURE: CPT | Performed by: FAMILY MEDICINE

## 2021-03-10 PROCEDURE — 81001 URINALYSIS AUTO W/SCOPE: CPT | Performed by: FAMILY MEDICINE

## 2021-03-10 NOTE — TELEPHONE ENCOUNTER
Please advise the patient that the orders for her 6 months fasting labs are in her chart and I also included urinalysis.  She is scheduled to follow-up with me on 3/17/2021 and I asked her to keep that appointment.  I would like her to come for the blood work and urinalysis check this week.  Thank you.

## 2021-03-10 NOTE — TELEPHONE ENCOUNTER
Patient called c/o urgency, burning and pain while urinating.   Requested an appt with Dr Whitfield, but there is no appts available until 03/17.  Requested an appt for labs to have them ready for her 6 mo f/u appt, and if she could also put an order for urine sample to check if has UTI    Please advise    [phone]

## 2021-03-11 LAB
25(OH)D3 SERPL-MCNC: 72.1 NG/ML (ref 30–100)
ALBUMIN SERPL-MCNC: 4.6 G/DL (ref 3.5–5.2)
ALBUMIN UR-MCNC: 71.9 MG/DL
ALBUMIN/GLOB SERPL: 1.4 G/DL
ALP SERPL-CCNC: 107 U/L (ref 39–117)
ALT SERPL W P-5'-P-CCNC: 47 U/L (ref 1–33)
ANION GAP SERPL CALCULATED.3IONS-SCNC: 16.8 MMOL/L (ref 5–15)
AST SERPL-CCNC: 100 U/L (ref 1–32)
BACTERIA UR QL AUTO: ABNORMAL /HPF
BASOPHILS # BLD AUTO: 0.04 10*3/MM3 (ref 0–0.2)
BASOPHILS NFR BLD AUTO: 0.5 % (ref 0–1.5)
BILIRUB SERPL-MCNC: 0.5 MG/DL (ref 0–1.2)
BILIRUB UR QL STRIP: NEGATIVE
BUN SERPL-MCNC: 19 MG/DL (ref 8–23)
BUN/CREAT SERPL: 15.1 (ref 7–25)
CALCIUM SPEC-SCNC: 9.4 MG/DL (ref 8.6–10.5)
CHLORIDE SERPL-SCNC: 98 MMOL/L (ref 98–107)
CHOLEST SERPL-MCNC: 118 MG/DL (ref 0–200)
CLARITY UR: ABNORMAL
CO2 SERPL-SCNC: 22.2 MMOL/L (ref 22–29)
COLOR UR: ABNORMAL
CREAT SERPL-MCNC: 1.26 MG/DL (ref 0.57–1)
CREAT UR-MCNC: 206.9 MG/DL
DEPRECATED RDW RBC AUTO: 41.1 FL (ref 37–54)
EOSINOPHIL # BLD AUTO: 0.18 10*3/MM3 (ref 0–0.4)
EOSINOPHIL NFR BLD AUTO: 2.2 % (ref 0.3–6.2)
ERYTHROCYTE [DISTWIDTH] IN BLOOD BY AUTOMATED COUNT: 13.1 % (ref 12.3–15.4)
GFR SERPL CREATININE-BSD FRML MDRD: 42 ML/MIN/1.73
GLOBULIN UR ELPH-MCNC: 3.4 GM/DL
GLUCOSE SERPL-MCNC: 284 MG/DL (ref 65–99)
GLUCOSE UR STRIP-MCNC: ABNORMAL MG/DL
HCT VFR BLD AUTO: 37.1 % (ref 34–46.6)
HDLC SERPL-MCNC: 31 MG/DL (ref 40–60)
HGB BLD-MCNC: 11.4 G/DL (ref 12–15.9)
HGB UR QL STRIP.AUTO: ABNORMAL
HYALINE CASTS UR QL AUTO: ABNORMAL /LPF
IMM GRANULOCYTES # BLD AUTO: 0.03 10*3/MM3 (ref 0–0.05)
IMM GRANULOCYTES NFR BLD AUTO: 0.4 % (ref 0–0.5)
KETONES UR QL STRIP: NEGATIVE
LDLC SERPL CALC-MCNC: 47 MG/DL (ref 0–100)
LDLC/HDLC SERPL: 1.14 {RATIO}
LEUKOCYTE ESTERASE UR QL STRIP.AUTO: ABNORMAL
LYMPHOCYTES # BLD AUTO: 1.4 10*3/MM3 (ref 0.7–3.1)
LYMPHOCYTES NFR BLD AUTO: 17.4 % (ref 19.6–45.3)
MCH RBC QN AUTO: 26.6 PG (ref 26.6–33)
MCHC RBC AUTO-ENTMCNC: 30.7 G/DL (ref 31.5–35.7)
MCV RBC AUTO: 86.5 FL (ref 79–97)
MICROALBUMIN/CREAT UR: 347.5 MG/G
MONOCYTES # BLD AUTO: 0.44 10*3/MM3 (ref 0.1–0.9)
MONOCYTES NFR BLD AUTO: 5.5 % (ref 5–12)
NEUTROPHILS NFR BLD AUTO: 5.97 10*3/MM3 (ref 1.7–7)
NEUTROPHILS NFR BLD AUTO: 74 % (ref 42.7–76)
NITRITE UR QL STRIP: POSITIVE
NRBC BLD AUTO-RTO: 0 /100 WBC (ref 0–0.2)
PH UR STRIP.AUTO: 5.5 [PH] (ref 5–8)
PLATELET # BLD AUTO: 268 10*3/MM3 (ref 140–450)
PMV BLD AUTO: 11 FL (ref 6–12)
POTASSIUM SERPL-SCNC: 4.7 MMOL/L (ref 3.5–5.2)
PROT SERPL-MCNC: 8 G/DL (ref 6–8.5)
PROT UR QL STRIP: ABNORMAL
RBC # BLD AUTO: 4.29 10*6/MM3 (ref 3.77–5.28)
RBC # UR: ABNORMAL /HPF
REF LAB TEST METHOD: ABNORMAL
SODIUM SERPL-SCNC: 137 MMOL/L (ref 136–145)
SP GR UR STRIP: 1.03 (ref 1–1.03)
SQUAMOUS #/AREA URNS HPF: ABNORMAL /HPF
TRIGL SERPL-MCNC: 258 MG/DL (ref 0–150)
TSH SERPL DL<=0.05 MIU/L-ACNC: 3.62 UIU/ML (ref 0.27–4.2)
UROBILINOGEN UR QL STRIP: ABNORMAL
VLDLC SERPL-MCNC: 40 MG/DL (ref 5–40)
WBC # BLD AUTO: 8.06 10*3/MM3 (ref 3.4–10.8)
WBC UR QL AUTO: ABNORMAL /HPF

## 2021-03-11 RX ORDER — SULFAMETHOXAZOLE AND TRIMETHOPRIM 800; 160 MG/1; MG/1
1 TABLET ORAL 2 TIMES DAILY
Qty: 14 TABLET | Refills: 0 | Status: SHIPPED | OUTPATIENT
Start: 2021-03-11 | End: 2021-03-17

## 2021-03-12 LAB — BACTERIA SPEC AEROBE CULT: NORMAL

## 2021-03-14 NOTE — PROGRESS NOTES
Subjective     History of Present Illness      Patient is a 71-year-old female with oted in late May 2018 a lump developing in the upper outer quadrant of the left breast without pain or nipple discharge.  This measured approximately 1 cm in size.  Mammogram indicated this asymmetric density in the same region and an ultrasound revealed a 1.8 cm irregular solid mass.  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  Additional history included no previous breast biopsies, a brief period of time with hormonal replacement and no history of breast or ovarian cancer with family.  She was seen by Dr. Zazueta on the 28th an MRI of both breasts was performed July 6.  Within the left anterior one third at the 12:30 position 3 cm from the nipple with irregular enhancing mass measuring 1.6 cm x 1.4 and 2.2 immediately lateral dimension.  There are other findings were seen in the left breast with no evidence of left axillary adenopathy and no findings suspicious for right breast.  The patient proceeded to a left breast lumpectomy July 18, 2018.      Pathologic findings were consistent with a 2.5 cm grade 3 invasive mammary ductal carcinoma with associated DCIS.  The closest margin was 1 mm.  [place] nodes were negative staging of pT2N0.  Dr. Zazueta saw the patient July 20 recognizing the closest margin was the anterior margin having taken this off the skin with no further removal possible.  There was concern about the need for additional tissue though the addended pathology revealed the DCIS to be intermediate grade.  It was determined not to take additional margins and have her seen by both medical oncology and radiation therapy.  She now presents with her  .      The patient's initial consultation was August 7 and potential adjuvant therapy discussed with the initial recommendation being an Oncotype DX analysis to be process.  This  is now reviewed with the patient and her  may return August 23, 2018.  Her recurrence score 10 with 10 year risk of distance recurrence at 7%.  She is clearly in the low risk category additional studies revealing ER score of 10.5, SD score of 8.6 which are both positive and HER-2 score of 8.7 which is negative.   Additional studies include  LH of 27.5, FSH of 57.6, estradiol of 9.9, CA 15-3 of 10.9, TSH of 4.64 hemoglobin A1c of 8.72.   The patient is advised of the findings per her risk of recurrence and she and her  are understandably elated.  Chemotherapy is not recommended in her circumstance but anti-hormonal therapy is and we have discussed potential treatment with AI therapy being the most appropriate choice in this postmenopausal patient.  She's not additionally had any recent assessment for bone density, however we discussed having this done in the near future.     As result the patient was scheduled for bone density and this was obtained September 6 revealing evidence of osteoporosis involving the lumbar with T score of -3.4 and right hip with T score of -2.7.  The patient has been using Arimidex which we'll discontinue and we discussed institution of tamoxifen at this point.  She'll also need assessment of her vitamin D level which we went on to measure documenting a level of 45.5.                                      The patient is now seen a month after switching to tamoxifen and is tolerating it well thus far without significant hot flashes.  We have also discussed the use of Reclast under the circumstances and she is agreeable to treatment when seen October 29, 2018.   She did have myalgias and arthralgias following Reclast for several days but these then resolved.  As she seen January 21, 2019 she is feeling well and we have discussed weight loss, exercise and also she and her 's recognition of slowly worsening bilateral hand tremor left greater than right.  This been present  much before her diagnosis of breast cancer and actually is an issue in several members of her family.   The patient is next seen August 1, 2019.  In the interval she been found to have elevated liver function tests and ultimately liver biopsy that was significant for mild steatohepatitis.  She has been adjusting her diet but indicates that she is also has significant fatigue and while these might be related she believes the fatigue is in part secondary to tamoxifen.  Follow-up testing included total cholesterol 113, triglycerides of 263 with HDL down to 24, VLDL at 52.6, ferritin of 298, normal iron profile, CMP with glucose 193, creatinine 1.36 and hemoglobin A1c of 7.10.  She notes that her fatigue is not improved off tamoxifen.  We have discussed her findings indicating better control needed for her diabetes and thyroid dysfunction and also went on to treat a urinary tract infection.  She did see primary care who adjusted medications for thyroid hypertension.  The patient when seen December 12, 2019 feels considerably better and is thrilled to see the difference in her functioning.  She is having no difficulty with tamoxifen at this point.  The patient was seen by the NP August 7, 2020 feeling well.  She did undergo subsequent mammogram and DEXA scan with the latter (performed October 14, 2020) demonstrating a lumbar T score -2.4 (increased), left hip femoral neck T score -2.5 (unchanged) and right femoral neck T score of -1.8 (increased).  Again this was substantial increase concerning osteoporosis and bone density particular in the left hip.  The patient is additional mammography October 14 showed no evidence recurrent malignancy.     She is next seen March 15, 2021 again noting that she had started AI therapy August 23, 2018 but when her bone density was consistent with osteoporosis we switched to tamoxifen beginning September 24, 2018.  Fortunately she continues to feel well overall though she did have a fall  recently possibly in part related to reaction after recent COVID-19 vaccination-not completed.    Past Medical History:   Diagnosis Date   • Anxiety    • Anxiety and depression    • Asthma     SEASONAL ALLERGY RELATED   • Cancer of breast (CMS/HCC) 06/2018    LEFT   • Chronic kidney disease     Renal cyst, right kidney w/urology workup in past   • Depression    • Diabetes mellitus (CMS/HCC)     Type 2   • Disease of thyroid gland    • GERD (gastroesophageal reflux disease)    • History of prior pregnancies     x2   • History of transfusion     S/P HYST --AUTOLOGOUS    • Hyperlipidemia    • Hypertension    • IBS (irritable bowel syndrome)    • Seasonal allergies         Past Surgical History:   Procedure Laterality Date   • BREAST LUMPECTOMY WITH SENTINEL NODE BIOPSY Left 7/18/2018    Procedure: BREAST LUMPECTOMY WITH SENTINEL NODE BIOPSY;  Surgeon: João Zazueta MD;  Location: [facility];  Service: General   • COLONOSCOPY      refusing; negative cologuard 8/24/2017   • HYSTERECTOMY  1989   • KNEE SURGERY Right 2014   • KNEE SURGERY Left 2016   • LIVER BIOPSY  2019    fatty liver   • ROTATOR CUFF REPAIR Left 2009   • ROTATOR CUFF REPAIR Right 2012   • TOE SURGERY  2009    ingrown         Current Outpatient Medications on File Prior to Visit   Medication Sig Dispense Refill   • albuterol (PROAIR RESPICLICK) 108 (90 Base) MCG/ACT inhaler Inhale 1 puff Every 4 (Four) Hours As Needed for Wheezing or Shortness of Air.     • B Complex Vitamins (VITAMIN B COMPLEX PO) Take 1 tablet by mouth Every Night.     • cetirizine (zyrTEC) 10 MG tablet Take 10 mg by mouth Daily.     • cholecalciferol (VITAMIN D3) 1000 units tablet Take 1,000 Units by mouth Every Night.     • escitalopram (LEXAPRO) 20 MG tablet Take 1 tablet by mouth Daily. 90 tablet 1   • fluticasone (FLONASE) 50 MCG/ACT nasal spray 2 sprays into each nostril Daily.     • glipizide (GLUCOTROL XL) 5 MG ER tablet TAKE ONE TABLET BY MOUTH DAILY 90 tablet 1   •  hydroCHLOROthiazide (HYDRODIURIL) 25 MG tablet TAKE ONE TABLET BY MOUTH DAILY 90 tablet 0   • levothyroxine (SYNTHROID, LEVOTHROID) 88 MCG tablet TAKE ONE TABLET BY MOUTH DAILY 90 tablet 0   • loperamide (IMODIUM A-D) 2 MG tablet Take 2 mg by mouth 4 (Four) Times a Day As Needed for Diarrhea.     • melatonin 5 MG tablet tablet Take 10 mg by mouth At Night As Needed.     • omeprazole (priLOSEC) 40 MG capsule Take 1 capsule by mouth Daily. 90 capsule 1   • propranolol LA (INDERAL LA) 60 MG 24 hr capsule TAKE ONE CAPSULE BY MOUTH DAILY 30 capsule 0   • rosuvastatin (CRESTOR) 20 MG tablet Take 1 tablet by mouth every night at bedtime. 90 tablet 1   • SITagliptin-metFORMIN HCl ER (Janumet XR)  MG tablet Take 1 tablet by mouth 2 (Two) Times a Day. 180 tablet 1   • sulfamethoxazole-trimethoprim (Bactrim DS) 800-160 MG per tablet Take 1 tablet by mouth 2 (Two) Times a Day for 7 days. 14 tablet 0   • tamoxifen (NOLVADEX) 20 MG chemo tablet Take 1 tablet by mouth Daily. 90 tablet 1     No current facility-administered medications on file prior to visit.        ALLERGIES:    Allergies   Allergen Reactions   • Amlodipine Swelling        Social History     Socioeconomic History   • Marital status:      Spouse name: Mahin   • Number of children: 2   • Years of education: College   • Highest education level: Not on file   Tobacco Use   • Smoking status: Never Smoker   • Smokeless tobacco: Never Used   Substance and Sexual Activity   • Alcohol use: Yes     Alcohol/week: 1.0 standard drinks     Types: 1 Cans of beer per week     Comment: 2-3 a month, social only   • Drug use: No   • Sexual activity: Yes     Comment:         Family History   Problem Relation Age of Onset   • Diabetes Mother    • Heart attack Mother    • Heart failure Mother    • Hyperlipidemia Mother    • Hyperthyroidism Mother         Has surgery   • Hypothyroidism Mother         Later in life   • Heart disease Mother    • Hypertension Mother   "  • Asthma Father    • COPD Father    • Hypertension Father    • Heart attack Maternal Uncle    • Heart failure Maternal Uncle    • Depression Maternal Grandmother    • Heart attack Maternal Grandfather    • Heart failure Maternal Grandfather    • Alcohol abuse Paternal Grandmother    • Alcohol abuse Paternal Grandfather    • Scleroderma Sister    • No Known Problems Daughter    • No Known Problems Son    • Malig Hyperthermia Neg Hx         Review of Systems   Constitutional: Negative for fatigue and unexpected weight change.        Modest hot flashes   Respiratory: Negative for chest tightness, shortness of breath and wheezing.    Gastrointestinal: Negative for abdominal pain, constipation, diarrhea, nausea and vomiting.   Skin:        Pruritus   Neurological: Positive for tremors and headaches. Negative for weakness.   All other systems reviewed and are negative.         Objective     Vitals:    03/15/21 0943   BP: 107/68   Pulse: 91   Resp: 18   Temp: 96.6 °F (35.9 °C)   TempSrc: Temporal   SpO2: 95%   Weight: 80.3 kg (177 lb)   Height: 160 cm (62.99\")   PainSc: 0-No pain     Current Status 3/15/2021   ECOG score 0       Physical Exam   Constitutional: She is oriented to person, place, and time. She appears well-developed.   HENT:   Head: Normocephalic and atraumatic.   Nose: Nose normal.   Eyes: Pupils are equal, round, and reactive to light. Conjunctivae are normal.   Cardiovascular: Normal rate, regular rhythm and normal heart sounds.   Pulmonary/Chest: Effort normal and breath sounds normal.   Abdominal: Soft. Bowel sounds are normal.   Musculoskeletal: Normal range of motion.   Neurological: She is alert and oriented to person, place, and time.   Resting tremor noted left greater than right upper extremities   Skin: Skin is warm and dry.   Psychiatric: Her behavior is normal. Judgment and thought content normal.     Breast exam: Patient status post left breast lumpectomy well healing without evidence of " inflammation or discharge from wound, status post left sentinel node assessment also without inflammation or discharge    RECENT LABS:  Hematology WBC   Date Value Ref Range Status   03/15/2021 5.56 3.40 - 10.80 10*3/mm3 Final     RBC   Date Value Ref Range Status   03/15/2021 4.06 3.77 - 5.28 10*6/mm3 Final     Hemoglobin   Date Value Ref Range Status   03/15/2021 10.9 (L) 12.0 - 15.9 g/dL Final     Hematocrit   Date Value Ref Range Status   03/15/2021 34.0 34.0 - 46.6 % Final     Platelets   Date Value Ref Range Status   03/15/2021 244 140 - 450 10*3/mm3 Final      BONE MINERAL DENSITOMETRY    October 14, 2020     HISTORY:  70 years-old female. Menopause at age 63. Previous diagnosis  of osteoporosis and breast cancer.     COMPARISON:  09/06/2018.     TECHNIQUE: The T score compares the patient's bone mineral density with  the peak bone mass of young normal patients. Patients with T-scores  between 1.0 and 2.5 standard deviations below the mean are osteopenic.  Patients with T-scores greater than 2.5 standard deviation below the  mean are osteoporotic.     The Z score compares the patient's bone mineral density with sex and age  matched patients. Z score of -2.0 and lower is an indication of low  mineral density for the patient's age.     FINDINGS: Lumbar T score is  -2.4, representing a 17.7% interval  increase..     Left hip density is lowest at the  femoral neck where the T score is  -2.5, unchanged.      Right hip density is lowest at the  femoral neck where the T score is  -1.8, representing a 19.8% interval increase.      IMPRESSION:  Osteoporosis at the left hip. Interval increase in bone  Density.      Assessment/Plan       71 year-old female with previous medical history of seasonal allergies, diabetes mellitus type 2, CKD3, hypothyroidism, hyperlipidemia, hypertension, IBS, history of anxiety and depression with recent development of left breast abnormality found by the patient herself.  This led to  mammogram ultrasound and stereotactic biopsy confirming invasive ductal carcinoma grade 2 though ER, GA positive HER-2 negative.  Subsequent assessments via surgical review your no additional abnormalities seen on breast MRI and the patient proceeded to lumpectomy July 18, 2018.  Her pathologic findings were consistent with a 2.5 cm grade 3 invasive mammary ductal carcinoma with associated DCIS.  The closest margin was 1 mm.  [place] nodes were negative with staging of pT2N0.  Dr. Zazueta saw the patient July 20 recognizing the closest margin was the anterior margin having taken this off the skin with no further removal possible.  There was concern about the need for additional tissue though the addended pathology revealed the DCIS to be intermediate grade.  It was ultimately determined that further surgery was not necessary.   The patient presents for medical oncology assessment and we discussed potential adjuvant therapy but in particular it is felt the patient requires further genomic assessment with Oncotype DX analysis.  We reviewed this in detail and she understands the additional information that we could obtain from such an approach in making decisions about adjuvant therapy. We proceeded with additional assessment including Oncotype and laboratory studies that, fortunate, revealed that she has an excellent prognosis including a 7% risk of recurrence at 10 years with the use of tamoxifen.  Chemotherapy, therefore, is not appropriate and we have discussed an alternative therapy in this postmenopausal patient with AI therapy in particular her Arimidex over 5 years.  She is currently undergoing review by radiation therapy and this can proceed at any point as we also plan to initiate Arimidex with a trial of the medication given over the next month.  We went on to have the patient tested for bone density finding evidence, unfortunate, of osteoporosis.  She's been tolerating Arimidex well without discontinue  this and institute Tamoxifen.  The patient went on to institute treatment and underwent testing for TSH, vitamin D level and serum chemistries.  These are found to be acceptable and she now next returns October 29 tolerating tamoxifen well. We went on to continue with Reclast at that visit.  The patient did have myalgias and arthralgias following the treatment for several days that resolved.     As she is seen January 21, 2019 she is tolerating tamoxifen overall well though we have discussed that she should continue to exercise, manage her diet and try to achieve weight loss overall.  Additionally she has what appears to be essential tremor left greater than right.  Plans to see her primary care to review these issues but will start exercising immediately.  We'll plan to see her back here in approximately 6 months, plan repeat Reclast in late October and then yearly follow-ups as she completes 5 years of tamoxifen.    The patient is next seen August 1, 2019 with complaints of fatigue and recent diagnostics that are consistent with mild to moderate steatohepatitis on liver biopsy.  It is not felt likely tamoxifen is the major issue here but we do plan to hold it briefly to see if she does not improve as we investigate both her liver function tests and etiology of her mild anemia.  It is hoped this might improve her degree of fatigue.  We plan additional laboratory studies as above we have discussed results with she and her  in some detail when seen September 12, 2019.  She needs better control of her diabetes as well as thyroid dysfunction and is urged to return to primary care to be assessed.  A copy this note will be sent to Dr. Whitfield and she will be asked otherwise to restart tamoxifen assessment approximately 3 months.    The patient was found to have symptoms of UTI and ultimately was treated with ciprofloxacin.  Thereafter she has follow-up with primary care and more effectively treated her blood  pressure and thyroid dysfunction.       The patient is followed at 6-month intervals  August 7, 2020 feeling well.  She did undergo subsequent mammogram and DEXA scan with the latter (performed October 14, 2020) demonstrating a lumbar T score -2.4 (increased), left hip femoral neck T score -2.5 (unchanged) and right femoral neck T score of -1.8 (increased).  Again this was substantial increase concerning osteoporosis and bone density particular in the left hip.  The patient is additional mammography October 14 showed no evidence recurrent malignancy.     She is next seen March 15, 2021 again noting that she had started AI therapy August 23, 2018 but when her bone density was consistent with osteoporosis we switched to tamoxifen beginning September 24, 2018.  Fortunately the patient is doing fairly well when assessed March 15, 2021.  Plan to continue her current treatment plans and she agrees.  She does have a degree of mild worsening of her anemia and we have agreed to a follow-up 3-month assessment.                                                                                                                                          *Continue current medications    *Follow-up 3 months, MD, CBC

## 2021-03-15 ENCOUNTER — LAB (OUTPATIENT)
Dept: LAB | Facility: HOSPITAL | Age: 71
End: 2021-03-15

## 2021-03-15 ENCOUNTER — OFFICE VISIT (OUTPATIENT)
Dept: ONCOLOGY | Facility: CLINIC | Age: 71
End: 2021-03-15

## 2021-03-15 VITALS
DIASTOLIC BLOOD PRESSURE: 68 MMHG | HEIGHT: 63 IN | OXYGEN SATURATION: 95 % | BODY MASS INDEX: 31.36 KG/M2 | WEIGHT: 177 LBS | TEMPERATURE: 96.6 F | SYSTOLIC BLOOD PRESSURE: 107 MMHG | RESPIRATION RATE: 18 BRPM | HEART RATE: 91 BPM

## 2021-03-15 DIAGNOSIS — D64.9 ANEMIA, UNSPECIFIED TYPE: ICD-10-CM

## 2021-03-15 DIAGNOSIS — Z17.0 MALIGNANT NEOPLASM OF UPPER-OUTER QUADRANT OF LEFT BREAST IN FEMALE, ESTROGEN RECEPTOR POSITIVE (HCC): ICD-10-CM

## 2021-03-15 DIAGNOSIS — C50.912 INFILTRATING DUCTAL CARCINOMA OF LEFT BREAST (HCC): Primary | ICD-10-CM

## 2021-03-15 DIAGNOSIS — C50.412 MALIGNANT NEOPLASM OF UPPER-OUTER QUADRANT OF LEFT BREAST IN FEMALE, ESTROGEN RECEPTOR POSITIVE (HCC): ICD-10-CM

## 2021-03-15 LAB
BASOPHILS # BLD AUTO: 0.04 10*3/MM3 (ref 0–0.2)
BASOPHILS NFR BLD AUTO: 0.7 % (ref 0–1.5)
DEPRECATED RDW RBC AUTO: 42.4 FL (ref 37–54)
EOSINOPHIL # BLD AUTO: 0.37 10*3/MM3 (ref 0–0.4)
EOSINOPHIL NFR BLD AUTO: 6.7 % (ref 0.3–6.2)
ERYTHROCYTE [DISTWIDTH] IN BLOOD BY AUTOMATED COUNT: 13.8 % (ref 12.3–15.4)
HCT VFR BLD AUTO: 34 % (ref 34–46.6)
HGB BLD-MCNC: 10.9 G/DL (ref 12–15.9)
IMM GRANULOCYTES # BLD AUTO: 0.04 10*3/MM3 (ref 0–0.05)
IMM GRANULOCYTES NFR BLD AUTO: 0.7 % (ref 0–0.5)
LYMPHOCYTES # BLD AUTO: 1.52 10*3/MM3 (ref 0.7–3.1)
LYMPHOCYTES NFR BLD AUTO: 27.3 % (ref 19.6–45.3)
MCH RBC QN AUTO: 26.8 PG (ref 26.6–33)
MCHC RBC AUTO-ENTMCNC: 32.1 G/DL (ref 31.5–35.7)
MCV RBC AUTO: 83.7 FL (ref 79–97)
MONOCYTES # BLD AUTO: 0.51 10*3/MM3 (ref 0.1–0.9)
MONOCYTES NFR BLD AUTO: 9.2 % (ref 5–12)
NEUTROPHILS NFR BLD AUTO: 3.08 10*3/MM3 (ref 1.7–7)
NEUTROPHILS NFR BLD AUTO: 55.4 % (ref 42.7–76)
NRBC BLD AUTO-RTO: 0 /100 WBC (ref 0–0.2)
PLATELET # BLD AUTO: 244 10*3/MM3 (ref 140–450)
PMV BLD AUTO: 10.7 FL (ref 6–12)
RBC # BLD AUTO: 4.06 10*6/MM3 (ref 3.77–5.28)
WBC # BLD AUTO: 5.56 10*3/MM3 (ref 3.4–10.8)

## 2021-03-15 PROCEDURE — 36415 COLL VENOUS BLD VENIPUNCTURE: CPT

## 2021-03-15 PROCEDURE — 99214 OFFICE O/P EST MOD 30 MIN: CPT | Performed by: INTERNAL MEDICINE

## 2021-03-15 PROCEDURE — 85025 COMPLETE CBC W/AUTO DIFF WBC: CPT

## 2021-03-17 ENCOUNTER — OFFICE VISIT (OUTPATIENT)
Dept: FAMILY MEDICINE CLINIC | Facility: CLINIC | Age: 71
End: 2021-03-17

## 2021-03-17 VITALS
SYSTOLIC BLOOD PRESSURE: 126 MMHG | RESPIRATION RATE: 16 BRPM | BODY MASS INDEX: 30.12 KG/M2 | WEIGHT: 170 LBS | HEIGHT: 63 IN | HEART RATE: 103 BPM | DIASTOLIC BLOOD PRESSURE: 75 MMHG | TEMPERATURE: 96.6 F | OXYGEN SATURATION: 96 %

## 2021-03-17 DIAGNOSIS — I10 ESSENTIAL HYPERTENSION: ICD-10-CM

## 2021-03-17 DIAGNOSIS — E11.8 TYPE 2 DIABETES MELLITUS WITH COMPLICATION, WITHOUT LONG-TERM CURRENT USE OF INSULIN (HCC): Primary | ICD-10-CM

## 2021-03-17 DIAGNOSIS — K21.9 GASTROESOPHAGEAL REFLUX DISEASE WITHOUT ESOPHAGITIS: ICD-10-CM

## 2021-03-17 DIAGNOSIS — F33.1 MODERATE EPISODE OF RECURRENT MAJOR DEPRESSIVE DISORDER (HCC): ICD-10-CM

## 2021-03-17 DIAGNOSIS — E78.2 MIXED HYPERLIPIDEMIA: ICD-10-CM

## 2021-03-17 DIAGNOSIS — E03.4 HYPOTHYROIDISM DUE TO ACQUIRED ATROPHY OF THYROID: ICD-10-CM

## 2021-03-17 PROCEDURE — 99214 OFFICE O/P EST MOD 30 MIN: CPT | Performed by: FAMILY MEDICINE

## 2021-03-17 RX ORDER — GLIPIZIDE 5 MG/1
5 TABLET, FILM COATED, EXTENDED RELEASE ORAL DAILY
Qty: 90 TABLET | Refills: 1 | Status: SHIPPED | OUTPATIENT
Start: 2021-03-17 | End: 2021-09-30

## 2021-03-17 RX ORDER — EMPAGLIFLOZIN 25 MG/1
25 TABLET, FILM COATED ORAL DAILY
Qty: 30 TABLET | Refills: 0 | COMMUNITY
Start: 2021-03-17 | End: 2021-03-17 | Stop reason: SDUPTHER

## 2021-03-17 RX ORDER — OMEPRAZOLE 40 MG/1
40 CAPSULE, DELAYED RELEASE ORAL DAILY
Qty: 90 CAPSULE | Refills: 1 | Status: SHIPPED | OUTPATIENT
Start: 2021-03-17 | End: 2021-09-16

## 2021-03-17 RX ORDER — PROPRANOLOL HCL 60 MG
60 CAPSULE, EXTENDED RELEASE 24HR ORAL DAILY
Qty: 90 CAPSULE | Refills: 0 | Status: SHIPPED | OUTPATIENT
Start: 2021-03-17 | End: 2021-06-17 | Stop reason: SDUPTHER

## 2021-03-17 RX ORDER — METFORMIN HYDROCHLORIDE 500 MG/1
1000 TABLET, EXTENDED RELEASE ORAL 2 TIMES DAILY
COMMUNITY
End: 2021-03-17 | Stop reason: SDUPTHER

## 2021-03-17 RX ORDER — ESCITALOPRAM OXALATE 20 MG/1
20 TABLET ORAL DAILY
Qty: 90 TABLET | Refills: 1 | Status: SHIPPED | OUTPATIENT
Start: 2021-03-17 | End: 2021-09-30

## 2021-03-17 RX ORDER — ROSUVASTATIN CALCIUM 20 MG/1
20 TABLET, COATED ORAL
Qty: 90 TABLET | Refills: 1 | Status: SHIPPED | OUTPATIENT
Start: 2021-03-17 | End: 2022-05-13 | Stop reason: SDUPTHER

## 2021-03-17 RX ORDER — HYDROCHLOROTHIAZIDE 25 MG/1
25 TABLET ORAL DAILY
Qty: 90 TABLET | Refills: 1 | Status: SHIPPED | OUTPATIENT
Start: 2021-03-17 | End: 2021-10-13

## 2021-03-17 RX ORDER — METFORMIN HYDROCHLORIDE 500 MG/1
1000 TABLET, EXTENDED RELEASE ORAL 2 TIMES DAILY
Qty: 360 TABLET | Refills: 0 | Status: SHIPPED | OUTPATIENT
Start: 2021-03-17 | End: 2021-06-17 | Stop reason: SDUPTHER

## 2021-03-17 RX ORDER — BLOOD-GLUCOSE,RECEIVER,CONT
1 EACH MISCELLANEOUS DAILY
Qty: 1 EACH | Refills: 0 | Status: SHIPPED | OUTPATIENT
Start: 2021-03-17 | End: 2021-06-17 | Stop reason: SDUPTHER

## 2021-03-17 RX ORDER — EMPAGLIFLOZIN 25 MG/1
25 TABLET, FILM COATED ORAL DAILY
Qty: 30 TABLET | Refills: 0 | COMMUNITY
Start: 2021-03-17 | End: 2021-06-17

## 2021-03-17 RX ORDER — LEVOTHYROXINE SODIUM 88 UG/1
88 TABLET ORAL DAILY
Qty: 90 TABLET | Refills: 1 | Status: SHIPPED | OUTPATIENT
Start: 2021-03-17 | End: 2021-11-13

## 2021-03-17 NOTE — PROGRESS NOTES
Subjective   Chief Complaint   Patient presents with   • Follow-up     6 month   • Med Refill   • Diabetes   • Hypertension   • Hypothyroidism   • Hyperlipidemia   • GI Problem   • Anxiety   • Depression     Mariana Ansari is a 71 y.o. female.     Patient Care Team:  Luna Whitfield MD as PCP - General (Family Medicine)  Luna Whitfield MD as PCP - Family Medicine  Michel Guajardo MD as Consulting Physician (Hematology and Oncology)  Luna Whitfield MD as Referring Physician (Family Medicine)    She is coming in today to follow-up on her chronic medical problems and her medications and she also wants to discuss her recent labs.  We are addressing her diabetes, hypertension, hyperlipidemia, hypothyroidism, acid reflux issues, depression and anxiety, and asthma.  She reports taking all of her medications as directed and she denies any side effects.  She has not been monitoring her blood sugar.  She has concerns about the Janumet she takes as this medication is becoming more and more expensive. Patient denies any chest pain, shortness of breath, dizziness, nausea, vomiting, or diarrhea. No visual issues reported. No headaches, numbness or tingling. No urinary issues reported like urgency, frequency, or discomfort upon urination. Patient has good appetite and denies any weight changes. No swelling reported.  No rashes or any other skin issues reported.  Patient denies polyuria or polydipsia as well as heat or cold intolerance.  No emotional issues or insomnia.         The following portions of the patient's history were reviewed and updated as appropriate: allergies, current medications, past family history, past medical history, past social history, past surgical history and problem list.  Past Medical History:   Diagnosis Date   • Anxiety    • Anxiety and depression    • Asthma     SEASONAL ALLERGY RELATED   • Cancer of breast (CMS/[facility]) 06/2018    LEFT   • Chronic kidney disease     Renal cyst, right kidney  w/urology workup in past   • Depression    • Diabetes mellitus (CMS/HCC)     Type 2   • Disease of thyroid gland    • GERD (gastroesophageal reflux disease)    • History of prior pregnancies     x2   • History of transfusion     S/P HYST --AUTOLOGOUS    • Hyperlipidemia    • Hypertension    • IBS (irritable bowel syndrome)    • Seasonal allergies      Past Surgical History:   Procedure Laterality Date   • BREAST LUMPECTOMY WITH SENTINEL NODE BIOPSY Left 7/18/2018    Procedure: BREAST LUMPECTOMY WITH SENTINEL NODE BIOPSY;  Surgeon: João Zazueta MD;  Location: [facility] OR;  Service: General   • COLONOSCOPY      refusing; negative cologuard 8/24/2017   • HYSTERECTOMY  1989   • KNEE SURGERY Right 2014   • KNEE SURGERY Left 2016   • LIVER BIOPSY  2019    fatty liver   • ROTATOR CUFF REPAIR Left 2009   • ROTATOR CUFF REPAIR Right 2012   • TOE SURGERY  2009    ingrown      The patient has a family history of  Family History   Problem Relation Age of Onset   • Diabetes Mother    • Heart attack Mother    • Heart failure Mother    • Hyperlipidemia Mother    • Hyperthyroidism Mother         Has surgery   • Hypothyroidism Mother         Later in life   • Heart disease Mother    • Hypertension Mother    • Asthma Father    • COPD Father    • Hypertension Father    • Heart attack Maternal Uncle    • Heart failure Maternal Uncle    • Depression Maternal Grandmother    • Heart attack Maternal Grandfather    • Heart failure Maternal Grandfather    • Alcohol abuse Paternal Grandmother    • Alcohol abuse Paternal Grandfather    • Scleroderma Sister    • No Known Problems Daughter    • No Known Problems Son    • Malig Hyperthermia Neg Hx      Social History     Socioeconomic History   • Marital status:      Spouse name: Mahin   • Number of children: 2   • Years of education: College   • Highest education level: Not on file   Tobacco Use   • Smoking status: Never Smoker   • Smokeless tobacco: Never Used   Substance and  "Sexual Activity   • Alcohol use: Yes     Alcohol/week: 1.0 standard drinks     Types: 1 Cans of beer per week     Comment: 2-3 a month, social only   • Drug use: No   • Sexual activity: Yes     Comment:        Review of Systems   Constitutional: Negative for activity change, fatigue and fever.   Respiratory: Negative for cough, shortness of breath and wheezing.    Cardiovascular: Negative for chest pain, palpitations and leg swelling.   Gastrointestinal: Negative for constipation, diarrhea and indigestion.   Endocrine: Negative for cold intolerance, heat intolerance, polydipsia and polyphagia.   Skin: Negative for color change, dry skin and rash.   Neurological: Negative for tremors and headache.     Visit Vitals  /75 (BP Location: Right arm, Patient Position: Sitting, Cuff Size: Adult)   Pulse 103   Temp 96.6 °F (35.9 °C) (Temporal)   Resp 16   Ht 160 cm (63\")   Wt 77.1 kg (170 lb)   SpO2 96%   BMI 30.11 kg/m²       Current Outpatient Medications:   •  metFORMIN ER (GLUCOPHAGE-XR) 500 MG 24 hr tablet, Take 2 tablets by mouth 2 (two) times a day., Disp: 360 tablet, Rfl: 0  •  albuterol (PROAIR RESPICLICK) 108 (90 Base) MCG/ACT inhaler, Inhale 1 puff Every 4 (Four) Hours As Needed for Wheezing or Shortness of Air., Disp: , Rfl:   •  B Complex Vitamins (VITAMIN B COMPLEX PO), Take 1 tablet by mouth Every Night., Disp: , Rfl:   •  cetirizine (zyrTEC) 10 MG tablet, Take 10 mg by mouth Daily., Disp: , Rfl:   •  cholecalciferol (VITAMIN D3) 1000 units tablet, Take 1,000 Units by mouth Every Night., Disp: , Rfl:   •  Continuous Blood Gluc  (Dexcom G5 Mobile ) device, 1 kit Daily., Disp: 1 each, Rfl: 0  •  Empagliflozin (Jardiance) 25 MG tablet, Take 25 mg by mouth Daily., Disp: 30 tablet, Rfl: 0  •  escitalopram (LEXAPRO) 20 MG tablet, Take 1 tablet by mouth Daily., Disp: 90 tablet, Rfl: 1  •  fluticasone (FLONASE) 50 MCG/ACT nasal spray, 2 sprays into each nostril Daily., Disp: , Rfl:   •  " glipizide (GLUCOTROL XL) 5 MG ER tablet, Take 1 tablet by mouth Daily., Disp: 90 tablet, Rfl: 1  •  hydroCHLOROthiazide (HYDRODIURIL) 25 MG tablet, Take 1 tablet by mouth Daily., Disp: 90 tablet, Rfl: 1  •  levothyroxine (SYNTHROID, LEVOTHROID) 88 MCG tablet, Take 1 tablet by mouth Daily., Disp: 90 tablet, Rfl: 1  •  loperamide (IMODIUM A-D) 2 MG tablet, Take 2 mg by mouth 4 (Four) Times a Day As Needed for Diarrhea., Disp: , Rfl:   •  melatonin 5 MG tablet tablet, Take 10 mg by mouth At Night As Needed., Disp: , Rfl:   •  omeprazole (priLOSEC) 40 MG capsule, Take 1 capsule by mouth Daily., Disp: 90 capsule, Rfl: 1  •  propranolol LA (INDERAL LA) 60 MG 24 hr capsule, Take 1 capsule by mouth Daily., Disp: 90 capsule, Rfl: 0  •  rosuvastatin (CRESTOR) 20 MG tablet, Take 1 tablet by mouth every night at bedtime., Disp: 90 tablet, Rfl: 1  •  tamoxifen (NOLVADEX) 20 MG chemo tablet, Take 1 tablet by mouth Daily., Disp: 90 tablet, Rfl: 1    Objective   Physical Exam  Vitals and nursing note reviewed.   Constitutional:       General: She is not in acute distress.     Appearance: Normal appearance. She is well-developed. She is not ill-appearing.   HENT:      Head: Normocephalic and atraumatic.   Cardiovascular:      Rate and Rhythm: Normal rate and regular rhythm.      Heart sounds: Normal heart sounds. No murmur heard.   No gallop.    Pulmonary:      Effort: Pulmonary effort is normal. No respiratory distress.      Breath sounds: Normal breath sounds. No wheezing, rhonchi or rales.   Chest:      Chest wall: No tenderness.   Musculoskeletal:      Cervical back: Normal range of motion and neck supple.   Neurological:      General: No focal deficit present.      Mental Status: She is alert and oriented to person, place, and time. Mental status is at baseline.   Psychiatric:         Mood and Affect: Mood normal.           Lab on 03/15/2021   Component Date Value Ref Range Status   • WBC 03/15/2021 5.56  3.40 - 10.80 10*3/mm3  Final   • RBC 03/15/2021 4.06  3.77 - 5.28 10*6/mm3 Final   • Hemoglobin 03/15/2021 10.9* 12.0 - 15.9 g/dL Final   • Hematocrit 03/15/2021 34.0  34.0 - 46.6 % Final   • MCV 03/15/2021 83.7  79.0 - 97.0 fL Final   • MCH 03/15/2021 26.8  26.6 - 33.0 pg Final   • MCHC 03/15/2021 32.1  31.5 - 35.7 g/dL Final   • RDW 03/15/2021 13.8  12.3 - 15.4 % Final   • RDW-SD 03/15/2021 42.4  37.0 - 54.0 fl Final   • MPV 03/15/2021 10.7  6.0 - 12.0 fL Final   • Platelets 03/15/2021 244  140 - 450 10*3/mm3 Final   • Neutrophil % 03/15/2021 55.4  42.7 - 76.0 % Final   • Lymphocyte % 03/15/2021 27.3  19.6 - 45.3 % Final   • Monocyte % 03/15/2021 9.2  5.0 - 12.0 % Final   • Eosinophil % 03/15/2021 6.7* 0.3 - 6.2 % Final   • Basophil % 03/15/2021 0.7  0.0 - 1.5 % Final   • Immature Grans % 03/15/2021 0.7* 0.0 - 0.5 % Final   • Neutrophils, Absolute 03/15/2021 3.08  1.70 - 7.00 10*3/mm3 Final   • Lymphocytes, Absolute 03/15/2021 1.52  0.70 - 3.10 10*3/mm3 Final   • Monocytes, Absolute 03/15/2021 0.51  0.10 - 0.90 10*3/mm3 Final   • Eosinophils, Absolute 03/15/2021 0.37  0.00 - 0.40 10*3/mm3 Final   • Basophils, Absolute 03/15/2021 0.04  0.00 - 0.20 10*3/mm3 Final   • Immature Grans, Absolute 03/15/2021 0.04  0.00 - 0.05 10*3/mm3 Final   • nRBC 03/15/2021 0.0  0.0 - 0.2 /100 WBC Final       FOLLOWING LABS WERE REVIEWED TODAY:  CMP    CMP 7/28/20 3/10/21   Glucose 184 (A) 284 (A)   BUN 15 19   Creatinine 1.45 (A) 1.26 (A)   eGFR Non  Am 36 (A) 42 (A)   Sodium 141 137   Potassium 5.5 (A) 4.7   Chloride 101 98   Calcium 10.2 9.4   Albumin 4.70 4.60   Total Bilirubin 0.4 0.5   Alkaline Phosphatase 64 107   AST (SGOT) 108 (A) 100 (A)   ALT (SGPT) 54 (A) 47 (A)   (A) Abnormal value            CBC    CBC 8/4/20 3/10/21 3/15/21   WBC 6.46 8.06 5.56   RBC 4.40 4.29 4.06   Hemoglobin 12.2 11.4 (A) 10.9 (A)   Hematocrit 37.4 37.1 34.0   MCV 85.0 86.5 83.7   MCH 27.7 26.6 26.8   MCHC 32.6 30.7 (A) 32.1   RDW 13.2 13.1 13.8   Platelets 257 268  244   (A) Abnormal value            Lipid Panel    Lipid Panel 7/28/20 3/10/21   Total Cholesterol 99 118   Triglycerides 181 (A) 258 (A)   HDL Cholesterol 30 (A) 31 (A)   VLDL Cholesterol 36.2 40   LDL Cholesterol  33 47   LDL/HDL Ratio 1.09 1.14   (A) Abnormal value            TSH    TSH 7/28/20 3/10/21   TSH 5.990 (A) 3.620   (A) Abnormal value            Most Recent A1C    HGBA1C Most Recent 3/10/21   Hemoglobin A1C 8.6 (A)   (A) Abnormal value                Assessment/Plan   Diagnoses and all orders for this visit:    1. Type 2 diabetes mellitus with complication, without long-term current use of insulin (CMS/[facility]) (Primary)  -     metFORMIN ER (GLUCOPHAGE-XR) 500 MG 24 hr tablet; Take 2 tablets by mouth 2 (two) times a day.  Dispense: 360 tablet; Refill: 0  -     Empagliflozin (Jardiance) 25 MG tablet; Take 25 mg by mouth Daily.  Dispense: 30 tablet; Refill: 0  -     Continuous Blood Gluc  (Dexcom G5 Mobile ) device; 1 kit Daily.  Dispense: 1 each; Refill: 0  -     glipizide (GLUCOTROL XL) 5 MG ER tablet; Take 1 tablet by mouth Daily.  Dispense: 90 tablet; Refill: 1  -     Hemoglobin A1c    2. Mixed hyperlipidemia  -     rosuvastatin (CRESTOR) 20 MG tablet; Take 1 tablet by mouth every night at bedtime.  Dispense: 90 tablet; Refill: 1    3. Essential hypertension  -     propranolol LA (INDERAL LA) 60 MG 24 hr capsule; Take 1 capsule by mouth Daily.  Dispense: 90 capsule; Refill: 0  -     hydroCHLOROthiazide (HYDRODIURIL) 25 MG tablet; Take 1 tablet by mouth Daily.  Dispense: 90 tablet; Refill: 1  -     Comprehensive Metabolic Panel  -     CBC Auto Differential    4. Hypothyroidism due to acquired atrophy of thyroid  -     levothyroxine (SYNTHROID, LEVOTHROID) 88 MCG tablet; Take 1 tablet by mouth Daily.  Dispense: 90 tablet; Refill: 1    5. Gastroesophageal reflux disease without esophagitis  -     omeprazole (priLOSEC) 40 MG capsule; Take 1 capsule by mouth Daily.  Dispense: 90 capsule;  Refill: 1    6. Moderate episode of recurrent major depressive disorder (CMS/HCC)  -     escitalopram (LEXAPRO) 20 MG tablet; Take 1 tablet by mouth Daily.  Dispense: 90 tablet; Refill: 1    Other orders  -     Discontinue: Empagliflozin (Jardiance) 25 MG tablet; Take 25 mg by mouth Daily.  Dispense: 30 tablet; Refill: 0      I reviewed her medical problems and her medications.  Her recent blood work shows elevated hemoglobin A1c at 8.6, which is a significant worsening from 6.7 which was noted in 2020.  She is currently on Janumet and glipizide.  Due to poorly controlled diabetes I will be adjusting her medicines.  I gave her prescription for Metformin and she will continue glipizide as well and I will be starting her on Jardiance.  I also gave her prescription for diabetic supplies and she was advised to start monitoring blood sugar and call us with some readings in about 2 weeks.  The rest of her medical problems is stable and well-controlled and medication refills were given.  She will be reevaluated in 3 months or sooner if needed.          Return in about 3 months (around 2021) for Next scheduled follow up.    Requested Prescriptions     Signed Prescriptions Disp Refills   • metFORMIN ER (GLUCOPHAGE-XR) 500 MG 24 hr tablet 360 tablet 0     Sig: Take 2 tablets by mouth 2 (two) times a day.   • Empagliflozin (Jardiance) 25 MG tablet 30 tablet 0     Sig: Take 25 mg by mouth Daily.   • Continuous Blood Gluc  (Dexcom G5 Mobile ) device 1 each 0     Si kit Daily.   • rosuvastatin (CRESTOR) 20 MG tablet 90 tablet 1     Sig: Take 1 tablet by mouth every night at bedtime.   • propranolol LA (INDERAL LA) 60 MG 24 hr capsule 90 capsule 0     Sig: Take 1 capsule by mouth Daily.   • omeprazole (priLOSEC) 40 MG capsule 90 capsule 1     Sig: Take 1 capsule by mouth Daily.   • levothyroxine (SYNTHROID, LEVOTHROID) 88 MCG tablet 90 tablet 1     Sig: Take 1 tablet by mouth Daily.   •  hydroCHLOROthiazide (HYDRODIURIL) 25 MG tablet 90 tablet 1     Sig: Take 1 tablet by mouth Daily.   • glipizide (GLUCOTROL XL) 5 MG ER tablet 90 tablet 1     Sig: Take 1 tablet by mouth Daily.   • escitalopram (LEXAPRO) 20 MG tablet 90 tablet 1     Sig: Take 1 tablet by mouth Daily.

## 2021-03-24 RX ORDER — TAMOXIFEN CITRATE 20 MG/1
TABLET ORAL
Qty: 90 TABLET | Refills: 0 | Status: SHIPPED | OUTPATIENT
Start: 2021-03-24 | End: 2021-07-07

## 2021-06-01 NOTE — PROGRESS NOTES
Subjective Patient feeling considerably better, had started OTC iron therapy after last visit.    History of Present Illness      Patient is a 71-year-old female with oted in late May 2018 a lump developing in the upper outer quadrant of the left breast without pain or nipple discharge.  This measured approximately 1 cm in size.  Mammogram indicated this asymmetric density in the same region and an ultrasound revealed a 1.8 cm irregular solid mass.  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  Additional history included no previous breast biopsies, a brief period of time with hormonal replacement and no history of breast or ovarian cancer with family.  She was seen by Dr. Zazueta on the 28th an MRI of both breasts was performed July 6.  Within the left anterior one third at the 12:30 position 3 cm from the nipple with irregular enhancing mass measuring 1.6 cm x 1.4 and 2.2 immediately lateral dimension.  There are other findings were seen in the left breast with no evidence of left axillary adenopathy and no findings suspicious for right breast.  The patient proceeded to a left breast lumpectomy July 18, 2018.      Pathologic findings were consistent with a 2.5 cm grade 3 invasive mammary ductal carcinoma with associated DCIS.  The closest margin was 1 mm.  Malone nodes were negative staging of pT2N0.  Dr. Zazueta saw the patient July 20 recognizing the closest margin was the anterior margin having taken this off the skin with no further removal possible.  There was concern about the need for additional tissue though the addended pathology revealed the DCIS to be intermediate grade.  It was determined not to take additional margins and have her seen by both medical oncology and radiation therapy.  She now presents with her  .      The patient's initial consultation was August 7 and potential adjuvant therapy discussed  with the initial recommendation being an Oncotype DX analysis to be process.  This is now reviewed with the patient and her  may return August 23, 2018.  Her recurrence score 10 with 10 year risk of distance recurrence at 7%.  She is clearly in the low risk category additional studies revealing ER score of 10.5, MO score of 8.6 which are both positive and HER-2 score of 8.7 which is negative.   Additional studies include  LH of 27.5, FSH of 57.6, estradiol of 9.9, CA 15-3 of 10.9, TSH of 4.64 hemoglobin A1c of 8.72.   The patient is advised of the findings per her risk of recurrence and she and her  are understandably elated.  Chemotherapy is not recommended in her circumstance but anti-hormonal therapy is and we have discussed potential treatment with AI therapy being the most appropriate choice in this postmenopausal patient.  She's not additionally had any recent assessment for bone density, however we discussed having this done in the near future.     As result the patient was scheduled for bone density and this was obtained September 6 revealing evidence of osteoporosis involving the lumbar with T score of -3.4 and right hip with T score of -2.7.  The patient has been using Arimidex which we'll discontinue and we discussed institution of tamoxifen at this point.  She'll also need assessment of her vitamin D level which we went on to measure documenting a level of 45.5.                                      The patient is now seen a month after switching to tamoxifen and is tolerating it well thus far without significant hot flashes.  We have also discussed the use of Reclast under the circumstances and she is agreeable to treatment when seen October 29, 2018.   She did have myalgias and arthralgias following Reclast for several days but these then resolved.  As she seen January 21, 2019 she is feeling well and we have discussed weight loss, exercise and also she and her 's recognition of  slowly worsening bilateral hand tremor left greater than right.  This been present much before her diagnosis of breast cancer and actually is an issue in several members of her family.   The patient is next seen August 1, 2019.  In the interval she been found to have elevated liver function tests and ultimately liver biopsy that was significant for mild steatohepatitis.  She has been adjusting her diet but indicates that she is also has significant fatigue and while these might be related she believes the fatigue is in part secondary to tamoxifen.  Follow-up testing included total cholesterol 113, triglycerides of 263 with HDL down to 24, VLDL at 52.6, ferritin of 298, normal iron profile, CMP with glucose 193, creatinine 1.36 and hemoglobin A1c of 7.10.  She notes that her fatigue is not improved off tamoxifen.  We have discussed her findings indicating better control needed for her diabetes and thyroid dysfunction and also went on to treat a urinary tract infection.  She did see primary care who adjusted medications for thyroid hypertension.  The patient when seen December 12, 2019 feels considerably better and is thrilled to see the difference in her functioning.  She is having no difficulty with tamoxifen at this point.  The patient was seen by the NP August 7, 2020 feeling well.  She did undergo subsequent mammogram and DEXA scan with the latter (performed October 14, 2020) demonstrating a lumbar T score -2.4 (increased), left hip femoral neck T score -2.5 (unchanged) and right femoral neck T score of -1.8 (increased).  Again this was substantial increase concerning osteoporosis and bone density particular in the left hip.  The patient is additional mammography October 14 showed no evidence recurrent malignancy.     She is next seen March 15, 2021 again noting that she had started AI therapy August 23, 2018 but when her bone density was consistent with osteoporosis we switched to tamoxifen beginning September  24, 2018.  Fortunately she continues to feel well overall though she did have a fall recently possibly in part related to reaction after recent COVID-19 vaccination-not completed.       The patient is next seen 6/7/2021.  She is feeling well having started OTC iron therapy and vitamin supplements on her own.  Fortunately she is feeling well though has not been following her diet very consistently.  Past Medical History:   Diagnosis Date   • Anxiety    • Anxiety and depression    • Asthma     SEASONAL ALLERGY RELATED   • Cancer of breast (CMS/HCC) 06/2018    LEFT   • Chronic kidney disease     Renal cyst, right kidney w/urology workup in past   • Depression    • Diabetes mellitus (CMS/HCC)     Type 2   • Disease of thyroid gland    • GERD (gastroesophageal reflux disease)    • History of prior pregnancies     x2   • History of transfusion     S/P HYST --AUTOLOGOUS    • Hyperlipidemia    • Hypertension    • IBS (irritable bowel syndrome)    • Seasonal allergies         Past Surgical History:   Procedure Laterality Date   • BREAST LUMPECTOMY WITH SENTINEL NODE BIOPSY Left 7/18/2018    Procedure: BREAST LUMPECTOMY WITH SENTINEL NODE BIOPSY;  Surgeon: João Zazueta MD;  Location: [facility];  Service: General   • COLONOSCOPY      refusing; negative cologuard 8/24/2017   • HYSTERECTOMY  1989   • KNEE SURGERY Right 2014   • KNEE SURGERY Left 2016   • LIVER BIOPSY  2019    fatty liver   • ROTATOR CUFF REPAIR Left 2009   • ROTATOR CUFF REPAIR Right 2012   • TOE SURGERY  2009    ingrown         Current Outpatient Medications on File Prior to Visit   Medication Sig Dispense Refill   • albuterol (PROAIR RESPICLICK) 108 (90 Base) MCG/ACT inhaler Inhale 1 puff Every 4 (Four) Hours As Needed for Wheezing or Shortness of Air.     • B Complex Vitamins (VITAMIN B COMPLEX PO) Take 1 tablet by mouth Every Night.     • cetirizine (zyrTEC) 10 MG tablet Take 10 mg by mouth Daily.     • cholecalciferol (VITAMIN D3) 1000 units  tablet Take 1,000 Units by mouth Every Night.     • Continuous Blood Gluc  (Dexcom G5 Mobile ) device 1 kit Daily. 1 each 0   • Empagliflozin (Jardiance) 25 MG tablet Take 25 mg by mouth Daily. 30 tablet 0   • escitalopram (LEXAPRO) 20 MG tablet Take 1 tablet by mouth Daily. 90 tablet 1   • fluticasone (FLONASE) 50 MCG/ACT nasal spray 2 sprays into each nostril Daily.     • glipizide (GLUCOTROL XL) 5 MG ER tablet Take 1 tablet by mouth Daily. 90 tablet 1   • hydroCHLOROthiazide (HYDRODIURIL) 25 MG tablet Take 1 tablet by mouth Daily. 90 tablet 1   • levothyroxine (SYNTHROID, LEVOTHROID) 88 MCG tablet Take 1 tablet by mouth Daily. 90 tablet 1   • loperamide (IMODIUM A-D) 2 MG tablet Take 2 mg by mouth 4 (Four) Times a Day As Needed for Diarrhea.     • melatonin 5 MG tablet tablet Take 10 mg by mouth At Night As Needed.     • metFORMIN ER (GLUCOPHAGE-XR) 500 MG 24 hr tablet Take 2 tablets by mouth 2 (two) times a day. 360 tablet 0   • omeprazole (priLOSEC) 40 MG capsule Take 1 capsule by mouth Daily. 90 capsule 1   • propranolol LA (INDERAL LA) 60 MG 24 hr capsule Take 1 capsule by mouth Daily. 90 capsule 0   • rosuvastatin (CRESTOR) 20 MG tablet Take 1 tablet by mouth every night at bedtime. 90 tablet 1   • tamoxifen (NOLVADEX) 20 MG chemo tablet TAKE ONE TABLET BY MOUTH DAILY 90 tablet 0     No current facility-administered medications on file prior to visit.        ALLERGIES:    Allergies   Allergen Reactions   • Amlodipine Swelling        Social History     Socioeconomic History   • Marital status:      Spouse name: Mahin   • Number of children: 2   • Years of education: College   • Highest education level: Not on file   Tobacco Use   • Smoking status: Never Smoker   • Smokeless tobacco: Never Used   Substance and Sexual Activity   • Alcohol use: Yes     Alcohol/week: 1.0 standard drinks     Types: 1 Cans of beer per week     Comment: 2-3 a month, social only   • Drug use: No   • Sexual  "activity: Yes     Comment:         Family History   Problem Relation Age of Onset   • Diabetes Mother    • Heart attack Mother    • Heart failure Mother    • Hyperlipidemia Mother    • Hyperthyroidism Mother         Has surgery   • Hypothyroidism Mother         Later in life   • Heart disease Mother    • Hypertension Mother    • Asthma Father    • COPD Father    • Hypertension Father    • Heart attack Maternal Uncle    • Heart failure Maternal Uncle    • Depression Maternal Grandmother    • Heart attack Maternal Grandfather    • Heart failure Maternal Grandfather    • Alcohol abuse Paternal Grandmother    • Alcohol abuse Paternal Grandfather    • Scleroderma Sister    • No Known Problems Daughter    • No Known Problems Son    • Malig Hyperthermia Neg Hx         Review of Systems   Constitutional: Negative for fatigue and unexpected weight change.        Modest hot flashes   Respiratory: Negative for chest tightness, shortness of breath and wheezing.    Gastrointestinal: Negative for abdominal pain, constipation, diarrhea, nausea and vomiting.   Skin:        Pruritus   Neurological: Positive for tremors and headaches. Negative for weakness.   All other systems reviewed and are negative.         Objective     Vitals:    06/07/21 1056   BP: 121/73   Pulse: 98   Resp: 18   Temp: 97.8 °F (36.6 °C)   TempSrc: Temporal   SpO2: 96%   Weight: 76.4 kg (168 lb 6.4 oz)   Height: 160 cm (62.99\")   PainSc: 0-No pain     Current Status 6/7/2021   ECOG score 0       Physical Exam   Constitutional: She is oriented to person, place, and time. She appears well-developed.   HENT:   Head: Normocephalic and atraumatic.   Nose: Nose normal.   Eyes: Pupils are equal, round, and reactive to light. Conjunctivae are normal.   Cardiovascular: Normal rate, regular rhythm and normal heart sounds.   Pulmonary/Chest: Effort normal and breath sounds normal.   Abdominal: Soft. Bowel sounds are normal.   Musculoskeletal: Normal range of " motion.   Neurological: She is alert and oriented to person, place, and time.   Resting tremor noted left greater than right upper extremities   Skin: Skin is warm and dry.   Psychiatric: Her behavior is normal. Judgment and thought content normal.     Breast exam: Patient status post left breast lumpectomy well healing without evidence of inflammation or discharge from wound, status post left sentinel node assessment also without inflammation or discharge    RECENT LABS:  Hematology WBC   Date Value Ref Range Status   06/07/2021 5.89 3.40 - 10.80 10*3/mm3 Final     RBC   Date Value Ref Range Status   06/07/2021 4.77 3.77 - 5.28 10*6/mm3 Final     Hemoglobin   Date Value Ref Range Status   06/07/2021 12.5 12.0 - 15.9 g/dL Final     Hematocrit   Date Value Ref Range Status   06/07/2021 39.2 34.0 - 46.6 % Final     Platelets   Date Value Ref Range Status   06/07/2021 285 140 - 450 10*3/mm3 Final      BONE MINERAL DENSITOMETRY    October 14, 2020     HISTORY:  70 years-old female. Menopause at age 63. Previous diagnosis  of osteoporosis and breast cancer.     COMPARISON:  09/06/2018.     TECHNIQUE: The T score compares the patient's bone mineral density with  the peak bone mass of young normal patients. Patients with T-scores  between 1.0 and 2.5 standard deviations below the mean are osteopenic.  Patients with T-scores greater than 2.5 standard deviation below the  mean are osteoporotic.     The Z score compares the patient's bone mineral density with sex and age  matched patients. Z score of -2.0 and lower is an indication of low  mineral density for the patient's age.     FINDINGS: Lumbar T score is  -2.4, representing a 17.7% interval  increase..     Left hip density is lowest at the  femoral neck where the T score is  -2.5, unchanged.      Right hip density is lowest at the  femoral neck where the T score is  -1.8, representing a 19.8% interval increase.      IMPRESSION:  Osteoporosis at the left hip. Interval  increase in bone  Density.      Assessment/Plan       71 year-old female with previous medical history of seasonal allergies, diabetes mellitus type 2, CKD3, hypothyroidism, hyperlipidemia, hypertension, IBS, history of anxiety and depression with recent development of left breast abnormality found by the patient herself.  This led to mammogram ultrasound and stereotactic biopsy confirming invasive ductal carcinoma grade 2 though ER, IN positive HER-2 negative.  Subsequent assessments via surgical review your no additional abnormalities seen on breast MRI and the patient proceeded to lumpectomy July 18, 2018.  Her pathologic findings were consistent with a 2.5 cm grade 3 invasive mammary ductal carcinoma with associated DCIS.  The closest margin was 1 mm.  [place] nodes were negative with staging of pT2N0.  Dr. Zazueta saw the patient July 20 recognizing the closest margin was the anterior margin having taken this off the skin with no further removal possible.  There was concern about the need for additional tissue though the addended pathology revealed the DCIS to be intermediate grade.  It was ultimately determined that further surgery was not necessary.   The patient presents for medical oncology assessment and we discussed potential adjuvant therapy but in particular it is felt the patient requires further genomic assessment with Oncotype DX analysis.  We reviewed this in detail and she understands the additional information that we could obtain from such an approach in making decisions about adjuvant therapy. We proceeded with additional assessment including Oncotype and laboratory studies that, fortunate, revealed that she has an excellent prognosis including a 7% risk of recurrence at 10 years with the use of tamoxifen.  Chemotherapy, therefore, is not appropriate and we have discussed an alternative therapy in this postmenopausal patient with AI therapy in particular her Arimidex over 5 years.  She is  currently undergoing review by radiation therapy and this can proceed at any point as we also plan to initiate Arimidex with a trial of the medication given over the next month.  We went on to have the patient tested for bone density finding evidence, unfortunate, of osteoporosis.  She's been tolerating Arimidex well without discontinue this and institute Tamoxifen.  The patient went on to institute treatment and underwent testing for TSH, vitamin D level and serum chemistries.  These are found to be acceptable and she now next returns October 29 tolerating tamoxifen well. We went on to continue with Reclast at that visit.  The patient did have myalgias and arthralgias following the treatment for several days that resolved.     As she is seen January 21, 2019 she is tolerating tamoxifen overall well though we have discussed that she should continue to exercise, manage her diet and try to achieve weight loss overall.  Additionally she has what appears to be essential tremor left greater than right.  Plans to see her primary care to review these issues but will start exercising immediately.  We'll plan to see her back here in approximately 6 months, plan repeat Reclast in late October and then yearly follow-ups as she completes 5 years of tamoxifen.    The patient is next seen August 1, 2019 with complaints of fatigue and recent diagnostics that are consistent with mild to moderate steatohepatitis on liver biopsy.  It is not felt likely tamoxifen is the major issue here but we do plan to hold it briefly to see if she does not improve as we investigate both her liver function tests and etiology of her mild anemia.  It is hoped this might improve her degree of fatigue.  We plan additional laboratory studies as above we have discussed results with she and her  in some detail when seen September 12, 2019.  She needs better control of her diabetes as well as thyroid dysfunction and is urged to return to primary care  to be assessed.  A copy this note will be sent to Dr. Whitfield and she will be asked otherwise to restart tamoxifen assessment approximately 3 months.    The patient was found to have symptoms of UTI and ultimately was treated with ciprofloxacin.  Thereafter she has follow-up with primary care and more effectively treated her blood pressure and thyroid dysfunction.       The patient is followed at 6-month intervals  August 7, 2020 feeling well.  She did undergo subsequent mammogram and DEXA scan with the latter (performed October 14, 2020) demonstrating a lumbar T score -2.4 (increased), left hip femoral neck T score -2.5 (unchanged) and right femoral neck T score of -1.8 (increased).  Again this was substantial increase concerning osteoporosis and bone density particular in the left hip.  The patient is additional mammography October 14 showed no evidence recurrent malignancy.     She is next seen March 15, 2021 again noting that she had started AI therapy August 23, 2018 but when her bone density was consistent with osteoporosis we switched to tamoxifen beginning September 24, 2018.  Fortunately the patient is doing fairly well when assessed March 15, 2021.  Plan to continue her current treatment plans and she agrees.  She does have a degree of mild worsening of her anemia and we have agreed to a follow-up 3-month assessment.     The patient assessed at 3 months later-6/7/2021-demonstrates normalization of hemoglobin hematocrit.  She will be asked to continue tamoxifen with a 6-month follow-up                                                                                                                                          *Continue current medications    *23 weeks laboratory studies including CBC, iron studies and CMP    *24 weeks MD follow-up

## 2021-06-07 ENCOUNTER — LAB (OUTPATIENT)
Dept: LAB | Facility: HOSPITAL | Age: 71
End: 2021-06-07

## 2021-06-07 ENCOUNTER — OFFICE VISIT (OUTPATIENT)
Dept: ONCOLOGY | Facility: CLINIC | Age: 71
End: 2021-06-07

## 2021-06-07 VITALS
HEIGHT: 63 IN | RESPIRATION RATE: 18 BRPM | SYSTOLIC BLOOD PRESSURE: 121 MMHG | BODY MASS INDEX: 29.84 KG/M2 | TEMPERATURE: 97.8 F | HEART RATE: 98 BPM | DIASTOLIC BLOOD PRESSURE: 73 MMHG | OXYGEN SATURATION: 96 % | WEIGHT: 168.4 LBS

## 2021-06-07 DIAGNOSIS — D64.9 ANEMIA, UNSPECIFIED TYPE: ICD-10-CM

## 2021-06-07 DIAGNOSIS — D64.9 ANEMIA, UNSPECIFIED TYPE: Primary | ICD-10-CM

## 2021-06-07 DIAGNOSIS — C50.912 INFILTRATING DUCTAL CARCINOMA OF LEFT BREAST (HCC): ICD-10-CM

## 2021-06-07 LAB
ALBUMIN SERPL-MCNC: 4.4 G/DL (ref 3.5–5.2)
ALBUMIN/GLOB SERPL: 1.3 G/DL (ref 1.1–2.4)
ALP SERPL-CCNC: 138 U/L (ref 38–116)
ALT SERPL W P-5'-P-CCNC: 52 U/L (ref 0–33)
ANION GAP SERPL CALCULATED.3IONS-SCNC: 11 MMOL/L (ref 5–15)
AST SERPL-CCNC: 107 U/L (ref 0–32)
BASOPHILS # BLD AUTO: 0.03 10*3/MM3 (ref 0–0.2)
BASOPHILS NFR BLD AUTO: 0.5 % (ref 0–1.5)
BILIRUB SERPL-MCNC: 0.7 MG/DL (ref 0.2–1.2)
BUN SERPL-MCNC: 20 MG/DL (ref 6–20)
BUN/CREAT SERPL: 15.5 (ref 7.3–30)
CALCIUM SPEC-SCNC: 10.2 MG/DL (ref 8.5–10.2)
CHLORIDE SERPL-SCNC: 93 MMOL/L (ref 98–107)
CO2 SERPL-SCNC: 28 MMOL/L (ref 22–29)
CREAT SERPL-MCNC: 1.29 MG/DL (ref 0.6–1.1)
DEPRECATED RDW RBC AUTO: 40.6 FL (ref 37–54)
EOSINOPHIL # BLD AUTO: 0.14 10*3/MM3 (ref 0–0.4)
EOSINOPHIL NFR BLD AUTO: 2.4 % (ref 0.3–6.2)
ERYTHROCYTE [DISTWIDTH] IN BLOOD BY AUTOMATED COUNT: 13.6 % (ref 12.3–15.4)
GFR SERPL CREATININE-BSD FRML MDRD: 41 ML/MIN/1.73
GLOBULIN UR ELPH-MCNC: 3.4 GM/DL (ref 1.8–3.5)
GLUCOSE SERPL-MCNC: 495 MG/DL (ref 74–124)
HCT VFR BLD AUTO: 39.2 % (ref 34–46.6)
HGB BLD-MCNC: 12.5 G/DL (ref 12–15.9)
IMM GRANULOCYTES # BLD AUTO: 0.02 10*3/MM3 (ref 0–0.05)
IMM GRANULOCYTES NFR BLD AUTO: 0.3 % (ref 0–0.5)
LYMPHOCYTES # BLD AUTO: 1.49 10*3/MM3 (ref 0.7–3.1)
LYMPHOCYTES NFR BLD AUTO: 25.3 % (ref 19.6–45.3)
MCH RBC QN AUTO: 26.2 PG (ref 26.6–33)
MCHC RBC AUTO-ENTMCNC: 31.9 G/DL (ref 31.5–35.7)
MCV RBC AUTO: 82.2 FL (ref 79–97)
MONOCYTES # BLD AUTO: 0.32 10*3/MM3 (ref 0.1–0.9)
MONOCYTES NFR BLD AUTO: 5.4 % (ref 5–12)
NEUTROPHILS NFR BLD AUTO: 3.89 10*3/MM3 (ref 1.7–7)
NEUTROPHILS NFR BLD AUTO: 66.1 % (ref 42.7–76)
NRBC BLD AUTO-RTO: 0 /100 WBC (ref 0–0.2)
PLATELET # BLD AUTO: 285 10*3/MM3 (ref 140–450)
PMV BLD AUTO: 10.3 FL (ref 6–12)
POTASSIUM SERPL-SCNC: 5.4 MMOL/L (ref 3.5–4.7)
PROT SERPL-MCNC: 7.8 G/DL (ref 6.3–8)
RBC # BLD AUTO: 4.77 10*6/MM3 (ref 3.77–5.28)
SODIUM SERPL-SCNC: 132 MMOL/L (ref 134–145)
WBC # BLD AUTO: 5.89 10*3/MM3 (ref 3.4–10.8)

## 2021-06-07 PROCEDURE — 99213 OFFICE O/P EST LOW 20 MIN: CPT | Performed by: INTERNAL MEDICINE

## 2021-06-07 PROCEDURE — 36415 COLL VENOUS BLD VENIPUNCTURE: CPT

## 2021-06-07 PROCEDURE — 80053 COMPREHEN METABOLIC PANEL: CPT

## 2021-06-07 PROCEDURE — 85025 COMPLETE CBC W/AUTO DIFF WBC: CPT

## 2021-06-14 ENCOUNTER — LAB (OUTPATIENT)
Dept: FAMILY MEDICINE CLINIC | Facility: CLINIC | Age: 71
End: 2021-06-14

## 2021-06-14 LAB
ALBUMIN SERPL-MCNC: 4.1 G/DL (ref 3.5–5.2)
ALBUMIN/GLOB SERPL: 1.4 G/DL
ALP SERPL-CCNC: 138 U/L (ref 39–117)
ALT SERPL W P-5'-P-CCNC: 57 U/L (ref 1–33)
ANION GAP SERPL CALCULATED.3IONS-SCNC: 9.5 MMOL/L (ref 5–15)
AST SERPL-CCNC: 74 U/L (ref 1–32)
BASOPHILS # BLD AUTO: 0.04 10*3/MM3 (ref 0–0.2)
BASOPHILS NFR BLD AUTO: 0.7 % (ref 0–1.5)
BILIRUB SERPL-MCNC: 0.4 MG/DL (ref 0–1.2)
BUN SERPL-MCNC: 17 MG/DL (ref 8–23)
BUN/CREAT SERPL: 14.2 (ref 7–25)
CALCIUM SPEC-SCNC: 9.3 MG/DL (ref 8.6–10.5)
CHLORIDE SERPL-SCNC: 96 MMOL/L (ref 98–107)
CO2 SERPL-SCNC: 27.5 MMOL/L (ref 22–29)
CREAT SERPL-MCNC: 1.2 MG/DL (ref 0.57–1)
DEPRECATED RDW RBC AUTO: 41.3 FL (ref 37–54)
EOSINOPHIL # BLD AUTO: 0.14 10*3/MM3 (ref 0–0.4)
EOSINOPHIL NFR BLD AUTO: 2.4 % (ref 0.3–6.2)
ERYTHROCYTE [DISTWIDTH] IN BLOOD BY AUTOMATED COUNT: 13.5 % (ref 12.3–15.4)
GFR SERPL CREATININE-BSD FRML MDRD: 44 ML/MIN/1.73
GLOBULIN UR ELPH-MCNC: 3 GM/DL
GLUCOSE SERPL-MCNC: 436 MG/DL (ref 65–99)
HBA1C MFR BLD: 13.9 % (ref 3.5–5.6)
HCT VFR BLD AUTO: 38.8 % (ref 34–46.6)
HGB BLD-MCNC: 12.1 G/DL (ref 12–15.9)
IMM GRANULOCYTES # BLD AUTO: 0.02 10*3/MM3 (ref 0–0.05)
IMM GRANULOCYTES NFR BLD AUTO: 0.3 % (ref 0–0.5)
LYMPHOCYTES # BLD AUTO: 1.85 10*3/MM3 (ref 0.7–3.1)
LYMPHOCYTES NFR BLD AUTO: 32.3 % (ref 19.6–45.3)
MCH RBC QN AUTO: 26.5 PG (ref 26.6–33)
MCHC RBC AUTO-ENTMCNC: 31.2 G/DL (ref 31.5–35.7)
MCV RBC AUTO: 84.9 FL (ref 79–97)
MONOCYTES # BLD AUTO: 0.43 10*3/MM3 (ref 0.1–0.9)
MONOCYTES NFR BLD AUTO: 7.5 % (ref 5–12)
NEUTROPHILS NFR BLD AUTO: 3.25 10*3/MM3 (ref 1.7–7)
NEUTROPHILS NFR BLD AUTO: 56.8 % (ref 42.7–76)
NRBC BLD AUTO-RTO: 0 /100 WBC (ref 0–0.2)
PLATELET # BLD AUTO: 243 10*3/MM3 (ref 140–450)
PMV BLD AUTO: 11.6 FL (ref 6–12)
POTASSIUM SERPL-SCNC: 4.9 MMOL/L (ref 3.5–5.2)
PROT SERPL-MCNC: 7.1 G/DL (ref 6–8.5)
RBC # BLD AUTO: 4.57 10*6/MM3 (ref 3.77–5.28)
SODIUM SERPL-SCNC: 133 MMOL/L (ref 136–145)
WBC # BLD AUTO: 5.73 10*3/MM3 (ref 3.4–10.8)

## 2021-06-14 PROCEDURE — 83036 HEMOGLOBIN GLYCOSYLATED A1C: CPT | Performed by: FAMILY MEDICINE

## 2021-06-14 PROCEDURE — 80053 COMPREHEN METABOLIC PANEL: CPT | Performed by: FAMILY MEDICINE

## 2021-06-14 PROCEDURE — 85025 COMPLETE CBC W/AUTO DIFF WBC: CPT | Performed by: FAMILY MEDICINE

## 2021-06-14 PROCEDURE — 36415 COLL VENOUS BLD VENIPUNCTURE: CPT | Performed by: FAMILY MEDICINE

## 2021-06-17 ENCOUNTER — OFFICE VISIT (OUTPATIENT)
Dept: FAMILY MEDICINE CLINIC | Facility: CLINIC | Age: 71
End: 2021-06-17

## 2021-06-17 VITALS
HEIGHT: 63 IN | BODY MASS INDEX: 29.77 KG/M2 | HEART RATE: 91 BPM | OXYGEN SATURATION: 97 % | SYSTOLIC BLOOD PRESSURE: 129 MMHG | RESPIRATION RATE: 16 BRPM | TEMPERATURE: 97.7 F | WEIGHT: 168 LBS | DIASTOLIC BLOOD PRESSURE: 85 MMHG

## 2021-06-17 DIAGNOSIS — E03.9 ACQUIRED HYPOTHYROIDISM: ICD-10-CM

## 2021-06-17 DIAGNOSIS — E11.65 UNCONTROLLED TYPE 2 DIABETES MELLITUS WITH HYPERGLYCEMIA (HCC): Primary | ICD-10-CM

## 2021-06-17 DIAGNOSIS — I10 ESSENTIAL HYPERTENSION: ICD-10-CM

## 2021-06-17 DIAGNOSIS — E78.2 MIXED HYPERLIPIDEMIA: ICD-10-CM

## 2021-06-17 PROBLEM — E03.4 HYPOTHYROIDISM DUE TO ACQUIRED ATROPHY OF THYROID: Status: RESOLVED | Noted: 2018-08-07 | Resolved: 2021-06-17

## 2021-06-17 LAB — GLUCOSE BLDC GLUCOMTR-MCNC: 391 MG/DL (ref 70–130)

## 2021-06-17 PROCEDURE — 99214 OFFICE O/P EST MOD 30 MIN: CPT | Performed by: FAMILY MEDICINE

## 2021-06-17 RX ORDER — BLOOD-GLUCOSE,RECEIVER,CONT
1 EACH MISCELLANEOUS DAILY
Qty: 1 EACH | Refills: 0 | Status: SHIPPED | OUTPATIENT
Start: 2021-06-17

## 2021-06-17 RX ORDER — PROPRANOLOL HCL 60 MG
60 CAPSULE, EXTENDED RELEASE 24HR ORAL DAILY
Qty: 90 CAPSULE | Refills: 0 | Status: SHIPPED | OUTPATIENT
Start: 2021-06-17 | End: 2021-09-16

## 2021-06-17 RX ORDER — METFORMIN HYDROCHLORIDE 500 MG/1
1000 TABLET, EXTENDED RELEASE ORAL 2 TIMES DAILY
Qty: 360 TABLET | Refills: 0 | Status: SHIPPED | OUTPATIENT
Start: 2021-06-17 | End: 2022-03-28

## 2021-06-17 RX ORDER — INSULIN DEGLUDEC 200 U/ML
10 INJECTION, SOLUTION SUBCUTANEOUS
Qty: 3 PEN | Refills: 0 | COMMUNITY
Start: 2021-06-17 | End: 2021-06-24 | Stop reason: SDUPTHER

## 2021-06-17 NOTE — PROGRESS NOTES
Subjective   Chief Complaint   Patient presents with   • Follow-up     3 month   • Diabetes   • Hypertension   • Hypothyroidism   • Hyperlipidemia     Mariana Ansari is a 71 y.o. female.     Patient Care Team:  Luna Whitfield MD as PCP - General (Family Medicine)  Luna Whitfield MD as PCP - Family Medicine  Michel Guajardo MD as Consulting Physician (Hematology and Oncology)  Luna Whitfield MD as Referring Physician (Family Medicine)    She is coming in today to follow-up on her medical problems and her recent blood work results.  Her recent blood work shows very poorly controlled diabetes and significant worsening of her hemoglobin A1c.  She is currently on Metformin and glipizide.  She was also started on Jardiance in 3/2021.  She however tells me today that she only took samples for about a month and then she never really picked up prescription at the pharmacy due to financial reasons.  She never notified the office about that.  She has not been monitoring her blood sugar at home and she is not really sure why.  She tells me that over the last few/several weeks she has noted some increased thirst and increased urination, she somehow feels fatigued.  She denies any chest pains or shortness of breath.       The following portions of the patient's history were reviewed and updated as appropriate: allergies, current medications, past family history, past medical history, past social history, past surgical history and problem list.  Past Medical History:   Diagnosis Date   • Anxiety    • Anxiety and depression    • Asthma     SEASONAL ALLERGY RELATED   • Cancer of breast (CMS/HCC) 06/2018    LEFT   • Chronic kidney disease     Renal cyst, right kidney w/urology workup in past   • Depression    • Diabetes mellitus (CMS/[facility])     Type 2   • Disease of thyroid gland    • GERD (gastroesophageal reflux disease)    • History of prior pregnancies     x2   • History of transfusion     S/P HYST --AUTOLOGOUS    •  Hyperlipidemia    • Hypertension    • IBS (irritable bowel syndrome)    • Seasonal allergies      Past Surgical History:   Procedure Laterality Date   • BREAST LUMPECTOMY WITH SENTINEL NODE BIOPSY Left 7/18/2018    Procedure: BREAST LUMPECTOMY WITH SENTINEL NODE BIOPSY;  Surgeon: João Zazueta MD;  Location: [facility] OR;  Service: General   • COLONOSCOPY      refusing; negative cologuard 8/24/2017   • HYSTERECTOMY  1989   • KNEE SURGERY Right 2014   • KNEE SURGERY Left 2016   • LIVER BIOPSY  2019    fatty liver   • ROTATOR CUFF REPAIR Left 2009   • ROTATOR CUFF REPAIR Right 2012   • TOE SURGERY  2009    ingrown      The patient has a family history of  Family History   Problem Relation Age of Onset   • Diabetes Mother    • Heart attack Mother    • Heart failure Mother    • Hyperlipidemia Mother    • Hyperthyroidism Mother         Has surgery   • Hypothyroidism Mother         Later in life   • Heart disease Mother    • Hypertension Mother    • Asthma Father    • COPD Father    • Hypertension Father    • Heart attack Maternal Uncle    • Heart failure Maternal Uncle    • Depression Maternal Grandmother    • Heart attack Maternal Grandfather    • Heart failure Maternal Grandfather    • Alcohol abuse Paternal Grandmother    • Alcohol abuse Paternal Grandfather    • Scleroderma Sister    • No Known Problems Daughter    • No Known Problems Son    • Malig Hyperthermia Neg Hx      Social History     Socioeconomic History   • Marital status:      Spouse name: Mahin   • Number of children: 2   • Years of education: College   • Highest education level: Not on file   Tobacco Use   • Smoking status: Never Smoker   • Smokeless tobacco: Never Used   Substance and Sexual Activity   • Alcohol use: Yes     Alcohol/week: 1.0 standard drinks     Types: 1 Cans of beer per week     Comment: 2-3 a month, social only   • Drug use: No   • Sexual activity: Yes     Comment:        Review of Systems   Constitutional:  "Negative for activity change, fatigue and fever.   Respiratory: Negative for cough, shortness of breath and wheezing.    Cardiovascular: Negative for chest pain, palpitations and leg swelling.   Gastrointestinal: Negative for constipation, diarrhea and indigestion.   Endocrine: Positive for polydipsia and polyphagia. Negative for cold intolerance and heat intolerance.   Skin: Negative for color change, dry skin and rash.   Neurological: Negative for tremors and headache.     Visit Vitals  /85 (BP Location: Left arm, Patient Position: Sitting, Cuff Size: Adult)   Pulse 91   Temp 97.7 °F (36.5 °C) (Temporal)   Resp 16   Ht 160 cm (63\")   Wt 76.2 kg (168 lb)   SpO2 97%   BMI 29.76 kg/m²       Current Outpatient Medications:   •  albuterol (PROAIR RESPICLICK) 108 (90 Base) MCG/ACT inhaler, Inhale 1 puff Every 4 (Four) Hours As Needed for Wheezing or Shortness of Air., Disp: , Rfl:   •  B Complex Vitamins (VITAMIN B COMPLEX PO), Take 1 tablet by mouth Every Night., Disp: , Rfl:   •  cetirizine (zyrTEC) 10 MG tablet, Take 10 mg by mouth Daily., Disp: , Rfl:   •  cholecalciferol (VITAMIN D3) 1000 units tablet, Take 1,000 Units by mouth Every Night., Disp: , Rfl:   •  Continuous Blood Gluc  (Dexcom G5 Mobile ) device, 1 kit Daily., Disp: 1 each, Rfl: 0  •  escitalopram (LEXAPRO) 20 MG tablet, Take 1 tablet by mouth Daily., Disp: 90 tablet, Rfl: 1  •  fluticasone (FLONASE) 50 MCG/ACT nasal spray, 2 sprays into each nostril Daily., Disp: , Rfl:   •  glipizide (GLUCOTROL XL) 5 MG ER tablet, Take 1 tablet by mouth Daily., Disp: 90 tablet, Rfl: 1  •  hydroCHLOROthiazide (HYDRODIURIL) 25 MG tablet, Take 1 tablet by mouth Daily., Disp: 90 tablet, Rfl: 1  •  Insulin Degludec (Tresiba FlexTouch) 200 UNIT/ML solution pen-injector pen injection, Inject 10 Units under the skin into the appropriate area as directed every night at bedtime., Disp: 3 pen, Rfl: 0  •  levothyroxine (SYNTHROID, LEVOTHROID) 88 MCG tablet, " Take 1 tablet by mouth Daily., Disp: 90 tablet, Rfl: 1  •  loperamide (IMODIUM A-D) 2 MG tablet, Take 2 mg by mouth 4 (Four) Times a Day As Needed for Diarrhea., Disp: , Rfl:   •  melatonin 5 MG tablet tablet, Take 10 mg by mouth At Night As Needed., Disp: , Rfl:   •  metFORMIN ER (GLUCOPHAGE-XR) 500 MG 24 hr tablet, Take 2 tablets by mouth 2 (two) times a day., Disp: 360 tablet, Rfl: 0  •  omeprazole (priLOSEC) 40 MG capsule, Take 1 capsule by mouth Daily., Disp: 90 capsule, Rfl: 1  •  propranolol LA (INDERAL LA) 60 MG 24 hr capsule, Take 1 capsule by mouth Daily., Disp: 90 capsule, Rfl: 0  •  rosuvastatin (CRESTOR) 20 MG tablet, Take 1 tablet by mouth every night at bedtime., Disp: 90 tablet, Rfl: 1  •  tamoxifen (NOLVADEX) 20 MG chemo tablet, TAKE ONE TABLET BY MOUTH DAILY, Disp: 90 tablet, Rfl: 0    Objective   Physical Exam  Constitutional:       General: She is not in acute distress.     Appearance: Normal appearance. She is well-developed. She is not ill-appearing or diaphoretic.      Comments: Patient is in no distress, patient has normal voice and speech.  Normal respiratory effort.   HENT:      Head: Normocephalic and atraumatic.   Pulmonary:      Effort: Pulmonary effort is normal.   Musculoskeletal:      Cervical back: Normal range of motion and neck supple.   Neurological:      General: No focal deficit present.      Mental Status: She is alert and oriented to person, place, and time. Mental status is at baseline.   Psychiatric:         Mood and Affect: Mood normal.           FOLLOWING LABS WERE REVIEWED TODAY:  CMP    CMP 3/10/21 6/7/21 6/14/21   Glucose 284 (A) 495 (A) 436 (A)   BUN 19 20 17   Creatinine 1.26 (A) 1.29 (A) 1.20 (A)   eGFR Non  Am 42 (A) 41 (A) 44 (A)   Sodium 137 132 (A) 133 (A)   Potassium 4.7 5.4 (A) 4.9   Chloride 98 93 (A) 96 (A)   Calcium 9.4 10.2 9.3   Albumin 4.60 4.40 4.10   Total Bilirubin 0.5 0.7 0.4   Alkaline Phosphatase 107 138 (A) 138 (A)   AST (SGOT) 100 (A) 107  (A) 74 (A)   ALT (SGPT) 47 (A) 52 (A) 57 (A)   (A) Abnormal value            Lipid Panel    Lipid Panel 7/28/20 3/10/21   Total Cholesterol 99 118   Triglycerides 181 (A) 258 (A)   HDL Cholesterol 30 (A) 31 (A)   VLDL Cholesterol 36.2 40   LDL Cholesterol  33 47   LDL/HDL Ratio 1.09 1.14   (A) Abnormal value            TSH    TSH 7/28/20 3/10/21   TSH 5.990 (A) 3.620   (A) Abnormal value            Most Recent A1C    HGBA1C Most Recent 6/14/21   Hemoglobin A1C 13.9 (A)   (A) Abnormal value                Assessment/Plan   Diagnoses and all orders for this visit:    1. Uncontrolled type 2 diabetes mellitus with hyperglycemia (CMS/[facility]) (Primary)  -     POC Glucose  -     Continuous Blood Gluc  (Dexcom G5 Mobile ) device; 1 kit Daily.  Dispense: 1 each; Refill: 0  -     Ambulatory Referral to Endocrinology  -     Insulin Degludec (Tresiba FlexTouch) 200 UNIT/ML solution pen-injector pen injection; Inject 10 Units under the skin into the appropriate area as directed every night at bedtime.  Dispense: 3 pen; Refill: 0  -     metFORMIN ER (GLUCOPHAGE-XR) 500 MG 24 hr tablet; Take 2 tablets by mouth 2 (two) times a day.  Dispense: 360 tablet; Refill: 0    2. Acquired hypothyroidism    3. Essential hypertension  -     propranolol LA (INDERAL LA) 60 MG 24 hr capsule; Take 1 capsule by mouth Daily.  Dispense: 90 capsule; Refill: 0    4. Mixed hyperlipidemia        I reviewed her medical problems and her medications.  I also reviewed in details her recent blood work results and her diabetes is very poorly controlled.  Her hemoglobin A1c went up from 8.6 in 3/2020 1-13.9 now.  I expressed my concerns to the patient due to very poor control of diabetes.  I will be starting her on insulin.  She was given her first shot of Tresiba today and she was instructed in details how to give herself the shots and she repeated instructions back to me.  I will be also referring her for a diabetes education classes.  I gave her  prescription for a sugar monitoring system.  Considering significant worsening of the blood sugar and the fact that she did not follow my instructions back in 3/2021 I am bringing her back for a very short term follow-up appointment in 1 week so we can go over this again to make sure that patient understands what to do and she is following through.  She agrees to this plan.  The rest of her medical problems are stable and well-controlled.            Return in about 1 week (around 2021) for Next scheduled follow up.    Requested Prescriptions     Signed Prescriptions Disp Refills   • Continuous Blood Gluc  (Dexcom G5 Mobile ) device 1 each 0     Si kit Daily.   • Insulin Degludec (Tresiba FlexTouch) 200 UNIT/ML solution pen-injector pen injection 3 pen 0     Sig: Inject 10 Units under the skin into the appropriate area as directed every night at bedtime.   • propranolol LA (INDERAL LA) 60 MG 24 hr capsule 90 capsule 0     Sig: Take 1 capsule by mouth Daily.   • metFORMIN ER (GLUCOPHAGE-XR) 500 MG 24 hr tablet 360 tablet 0     Sig: Take 2 tablets by mouth 2 (two) times a day.

## 2021-06-18 ENCOUNTER — TELEPHONE (OUTPATIENT)
Dept: FAMILY MEDICINE CLINIC | Facility: CLINIC | Age: 71
End: 2021-06-18

## 2021-06-18 RX ORDER — BLOOD SUGAR DIAGNOSTIC
1 STRIP MISCELLANEOUS 3 TIMES DAILY
Qty: 100 EACH | Refills: 1 | Status: SHIPPED | OUTPATIENT
Start: 2021-06-18

## 2021-06-18 RX ORDER — BLOOD-GLUCOSE METER
1 EACH MISCELLANEOUS DAILY
Qty: 1 KIT | Refills: 0 | Status: SHIPPED | OUTPATIENT
Start: 2021-06-18

## 2021-06-18 NOTE — TELEPHONE ENCOUNTER
PATIENT CALLED STATING THAT HER INSURANCE WILL NOT COVER THE Continuous Blood Gluc  (Dexcom G5 Mobile ) device AND WAS TOLD THAT SHE WOULD HAVE TO PAY OVER $900 TO HAVE THIS EQUIPMENT.    PATIENT WOULD LIKE TO SEE IF DR. MCKEON KNOWS OF A DEVICE THAT HER INSURANCE WILL COVER.    PLEASE ADVISE  [phone]

## 2021-06-18 NOTE — TELEPHONE ENCOUNTER
PATIENT CALLED BACK. SHE SAID IT IS FINE TO GO AHEAD AND SEND IN THE FINGER STICK GLUCOMETER.    THANKS

## 2021-06-18 NOTE — TELEPHONE ENCOUNTER
Her insurance will pay for a regular glucometer where she just needs to stick her finger and check her blood sugar that way.  I do recommend to use that as the monitoring of her blood sugar now it is very important due to very poorly controlled diabetes and her being started on insulin.  Let me know if she agrees to that and I can send prescription to her pharmacy.  Thank you.

## 2021-06-24 ENCOUNTER — OFFICE VISIT (OUTPATIENT)
Dept: FAMILY MEDICINE CLINIC | Facility: CLINIC | Age: 71
End: 2021-06-24

## 2021-06-24 VITALS
OXYGEN SATURATION: 97 % | HEART RATE: 84 BPM | SYSTOLIC BLOOD PRESSURE: 151 MMHG | WEIGHT: 169 LBS | TEMPERATURE: 97.3 F | DIASTOLIC BLOOD PRESSURE: 76 MMHG | HEIGHT: 63 IN | RESPIRATION RATE: 16 BRPM | BODY MASS INDEX: 29.95 KG/M2

## 2021-06-24 DIAGNOSIS — E11.65 UNCONTROLLED TYPE 2 DIABETES MELLITUS WITH HYPERGLYCEMIA (HCC): Primary | ICD-10-CM

## 2021-06-24 PROCEDURE — 99213 OFFICE O/P EST LOW 20 MIN: CPT | Performed by: FAMILY MEDICINE

## 2021-06-24 RX ORDER — INSULIN DEGLUDEC 200 U/ML
20 INJECTION, SOLUTION SUBCUTANEOUS
Qty: 3 PEN | Refills: 0
Start: 2021-06-24 | End: 2021-07-02

## 2021-06-24 NOTE — PROGRESS NOTES
Subjective   Chief Complaint   Patient presents with   • Follow-up   • Diabetes     New medication follow-up     Mariana Ansari is a 71 y.o. female.     Patient Care Team:  Luna Whitfield MD as PCP - General (Family Medicine)  Luna Whitfield MD as PCP - Family Medicine  Michel Guajardo MD as Consulting Physician (Hematology and Oncology)  Luna Whitfield MD as Referring Physician (Family Medicine)    She is coming in today for close follow-up on her poorly controlled diabetes.  Her most recent blood work showed hemoglobin A1c of 13.9, at that point she was started on insulin, Tresiba at 10 units a day.  She tells me that she has been using it every day, she does picked up her glucometer at the pharmacy couple of days ago and she does not really know how to use it and has not been checking her blood sugar.  She denies any polyuria or polydipsia.  She does not really feel any difference as she did not feel that even before that.  She was also referred to a diabetes education class last week, but appointment has not been made yet.       The following portions of the patient's history were reviewed and updated as appropriate: allergies, current medications, past family history, past medical history, past social history, past surgical history and problem list.  Past Medical History:   Diagnosis Date   • Anxiety    • Anxiety and depression    • Asthma     SEASONAL ALLERGY RELATED   • Cancer of breast (CMS/[facility]) 06/2018    LEFT   • Chronic kidney disease     Renal cyst, right kidney w/urology workup in past   • Depression    • Diabetes mellitus (CMS/[facility])     Type 2   • Disease of thyroid gland    • GERD (gastroesophageal reflux disease)    • History of prior pregnancies     x2   • History of transfusion     S/P HYST --AUTOLOGOUS    • Hyperlipidemia    • Hypertension    • IBS (irritable bowel syndrome)    • Seasonal allergies      Past Surgical History:   Procedure Laterality Date   • BREAST LUMPECTOMY WITH  SENTINEL NODE BIOPSY Left 7/18/2018    Procedure: BREAST LUMPECTOMY WITH SENTINEL NODE BIOPSY;  Surgeon: João Zazueta MD;  Location: [facility] OR;  Service: General   • COLONOSCOPY      refusing; negative cologuard 8/24/2017   • HYSTERECTOMY  1989   • KNEE SURGERY Right 2014   • KNEE SURGERY Left 2016   • LIVER BIOPSY  2019    fatty liver   • ROTATOR CUFF REPAIR Left 2009   • ROTATOR CUFF REPAIR Right 2012   • TOE SURGERY  2009    ingrown      The patient has a family history of  Family History   Problem Relation Age of Onset   • Diabetes Mother    • Heart attack Mother    • Heart failure Mother    • Hyperlipidemia Mother    • Hyperthyroidism Mother         Has surgery   • Hypothyroidism Mother         Later in life   • Heart disease Mother    • Hypertension Mother    • Asthma Father    • COPD Father    • Hypertension Father    • Heart attack Maternal Uncle    • Heart failure Maternal Uncle    • Depression Maternal Grandmother    • Heart attack Maternal Grandfather    • Heart failure Maternal Grandfather    • Alcohol abuse Paternal Grandmother    • Alcohol abuse Paternal Grandfather    • Scleroderma Sister    • No Known Problems Daughter    • No Known Problems Son    • Malig Hyperthermia Neg Hx      Social History     Socioeconomic History   • Marital status:      Spouse name: Mahin   • Number of children: 2   • Years of education: College   • Highest education level: Not on file   Tobacco Use   • Smoking status: Never Smoker   • Smokeless tobacco: Never Used   Substance and Sexual Activity   • Alcohol use: Yes     Alcohol/week: 1.0 standard drinks     Types: 1 Cans of beer per week     Comment: 2-3 a month, social only   • Drug use: No   • Sexual activity: Yes     Comment:        Review of Systems   Constitutional: Negative for activity change, fatigue and fever.   Respiratory: Negative for shortness of breath and wheezing.    Cardiovascular: Negative for chest pain, palpitations and leg  "swelling.   Musculoskeletal: Negative for arthralgias and back pain.   Skin: Negative for rash.   Neurological: Negative for tremors and headache.     Visit Vitals  /76 (BP Location: Left arm, Patient Position: Sitting, Cuff Size: Adult)   Pulse 84   Temp 97.3 °F (36.3 °C) (Temporal)   Resp 16   Ht 160 cm (63\")   Wt 76.7 kg (169 lb)   SpO2 97%   BMI 29.94 kg/m²       Current Outpatient Medications:   •  albuterol (PROAIR RESPICLICK) 108 (90 Base) MCG/ACT inhaler, Inhale 1 puff Every 4 (Four) Hours As Needed for Wheezing or Shortness of Air., Disp: , Rfl:   •  B Complex Vitamins (VITAMIN B COMPLEX PO), Take 1 tablet by mouth Every Night., Disp: , Rfl:   •  Blood Glucose Monitoring Suppl (Accu-Chek Guide) w/Device kit, 1 kit Daily. Dg: E11.65, Disp: 1 kit, Rfl: 0  •  cetirizine (zyrTEC) 10 MG tablet, Take 10 mg by mouth Daily., Disp: , Rfl:   •  cholecalciferol (VITAMIN D3) 1000 units tablet, Take 1,000 Units by mouth Every Night., Disp: , Rfl:   •  Continuous Blood Gluc  (Dexcom G5 Mobile ) device, 1 kit Daily., Disp: 1 each, Rfl: 0  •  escitalopram (LEXAPRO) 20 MG tablet, Take 1 tablet by mouth Daily., Disp: 90 tablet, Rfl: 1  •  fluticasone (FLONASE) 50 MCG/ACT nasal spray, 2 sprays into each nostril Daily., Disp: , Rfl:   •  glipizide (GLUCOTROL XL) 5 MG ER tablet, Take 1 tablet by mouth Daily., Disp: 90 tablet, Rfl: 1  •  glucose blood (Accu-Chek Guide) test strip, 1 each by Other route 3 (Three) Times a Day. Dg: E11.65, Disp: 100 each, Rfl: 1  •  hydroCHLOROthiazide (HYDRODIURIL) 25 MG tablet, Take 1 tablet by mouth Daily., Disp: 90 tablet, Rfl: 1  •  Insulin Degludec (Tresiba FlexTouch) 200 UNIT/ML solution pen-injector pen injection, Inject 20 Units under the skin into the appropriate area as directed every night at bedtime., Disp: 3 pen, Rfl: 0  •  Lancets (accu-chek soft touch) lancets, 1 each by Other route 3 (Three) Times a Day. Dg: E11.65, Disp: 100 each, Rfl: 1  •  levothyroxine " (SYNTHROID, LEVOTHROID) 88 MCG tablet, Take 1 tablet by mouth Daily., Disp: 90 tablet, Rfl: 1  •  loperamide (IMODIUM A-D) 2 MG tablet, Take 2 mg by mouth 4 (Four) Times a Day As Needed for Diarrhea., Disp: , Rfl:   •  melatonin 5 MG tablet tablet, Take 10 mg by mouth At Night As Needed., Disp: , Rfl:   •  metFORMIN ER (GLUCOPHAGE-XR) 500 MG 24 hr tablet, Take 2 tablets by mouth 2 (two) times a day., Disp: 360 tablet, Rfl: 0  •  omeprazole (priLOSEC) 40 MG capsule, Take 1 capsule by mouth Daily., Disp: 90 capsule, Rfl: 1  •  propranolol LA (INDERAL LA) 60 MG 24 hr capsule, Take 1 capsule by mouth Daily., Disp: 90 capsule, Rfl: 0  •  rosuvastatin (CRESTOR) 20 MG tablet, Take 1 tablet by mouth every night at bedtime., Disp: 90 tablet, Rfl: 1  •  tamoxifen (NOLVADEX) 20 MG chemo tablet, TAKE ONE TABLET BY MOUTH DAILY, Disp: 90 tablet, Rfl: 0    Objective   Physical Exam  Constitutional:       General: She is not in acute distress.     Appearance: Normal appearance. She is well-developed. She is not ill-appearing or diaphoretic.      Comments: Patient is in no distress, patient has normal voice and speech.  Normal respiratory effort.   HENT:      Head: Normocephalic and atraumatic.   Pulmonary:      Effort: Pulmonary effort is normal.   Musculoskeletal:      Cervical back: Normal range of motion and neck supple.   Neurological:      General: No focal deficit present.      Mental Status: She is alert and oriented to person, place, and time. Mental status is at baseline.   Psychiatric:         Mood and Affect: Mood normal.         FOLLOWING LABS WERE REVIEWED TODAY:  CMP    CMP 3/10/21 6/7/21 6/14/21   Glucose 284 (A) 495 (A) 436 (A)   BUN 19 20 17   Creatinine 1.26 (A) 1.29 (A) 1.20 (A)   eGFR Non  Am 42 (A) 41 (A) 44 (A)   Sodium 137 132 (A) 133 (A)   Potassium 4.7 5.4 (A) 4.9   Chloride 98 93 (A) 96 (A)   Calcium 9.4 10.2 9.3   Albumin 4.60 4.40 4.10   Total Bilirubin 0.5 0.7 0.4   Alkaline Phosphatase 107 138  (A) 138 (A)   AST (SGOT) 100 (A) 107 (A) 74 (A)   ALT (SGPT) 47 (A) 52 (A) 57 (A)   (A) Abnormal value            Most Recent A1C    HGBA1C Most Recent 6/14/21   Hemoglobin A1C 13.9 (A)   (A) Abnormal value                 Assessment/Plan   Diagnoses and all orders for this visit:    1. Uncontrolled type 2 diabetes mellitus with hyperglycemia (CMS/[facility]) (Primary)  -     Insulin Degludec (Tresiba FlexTouch) 200 UNIT/ML solution pen-injector pen injection; Inject 20 Units under the skin into the appropriate area as directed every night at bedtime.  Dispense: 3 pen; Refill: 0    Other orders  -     Cancel: POC Glucose      Accu-Chek was done today and her nonfasting blood sugar was 363.  She is on Metformin and I am increasing the dose of Tresiba from 10 units to 20 units.  We will be calling [facility] to set up a diabetes education class for her as soon as possible.  She is asymptomatic.  She was also instructed today on how to use glucometer, she will start monitoring her blood sugar and call us with some readings in about 10 days for a possible another insulin adjustment.            Return in about 4 weeks (around 7/22/2021) for Next scheduled follow up.    Requested Prescriptions     Signed Prescriptions Disp Refills   • Insulin Degludec (Tresiba FlexTouch) 200 UNIT/ML solution pen-injector pen injection 3 pen 0     Sig: Inject 20 Units under the skin into the appropriate area as directed every night at bedtime.

## 2021-07-02 DIAGNOSIS — E11.65 UNCONTROLLED TYPE 2 DIABETES MELLITUS WITH HYPERGLYCEMIA (HCC): ICD-10-CM

## 2021-07-02 RX ORDER — INSULIN DEGLUDEC 200 U/ML
20 INJECTION, SOLUTION SUBCUTANEOUS
Qty: 3 PEN | Refills: 0 | Status: CANCELLED
Start: 2021-07-02

## 2021-07-02 RX ORDER — INSULIN DEGLUDEC 200 U/ML
30 INJECTION, SOLUTION SUBCUTANEOUS
Qty: 3 PEN | Refills: 0 | Status: SHIPPED | OUTPATIENT
Start: 2021-07-02 | End: 2021-07-22 | Stop reason: SDUPTHER

## 2021-07-02 NOTE — TELEPHONE ENCOUNTER
Caller: Mariana Ansari    Relationship: Self    Best call back number: [phone]   Medication needed:   Requested Prescriptions     Pending Prescriptions Disp Refills   • Insulin Degludec (Tresiba FlexTouch) 200 UNIT/ML solution pen-injector pen injection 3 pen 0     Sig: Inject 20 Units under the skin into the appropriate area as directed every night at bedtime.       When do you need the refill by: ASAP    What additional details did the patient provide when requesting the medication: PATIENT OUT.  HER GLUCOSE READINGS ARE:  272, 356    Does the patient have less than a 3 day supply:  [x] Yes  [] No    What is the patient's preferred pharmacy: [address] - [phone] [facility] [phone] FX

## 2021-07-02 NOTE — TELEPHONE ENCOUNTER
Please advise the patient that I recommend to increase insulin dose from 20 units to 30 units.  I sent prescription to her pharmacy.  If she is already out of her insulin which was given from here as a sample we can give her more samples.  We possibly might need to work on the PA for the insurance purposes.  I recommend to continue monitor blood sugar and call us with some readings in 2 weeks.  Thank you.

## 2021-07-07 RX ORDER — TAMOXIFEN CITRATE 20 MG/1
TABLET ORAL
Qty: 90 TABLET | Refills: 1 | Status: SHIPPED | OUTPATIENT
Start: 2021-07-07 | End: 2022-01-03

## 2021-07-07 NOTE — TELEPHONE ENCOUNTER
Refill requested from pharmacy. Per last chart note, patient to continue tamoxifen at current dose. Will route to MD for cosignature.

## 2021-07-22 ENCOUNTER — OFFICE VISIT (OUTPATIENT)
Dept: FAMILY MEDICINE CLINIC | Facility: CLINIC | Age: 71
End: 2021-07-22

## 2021-07-22 VITALS
DIASTOLIC BLOOD PRESSURE: 69 MMHG | TEMPERATURE: 97.9 F | OXYGEN SATURATION: 96 % | RESPIRATION RATE: 16 BRPM | HEIGHT: 63 IN | HEART RATE: 92 BPM | BODY MASS INDEX: 31.01 KG/M2 | WEIGHT: 175 LBS | SYSTOLIC BLOOD PRESSURE: 126 MMHG

## 2021-07-22 DIAGNOSIS — E03.9 ACQUIRED HYPOTHYROIDISM: ICD-10-CM

## 2021-07-22 DIAGNOSIS — E11.65 UNCONTROLLED TYPE 2 DIABETES MELLITUS WITH HYPERGLYCEMIA (HCC): Primary | ICD-10-CM

## 2021-07-22 DIAGNOSIS — E78.2 MIXED HYPERLIPIDEMIA: ICD-10-CM

## 2021-07-22 PROBLEM — R25.1 TREMOR: Status: RESOLVED | Noted: 2019-02-06 | Resolved: 2021-07-22

## 2021-07-22 PROBLEM — Z23 NEED FOR VACCINATION: Status: RESOLVED | Noted: 2019-09-16 | Resolved: 2021-07-22

## 2021-07-22 PROBLEM — J40 BRONCHITIS: Status: RESOLVED | Noted: 2019-12-13 | Resolved: 2021-07-22

## 2021-07-22 PROCEDURE — 99214 OFFICE O/P EST MOD 30 MIN: CPT | Performed by: FAMILY MEDICINE

## 2021-07-22 RX ORDER — INSULIN DEGLUDEC 200 U/ML
40 INJECTION, SOLUTION SUBCUTANEOUS
Qty: 3 PEN | Refills: 0 | Status: SHIPPED | OUTPATIENT
Start: 2021-07-22 | End: 2021-09-22 | Stop reason: SDUPTHER

## 2021-07-22 RX ORDER — INSULIN DEGLUDEC 200 U/ML
40 INJECTION, SOLUTION SUBCUTANEOUS
Qty: 3 PEN | Refills: 0 | COMMUNITY
Start: 2021-07-22 | End: 2021-07-22 | Stop reason: SDUPTHER

## 2021-07-22 NOTE — PROGRESS NOTES
Subjective   Chief Complaint   Patient presents with   • Follow-up     1 month   • Diabetes   • Med Refill     Mariana Ansari is a 71 y.o. female.     Patient Care Team:  Luna Whitfield MD as PCP - General (Family Medicine)  Luna Whitfield MD as PCP - Family Medicine  Michel Guajardo MD as Consulting Physician (Hematology and Oncology)  Luna Whitfield MD as Referring Physician (Family Medicine)    She is coming in today for short term follow-up on her poorly controlled diabetes.  Her hemoglobin A1c was noted to be high at 13.9 in 6/2021.  At that time she was started on Tresiba 10 units a day as an addition to her other treatment for diabetes.  Over time the dose of the insulin was increased to 30 units.  She tells me that she finally can start seeing difference in her blood sugar readings, she has been monitoring her blood sugar closely and she provided a copy of her blood sugar log today.  Majority of the readings are in high 100s and 200s.  Previously she was noticing readings in 300s and 400s.  She tells me that she is actually feeling also better, she seems to be less tired and fatigued.  She is scheduled for diabetes education class for the first time next week.  She is also being treated for other medical issues including hypertension, hyperlipidemia, hypothyroidism.       The following portions of the patient's history were reviewed and updated as appropriate: allergies, current medications, past family history, past medical history, past social history, past surgical history and problem list.  Past Medical History:   Diagnosis Date   • Anxiety    • Anxiety and depression    • Asthma     SEASONAL ALLERGY RELATED   • Cancer of breast (CMS/Formerly [facility]) 06/2018    LEFT   • Chronic kidney disease     Renal cyst, right kidney w/urology workup in past   • Depression    • Diabetes mellitus (CMS/Formerly [facility])     Type 2   • Disease of thyroid gland    • GERD (gastroesophageal reflux disease)    • History of prior  pregnancies     x2   • History of transfusion     S/P HYST --AUTOLOGOUS    • Hyperlipidemia    • Hypertension    • IBS (irritable bowel syndrome)    • Seasonal allergies      Past Surgical History:   Procedure Laterality Date   • BREAST LUMPECTOMY WITH SENTINEL NODE BIOPSY Left 7/18/2018    Procedure: BREAST LUMPECTOMY WITH SENTINEL NODE BIOPSY;  Surgeon: João Zazueta MD;  Location: [facility] OR;  Service: General   • COLONOSCOPY      refusing; negative cologuard 8/24/2017   • HYSTERECTOMY  1989   • KNEE SURGERY Right 2014   • KNEE SURGERY Left 2016   • LIVER BIOPSY  2019    fatty liver   • ROTATOR CUFF REPAIR Left 2009   • ROTATOR CUFF REPAIR Right 2012   • TOE SURGERY  2009    ingrown      The patient has a family history of  Family History   Problem Relation Age of Onset   • Diabetes Mother    • Heart attack Mother    • Heart failure Mother    • Hyperlipidemia Mother    • Hyperthyroidism Mother         Has surgery   • Hypothyroidism Mother         Later in life   • Heart disease Mother    • Hypertension Mother    • Asthma Father    • COPD Father    • Hypertension Father    • Heart attack Maternal Uncle    • Heart failure Maternal Uncle    • Depression Maternal Grandmother    • Heart attack Maternal Grandfather    • Heart failure Maternal Grandfather    • Alcohol abuse Paternal Grandmother    • Alcohol abuse Paternal Grandfather    • Scleroderma Sister    • No Known Problems Daughter    • No Known Problems Son    • Malig Hyperthermia Neg Hx      Social History     Socioeconomic History   • Marital status:      Spouse name: Mahin   • Number of children: 2   • Years of education: College   • Highest education level: Not on file   Tobacco Use   • Smoking status: Never Smoker   • Smokeless tobacco: Never Used   Substance and Sexual Activity   • Alcohol use: Yes     Alcohol/week: 1.0 standard drinks     Types: 1 Cans of beer per week     Comment: 2-3 a month, social only   • Drug use: No   • Sexual  "activity: Yes     Comment:        Review of Systems   Constitutional: Negative for activity change, fatigue and fever.   Respiratory: Negative for shortness of breath and wheezing.    Cardiovascular: Negative for chest pain, palpitations and leg swelling.   Musculoskeletal: Negative for arthralgias and back pain.   Skin: Negative for rash.   Neurological: Negative for tremors and headache.     Visit Vitals  /69 (BP Location: Left arm, Patient Position: Sitting, Cuff Size: Adult)   Pulse 92   Temp 97.9 °F (36.6 °C) (Temporal)   Resp 16   Ht 160 cm (63\")   Wt 79.4 kg (175 lb)   SpO2 96%   BMI 31.00 kg/m²       Current Outpatient Medications:   •  albuterol (PROAIR RESPICLICK) 108 (90 Base) MCG/ACT inhaler, Inhale 1 puff Every 4 (Four) Hours As Needed for Wheezing or Shortness of Air., Disp: , Rfl:   •  B Complex Vitamins (VITAMIN B COMPLEX PO), Take 1 tablet by mouth Every Night., Disp: , Rfl:   •  Blood Glucose Monitoring Suppl (Accu-Chek Guide) w/Device kit, 1 kit Daily. Dg: E11.65, Disp: 1 kit, Rfl: 0  •  cetirizine (zyrTEC) 10 MG tablet, Take 10 mg by mouth Daily., Disp: , Rfl:   •  cholecalciferol (VITAMIN D3) 1000 units tablet, Take 1,000 Units by mouth Every Night., Disp: , Rfl:   •  Continuous Blood Gluc  (Dexcom G5 Mobile ) device, 1 kit Daily., Disp: 1 each, Rfl: 0  •  escitalopram (LEXAPRO) 20 MG tablet, Take 1 tablet by mouth Daily., Disp: 90 tablet, Rfl: 1  •  fluticasone (FLONASE) 50 MCG/ACT nasal spray, 2 sprays into each nostril Daily., Disp: , Rfl:   •  glipizide (GLUCOTROL XL) 5 MG ER tablet, Take 1 tablet by mouth Daily., Disp: 90 tablet, Rfl: 1  •  glucose blood (Accu-Chek Guide) test strip, 1 each by Other route 3 (Three) Times a Day. Dg: E11.65, Disp: 100 each, Rfl: 1  •  hydroCHLOROthiazide (HYDRODIURIL) 25 MG tablet, Take 1 tablet by mouth Daily., Disp: 90 tablet, Rfl: 1  •  Insulin Degludec (Tresiba FlexTouch) 200 UNIT/ML solution pen-injector pen injection, Inject " 40 Units under the skin into the appropriate area as directed every night at bedtime., Disp: 3 pen, Rfl: 0  •  Lancets (accu-chek soft touch) lancets, 1 each by Other route 3 (Three) Times a Day. Dg: E11.65, Disp: 100 each, Rfl: 1  •  levothyroxine (SYNTHROID, LEVOTHROID) 88 MCG tablet, Take 1 tablet by mouth Daily., Disp: 90 tablet, Rfl: 1  •  loperamide (IMODIUM A-D) 2 MG tablet, Take 2 mg by mouth 4 (Four) Times a Day As Needed for Diarrhea., Disp: , Rfl:   •  melatonin 5 MG tablet tablet, Take 10 mg by mouth At Night As Needed., Disp: , Rfl:   •  metFORMIN ER (GLUCOPHAGE-XR) 500 MG 24 hr tablet, Take 2 tablets by mouth 2 (two) times a day., Disp: 360 tablet, Rfl: 0  •  omeprazole (priLOSEC) 40 MG capsule, Take 1 capsule by mouth Daily., Disp: 90 capsule, Rfl: 1  •  propranolol LA (INDERAL LA) 60 MG 24 hr capsule, Take 1 capsule by mouth Daily., Disp: 90 capsule, Rfl: 0  •  rosuvastatin (CRESTOR) 20 MG tablet, Take 1 tablet by mouth every night at bedtime., Disp: 90 tablet, Rfl: 1  •  tamoxifen (NOLVADEX) 20 MG chemo tablet, TAKE ONE TABLET BY MOUTH DAILY, Disp: 90 tablet, Rfl: 1    Objective   Physical Exam  Constitutional:       General: She is not in acute distress.     Appearance: Normal appearance. She is well-developed. She is not ill-appearing or diaphoretic.      Comments: Patient is in no distress, patient has normal voice and speech.  Normal respiratory effort.   HENT:      Head: Normocephalic and atraumatic.   Pulmonary:      Effort: Pulmonary effort is normal.   Musculoskeletal:      Cervical back: Normal range of motion and neck supple.   Neurological:      General: No focal deficit present.      Mental Status: She is alert and oriented to person, place, and time. Mental status is at baseline.   Psychiatric:         Mood and Affect: Mood normal.       FOLLOWING LABS WERE REVIEWED TODAY:  CMP    CMP 3/10/21 6/7/21 6/14/21   Glucose 284 (A) 495 (A) 436 (A)   BUN 19 20 17   Creatinine 1.26 (A) 1.29 (A)  1.20 (A)   eGFR Non  Am 42 (A) 41 (A) 44 (A)   Sodium 137 132 (A) 133 (A)   Potassium 4.7 5.4 (A) 4.9   Chloride 98 93 (A) 96 (A)   Calcium 9.4 10.2 9.3   Albumin 4.60 4.40 4.10   Total Bilirubin 0.5 0.7 0.4   Alkaline Phosphatase 107 138 (A) 138 (A)   AST (SGOT) 100 (A) 107 (A) 74 (A)   ALT (SGPT) 47 (A) 52 (A) 57 (A)   (A) Abnormal value            Lipid Panel    Lipid Panel 7/28/20 3/10/21   Total Cholesterol 99 118   Triglycerides 181 (A) 258 (A)   HDL Cholesterol 30 (A) 31 (A)   VLDL Cholesterol 36.2 40   LDL Cholesterol  33 47   LDL/HDL Ratio 1.09 1.14   (A) Abnormal value            TSH    TSH 7/28/20 3/10/21   TSH 5.990 (A) 3.620   (A) Abnormal value            Most Recent A1C    HGBA1C Most Recent 6/14/21   Hemoglobin A1C 13.9 (A)   (A) Abnormal value                Assessment/Plan   Diagnoses and all orders for this visit:    1. Uncontrolled type 2 diabetes mellitus with hyperglycemia (CMS/Formerly [facility]) (Primary)  -     Discontinue: Insulin Degludec (Tresiba FlexTouch) 200 UNIT/ML solution pen-injector pen injection; Inject 40 Units under the skin into the appropriate area as directed every night at bedtime.  Dispense: 3 pen; Refill: 0  -     Insulin Degludec (Tresiba FlexTouch) 200 UNIT/ML solution pen-injector pen injection; Inject 40 Units under the skin into the appropriate area as directed every night at bedtime.  Dispense: 3 pen; Refill: 0  -     Comprehensive Metabolic Panel  -     Hemoglobin A1c  -     Microalbumin / Creatinine Urine Ratio - Urine, Clean Catch    2. Mixed hyperlipidemia  -     Comprehensive Metabolic Panel  -     Lipid Panel    3. Acquired hypothyroidism  -     TSH  -     T4, Free      I reviewed her home blood sugar readings and they are definitely improving, however she still has readings in high 100s and 200s and her diabetes is certainly not well controlled yet.  I will be increasing Tresiba from 30 units to 40 units, prescription and samples were given.  She will continue to  work on diet and closely monitor her blood sugar.  She was advised to call us with her blood sugar readings in about 2-3 weeks and at that point to a dose of the insulin possibly might need to be adjusted again.  She is scheduled for the her first diabetes education class next week.  She will be reevaluated in 2 months and at that time I will be getting fasting blood work.          Return in about 2 months (around 9/22/2021) for Medicare Wellness.    Requested Prescriptions     Signed Prescriptions Disp Refills   • Insulin Degludec (Tresiba FlexTouch) 200 UNIT/ML solution pen-injector pen injection 3 pen 0     Sig: Inject 40 Units under the skin into the appropriate area as directed every night at bedtime.

## 2021-07-26 ENCOUNTER — OFFICE VISIT (OUTPATIENT)
Dept: ENDOCRINOLOGY | Facility: CLINIC | Age: 71
End: 2021-07-26

## 2021-07-26 DIAGNOSIS — E11.65 UNCONTROLLED TYPE 2 DIABETES MELLITUS WITH HYPERGLYCEMIA (HCC): ICD-10-CM

## 2021-07-26 DIAGNOSIS — E11.8 TYPE 2 DIABETES MELLITUS WITH COMPLICATION, WITHOUT LONG-TERM CURRENT USE OF INSULIN (HCC): ICD-10-CM

## 2021-07-26 PROCEDURE — G0108 DIAB MANAGE TRN  PER INDIV: HCPCS | Performed by: DIETITIAN, REGISTERED

## 2021-07-30 ENCOUNTER — TELEPHONE (OUTPATIENT)
Dept: FAMILY MEDICINE CLINIC | Facility: CLINIC | Age: 71
End: 2021-07-30

## 2021-07-30 RX ORDER — PEN NEEDLE, DIABETIC 32 GX 1/4"
1 NEEDLE, DISPOSABLE MISCELLANEOUS DAILY
Qty: 100 EACH | Refills: 1 | Status: SHIPPED | OUTPATIENT
Start: 2021-07-30 | End: 2022-02-04 | Stop reason: SDUPTHER

## 2021-07-30 NOTE — TELEPHONE ENCOUNTER
Caller: ElanLinda gibbonsMariana Z    Relationship: Self    Best call back number: [phone]    Medication needed:   Requested Prescriptions     Pending Prescriptions Disp Refills   • Lancets (accu-chek soft touch) lancets 100 each 1     Si each by Other route 3 (Three) Times a Day. Dg: E11.65       When do you need the refill by:     What additional details did the patient provide when requesting the medication: PATIENT DIDN'T RECEIVE ANY LANCETS WITH HER KIT.  PATIENT DOES NOT HAVE ANY AND WAS NOT ABLE TO INJECT INSULIN LAST NIGHT.    Does the patient have less than a 3 day supply:  [x] Yes  [] No    What is the patient's preferred pharmacy:      [address] - [phone] [facility] [phone]   [phone]

## 2021-07-30 NOTE — TELEPHONE ENCOUNTER
These advise the patient that I sent in prescription for insulin pen needles, she should not need any syringes as she is using insulin pen.  Thank you.

## 2021-07-30 NOTE — TELEPHONE ENCOUNTER
Please call the patient to clarify what she really needs.  Lancets are for blood sugar check, however message is saying that she was not able to inject insulin.  Does she need lancets or insulin pen needles?  Thank you.

## 2021-09-13 ENCOUNTER — TELEPHONE (OUTPATIENT)
Dept: FAMILY MEDICINE CLINIC | Facility: CLINIC | Age: 71
End: 2021-09-13

## 2021-09-13 NOTE — TELEPHONE ENCOUNTER
Caller: [facility]     Relationship:     Best call back number: [phone]    What form or medical record are you requesting: PAST OFFICE NOTES PRIOR TO June     Who is requesting this form or medical record from you: [facility]     How would you like to receive the form or medical records (pick-up, mail, fax):   If fax, what is the fax number: [phone]    Timeframe paperwork needed: ASAP     Additional notes: [facility] IS NEEDING PATIENTS OFFICE NOTES (MEDICAL NOTES AND MEDICATION LIST) BEFORE June IN ORDER TO FILL THE PRESCRIPTION FOR THE FREE STYLE ANTONIO

## 2021-09-13 NOTE — TELEPHONE ENCOUNTER
I gave her prescription for freestyle charles, but her insurance denied to pay for it.  I then gave her prescription for just regular glucometer which I believe she has been using.  What kind of facility  [facility]?  Is patient aware of them requesting her records?  Please do not fax any records unless we make clarification with the patient.  Thank you.

## 2021-09-16 DIAGNOSIS — K21.9 GASTROESOPHAGEAL REFLUX DISEASE WITHOUT ESOPHAGITIS: ICD-10-CM

## 2021-09-16 DIAGNOSIS — I10 ESSENTIAL HYPERTENSION: ICD-10-CM

## 2021-09-16 RX ORDER — OMEPRAZOLE 40 MG/1
CAPSULE, DELAYED RELEASE ORAL
Qty: 90 CAPSULE | Refills: 0 | Status: SHIPPED | OUTPATIENT
Start: 2021-09-16 | End: 2021-12-13

## 2021-09-16 RX ORDER — PROPRANOLOL HCL 60 MG
CAPSULE, EXTENDED RELEASE 24HR ORAL
Qty: 90 CAPSULE | Refills: 0 | Status: SHIPPED | OUTPATIENT
Start: 2021-09-16 | End: 2021-12-13

## 2021-09-20 ENCOUNTER — LAB (OUTPATIENT)
Dept: FAMILY MEDICINE CLINIC | Facility: CLINIC | Age: 71
End: 2021-09-20

## 2021-09-20 ENCOUNTER — TELEPHONE (OUTPATIENT)
Dept: FAMILY MEDICINE CLINIC | Facility: CLINIC | Age: 71
End: 2021-09-20

## 2021-09-20 LAB
ALBUMIN SERPL-MCNC: 4.5 G/DL (ref 3.5–5.2)
ALBUMIN UR-MCNC: <1.2 MG/DL
ALBUMIN/GLOB SERPL: 1.6 G/DL
ALP SERPL-CCNC: 76 U/L (ref 39–117)
ALT SERPL W P-5'-P-CCNC: 39 U/L (ref 1–33)
ANION GAP SERPL CALCULATED.3IONS-SCNC: 9.6 MMOL/L (ref 5–15)
AST SERPL-CCNC: 46 U/L (ref 1–32)
BILIRUB SERPL-MCNC: 0.3 MG/DL (ref 0–1.2)
BUN SERPL-MCNC: 19 MG/DL (ref 8–23)
BUN/CREAT SERPL: 15.4 (ref 7–25)
CALCIUM SPEC-SCNC: 9.7 MG/DL (ref 8.6–10.5)
CHLORIDE SERPL-SCNC: 106 MMOL/L (ref 98–107)
CHOLEST SERPL-MCNC: 107 MG/DL (ref 0–200)
CO2 SERPL-SCNC: 27.4 MMOL/L (ref 22–29)
CREAT SERPL-MCNC: 1.23 MG/DL (ref 0.57–1)
CREAT UR-MCNC: 65.3 MG/DL
GFR SERPL CREATININE-BSD FRML MDRD: 43 ML/MIN/1.73
GLOBULIN UR ELPH-MCNC: 2.9 GM/DL
GLUCOSE SERPL-MCNC: 155 MG/DL (ref 65–99)
HBA1C MFR BLD: 8.6 % (ref 3.5–5.6)
HDLC SERPL-MCNC: 40 MG/DL (ref 40–60)
LDLC SERPL CALC-MCNC: 48 MG/DL (ref 0–100)
LDLC/HDLC SERPL: 1.17 {RATIO}
MICROALBUMIN/CREAT UR: NORMAL MG/G{CREAT}
POTASSIUM SERPL-SCNC: 5.1 MMOL/L (ref 3.5–5.2)
PROT SERPL-MCNC: 7.4 G/DL (ref 6–8.5)
SODIUM SERPL-SCNC: 143 MMOL/L (ref 136–145)
T4 FREE SERPL-MCNC: 1.29 NG/DL (ref 0.93–1.7)
TRIGL SERPL-MCNC: 102 MG/DL (ref 0–150)
TSH SERPL DL<=0.05 MIU/L-ACNC: 2.16 UIU/ML (ref 0.27–4.2)
VLDLC SERPL-MCNC: 19 MG/DL (ref 5–40)

## 2021-09-20 PROCEDURE — 84443 ASSAY THYROID STIM HORMONE: CPT | Performed by: FAMILY MEDICINE

## 2021-09-20 PROCEDURE — 80053 COMPREHEN METABOLIC PANEL: CPT | Performed by: FAMILY MEDICINE

## 2021-09-20 PROCEDURE — 82043 UR ALBUMIN QUANTITATIVE: CPT | Performed by: FAMILY MEDICINE

## 2021-09-20 PROCEDURE — 82570 ASSAY OF URINE CREATININE: CPT | Performed by: FAMILY MEDICINE

## 2021-09-20 PROCEDURE — 36415 COLL VENOUS BLD VENIPUNCTURE: CPT | Performed by: FAMILY MEDICINE

## 2021-09-20 PROCEDURE — 83036 HEMOGLOBIN GLYCOSYLATED A1C: CPT | Performed by: FAMILY MEDICINE

## 2021-09-20 PROCEDURE — 84439 ASSAY OF FREE THYROXINE: CPT | Performed by: FAMILY MEDICINE

## 2021-09-20 PROCEDURE — 80061 LIPID PANEL: CPT | Performed by: FAMILY MEDICINE

## 2021-09-20 NOTE — TELEPHONE ENCOUNTER
PLEASE FAX TO  [phone] [facility].  PLEASE WRITE A LETTER OF NECESSITY FOR THE DEXACOM MACHINE.  INJECTING INSULIN ONCE DAILY.

## 2021-09-20 NOTE — TELEPHONE ENCOUNTER
I believe we already worked on the prior authorization for Dexcom machine and it was denied by her insurance.  She definitely would benefit from having this however.  Patient was found out to have poorly controlled and worsening diabetes couple of months ago and she was started on insulin.  We will can give her a letter of medical necessity with this reasoning.  Thank you.

## 2021-09-22 ENCOUNTER — OFFICE VISIT (OUTPATIENT)
Dept: FAMILY MEDICINE CLINIC | Facility: CLINIC | Age: 71
End: 2021-09-22

## 2021-09-22 VITALS
HEIGHT: 63 IN | OXYGEN SATURATION: 95 % | RESPIRATION RATE: 16 BRPM | WEIGHT: 186 LBS | TEMPERATURE: 97.1 F | DIASTOLIC BLOOD PRESSURE: 83 MMHG | BODY MASS INDEX: 32.96 KG/M2 | SYSTOLIC BLOOD PRESSURE: 124 MMHG | HEART RATE: 79 BPM

## 2021-09-22 DIAGNOSIS — E11.65 UNCONTROLLED TYPE 2 DIABETES MELLITUS WITH HYPERGLYCEMIA (HCC): ICD-10-CM

## 2021-09-22 DIAGNOSIS — Z00.00 MEDICARE ANNUAL WELLNESS VISIT, SUBSEQUENT: Primary | ICD-10-CM

## 2021-09-22 PROCEDURE — 1159F MED LIST DOCD IN RCRD: CPT | Performed by: FAMILY MEDICINE

## 2021-09-22 PROCEDURE — 1170F FXNL STATUS ASSESSED: CPT | Performed by: FAMILY MEDICINE

## 2021-09-22 PROCEDURE — G0439 PPPS, SUBSEQ VISIT: HCPCS | Performed by: FAMILY MEDICINE

## 2021-09-22 RX ORDER — INSULIN DEGLUDEC 200 U/ML
44 INJECTION, SOLUTION SUBCUTANEOUS
Qty: 3 PEN | Refills: 2 | Status: SHIPPED | OUTPATIENT
Start: 2021-09-22 | End: 2021-12-03 | Stop reason: SDUPTHER

## 2021-09-22 NOTE — PROGRESS NOTES
Subsequent Medicare Wellness Visit   The ABC's of the Annual Wellness Visit    Chief Complaint   Patient presents with   • Medicare Wellness-subsequent   • Diabetes   • Hypertension   • Hyperlipidemia       HPI:  Mariana Ansari, -1950, is a 71 y.o. female who presents for a Subsequent Medicare Wellness Visit. She reports doing well overall. She recently had fasting blood work done and she would like to discuss these results. Her diabetes was noted to be very poorly controlled few months ago when her hemoglobin A1c was 13.9. She has been started on insulin since then and she reports being compliant with the regimen. Her sugar is running much better and overall she feels much better. No issues with memory or depressive symptoms reported. No balance issues reported. She just recently had her eye exam. Patient does not report any chest pain, shortness of breath, dizziness, nausea, vomiting, or diarrhea, visual issues, headaches, numbness or tingling. No urinary issues reported like urgency, frequency, or discomfort upon urination.  No significant weight changes reported.  No swelling reported.  No rashes or any other skin issues reported. No emotional issues or insomnia.      Recent Hospitalizations:  No hospitalization(s) within the last year..    Current Medical Providers:  Patient Care Team:  Luna Whitfield MD as PCP - General (Family Medicine)  Luna Whitfield MD as PCP - Family Medicine  Michel Guajardo MD as Consulting Physician (Hematology and Oncology)  Luna Whitfield MD as Referring Physician (Family Medicine)    Health Habits and Functional and Cognitive Screening and Depression Screening:  Functional & Cognitive Status 2021   Do you have difficulty preparing food and eating? No   Do you have difficulty bathing yourself, getting dressed or grooming yourself? No   Do you have difficulty using the toilet? No   Do you have difficulty moving around from place to place? No   Do you have  trouble with steps or getting out of a bed or a chair? No   Current Diet Diabetic Diet   Dental Exam Up to date   Eye Exam Up to date   Exercise (times per week) 0 times per week   Current Exercises Include No Regular Exercise   Current Exercise Activities Include -   Do you need help using the phone?  No   Are you deaf or do you have serious difficulty hearing?  No   Do you need help with transportation? No   Do you need help shopping? No   Do you need help preparing meals?  No   Do you need help with housework?  No   Do you need help with laundry? No   Do you need help taking your medications? No   Do you need help managing money? No   Do you ever drive or ride in a car without wearing a seat belt? No   Have you felt unusual stress, anger or loneliness in the last month? No   Who do you live with? Spouse   If you need help, do you have trouble finding someone available to you? No   Have you been bothered in the last four weeks by sexual problems? -   Do you have difficulty concentrating, remembering or making decisions? No       Compared to one year ago, the patient feels her physical health is the same and her mental health is the same.    Depression Screen:  PHQ-2/PHQ-9 Depression Screening 3/15/2021   Little interest or pleasure in doing things 0   Feeling down, depressed, or hopeless 0   Trouble falling or staying asleep, or sleeping too much 0   Feeling tired or having little energy 0   Poor appetite or overeating 0   Feeling bad about yourself - or that you are a failure or have let yourself or your family down 0   Trouble concentrating on things, such as reading the newspaper or watching television 0   Moving or speaking so slowly that other people could have noticed. Or the opposite - being so fidgety or restless that you have been moving around a lot more than usual 0   Thoughts that you would be better off dead, or of hurting yourself in some way 0   Total Score 0   If you checked off any problems, how  difficult have these problems made it for you to do your work, take care of things at home, or get along with other people? -         Past Medical/Family/Social History:  The following portions of the patient's history were reviewed and updated as appropriate: allergies, current medications, past family history, past medical history, past social history, past surgical history and problem list.    Allergies   Allergen Reactions   • Amlodipine Swelling         Current Outpatient Medications:   •  albuterol (PROAIR RESPICLICK) 108 (90 Base) MCG/ACT inhaler, Inhale 1 puff Every 4 (Four) Hours As Needed for Wheezing or Shortness of Air., Disp: , Rfl:   •  B Complex Vitamins (VITAMIN B COMPLEX PO), Take 1 tablet by mouth Every Night., Disp: , Rfl:   •  Blood Glucose Monitoring Suppl (Accu-Chek Guide) w/Device kit, 1 kit Daily. Dg: E11.65, Disp: 1 kit, Rfl: 0  •  cetirizine (zyrTEC) 10 MG tablet, Take 10 mg by mouth Daily., Disp: , Rfl:   •  cholecalciferol (VITAMIN D3) 1000 units tablet, Take 1,000 Units by mouth Every Night., Disp: , Rfl:   •  Continuous Blood Gluc  (Dexcom G5 Mobile ) device, 1 kit Daily., Disp: 1 each, Rfl: 0  •  escitalopram (LEXAPRO) 20 MG tablet, Take 1 tablet by mouth Daily., Disp: 90 tablet, Rfl: 1  •  fluticasone (FLONASE) 50 MCG/ACT nasal spray, 2 sprays into each nostril Daily., Disp: , Rfl:   •  glipizide (GLUCOTROL XL) 5 MG ER tablet, Take 1 tablet by mouth Daily., Disp: 90 tablet, Rfl: 1  •  glucose blood (Accu-Chek Guide) test strip, 1 each by Other route 3 (Three) Times a Day. Dg: E11.65, Disp: 100 each, Rfl: 1  •  hydroCHLOROthiazide (HYDRODIURIL) 25 MG tablet, Take 1 tablet by mouth Daily., Disp: 90 tablet, Rfl: 1  •  Insulin Degludec (Tresiba FlexTouch) 200 UNIT/ML solution pen-injector pen injection, Inject 44 Units under the skin into the appropriate area as directed every night at bedtime., Disp: 3 pen, Rfl: 2  •  Insulin Pen Needle (BD Pen Needle Micro U/F) 32G X 6  MM misc, 1 each Daily., Disp: 100 each, Rfl: 1  •  Lancets (accu-chek soft touch) lancets, 1 each by Other route 3 (Three) Times a Day. Dg: E11.65, Disp: 100 each, Rfl: 1  •  levothyroxine (SYNTHROID, LEVOTHROID) 88 MCG tablet, Take 1 tablet by mouth Daily., Disp: 90 tablet, Rfl: 1  •  loperamide (IMODIUM A-D) 2 MG tablet, Take 2 mg by mouth 4 (Four) Times a Day As Needed for Diarrhea., Disp: , Rfl:   •  melatonin 5 MG tablet tablet, Take 10 mg by mouth At Night As Needed., Disp: , Rfl:   •  metFORMIN ER (GLUCOPHAGE-XR) 500 MG 24 hr tablet, Take 2 tablets by mouth 2 (two) times a day., Disp: 360 tablet, Rfl: 0  •  omeprazole (priLOSEC) 40 MG capsule, TAKE ONE CAPSULE BY MOUTH DAILY, Disp: 90 capsule, Rfl: 0  •  propranolol LA (INDERAL LA) 60 MG 24 hr capsule, TAKE ONE CAPSULE BY MOUTH DAILY, Disp: 90 capsule, Rfl: 0  •  rosuvastatin (CRESTOR) 20 MG tablet, Take 1 tablet by mouth every night at bedtime., Disp: 90 tablet, Rfl: 1  •  tamoxifen (NOLVADEX) 20 MG chemo tablet, TAKE ONE TABLET BY MOUTH DAILY, Disp: 90 tablet, Rfl: 1    Aspirin use counseling: Does not need ASA (and currently is not on it)    Current medication list contains no high risk medications. Some potential harmful drug interactions identified. Plan of action: monitoring    Family History   Problem Relation Age of Onset   • Diabetes Mother    • Heart attack Mother    • Heart failure Mother    • Hyperlipidemia Mother    • Hyperthyroidism Mother         Has surgery   • Hypothyroidism Mother         Later in life   • Heart disease Mother    • Hypertension Mother    • Asthma Father    • COPD Father    • Hypertension Father    • Heart attack Maternal Uncle    • Heart failure Maternal Uncle    • Depression Maternal Grandmother    • Heart attack Maternal Grandfather    • Heart failure Maternal Grandfather    • Alcohol abuse Paternal Grandmother    • Alcohol abuse Paternal Grandfather    • Scleroderma Sister    • No Known Problems Daughter    • No Known  Problems Son    • Ren Hyperthermia Neg Hx        Social History     Tobacco Use   • Smoking status: Never Smoker   • Smokeless tobacco: Never Used   Substance Use Topics   • Alcohol use: Yes     Alcohol/week: 1.0 standard drinks     Types: 1 Cans of beer per week     Comment: 2-3 a month, social only       Past Surgical History:   Procedure Laterality Date   • BREAST LUMPECTOMY WITH SENTINEL NODE BIOPSY Left 7/18/2018    Procedure: BREAST LUMPECTOMY WITH SENTINEL NODE BIOPSY;  Surgeon: João Zazueta MD;  Location: [facility] OR;  Service: General   • COLONOSCOPY      refusing; negative cologuard 8/24/2017   • HYSTERECTOMY  1989   • KNEE SURGERY Right 2014   • KNEE SURGERY Left 2016   • LIVER BIOPSY  2019    fatty liver   • ROTATOR CUFF REPAIR Left 2009   • ROTATOR CUFF REPAIR Right 2012   • TOE SURGERY  2009    ingrown        Patient Active Problem List   Diagnosis   • Malignant neoplasm of upper-outer quadrant of left breast in female, estrogen receptor positive (CMS/HCC)   • Type 2 diabetes mellitus with complication, without long-term current use of insulin (CMS/HCC)   • High risk medication use   • Age-related osteoporosis without current pathological fracture   • Tremor of both hands   • Anemia   • Allergic rhinitis   • Asthma   • Chronic kidney disease, stage II (mild)   • Depression   • Medicare annual wellness visit, subsequent   • Gastroesophageal reflux disease   • Hyperlipidemia   • Hypertension   • Hypothyroidism   • Infiltrating ductal carcinoma of breast (CMS/HCC)   • Renal cyst, right   • Fatty liver   • Vitamin D deficiency   • Moderate episode of recurrent major depressive disorder (CMS/HCC)   • Colon cancer screening   • Combined form of senile cataract   • Uncontrolled type 2 diabetes mellitus with hyperglycemia (CMS/HCC)       Review of Systems   Constitutional: Negative for activity change, fatigue and fever.   Respiratory: Negative for cough, shortness of breath and wheezing.   "  Cardiovascular: Negative for chest pain, palpitations and leg swelling.   Gastrointestinal: Negative for constipation, diarrhea and indigestion.   Skin: Negative for color change, dry skin and rash.   Neurological: Negative for tremors and headache.       Objective     Vitals:    09/22/21 1104   BP: 124/83   Pulse: 79   Resp: 16   Temp: 97.1 °F (36.2 °C)   SpO2: 95%   Weight: 84.4 kg (186 lb)   Height: 160 cm (63\")   PainSc: 0-No pain       Patient's Body mass index is 32.95 kg/m². indicating that she is obese (BMI >30). Obesity-related health conditions include the following: hypertension, diabetes mellitus and dyslipidemias. Obesity is unchanged. BMI is is above average; BMI management plan is completed. We discussed portion control and increasing exercise..      No exam data present    The patient has no evidence of cognitve impairment.     Physical Exam  Vitals and nursing note reviewed.   Constitutional:       General: She is not in acute distress.     Appearance: Normal appearance. She is well-developed. She is not ill-appearing.   HENT:      Head: Normocephalic and atraumatic.   Cardiovascular:      Rate and Rhythm: Normal rate and regular rhythm.      Heart sounds: Normal heart sounds. No murmur heard.   No gallop.    Pulmonary:      Effort: Pulmonary effort is normal. No respiratory distress.      Breath sounds: Normal breath sounds. No wheezing, rhonchi or rales.   Chest:      Chest wall: No tenderness.   Musculoskeletal:      Cervical back: Normal range of motion and neck supple.   Neurological:      General: No focal deficit present.      Mental Status: She is alert and oriented to person, place, and time. Mental status is at baseline.   Psychiatric:         Mood and Affect: Mood normal.         Recent Lab Results:     Lab Results   Component Value Date    CHOL 107 09/20/2021    TRIG 102 09/20/2021    HDL 40 09/20/2021    VLDL 19 09/20/2021    LDLHDL 1.17 09/20/2021       Assessment/Plan "   Age-appropriate Screening Schedule:  Refer to the list below for future screening recommendations based on patient's age, sex and/or medical conditions.      Health Maintenance   Topic Date Due   • DIABETIC FOOT EXAM  Never done   • INFLUENZA VACCINE  10/01/2021   • MAMMOGRAM  10/14/2021   • HEMOGLOBIN A1C  03/20/2022   • DIABETIC EYE EXAM  08/01/2022   • LIPID PANEL  09/20/2022   • URINE MICROALBUMIN  09/20/2022   • DXA SCAN  10/14/2022   • TDAP/TD VACCINES (2 - Td or Tdap) 06/23/2025   • ZOSTER VACCINE  Completed       Medicare Risks and Personalized Health Plan:  Alcohol Misuse  Breast Cancer/Mammogram Screening  Cardiovascular risk  Colon Cancer Screening  Dementia/Memory   Depression/Dysphoria  Diabetic Lab Screening   Immunizations Discussed/Encouraged (specific immunizations; COVID19 )  Osteoporosis Risk      CMS-Preventive Services Quick Reference  Medicare Preventive Services Addressed:  Annual Wellness Visit (AWV)  Bone Density Measurements  Cardiovascular Disease Screening Tests (may do this order every 5 years in beneficiaries without signs or symptoms of cardiovascular disease)  Screening Mammography     Advance Care Planning:  ACP discussion was held with the patient during this visit. Patient has an advance directive in EMR which is still valid.     Diagnoses and all orders for this visit:    1. Medicare annual wellness visit, subsequent (Primary)    2. Uncontrolled type 2 diabetes mellitus with hyperglycemia (HCC)  -     Insulin Degludec (Tresiba FlexTouch) 200 UNIT/ML solution pen-injector pen injection; Inject 44 Units under the skin into the appropriate area as directed every night at bedtime.  Dispense: 3 pen; Refill: 2      Medicare wellness exam was done today. I reviewed her medical problems and her medications. I also reviewed her recent lab results. Her hemoglobin A1c came down from 13.9 and 6/2021 to8.6 now. This is a great improvement, but there is definitely more work we need to do and  further improve the control of her diabetes. I will be increasing the dose of Tresiba from 40 units to 44 units and she will continue the rest of her medicines and monitor her blood sugar and work on diet. I also reviewed her health maintenance. Her last mammogram was in 10/2020 and she will be scheduling her mammogram down the road through her oncologist as she was diagnosed with breast cancer few years ago. She had negative Cologuard in 9/2020. Her last DEXA scan was in 10/2020. She is fully vaccinated against COVID-19. She will be scheduling her booster shot down the road and she will be also scheduling the flu shot. She is up to speed with her pneumonia shots and Shingrix vaccination. Healthy lifestyle was reinforced.    An After Visit Summary and PPPS with all of these plans were given to the patient.      Follow Up:  Return in about 3 months (around 12/22/2021) for Next scheduled follow up.        Requested Prescriptions     Signed Prescriptions Disp Refills   • Insulin Degludec (Tresiba FlexTouch) 200 UNIT/ML solution pen-injector pen injection 3 pen 2     Sig: Inject 44 Units under the skin into the appropriate area as directed every night at bedtime.

## 2021-09-23 ENCOUNTER — TELEPHONE (OUTPATIENT)
Dept: FAMILY MEDICINE CLINIC | Facility: CLINIC | Age: 71
End: 2021-09-23

## 2021-09-23 NOTE — TELEPHONE ENCOUNTER
Please call the patient to see if she is really requesting that.  She was here for follow-up yesterday and we talked about her checking sugar at home with her regular glucometer.  Her insurance previously denied freestyle charles.  Thank you.

## 2021-09-23 NOTE — TELEPHONE ENCOUNTER
Okay, we can write a letter of medical necessity.  Patient was recently noted to have significant worsening of her diabetes and she was started on insulin, we are adjusting the dose of insulin and that requires frequent monitoring of the blood sugar.

## 2021-09-23 NOTE — TELEPHONE ENCOUNTER
Hub staff attempted to follow warm transfer process and was unsuccessful     Caller: JACKY [facility]    Relationship to patient:     Best call back number: [phone]    Patient is needing: JACKY FROM [facility] CALLING IN REGARDS TO NEEDING A LETTER OF MEDICAL NECESSITY WITH MEDICAL REASON FOR PATIENT TO RECEIVE RENEE LOZANO#[phone]  ATTN BEA

## 2021-09-23 NOTE — TELEPHONE ENCOUNTER
PATIENT CALLED BACK AND SAID SHE'S REALLY WANTING TO GET WHATEVER MACHINE THAT SHE NEEDS TO STOP PRICKING HER FINGER.

## 2021-09-27 ENCOUNTER — TELEPHONE (OUTPATIENT)
Dept: FAMILY MEDICINE CLINIC | Facility: CLINIC | Age: 71
End: 2021-09-27

## 2021-09-27 NOTE — TELEPHONE ENCOUNTER
Please write a new letter for medical necessity of the glucose monitoring system, please state the year that patient is requiring continuous glucose monitor for frequent monitoring of the blood sugar, I will then sign it.  Thank you.

## 2021-09-27 NOTE — TELEPHONE ENCOUNTER
ERNESTO FROM [facility] IS CALLING IN REGARDS TO, TWO THINGS MISSING ON THE MEDICAL NECESSITY LETTER DR. MCKEON FAXED OVER.     THEY NEED IT TO INCLUDE THE WORD CONTINUOUS GLUCOSE MONITOR FOR FREQUENT MONITORING OF THE BLOOD SUGAR AND IF THE DOCTOR COULD SIGN IT AS WELL     ERNESTO CAN BE REACHED AT: [phone]

## 2021-09-30 DIAGNOSIS — E11.8 TYPE 2 DIABETES MELLITUS WITH COMPLICATION, WITHOUT LONG-TERM CURRENT USE OF INSULIN (HCC): ICD-10-CM

## 2021-09-30 DIAGNOSIS — F33.1 MODERATE EPISODE OF RECURRENT MAJOR DEPRESSIVE DISORDER (HCC): ICD-10-CM

## 2021-09-30 RX ORDER — GLIPIZIDE 5 MG/1
TABLET, FILM COATED, EXTENDED RELEASE ORAL
Qty: 90 TABLET | Refills: 1 | Status: SHIPPED | OUTPATIENT
Start: 2021-09-30 | End: 2022-02-04 | Stop reason: SDUPTHER

## 2021-09-30 RX ORDER — ESCITALOPRAM OXALATE 20 MG/1
TABLET ORAL
Qty: 90 TABLET | Refills: 1 | Status: SHIPPED | OUTPATIENT
Start: 2021-09-30 | End: 2022-03-28

## 2021-10-13 DIAGNOSIS — I10 ESSENTIAL HYPERTENSION: ICD-10-CM

## 2021-10-13 RX ORDER — HYDROCHLOROTHIAZIDE 25 MG/1
TABLET ORAL
Qty: 90 TABLET | Refills: 0 | Status: SHIPPED | OUTPATIENT
Start: 2021-10-13 | End: 2021-12-30

## 2021-11-11 ENCOUNTER — OFFICE VISIT (OUTPATIENT)
Dept: ONCOLOGY | Facility: CLINIC | Age: 71
End: 2021-11-11

## 2021-11-11 ENCOUNTER — APPOINTMENT (OUTPATIENT)
Dept: LAB | Facility: HOSPITAL | Age: 71
End: 2021-11-11

## 2021-11-11 VITALS
WEIGHT: 194.3 LBS | TEMPERATURE: 97.3 F | HEIGHT: 63 IN | RESPIRATION RATE: 14 BRPM | BODY MASS INDEX: 34.43 KG/M2 | SYSTOLIC BLOOD PRESSURE: 145 MMHG | DIASTOLIC BLOOD PRESSURE: 76 MMHG | OXYGEN SATURATION: 98 % | HEART RATE: 93 BPM

## 2021-11-11 DIAGNOSIS — Z17.0 MALIGNANT NEOPLASM OF UPPER-OUTER QUADRANT OF LEFT BREAST IN FEMALE, ESTROGEN RECEPTOR POSITIVE (HCC): ICD-10-CM

## 2021-11-11 DIAGNOSIS — D64.9 ANEMIA, UNSPECIFIED TYPE: Primary | ICD-10-CM

## 2021-11-11 DIAGNOSIS — C50.412 MALIGNANT NEOPLASM OF UPPER-OUTER QUADRANT OF LEFT BREAST IN FEMALE, ESTROGEN RECEPTOR POSITIVE (HCC): ICD-10-CM

## 2021-11-11 PROBLEM — D64.89 OTHER SPECIFIED ANEMIAS: Status: ACTIVE | Noted: 2019-08-01

## 2021-11-11 LAB
ALBUMIN SERPL-MCNC: 4.2 G/DL (ref 3.5–5.2)
ALBUMIN/GLOB SERPL: 1.3 G/DL (ref 1.1–2.4)
ALP SERPL-CCNC: 86 U/L (ref 38–116)
ALT SERPL W P-5'-P-CCNC: 45 U/L (ref 0–33)
ANION GAP SERPL CALCULATED.3IONS-SCNC: 10.7 MMOL/L (ref 5–15)
AST SERPL-CCNC: 63 U/L (ref 0–32)
BASOPHILS # BLD AUTO: 0.05 10*3/MM3 (ref 0–0.2)
BASOPHILS NFR BLD AUTO: 0.6 % (ref 0–1.5)
BILIRUB SERPL-MCNC: 0.5 MG/DL (ref 0.2–1.2)
BUN SERPL-MCNC: 17 MG/DL (ref 6–20)
BUN/CREAT SERPL: 15 (ref 7.3–30)
CALCIUM SPEC-SCNC: 9.3 MG/DL (ref 8.5–10.2)
CHLORIDE SERPL-SCNC: 100 MMOL/L (ref 98–107)
CO2 SERPL-SCNC: 27.3 MMOL/L (ref 22–29)
CREAT SERPL-MCNC: 1.13 MG/DL (ref 0.6–1.1)
DEPRECATED RDW RBC AUTO: 41.5 FL (ref 37–54)
EOSINOPHIL # BLD AUTO: 0.3 10*3/MM3 (ref 0–0.4)
EOSINOPHIL NFR BLD AUTO: 3.6 % (ref 0.3–6.2)
ERYTHROCYTE [DISTWIDTH] IN BLOOD BY AUTOMATED COUNT: 14.1 % (ref 12.3–15.4)
FERRITIN SERPL-MCNC: 62.8 NG/ML (ref 13–150)
FOLATE SERPL-MCNC: >20 NG/ML (ref 4.78–24.2)
GFR SERPL CREATININE-BSD FRML MDRD: 48 ML/MIN/1.73
GLOBULIN UR ELPH-MCNC: 3.3 GM/DL (ref 1.8–3.5)
GLUCOSE SERPL-MCNC: 110 MG/DL (ref 74–124)
HAPTOGLOB SERPL-MCNC: 115 MG/DL (ref 30–200)
HCT VFR BLD AUTO: 35.6 % (ref 34–46.6)
HGB BLD-MCNC: 11.5 G/DL (ref 12–15.9)
HGB RETIC QN AUTO: 31.4 PG (ref 29.8–36.1)
IMM GRANULOCYTES # BLD AUTO: 0.03 10*3/MM3 (ref 0–0.05)
IMM GRANULOCYTES NFR BLD AUTO: 0.4 % (ref 0–0.5)
IMM RETICS NFR: 11.9 % (ref 3–15.8)
IRON 24H UR-MRATE: 67 MCG/DL (ref 37–145)
IRON SATN MFR SERPL: 12 % (ref 14–48)
LDH SERPL-CCNC: 164 U/L (ref 99–259)
LYMPHOCYTES # BLD AUTO: 2.51 10*3/MM3 (ref 0.7–3.1)
LYMPHOCYTES NFR BLD AUTO: 30.4 % (ref 19.6–45.3)
MCH RBC QN AUTO: 26.1 PG (ref 26.6–33)
MCHC RBC AUTO-ENTMCNC: 32.3 G/DL (ref 31.5–35.7)
MCV RBC AUTO: 80.7 FL (ref 79–97)
MONOCYTES # BLD AUTO: 0.57 10*3/MM3 (ref 0.1–0.9)
MONOCYTES NFR BLD AUTO: 6.9 % (ref 5–12)
NEUTROPHILS NFR BLD AUTO: 4.81 10*3/MM3 (ref 1.7–7)
NEUTROPHILS NFR BLD AUTO: 58.1 % (ref 42.7–76)
NRBC BLD AUTO-RTO: 0 /100 WBC (ref 0–0.2)
PLATELET # BLD AUTO: 309 10*3/MM3 (ref 140–450)
PMV BLD AUTO: 10 FL (ref 6–12)
POTASSIUM SERPL-SCNC: 4.4 MMOL/L (ref 3.5–4.7)
PROT SERPL-MCNC: 7.5 G/DL (ref 6.3–8)
RBC # BLD AUTO: 4.41 10*6/MM3 (ref 3.77–5.28)
RETICS # AUTO: 0.07 10*6/MM3 (ref 0.02–0.13)
RETICS/RBC NFR AUTO: 1.55 % (ref 0.7–1.9)
SODIUM SERPL-SCNC: 138 MMOL/L (ref 134–145)
TIBC SERPL-MCNC: 561 MCG/DL (ref 249–505)
TRANSFERRIN SERPL-MCNC: 401 MG/DL (ref 200–360)
TSH SERPL DL<=0.05 MIU/L-ACNC: 1.82 UIU/ML (ref 0.27–4.2)
VIT B12 BLD-MCNC: 739 PG/ML (ref 211–946)
WBC # BLD AUTO: 8.27 10*3/MM3 (ref 3.4–10.8)

## 2021-11-11 PROCEDURE — 83010 ASSAY OF HAPTOGLOBIN QUANT: CPT | Performed by: INTERNAL MEDICINE

## 2021-11-11 PROCEDURE — 83615 LACTATE (LD) (LDH) ENZYME: CPT | Performed by: INTERNAL MEDICINE

## 2021-11-11 PROCEDURE — 36415 COLL VENOUS BLD VENIPUNCTURE: CPT | Performed by: INTERNAL MEDICINE

## 2021-11-11 PROCEDURE — 99214 OFFICE O/P EST MOD 30 MIN: CPT | Performed by: INTERNAL MEDICINE

## 2021-11-11 PROCEDURE — 82607 VITAMIN B-12: CPT | Performed by: INTERNAL MEDICINE

## 2021-11-11 PROCEDURE — 85046 RETICYTE/HGB CONCENTRATE: CPT | Performed by: INTERNAL MEDICINE

## 2021-11-11 PROCEDURE — 84443 ASSAY THYROID STIM HORMONE: CPT | Performed by: INTERNAL MEDICINE

## 2021-11-11 PROCEDURE — 84466 ASSAY OF TRANSFERRIN: CPT | Performed by: INTERNAL MEDICINE

## 2021-11-11 PROCEDURE — 80053 COMPREHEN METABOLIC PANEL: CPT | Performed by: INTERNAL MEDICINE

## 2021-11-11 PROCEDURE — 85025 COMPLETE CBC W/AUTO DIFF WBC: CPT | Performed by: INTERNAL MEDICINE

## 2021-11-11 PROCEDURE — 82746 ASSAY OF FOLIC ACID SERUM: CPT | Performed by: INTERNAL MEDICINE

## 2021-11-11 PROCEDURE — 82728 ASSAY OF FERRITIN: CPT | Performed by: INTERNAL MEDICINE

## 2021-11-11 PROCEDURE — 83540 ASSAY OF IRON: CPT | Performed by: INTERNAL MEDICINE

## 2021-11-11 RX ORDER — FERROUS SULFATE 325(65) MG
325 TABLET ORAL
Qty: 30 TABLET | Refills: 1 | Status: SHIPPED | OUTPATIENT
Start: 2021-11-11 | End: 2022-01-12

## 2021-11-11 NOTE — PROGRESS NOTES
Subjective Patient with continued fatigue    History of present illness:     Patient is a 71-year-old female with oted in late May 2018 a lump developing in the upper outer quadrant of the left breast without pain or nipple discharge.  This measured approximately 1 cm in size.  Mammogram indicated this asymmetric density in the same region and an ultrasound revealed a 1.8 cm irregular solid mass.  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  Additional history included no previous breast biopsies, a brief period of time with hormonal replacement and no history of breast or ovarian cancer with family.  She was seen by Dr. Zazueta on the 28th an MRI of both breasts was performed July 6.  Within the left anterior one third at the 12:30 position 3 cm from the nipple with irregular enhancing mass measuring 1.6 cm x 1.4 and 2.2 immediately lateral dimension.  There are other findings were seen in the left breast with no evidence of left axillary adenopathy and no findings suspicious for right breast.  The patient proceeded to a left breast lumpectomy July 18, 2018.      Pathologic findings were consistent with a 2.5 cm grade 3 invasive mammary ductal carcinoma with associated DCIS.  The closest margin was 1 mm.  Dandridge nodes were negative staging of pT2N0.  Dr. Zazueta saw the patient July 20 recognizing the closest margin was the anterior margin having taken this off the skin with no further removal possible.  There was concern about the need for additional tissue though the addended pathology revealed the DCIS to be intermediate grade.  It was determined not to take additional margins and have her seen by both medical oncology and radiation therapy.  She now presents with her  .      The patient's initial consultation was August 7 and potential adjuvant therapy discussed with the initial recommendation being an Oncotype DX  analysis to be process.  This is now reviewed with the patient and her  may return August 23, 2018.  Her recurrence score 10 with 10 year risk of distance recurrence at 7%.  She is clearly in the low risk category additional studies revealing ER score of 10.5, AL score of 8.6 which are both positive and HER-2 score of 8.7 which is negative.   Additional studies include  LH of 27.5, FSH of 57.6, estradiol of 9.9, CA 15-3 of 10.9, TSH of 4.64 hemoglobin A1c of 8.72.   The patient is advised of the findings per her risk of recurrence and she and her  are understandably elated.  Chemotherapy is not recommended in her circumstance but anti-hormonal therapy is and we have discussed potential treatment with AI therapy being the most appropriate choice in this postmenopausal patient.  She's not additionally had any recent assessment for bone density, however we discussed having this done in the near future.     As result the patient was scheduled for bone density and this was obtained September 6 revealing evidence of osteoporosis involving the lumbar with T score of -3.4 and right hip with T score of -2.7.  The patient has been using Arimidex which we'll discontinue and we discussed institution of tamoxifen at this point.  She'll also need assessment of her vitamin D level which we went on to measure documenting a level of 45.5.                                      The patient is now seen a month after switching to tamoxifen and is tolerating it well thus far without significant hot flashes.  We have also discussed the use of Reclast under the circumstances and she is agreeable to treatment when seen October 29, 2018.   She did have myalgias and arthralgias following Reclast for several days but these then resolved.  As she seen January 21, 2019 she is feeling well and we have discussed weight loss, exercise and also she and her 's recognition of slowly worsening bilateral hand tremor left greater than  right.  This been present much before her diagnosis of breast cancer and actually is an issue in several members of her family.   The patient is next seen August 1, 2019.  In the interval she been found to have elevated liver function tests and ultimately liver biopsy that was significant for mild steatohepatitis.  She has been adjusting her diet but indicates that she is also has significant fatigue and while these might be related she believes the fatigue is in part secondary to tamoxifen.  Follow-up testing included total cholesterol 113, triglycerides of 263 with HDL down to 24, VLDL at 52.6, ferritin of 298, normal iron profile, CMP with glucose 193, creatinine 1.36 and hemoglobin A1c of 7.10.  She notes that her fatigue is not improved off tamoxifen.  We have discussed her findings indicating better control needed for her diabetes and thyroid dysfunction and also went on to treat a urinary tract infection.  She did see primary care who adjusted medications for thyroid hypertension.  The patient when seen December 12, 2019 feels considerably better and is thrilled to see the difference in her functioning.  She is having no difficulty with tamoxifen at this point.  The patient was seen by the NP August 7, 2020 feeling well.  She did undergo subsequent mammogram and DEXA scan with the latter (performed October 14, 2020) demonstrating a lumbar T score -2.4 (increased), left hip femoral neck T score -2.5 (unchanged) and right femoral neck T score of -1.8 (increased).  Again this was substantial increase concerning osteoporosis and bone density particular in the left hip.  The patient is additional mammography October 14 showed no evidence recurrent malignancy.     She is next seen March 15, 2021 again noting that she had started AI therapy August 23, 2018 but when her bone density was consistent with osteoporosis we switched to tamoxifen beginning September 24, 2018.  Fortunately she continues to feel well overall  though she did have a fall recently possibly in part related to reaction after recent COVID-19 vaccination-not completed.       The patient is next seen 6/7/2021.  She is feeling well having started OTC iron therapy and vitamin supplements on her own.  Fortunately she is feeling well though has not been following her diet very consistently.  We made plans for the patient be seen back and she is next reviewed 11/11/21.Initially her repeat iron studies were not available but we have had them completed and we find it currently that she is iron deficient with a saturation of 12%, TIBC 561 and iron 67 and with a ferritin that is dropped from  298-62.8.  For follow-up CBC including H&H of 11.5 and 35.6, MCV of 80.7 MCH of 26.1.    Past Medical History:   Diagnosis Date   • Anxiety    • Anxiety and depression    • Asthma     SEASONAL ALLERGY RELATED   • Cancer of breast (HCC) 06/2018    LEFT   • Chronic kidney disease     Renal cyst, right kidney w/urology workup in past   • Depression    • Diabetes mellitus (HCC)     Type 2   • Disease of thyroid gland    • GERD (gastroesophageal reflux disease)    • History of prior pregnancies     x2   • History of transfusion     S/P HYST --AUTOLOGOUS    • Hyperlipidemia    • Hypertension    • IBS (irritable bowel syndrome)    • Seasonal allergies         Past Surgical History:   Procedure Laterality Date   • BREAST LUMPECTOMY WITH SENTINEL NODE BIOPSY Left 7/18/2018    Procedure: BREAST LUMPECTOMY WITH SENTINEL NODE BIOPSY;  Surgeon: João Zazueta MD;  Location: [facility];  Service: General   • COLONOSCOPY      refusing; negative cologuard 8/24/2017   • HYSTERECTOMY  1989   • KNEE SURGERY Right 2014   • KNEE SURGERY Left 2016   • LIVER BIOPSY  2019    fatty liver   • ROTATOR CUFF REPAIR Left 2009   • ROTATOR CUFF REPAIR Right 2012   • TOE SURGERY  2009    ingrown         Current Outpatient Medications on File Prior to Visit   Medication Sig Dispense Refill   • albuterol  (PROAIR RESPICLICK) 108 (90 Base) MCG/ACT inhaler Inhale 1 puff Every 4 (Four) Hours As Needed for Wheezing or Shortness of Air.     • B Complex Vitamins (VITAMIN B COMPLEX PO) Take 1 tablet by mouth Every Night.     • Blood Glucose Monitoring Suppl (Accu-Chek Guide) w/Device kit 1 kit Daily. Dg: E11.65 1 kit 0   • cetirizine (zyrTEC) 10 MG tablet Take 10 mg by mouth Daily.     • cholecalciferol (VITAMIN D3) 1000 units tablet Take 1,000 Units by mouth Every Night.     • Continuous Blood Gluc  (Dexcom G5 Mobile ) device 1 kit Daily. 1 each 0   • escitalopram (LEXAPRO) 20 MG tablet TAKE ONE TABLET BY MOUTH DAILY 90 tablet 1   • fluticasone (FLONASE) 50 MCG/ACT nasal spray 2 sprays into each nostril Daily.     • glipizide (GLUCOTROL XL) 5 MG ER tablet TAKE ONE TABLET BY MOUTH DAILY 90 tablet 1   • glucose blood (Accu-Chek Guide) test strip 1 each by Other route 3 (Three) Times a Day. Dg: E11.65 100 each 1   • hydroCHLOROthiazide (HYDRODIURIL) 25 MG tablet TAKE ONE TABLET BY MOUTH DAILY 90 tablet 0   • Insulin Degludec (Tresiba FlexTouch) 200 UNIT/ML solution pen-injector pen injection Inject 44 Units under the skin into the appropriate area as directed every night at bedtime. 3 pen 2   • Insulin Pen Needle (BD Pen Needle Micro U/F) 32G X 6 MM misc 1 each Daily. 100 each 1   • Lancets (accu-chek soft touch) lancets 1 each by Other route 3 (Three) Times a Day. Dg: E11.65 100 each 1   • levothyroxine (SYNTHROID, LEVOTHROID) 88 MCG tablet Take 1 tablet by mouth Daily. 90 tablet 1   • loperamide (IMODIUM A-D) 2 MG tablet Take 2 mg by mouth 4 (Four) Times a Day As Needed for Diarrhea.     • melatonin 5 MG tablet tablet Take 10 mg by mouth At Night As Needed.     • metFORMIN ER (GLUCOPHAGE-XR) 500 MG 24 hr tablet Take 2 tablets by mouth 2 (two) times a day. 360 tablet 0   • omeprazole (priLOSEC) 40 MG capsule TAKE ONE CAPSULE BY MOUTH DAILY 90 capsule 0   • propranolol LA (INDERAL LA) 60 MG 24 hr capsule TAKE  ONE CAPSULE BY MOUTH DAILY 90 capsule 0   • rosuvastatin (CRESTOR) 20 MG tablet Take 1 tablet by mouth every night at bedtime. 90 tablet 1   • tamoxifen (NOLVADEX) 20 MG chemo tablet TAKE ONE TABLET BY MOUTH DAILY 90 tablet 1     No current facility-administered medications on file prior to visit.        ALLERGIES:    Allergies   Allergen Reactions   • Amlodipine Swelling        Social History     Socioeconomic History   • Marital status:      Spouse name: Mahin   • Number of children: 2   • Years of education: College   Tobacco Use   • Smoking status: Never Smoker   • Smokeless tobacco: Never Used   Substance and Sexual Activity   • Alcohol use: Yes     Alcohol/week: 1.0 standard drink     Types: 1 Cans of beer per week     Comment: 2-3 a month, social only   • Drug use: No   • Sexual activity: Yes     Comment:         Family History   Problem Relation Age of Onset   • Diabetes Mother    • Heart attack Mother    • Heart failure Mother    • Hyperlipidemia Mother    • Hyperthyroidism Mother         Has surgery   • Hypothyroidism Mother         Later in life   • Heart disease Mother    • Hypertension Mother    • Asthma Father    • COPD Father    • Hypertension Father    • Heart attack Maternal Uncle    • Heart failure Maternal Uncle    • Depression Maternal Grandmother    • Heart attack Maternal Grandfather    • Heart failure Maternal Grandfather    • Alcohol abuse Paternal Grandmother    • Alcohol abuse Paternal Grandfather    • Scleroderma Sister    • No Known Problems Daughter    • No Known Problems Son    • Malig Hyperthermia Neg Hx         Review of Systems   Constitutional: Positive for activity change and fatigue. Negative for unexpected weight change.        Modest hot flashes   Respiratory: Negative for chest tightness, shortness of breath and wheezing.    Gastrointestinal: Negative for abdominal pain, constipation, diarrhea, nausea and vomiting.   Skin:        Pruritus   Neurological: Positive  "for tremors and headaches. Negative for weakness.   All other systems reviewed and are negative.         Objective     Vitals:    11/11/21 1514   BP: 145/76   Pulse: 93   Resp: 14   Temp: 97.3 °F (36.3 °C)   TempSrc: Infrared   SpO2: 98%   Weight: 88.1 kg (194 lb 4.8 oz)   Height: 160 cm (62.99\")   PainSc: 0-No pain     Current Status 11/11/2021   ECOG score 0       Physical Exam   Constitutional: She is oriented to person, place, and time. She appears well-developed.   HENT:   Head: Normocephalic and atraumatic.   Nose: Nose normal.   Eyes: Pupils are equal, round, and reactive to light. Conjunctivae are normal.   Cardiovascular: Normal rate, regular rhythm and normal heart sounds.   Pulmonary/Chest: Effort normal and breath sounds normal.   Abdominal: Soft. Bowel sounds are normal.   Musculoskeletal: Normal range of motion.   Neurological: She is alert and oriented to person, place, and time.   Resting tremor noted left greater than right upper extremities   Skin: Skin is warm and dry.   Psychiatric: Her behavior is normal. Judgment and thought content normal.     Breast exam: Patient status post left breast lumpectomy well healing without evidence of inflammation or discharge from wound, status post left sentinel node assessment also without inflammation or discharge    RECENT LABS:  Hematology WBC   Date Value Ref Range Status   11/11/2021 8.27 3.40 - 10.80 10*3/mm3 Final     RBC   Date Value Ref Range Status   11/11/2021 4.41 3.77 - 5.28 10*6/mm3 Final     Hemoglobin   Date Value Ref Range Status   11/11/2021 11.5 (L) 12.0 - 15.9 g/dL Final     Hematocrit   Date Value Ref Range Status   11/11/2021 35.6 34.0 - 46.6 % Final     Platelets   Date Value Ref Range Status   11/11/2021 309 140 - 450 10*3/mm3 Final      BONE MINERAL DENSITOMETRY    October 14, 2020     HISTORY:  70 years-old female. Menopause at age 63. Previous diagnosis  of osteoporosis and breast cancer.     COMPARISON:  09/06/2018.     TECHNIQUE: " The T score compares the patient's bone mineral density with  the peak bone mass of young normal patients. Patients with T-scores  between 1.0 and 2.5 standard deviations below the mean are osteopenic.  Patients with T-scores greater than 2.5 standard deviation below the  mean are osteoporotic.     The Z score compares the patient's bone mineral density with sex and age  matched patients. Z score of -2.0 and lower is an indication of low  mineral density for the patient's age.     FINDINGS: Lumbar T score is  -2.4, representing a 17.7% interval  increase..     Left hip density is lowest at the  femoral neck where the T score is  -2.5, unchanged.      Right hip density is lowest at the  femoral neck where the T score is  -1.8, representing a 19.8% interval increase.      IMPRESSION:  Osteoporosis at the left hip. Interval increase in bone  Density.      Assessment/Plan       71 year-old female with previous medical history of seasonal allergies, diabetes mellitus type 2, CKD3, hypothyroidism, hyperlipidemia, hypertension, IBS, history of anxiety and depression with recent development of left breast abnormality found by the patient herself.  This led to mammogram ultrasound and stereotactic biopsy confirming invasive ductal carcinoma grade 2 though ER, [place] positive HER-2 negative.  Subsequent assessments via surgical review your no additional abnormalities seen on breast MRI and the patient proceeded to lumpectomy July 18, 2018.  Her pathologic findings were consistent with a 2.5 cm grade 3 invasive mammary ductal carcinoma with associated DCIS.  The closest margin was 1 mm.  Pender nodes were negative with staging of pT2N0.  Dr. Zazueta saw the patient July 20 recognizing the closest margin was the anterior margin having taken this off the skin with no further removal possible.  There was concern about the need for additional tissue though the addended pathology revealed the DCIS to be intermediate grade.  It was  ultimately determined that further surgery was not necessary.   The patient presents for medical oncology assessment and we discussed potential adjuvant therapy but in particular it is felt the patient requires further genomic assessment with Oncotype DX analysis.  We reviewed this in detail and she understands the additional information that we could obtain from such an approach in making decisions about adjuvant therapy. We proceeded with additional assessment including Oncotype and laboratory studies that, fortunate, revealed that she has an excellent prognosis including a 7% risk of recurrence at 10 years with the use of tamoxifen.  Chemotherapy, therefore, is not appropriate and we have discussed an alternative therapy in this postmenopausal patient with AI therapy in particular her Arimidex over 5 years.  She is currently undergoing review by radiation therapy and this can proceed at any point as we also plan to initiate Arimidex with a trial of the medication given over the next month.  We went on to have the patient tested for bone density finding evidence, unfortunate, of osteoporosis.  She's been tolerating Arimidex well without discontinue this and institute Tamoxifen.  The patient went on to institute treatment and underwent testing for TSH, vitamin D level and serum chemistries.  These are found to be acceptable and she now next returns October 29 tolerating tamoxifen well. We went on to continue with Reclast at that visit.  The patient did have myalgias and arthralgias following the treatment for several days that resolved.     As she is seen January 21, 2019 she is tolerating tamoxifen overall well though we have discussed that she should continue to exercise, manage her diet and try to achieve weight loss overall.  Additionally she has what appears to be essential tremor left greater than right.  Plans to see her primary care to review these issues but will start exercising immediately.  We'll plan  to see her back here in approximately 6 months, plan repeat Reclast in late October and then yearly follow-ups as she completes 5 years of tamoxifen.    The patient is next seen August 1, 2019 with complaints of fatigue and recent diagnostics that are consistent with mild to moderate steatohepatitis on liver biopsy.  It is not felt likely tamoxifen is the major issue here but we do plan to hold it briefly to see if she does not improve as we investigate both her liver function tests and etiology of her mild anemia.  It is hoped this might improve her degree of fatigue.  We plan additional laboratory studies as above we have discussed results with she and her  in some detail when seen September 12, 2019.  She needs better control of her diabetes as well as thyroid dysfunction and is urged to return to primary care to be assessed.  A copy this note will be sent to Dr. Whitfield and she will be asked otherwise to restart tamoxifen assessment approximately 3 months.    The patient was found to have symptoms of UTI and ultimately was treated with ciprofloxacin.  Thereafter she has follow-up with primary care and more effectively treated her blood pressure and thyroid dysfunction.       The patient is followed at 6-month intervals  August 7, 2020 feeling well.  She did undergo subsequent mammogram and DEXA scan with the latter (performed October 14, 2020) demonstrating a lumbar T score -2.4 (increased), left hip femoral neck T score -2.5 (unchanged) and right femoral neck T score of -1.8 (increased).  Again this was substantial increase concerning osteoporosis and bone density particular in the left hip.  The patient is additional mammography October 14 showed no evidence recurrent malignancy.     She is next seen March 15, 2021 again noting that she had started AI therapy August 23, 2018 but when her bone density was consistent with osteoporosis we switched to tamoxifen beginning September 24, 2018.  Fortunately the  patient is doing fairly well when assessed March 15, 2021.  Plan to continue her current treatment plans and she agrees.  She does have a degree of mild worsening of her anemia and we have agreed to a follow-up 3-month assessment.     The patient assessed at 3 months later-6/7/2021-demonstrates normalization of hemoglobin hematocrit.  She will be asked to continue tamoxifen with a 6-month follow-up.     The patient is next seen 11/11/2021 demonstrating a mild drop in hemoglobin and modest iron deficiency.  She is contacted with these results we do plan additional iron supplementation.    Plan:  *Continue tamoxifen at this point approximately 3 years since initiating endocrine therapy for her  2.5 cm grade 3 invasive mammary ductal carcinoma with associated DCIS.      *Patient's osteoporosis will be followed by repeat bone density late October 2022    *Current anemia remains in question with at least a component of iron deficiency.  She has had previous Cologuard in 2020 that was negative.  She is taking an OTC component that is low level iron supplement and will start 325 mg p.o. ferrous sulfate daily.  She will contact me if she has any gastric upset from this.    *3 months follow-up MD, CBC, iron studies

## 2021-11-13 DIAGNOSIS — E03.4 HYPOTHYROIDISM DUE TO ACQUIRED ATROPHY OF THYROID: ICD-10-CM

## 2021-11-13 RX ORDER — LEVOTHYROXINE SODIUM 88 UG/1
TABLET ORAL
Qty: 90 TABLET | Refills: 0 | Status: SHIPPED | OUTPATIENT
Start: 2021-11-13 | End: 2022-02-04 | Stop reason: SDUPTHER

## 2021-11-16 ENCOUNTER — TELEPHONE (OUTPATIENT)
Dept: ONCOLOGY | Facility: CLINIC | Age: 71
End: 2021-11-16

## 2021-11-16 LAB
Lab: NORMAL
METHYLMALONATE SERPL-SCNC: 208 NMOL/L (ref 0–378)

## 2021-11-16 NOTE — TELEPHONE ENCOUNTER
Caller: SUSANA YI    Relationship to patient: SELF    Best call back number: [phone]    Chief complaint: PT IS CALLING TO R/S HER APPT, STATES SHE IS GOING TO [place] AND DID NOT REALIZE THAT SHE WAS LEAVING ON 02/09/22 WHICH IS THE DAY BEFORE HER SCHEDULED APPT.    Type of visit: LAB AND FOLLOW UP    Requested date: 02/07/22 OR ANY PRIOR TO 02/09/22    If rescheduling, when is the original appointment: 02/10/22

## 2021-12-03 ENCOUNTER — TELEPHONE (OUTPATIENT)
Dept: FAMILY MEDICINE CLINIC | Facility: CLINIC | Age: 71
End: 2021-12-03

## 2021-12-03 DIAGNOSIS — E11.65 UNCONTROLLED TYPE 2 DIABETES MELLITUS WITH HYPERGLYCEMIA (HCC): ICD-10-CM

## 2021-12-03 RX ORDER — INSULIN DEGLUDEC 200 U/ML
44 INJECTION, SOLUTION SUBCUTANEOUS
Qty: 3 PEN | Refills: 2 | Status: CANCELLED | OUTPATIENT
Start: 2021-12-03

## 2021-12-03 RX ORDER — INSULIN DEGLUDEC 200 U/ML
44 INJECTION, SOLUTION SUBCUTANEOUS
Qty: 3 PEN | Refills: 0 | Status: SHIPPED | OUTPATIENT
Start: 2021-12-03 | End: 2022-03-29

## 2021-12-03 NOTE — TELEPHONE ENCOUNTER
PATIENT CALLING STATING KRIS WOOD FILL THE MEDICATION BE THEY SEE IT AS IT HAS BEEN FILLED SOMEWHERE IS PATIENT STATES IT HAS NOT BECAUSE [facility] IS ON BACK ORDER PLEASE CALL PHARMACY TO OKAY THE REFILL. PATIENT WOULD ALSO LIKE TO KNOW IF THERE ARE ANY SAMPLES AS WELL.    PLEASE ADVISE  [phone]

## 2021-12-03 NOTE — TELEPHONE ENCOUNTER
Caller: Mariana Ansari    Relationship: Self    Best call back number:[phone]    Requested Prescriptions:   Requested Prescriptions     Pending Prescriptions Disp Refills   • Insulin Degludec (Tresiba FlexTouch) 200 UNIT/ML solution pen-injector pen injection 3 pen 2     Sig: Inject 44 Units under the skin into the appropriate area as directed every night at bedtime.        Pharmacy where request should be sent: [id number] [address] [facility] [phone] [facility] [phone] FX     Additional details provided by patient: PATIENT'S REGULAR PHARMACY IS OUT OF THIS MEDICATION. [facility] IS HOLDING THE MEDICATION UNTIL TONIGHT FOR THIS PATIENT, THEY ONLY HAVE 1 IN STOCK.         Does the patient have less than a 3 day supply:  [x] Yes  [] No    Grzegorz Carvalho Rep   12/03/21 11:55 EST

## 2021-12-03 NOTE — TELEPHONE ENCOUNTER
Caller: Mariana Ansari    Relationship to patient: Self    Best call back number: [phone] (M)    Patient is needing: PATIENT CALLED IN AND SAID THE PHARMACY IS TELLING THEM NOTHING HAS BEEN CALLED IN. SHE ASKED IF THEY CAN BE CALLED BECAUSE HER  IS THERE AT THE PHARMACY NOW. PLEASE CALL PATIENT AND ADVISE

## 2021-12-13 DIAGNOSIS — K21.9 GASTROESOPHAGEAL REFLUX DISEASE WITHOUT ESOPHAGITIS: ICD-10-CM

## 2021-12-13 DIAGNOSIS — I10 ESSENTIAL HYPERTENSION: ICD-10-CM

## 2021-12-13 RX ORDER — OMEPRAZOLE 40 MG/1
CAPSULE, DELAYED RELEASE ORAL
Qty: 90 CAPSULE | Refills: 0 | Status: SHIPPED | OUTPATIENT
Start: 2021-12-13 | End: 2022-03-14

## 2021-12-13 RX ORDER — PROPRANOLOL HCL 60 MG
CAPSULE, EXTENDED RELEASE 24HR ORAL
Qty: 90 CAPSULE | Refills: 0 | Status: SHIPPED | OUTPATIENT
Start: 2021-12-13 | End: 2022-03-14

## 2021-12-30 DIAGNOSIS — I10 ESSENTIAL HYPERTENSION: ICD-10-CM

## 2021-12-30 RX ORDER — HYDROCHLOROTHIAZIDE 25 MG/1
TABLET ORAL
Qty: 90 TABLET | Refills: 0 | Status: SHIPPED | OUTPATIENT
Start: 2021-12-30 | End: 2022-03-28

## 2022-01-03 ENCOUNTER — TELEPHONE (OUTPATIENT)
Dept: FAMILY MEDICINE CLINIC | Facility: CLINIC | Age: 72
End: 2022-01-03

## 2022-01-03 ENCOUNTER — LAB (OUTPATIENT)
Dept: FAMILY MEDICINE CLINIC | Facility: CLINIC | Age: 72
End: 2022-01-03

## 2022-01-03 DIAGNOSIS — E11.65 UNCONTROLLED TYPE 2 DIABETES MELLITUS WITH HYPERGLYCEMIA: ICD-10-CM

## 2022-01-03 DIAGNOSIS — D64.9 ANEMIA, UNSPECIFIED TYPE: ICD-10-CM

## 2022-01-03 DIAGNOSIS — E78.2 MIXED HYPERLIPIDEMIA: Primary | ICD-10-CM

## 2022-01-03 DIAGNOSIS — E03.9 ACQUIRED HYPOTHYROIDISM: ICD-10-CM

## 2022-01-03 LAB
ALBUMIN SERPL-MCNC: 4.4 G/DL (ref 3.5–5.2)
ALBUMIN/GLOB SERPL: 1.6 G/DL
ALP SERPL-CCNC: 65 U/L (ref 39–117)
ALT SERPL W P-5'-P-CCNC: 33 U/L (ref 1–33)
ANION GAP SERPL CALCULATED.3IONS-SCNC: 8.7 MMOL/L (ref 5–15)
AST SERPL-CCNC: 33 U/L (ref 1–32)
BASOPHILS # BLD AUTO: 0.03 10*3/MM3 (ref 0–0.2)
BASOPHILS NFR BLD AUTO: 0.5 % (ref 0–1.5)
BILIRUB SERPL-MCNC: 0.3 MG/DL (ref 0–1.2)
BUN SERPL-MCNC: 23 MG/DL (ref 8–23)
BUN/CREAT SERPL: 17.7 (ref 7–25)
CALCIUM SPEC-SCNC: 9.7 MG/DL (ref 8.6–10.5)
CHLORIDE SERPL-SCNC: 102 MMOL/L (ref 98–107)
CHOLEST SERPL-MCNC: 100 MG/DL (ref 0–200)
CO2 SERPL-SCNC: 31.3 MMOL/L (ref 22–29)
CREAT SERPL-MCNC: 1.3 MG/DL (ref 0.57–1)
DEPRECATED RDW RBC AUTO: 40.5 FL (ref 37–54)
EOSINOPHIL # BLD AUTO: 0.21 10*3/MM3 (ref 0–0.4)
EOSINOPHIL NFR BLD AUTO: 3.6 % (ref 0.3–6.2)
ERYTHROCYTE [DISTWIDTH] IN BLOOD BY AUTOMATED COUNT: 13.9 % (ref 12.3–15.4)
FERRITIN SERPL-MCNC: 41.3 NG/ML (ref 13–150)
GFR SERPL CREATININE-BSD FRML MDRD: 40 ML/MIN/1.73
GLOBULIN UR ELPH-MCNC: 2.8 GM/DL
GLUCOSE SERPL-MCNC: 121 MG/DL (ref 65–99)
HBA1C MFR BLD: 7.3 % (ref 3.5–5.6)
HCT VFR BLD AUTO: 37.3 % (ref 34–46.6)
HDLC SERPL-MCNC: 36 MG/DL (ref 40–60)
HGB BLD-MCNC: 12.2 G/DL (ref 12–15.9)
IMM GRANULOCYTES # BLD AUTO: 0.01 10*3/MM3 (ref 0–0.05)
IMM GRANULOCYTES NFR BLD AUTO: 0.2 % (ref 0–0.5)
IRON 24H UR-MRATE: 56 MCG/DL (ref 37–145)
IRON SATN MFR SERPL: 10 % (ref 20–50)
LDLC SERPL CALC-MCNC: 42 MG/DL (ref 0–100)
LDLC/HDLC SERPL: 1.09 {RATIO}
LYMPHOCYTES # BLD AUTO: 2.58 10*3/MM3 (ref 0.7–3.1)
LYMPHOCYTES NFR BLD AUTO: 44.8 % (ref 19.6–45.3)
MCH RBC QN AUTO: 26.8 PG (ref 26.6–33)
MCHC RBC AUTO-ENTMCNC: 32.7 G/DL (ref 31.5–35.7)
MCV RBC AUTO: 81.8 FL (ref 79–97)
MONOCYTES # BLD AUTO: 0.4 10*3/MM3 (ref 0.1–0.9)
MONOCYTES NFR BLD AUTO: 6.9 % (ref 5–12)
NEUTROPHILS NFR BLD AUTO: 2.53 10*3/MM3 (ref 1.7–7)
NEUTROPHILS NFR BLD AUTO: 44 % (ref 42.7–76)
NRBC BLD AUTO-RTO: 0 /100 WBC (ref 0–0.2)
PLATELET # BLD AUTO: 272 10*3/MM3 (ref 140–450)
PMV BLD AUTO: 10.9 FL (ref 6–12)
POTASSIUM SERPL-SCNC: 4.3 MMOL/L (ref 3.5–5.2)
PROT SERPL-MCNC: 7.2 G/DL (ref 6–8.5)
RBC # BLD AUTO: 4.56 10*6/MM3 (ref 3.77–5.28)
SODIUM SERPL-SCNC: 142 MMOL/L (ref 136–145)
T4 FREE SERPL-MCNC: 1.32 NG/DL (ref 0.93–1.7)
TIBC SERPL-MCNC: 568 MCG/DL (ref 298–536)
TRANSFERRIN SERPL-MCNC: 381 MG/DL (ref 200–360)
TRIGL SERPL-MCNC: 123 MG/DL (ref 0–150)
TSH SERPL DL<=0.05 MIU/L-ACNC: 2.64 UIU/ML (ref 0.27–4.2)
VLDLC SERPL-MCNC: 22 MG/DL (ref 5–40)
WBC NRBC COR # BLD: 5.76 10*3/MM3 (ref 3.4–10.8)

## 2022-01-03 PROCEDURE — 80061 LIPID PANEL: CPT | Performed by: FAMILY MEDICINE

## 2022-01-03 PROCEDURE — 85025 COMPLETE CBC W/AUTO DIFF WBC: CPT | Performed by: INTERNAL MEDICINE

## 2022-01-03 PROCEDURE — 84439 ASSAY OF FREE THYROXINE: CPT | Performed by: FAMILY MEDICINE

## 2022-01-03 PROCEDURE — 83036 HEMOGLOBIN GLYCOSYLATED A1C: CPT | Performed by: FAMILY MEDICINE

## 2022-01-03 PROCEDURE — 83540 ASSAY OF IRON: CPT | Performed by: FAMILY MEDICINE

## 2022-01-03 PROCEDURE — 80053 COMPREHEN METABOLIC PANEL: CPT | Performed by: FAMILY MEDICINE

## 2022-01-03 PROCEDURE — 84443 ASSAY THYROID STIM HORMONE: CPT | Performed by: FAMILY MEDICINE

## 2022-01-03 PROCEDURE — 82728 ASSAY OF FERRITIN: CPT | Performed by: FAMILY MEDICINE

## 2022-01-03 PROCEDURE — 84466 ASSAY OF TRANSFERRIN: CPT | Performed by: FAMILY MEDICINE

## 2022-01-03 PROCEDURE — 36415 COLL VENOUS BLD VENIPUNCTURE: CPT | Performed by: FAMILY MEDICINE

## 2022-01-03 RX ORDER — TAMOXIFEN CITRATE 20 MG/1
TABLET ORAL
Qty: 90 TABLET | Refills: 1 | Status: SHIPPED | OUTPATIENT
Start: 2022-01-03 | End: 2022-06-13

## 2022-01-07 ENCOUNTER — OFFICE VISIT (OUTPATIENT)
Dept: FAMILY MEDICINE CLINIC | Facility: CLINIC | Age: 72
End: 2022-01-07

## 2022-01-07 VITALS
OXYGEN SATURATION: 95 % | WEIGHT: 196 LBS | TEMPERATURE: 97.2 F | HEIGHT: 63 IN | DIASTOLIC BLOOD PRESSURE: 82 MMHG | HEART RATE: 85 BPM | SYSTOLIC BLOOD PRESSURE: 144 MMHG | RESPIRATION RATE: 16 BRPM | BODY MASS INDEX: 34.73 KG/M2

## 2022-01-07 DIAGNOSIS — Z12.31 VISIT FOR SCREENING MAMMOGRAM: ICD-10-CM

## 2022-01-07 DIAGNOSIS — Z00.00 MEDICARE ANNUAL WELLNESS VISIT, SUBSEQUENT: Primary | ICD-10-CM

## 2022-01-07 DIAGNOSIS — C50.912 INFILTRATING DUCTAL CARCINOMA OF LEFT BREAST: ICD-10-CM

## 2022-01-07 DIAGNOSIS — E11.65 UNCONTROLLED TYPE 2 DIABETES MELLITUS WITH HYPERGLYCEMIA: ICD-10-CM

## 2022-01-07 DIAGNOSIS — F33.1 MODERATE EPISODE OF RECURRENT MAJOR DEPRESSIVE DISORDER: ICD-10-CM

## 2022-01-07 DIAGNOSIS — F51.01 PRIMARY INSOMNIA: ICD-10-CM

## 2022-01-07 PROCEDURE — 1126F AMNT PAIN NOTED NONE PRSNT: CPT | Performed by: FAMILY MEDICINE

## 2022-01-07 PROCEDURE — 1170F FXNL STATUS ASSESSED: CPT | Performed by: FAMILY MEDICINE

## 2022-01-07 PROCEDURE — G0439 PPPS, SUBSEQ VISIT: HCPCS | Performed by: FAMILY MEDICINE

## 2022-01-07 PROCEDURE — 1159F MED LIST DOCD IN RCRD: CPT | Performed by: FAMILY MEDICINE

## 2022-01-07 RX ORDER — TRAZODONE HYDROCHLORIDE 50 MG/1
100 TABLET ORAL NIGHTLY
Qty: 30 TABLET | Refills: 0 | Status: SHIPPED | OUTPATIENT
Start: 2022-01-07 | End: 2022-02-04 | Stop reason: SDUPTHER

## 2022-01-07 NOTE — PROGRESS NOTES
Subsequent Medicare Wellness Visit   The ABC's of the Annual Wellness Visit    Chief Complaint   Patient presents with   • Medicare Wellness-subsequent   • Diabetes   • Hypertension   • Hypothyroidism   • Hyperlipidemia   • Med Refill       HPI:  Mariana Ansari, -1950, is a 71 y.o. female who presents for a Subsequent Medicare Wellness Visit. She reports to be doing overall well, she is currently being treated for several medical problems including diabetes, hypertension, hyperlipidemia, and hypothyroidism. She was diagnosed with breast cancer few years ago and was treated with lumpectomy. She is followed by oncologist, she is still on tamoxifen. Patient does not report any chest pain, shortness of breath, dizziness, nausea, vomiting, or diarrhea, visual issues, headaches, numbness or tingling. No urinary issues reported like urgency, frequency, or discomfort upon urination.  No significant weight changes reported.  No swelling reported.  No rashes or any other skin issues reported. She has been having some issues with insomnia, she has tried over-the-counter melatonin, but this does not seem to be helping much and she wonders if there are some different alternatives to try.    Recent Hospitalizations:  No hospitalization(s) within the last year..    Current Medical Providers:  Patient Care Team:  Luna Whitfield MD as PCP - General (Family Medicine)  Luna Whitfield MD as PCP - Family Medicine  Michel Guajardo MD as Consulting Physician (Hematology and Oncology)  Luna Whitfield MD as Referring Physician (Family Medicine)    Health Habits and Functional and Cognitive Screening and Depression Screening:  Functional & Cognitive Status 2022   Do you have difficulty preparing food and eating? No   Do you have difficulty bathing yourself, getting dressed or grooming yourself? No   Do you have difficulty using the toilet? No   Do you have difficulty moving around from place to place? No   Do you  have trouble with steps or getting out of a bed or a chair? No   Current Diet Diabetic Diet   Dental Exam Up to date   Eye Exam Up to date   Exercise (times per week) 0 times per week   Current Exercises Include No Regular Exercise   Current Exercise Activities Include -   Do you need help using the phone?  No   Are you deaf or do you have serious difficulty hearing?  No   Do you need help with transportation? No   Do you need help shopping? No   Do you need help preparing meals?  No   Do you need help with housework?  No   Do you need help with laundry? No   Do you need help taking your medications? No   Do you need help managing money? No   Do you ever drive or ride in a car without wearing a seat belt? No   Have you felt unusual stress, anger or loneliness in the last month? No   Who do you live with? Spouse   If you need help, do you have trouble finding someone available to you? No   Have you been bothered in the last four weeks by sexual problems? -   Do you have difficulty concentrating, remembering or making decisions? No       Compared to one year ago, the patient feels her physical health is the same and her mental health is the same.    Depression Screen:  PHQ-2/PHQ-9 Depression Screening 1/7/2022   Little interest or pleasure in doing things 0   Feeling down, depressed, or hopeless 0   Trouble falling or staying asleep, or sleeping too much -   Feeling tired or having little energy -   Poor appetite or overeating -   Feeling bad about yourself - or that you are a failure or have let yourself or your family down -   Trouble concentrating on things, such as reading the newspaper or watching television -   Moving or speaking so slowly that other people could have noticed. Or the opposite - being so fidgety or restless that you have been moving around a lot more than usual -   Thoughts that you would be better off dead, or of hurting yourself in some way -   Total Score 0   If you checked off any problems,  how difficult have these problems made it for you to do your work, take care of things at home, or get along with other people? -         Past Medical/Family/Social History:  The following portions of the patient's history were reviewed and updated as appropriate: allergies, current medications, past family history, past medical history, past social history, past surgical history and problem list.    Allergies   Allergen Reactions   • Amlodipine Swelling   • Reclast [Zoledronic Acid] GI Intolerance         Current Outpatient Medications:   •  albuterol (PROAIR RESPICLICK) 108 (90 Base) MCG/ACT inhaler, Inhale 1 puff Every 4 (Four) Hours As Needed for Wheezing or Shortness of Air., Disp: , Rfl:   •  B Complex Vitamins (VITAMIN B COMPLEX PO), Take 1 tablet by mouth Every Night., Disp: , Rfl:   •  Blood Glucose Monitoring Suppl (Accu-Chek Guide) w/Device kit, 1 kit Daily. Dg: E11.65, Disp: 1 kit, Rfl: 0  •  cetirizine (zyrTEC) 10 MG tablet, Take 10 mg by mouth Daily., Disp: , Rfl:   •  cholecalciferol (VITAMIN D3) 1000 units tablet, Take 1,000 Units by mouth Every Night., Disp: , Rfl:   •  Continuous Blood Gluc  (Dexcom G5 Mobile ) device, 1 kit Daily., Disp: 1 each, Rfl: 0  •  escitalopram (LEXAPRO) 20 MG tablet, TAKE ONE TABLET BY MOUTH DAILY, Disp: 90 tablet, Rfl: 1  •  ferrous sulfate 325 (65 FE) MG tablet, Take 1 tablet by mouth Daily With Breakfast., Disp: 30 tablet, Rfl: 1  •  fluticasone (FLONASE) 50 MCG/ACT nasal spray, 2 sprays into each nostril Daily., Disp: , Rfl:   •  glipizide (GLUCOTROL XL) 5 MG ER tablet, TAKE ONE TABLET BY MOUTH DAILY, Disp: 90 tablet, Rfl: 1  •  glucose blood (Accu-Chek Guide) test strip, 1 each by Other route 3 (Three) Times a Day. Dg: E11.65, Disp: 100 each, Rfl: 1  •  hydroCHLOROthiazide (HYDRODIURIL) 25 MG tablet, TAKE ONE TABLET BY MOUTH DAILY, Disp: 90 tablet, Rfl: 0  •  Insulin Degludec (Tresiba FlexTouch) 200 UNIT/ML solution pen-injector pen injection, Inject  44 Units under the skin into the appropriate area as directed every night at bedtime., Disp: 3 pen, Rfl: 0  •  Insulin Pen Needle (BD Pen Needle Micro U/F) 32G X 6 MM misc, 1 each Daily., Disp: 100 each, Rfl: 1  •  Lancets (accu-chek soft touch) lancets, 1 each by Other route 3 (Three) Times a Day. Dg: E11.65, Disp: 100 each, Rfl: 1  •  levothyroxine (SYNTHROID, LEVOTHROID) 88 MCG tablet, TAKE ONE TABLET BY MOUTH DAILY, Disp: 90 tablet, Rfl: 0  •  loperamide (IMODIUM A-D) 2 MG tablet, Take 2 mg by mouth 4 (Four) Times a Day As Needed for Diarrhea., Disp: , Rfl:   •  melatonin 5 MG tablet tablet, Take 10 mg by mouth At Night As Needed., Disp: , Rfl:   •  metFORMIN ER (GLUCOPHAGE-XR) 500 MG 24 hr tablet, Take 2 tablets by mouth 2 (two) times a day., Disp: 360 tablet, Rfl: 0  •  omeprazole (priLOSEC) 40 MG capsule, TAKE ONE CAPSULE BY MOUTH DAILY, Disp: 90 capsule, Rfl: 0  •  propranolol LA (INDERAL LA) 60 MG 24 hr capsule, TAKE ONE CAPSULE BY MOUTH DAILY, Disp: 90 capsule, Rfl: 0  •  rosuvastatin (CRESTOR) 20 MG tablet, Take 1 tablet by mouth every night at bedtime., Disp: 90 tablet, Rfl: 1  •  tamoxifen (NOLVADEX) 20 MG chemo tablet, TAKE ONE TABLET BY MOUTH DAILY, Disp: 90 tablet, Rfl: 1  •  traZODone (DESYREL) 50 MG tablet, Take 2 tablets by mouth Every Night., Disp: 30 tablet, Rfl: 0    Aspirin use counseling: Start ASA 81 mg daily     Current medication list contains no high risk medications. Some potential harmful drug interactions identified. Plan of action: monitoring    Family History   Problem Relation Age of Onset   • Diabetes Mother    • Heart attack Mother    • Heart failure Mother    • Hyperlipidemia Mother    • Hyperthyroidism Mother         Has surgery   • Hypothyroidism Mother         Later in life   • Heart disease Mother    • Hypertension Mother    • Asthma Father    • COPD Father    • Hypertension Father    • Heart attack Maternal Uncle    • Heart failure Maternal Uncle    • Depression Maternal  Grandmother    • Heart attack Maternal Grandfather    • Heart failure Maternal Grandfather    • Alcohol abuse Paternal Grandmother    • Alcohol abuse Paternal Grandfather    • Scleroderma Sister    • No Known Problems Daughter    • No Known Problems Son    • Malig Hyperthermia Neg Hx        Social History     Tobacco Use   • Smoking status: Never Smoker   • Smokeless tobacco: Never Used   Substance Use Topics   • Alcohol use: Yes     Alcohol/week: 1.0 standard drink     Types: 1 Cans of beer per week     Comment: 2-3 a month, social only       Past Surgical History:   Procedure Laterality Date   • BREAST LUMPECTOMY WITH SENTINEL NODE BIOPSY Left 7/18/2018    Procedure: BREAST LUMPECTOMY WITH SENTINEL NODE BIOPSY;  Surgeon: João Zazueta MD;  Location: [facility] OR;  Service: General   • COLONOSCOPY      refusing; negative cologuard 8/24/2017   • HYSTERECTOMY  1989   • KNEE SURGERY Right 2014   • KNEE SURGERY Left 2016   • LIVER BIOPSY  2019    fatty liver   • ROTATOR CUFF REPAIR Left 2009   • ROTATOR CUFF REPAIR Right 2012   • TOE SURGERY  2009    ingrown        Patient Active Problem List   Diagnosis   • Malignant neoplasm of upper-outer quadrant of left breast in female, estrogen receptor positive (HCC)   • Type 2 diabetes mellitus with complication, without long-term current use of insulin (HCC)   • High risk medication use   • Age-related osteoporosis without current pathological fracture   • Tremor of both hands   • Other specified anemias   • Allergic rhinitis   • Asthma   • Chronic kidney disease, stage II (mild)   • Depression   • Medicare annual wellness visit, subsequent   • Gastroesophageal reflux disease   • Hyperlipidemia   • Hypertension   • Hypothyroidism   • Infiltrating ductal carcinoma of breast (HCC)   • Renal cyst, right   • Fatty liver   • Vitamin D deficiency   • Moderate episode of recurrent major depressive disorder (HCC)   • Colon cancer screening   • Combined form of senile  "cataract   • Uncontrolled type 2 diabetes mellitus with hyperglycemia (HCC)   • Visit for screening mammogram   • Primary insomnia       Review of Systems   Constitutional: Negative for activity change, fatigue and fever.   Respiratory: Negative for cough, shortness of breath and wheezing.    Cardiovascular: Negative for chest pain, palpitations and leg swelling.   Gastrointestinal: Negative for constipation, diarrhea and indigestion.   Skin: Negative for color change, dry skin and rash.   Neurological: Negative for tremors and headache.   Psychiatric/Behavioral: Positive for sleep disturbance.       Objective     Vitals:    01/07/22 1038   BP: 144/82   BP Location: Left arm   Patient Position: Sitting   Cuff Size: Adult   Pulse: 85   Resp: 16   Temp: 97.2 °F (36.2 °C)   SpO2: 95%   Weight: 88.9 kg (196 lb)   Height: 160 cm (62.99\")   PainSc: 0-No pain       Patient's Body mass index is 34.73 kg/m². indicating that she is obese (BMI >30). Obesity-related health conditions include the following: hypertension, diabetes mellitus and dyslipidemias. Obesity is unchanged. BMI is is above average; BMI management plan is completed. We discussed portion control and increasing exercise..      No exam data present    The patient has no evidence of cognitve impairment.     Physical Exam  Constitutional:       General: She is not in acute distress.     Appearance: Normal appearance. She is well-developed. She is not ill-appearing or diaphoretic.      Comments: Patient is in no distress, patient has normal voice and speech.  Normal respiratory effort.   HENT:      Head: Normocephalic and atraumatic.   Pulmonary:      Effort: Pulmonary effort is normal.   Musculoskeletal:      Cervical back: Normal range of motion and neck supple.   Neurological:      General: No focal deficit present.      Mental Status: She is alert and oriented to person, place, and time. Mental status is at baseline.   Psychiatric:         Mood and Affect: " Mood normal.         Recent Lab Results:     Lab Results   Component Value Date    CHOL 100 01/03/2022    TRIG 123 01/03/2022    HDL 36 (L) 01/03/2022    VLDL 22 01/03/2022    LDLHDL 1.09 01/03/2022       Assessment/Plan   Age-appropriate Screening Schedule:  Refer to the list below for future screening recommendations based on patient's age, sex and/or medical conditions.      Health Maintenance   Topic Date Due   • DIABETIC FOOT EXAM  Never done   • MAMMOGRAM  10/14/2021   • HEMOGLOBIN A1C  07/03/2022   • DIABETIC EYE EXAM  08/01/2022   • URINE MICROALBUMIN  09/20/2022   • DXA SCAN  10/14/2022   • LIPID PANEL  01/03/2023   • TDAP/TD VACCINES (3 - Td or Tdap) 10/05/2031   • INFLUENZA VACCINE  Completed   • ZOSTER VACCINE  Completed       Medicare Risks and Personalized Health Plan:  Advance Directive Discussion  Breast Cancer/Mammogram Screening  Cardiovascular risk  Colon Cancer Screening  Dementia/Memory   Depression/Dysphoria  Diabetic Lab Screening   Fall Risk  Immunizations Discussed/Encouraged (specific immunizations; Influenza and COVID19 )  Obesity/Overweight   Osteoporosis Risk      CMS-Preventive Services Quick Reference  Medicare Preventive Services Addressed:  Annual Wellness Visit (AWV)  Bone Density Measurements  Cardiovascular Disease Screening Tests (may do this order every 5 years in beneficiaries without signs or symptoms of cardiovascular disease)  Colorectal Cancer Screening, Colonoscopy  Screening Mammography     Advance Care Planning:  ACP discussion was held with the patient during this visit. Patient has an advance directive in EMR which is still valid.     Diagnoses and all orders for this visit:    1. Medicare annual wellness visit, subsequent (Primary)    2. Uncontrolled type 2 diabetes mellitus with hyperglycemia (HCC)    3. Visit for screening mammogram  -     Mammo Screening Digital Tomosynthesis Bilateral With CAD; Future    4. Primary insomnia  -     traZODone (DESYREL) 50 MG tablet;  Take 2 tablets by mouth Every Night.  Dispense: 30 tablet; Refill: 0    5. Moderate episode of recurrent major depressive disorder (HCC)    6. Infiltrating ductal carcinoma of left breast (HCC)      Medicare wellness exam was done today. I reviewed her medical problems and her medications. I also reviewed her recent blood work results, her chronic medical issues are stable and well-controlled. Her diabetes looks pretty good and her hemoglobin A1c has further improved. I also reviewed her health maintenance. Her last mammogram was in 10/2020 and the new order was given and she will be scheduling the test. Her last DEXA scan was in 10/2020, it showed bone loss and osteoporosis range. She was started on Reclast, but this was not continued due to GI side effects. I asked her to discuss this further with her oncologist and further treatment options like Prolia shots should be definitely entertained due to bone loss and risk of fractures. Her last colon cancer screening was done through [facility] in 9/2020 and it was negative. She is fully vaccinated against COVID-19 and she already had her booster. She is also up to speed with the rest of her immunization. I will be starting her on trazodone due to reported insomnia. Healthy lifestyle was reinforced.      An After Visit Summary and PPPS with all of these plans were given to the patient.      Follow Up:  Return in about 4 months (around 5/7/2022) for Next scheduled follow up.        Requested Prescriptions     Signed Prescriptions Disp Refills   • traZODone (DESYREL) 50 MG tablet 30 tablet 0     Sig: Take 2 tablets by mouth Every Night.

## 2022-01-12 RX ORDER — FERROUS SULFATE 325(65) MG
TABLET ORAL
Qty: 30 TABLET | Refills: 1 | Status: SHIPPED | OUTPATIENT
Start: 2022-01-12 | End: 2022-03-14

## 2022-01-24 ENCOUNTER — HOSPITAL ENCOUNTER (OUTPATIENT)
Dept: MAMMOGRAPHY | Facility: HOSPITAL | Age: 72
Discharge: HOME | End: 2022-01-24
Admitting: FAMILY MEDICINE

## 2022-01-24 DIAGNOSIS — Z12.31 VISIT FOR SCREENING MAMMOGRAM: ICD-10-CM

## 2022-01-24 PROCEDURE — 77067 SCR MAMMO BI INCL CAD: CPT

## 2022-01-24 PROCEDURE — 77063 BREAST TOMOSYNTHESIS BI: CPT

## 2022-01-26 ENCOUNTER — TELEPHONE (OUTPATIENT)
Dept: FAMILY MEDICINE CLINIC | Facility: CLINIC | Age: 72
End: 2022-01-26

## 2022-01-26 DIAGNOSIS — G89.29 CHRONIC PAIN OF RIGHT THUMB: Primary | ICD-10-CM

## 2022-01-26 DIAGNOSIS — M79.644 CHRONIC PAIN OF RIGHT THUMB: Primary | ICD-10-CM

## 2022-01-26 NOTE — TELEPHONE ENCOUNTER
Please call the patient to get more information.  Message says that she is requesting referral to to see the hand specialist due to right thumb pain.  Did she have any recent injury?  Or issues with arthritis?

## 2022-01-26 NOTE — TELEPHONE ENCOUNTER
Caller: Mariana Ansari    Relationship: Self    Best call back number:[phone]    What is the medical concern/diagnosis: RIGHT THUMB PAIN     What specialty or service is being requested: [facility]

## 2022-01-28 ENCOUNTER — TELEPHONE (OUTPATIENT)
Dept: ONCOLOGY | Facility: CLINIC | Age: 72
End: 2022-01-28

## 2022-01-28 NOTE — TELEPHONE ENCOUNTER
Caller: Mariana Ansari    Relationship to patient: Self    Best call back number: [phone]    Chief complaint: PATIENT TO RESCHED 2/7/22 APPT. LEAVING FOR VACATION 2/7/22 THROUGH THE MIDDLE OF MARCH    Type of visit: LAB AND FU PREFERS AFTERNOON    Requested date: MID TO LATE MARCH

## 2022-02-04 DIAGNOSIS — F51.01 PRIMARY INSOMNIA: ICD-10-CM

## 2022-02-04 DIAGNOSIS — E03.4 HYPOTHYROIDISM DUE TO ACQUIRED ATROPHY OF THYROID: ICD-10-CM

## 2022-02-04 DIAGNOSIS — E11.8 TYPE 2 DIABETES MELLITUS WITH COMPLICATION, WITHOUT LONG-TERM CURRENT USE OF INSULIN: ICD-10-CM

## 2022-02-04 RX ORDER — TRAZODONE HYDROCHLORIDE 50 MG/1
TABLET ORAL
Qty: 30 TABLET | Refills: 0 | Status: SHIPPED | OUTPATIENT
Start: 2022-02-04 | End: 2022-03-14

## 2022-02-04 RX ORDER — LEVOTHYROXINE SODIUM 88 UG/1
TABLET ORAL
Qty: 90 TABLET | Refills: 0 | Status: SHIPPED | OUTPATIENT
Start: 2022-02-04 | End: 2022-05-13 | Stop reason: SDUPTHER

## 2022-02-04 RX ORDER — GLIPIZIDE 5 MG/1
TABLET, FILM COATED, EXTENDED RELEASE ORAL
Qty: 90 TABLET | Refills: 0 | Status: SHIPPED | OUTPATIENT
Start: 2022-02-04 | End: 2022-05-13 | Stop reason: SDUPTHER

## 2022-02-04 RX ORDER — PEN NEEDLE, DIABETIC 31 G X1/4"
NEEDLE, DISPOSABLE MISCELLANEOUS
Qty: 100 EACH | Refills: 1 | Status: SHIPPED | OUTPATIENT
Start: 2022-02-04 | End: 2022-09-14

## 2022-02-07 ENCOUNTER — APPOINTMENT (OUTPATIENT)
Dept: LAB | Facility: HOSPITAL | Age: 72
End: 2022-02-07

## 2022-03-14 DIAGNOSIS — I10 ESSENTIAL HYPERTENSION: ICD-10-CM

## 2022-03-14 DIAGNOSIS — K21.9 GASTROESOPHAGEAL REFLUX DISEASE WITHOUT ESOPHAGITIS: ICD-10-CM

## 2022-03-14 DIAGNOSIS — F51.01 PRIMARY INSOMNIA: ICD-10-CM

## 2022-03-14 RX ORDER — OMEPRAZOLE 40 MG/1
CAPSULE, DELAYED RELEASE ORAL
Qty: 90 CAPSULE | Refills: 0 | Status: SHIPPED | OUTPATIENT
Start: 2022-03-14 | End: 2022-06-12

## 2022-03-14 RX ORDER — TRAZODONE HYDROCHLORIDE 50 MG/1
TABLET ORAL
Qty: 30 TABLET | Refills: 0 | Status: SHIPPED | OUTPATIENT
Start: 2022-03-14 | End: 2022-03-28

## 2022-03-14 RX ORDER — PROPRANOLOL HCL 60 MG
CAPSULE, EXTENDED RELEASE 24HR ORAL
Qty: 90 CAPSULE | Refills: 0 | Status: SHIPPED | OUTPATIENT
Start: 2022-03-14 | End: 2022-06-12

## 2022-03-14 RX ORDER — FERROUS SULFATE 325(65) MG
TABLET ORAL
Qty: 30 TABLET | Refills: 1 | Status: SHIPPED | OUTPATIENT
Start: 2022-03-14 | End: 2022-05-12

## 2022-03-24 ENCOUNTER — OFFICE VISIT (OUTPATIENT)
Dept: ONCOLOGY | Facility: CLINIC | Age: 72
End: 2022-03-24

## 2022-03-24 ENCOUNTER — LAB (OUTPATIENT)
Dept: LAB | Facility: HOSPITAL | Age: 72
End: 2022-03-24

## 2022-03-24 VITALS
SYSTOLIC BLOOD PRESSURE: 113 MMHG | HEART RATE: 79 BPM | WEIGHT: 200 LBS | TEMPERATURE: 96.8 F | DIASTOLIC BLOOD PRESSURE: 63 MMHG | BODY MASS INDEX: 35.44 KG/M2 | RESPIRATION RATE: 16 BRPM | OXYGEN SATURATION: 97 % | HEIGHT: 63 IN

## 2022-03-24 DIAGNOSIS — D64.9 ANEMIA, UNSPECIFIED TYPE: ICD-10-CM

## 2022-03-24 DIAGNOSIS — G47.9 SLEEP DISORDER: ICD-10-CM

## 2022-03-24 DIAGNOSIS — Z17.0 MALIGNANT NEOPLASM OF UPPER-OUTER QUADRANT OF LEFT BREAST IN FEMALE, ESTROGEN RECEPTOR POSITIVE: ICD-10-CM

## 2022-03-24 DIAGNOSIS — M81.0 AGE-RELATED OSTEOPOROSIS WITHOUT CURRENT PATHOLOGICAL FRACTURE: Primary | ICD-10-CM

## 2022-03-24 DIAGNOSIS — C50.412 MALIGNANT NEOPLASM OF UPPER-OUTER QUADRANT OF LEFT BREAST IN FEMALE, ESTROGEN RECEPTOR POSITIVE: ICD-10-CM

## 2022-03-24 LAB
ALBUMIN SERPL-MCNC: 4.5 G/DL (ref 3.5–5.2)
ALBUMIN/GLOB SERPL: 1.4 G/DL (ref 1.1–2.4)
ALP SERPL-CCNC: 74 U/L (ref 38–116)
ALT SERPL W P-5'-P-CCNC: 41 U/L (ref 0–33)
ANION GAP SERPL CALCULATED.3IONS-SCNC: 11.4 MMOL/L (ref 5–15)
AST SERPL-CCNC: 59 U/L (ref 0–32)
BASOPHILS # BLD AUTO: 0.04 10*3/MM3 (ref 0–0.2)
BASOPHILS NFR BLD AUTO: 0.6 % (ref 0–1.5)
BILIRUB SERPL-MCNC: 0.5 MG/DL (ref 0.2–1.2)
BUN SERPL-MCNC: 23 MG/DL (ref 6–20)
BUN/CREAT SERPL: 16.4 (ref 7.3–30)
CALCIUM SPEC-SCNC: 10 MG/DL (ref 8.5–10.2)
CHLORIDE SERPL-SCNC: 102 MMOL/L (ref 98–107)
CO2 SERPL-SCNC: 28.6 MMOL/L (ref 22–29)
CREAT SERPL-MCNC: 1.4 MG/DL (ref 0.6–1.1)
DEPRECATED RDW RBC AUTO: 44.3 FL (ref 37–54)
EGFRCR SERPLBLD CKD-EPI 2021: 40.3 ML/MIN/1.73
EOSINOPHIL # BLD AUTO: 0.21 10*3/MM3 (ref 0–0.4)
EOSINOPHIL NFR BLD AUTO: 3.1 % (ref 0.3–6.2)
ERYTHROCYTE [DISTWIDTH] IN BLOOD BY AUTOMATED COUNT: 14 % (ref 12.3–15.4)
FERRITIN SERPL-MCNC: 105.3 NG/ML (ref 13–150)
GLOBULIN UR ELPH-MCNC: 3.2 GM/DL (ref 1.8–3.5)
GLUCOSE SERPL-MCNC: 146 MG/DL (ref 74–124)
HCT VFR BLD AUTO: 39.1 % (ref 34–46.6)
HGB BLD-MCNC: 12.4 G/DL (ref 12–15.9)
IMM GRANULOCYTES # BLD AUTO: 0.01 10*3/MM3 (ref 0–0.05)
IMM GRANULOCYTES NFR BLD AUTO: 0.1 % (ref 0–0.5)
IRON 24H UR-MRATE: 123 MCG/DL (ref 37–145)
IRON SATN MFR SERPL: 23 % (ref 14–48)
LYMPHOCYTES # BLD AUTO: 1.91 10*3/MM3 (ref 0.7–3.1)
LYMPHOCYTES NFR BLD AUTO: 28.3 % (ref 19.6–45.3)
MCH RBC QN AUTO: 27.5 PG (ref 26.6–33)
MCHC RBC AUTO-ENTMCNC: 31.7 G/DL (ref 31.5–35.7)
MCV RBC AUTO: 86.7 FL (ref 79–97)
MONOCYTES # BLD AUTO: 0.42 10*3/MM3 (ref 0.1–0.9)
MONOCYTES NFR BLD AUTO: 6.2 % (ref 5–12)
NEUTROPHILS NFR BLD AUTO: 4.15 10*3/MM3 (ref 1.7–7)
NEUTROPHILS NFR BLD AUTO: 61.7 % (ref 42.7–76)
NRBC BLD AUTO-RTO: 0 /100 WBC (ref 0–0.2)
PLATELET # BLD AUTO: 254 10*3/MM3 (ref 140–450)
PMV BLD AUTO: 9.8 FL (ref 6–12)
POTASSIUM SERPL-SCNC: 5 MMOL/L (ref 3.5–4.7)
PROT SERPL-MCNC: 7.7 G/DL (ref 6.3–8)
RBC # BLD AUTO: 4.51 10*6/MM3 (ref 3.77–5.28)
SODIUM SERPL-SCNC: 142 MMOL/L (ref 134–145)
TIBC SERPL-MCNC: 528 MCG/DL (ref 249–505)
TRANSFERRIN SERPL-MCNC: 377 MG/DL (ref 200–360)
WBC NRBC COR # BLD: 6.74 10*3/MM3 (ref 3.4–10.8)

## 2022-03-24 PROCEDURE — 82728 ASSAY OF FERRITIN: CPT

## 2022-03-24 PROCEDURE — 36415 COLL VENOUS BLD VENIPUNCTURE: CPT

## 2022-03-24 PROCEDURE — 83540 ASSAY OF IRON: CPT

## 2022-03-24 PROCEDURE — 80053 COMPREHEN METABOLIC PANEL: CPT

## 2022-03-24 PROCEDURE — 85025 COMPLETE CBC W/AUTO DIFF WBC: CPT

## 2022-03-24 PROCEDURE — 99214 OFFICE O/P EST MOD 30 MIN: CPT | Performed by: INTERNAL MEDICINE

## 2022-03-24 PROCEDURE — 84466 ASSAY OF TRANSFERRIN: CPT

## 2022-03-24 NOTE — PROGRESS NOTES
Subjective Patient noting continued difficulty with sleep    History of present illness:     Patient is a 72-year-old female with oted in late May 2018 a lump developing in the upper outer quadrant of the left breast without pain or nipple discharge.  This measured approximately 1 cm in size.  Mammogram indicated this asymmetric density in the same region and an ultrasound revealed a 1.8 cm irregular solid mass.  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  Additional history included no previous breast biopsies, a brief period of time with hormonal replacement and no history of breast or ovarian cancer with family.  She was seen by Dr. Zazueta on the 28th an MRI of both breasts was performed July 6.  Within the left anterior one third at the 12:30 position 3 cm from the nipple with irregular enhancing mass measuring 1.6 cm x 1.4 and 2.2 immediately lateral dimension.  There are other findings were seen in the left breast with no evidence of left axillary adenopathy and no findings suspicious for right breast.  The patient proceeded to a left breast lumpectomy July 18, 2018.      Pathologic findings were consistent with a 2.5 cm grade 3 invasive mammary ductal carcinoma with associated DCIS.  The closest margin was 1 mm.  [place] nodes were negative staging of pT2N0.  Dr. Zazueta saw the patient July 20 recognizing the closest margin was the anterior margin having taken this off the skin with no further removal possible.  There was concern about the need for additional tissue though the addended pathology revealed the DCIS to be intermediate grade.  It was determined not to take additional margins and have her seen by both medical oncology and radiation therapy.  She now presents with her  .      The patient's initial consultation was August 7 and potential adjuvant therapy discussed with the initial recommendation being  an Oncotype DX analysis to be process.  This is now reviewed with the patient and her  may return August 23, 2018.  Her recurrence score 10 with 10 year risk of distance recurrence at 7%.  She is clearly in the low risk category additional studies revealing ER score of 10.5, WA score of 8.6 which are both positive and HER-2 score of 8.7 which is negative.   Additional studies include  LH of 27.5, FSH of 57.6, estradiol of 9.9, CA 15-3 of 10.9, TSH of 4.64 hemoglobin A1c of 8.72.   The patient is advised of the findings per her risk of recurrence and she and her  are understandably elated.  Chemotherapy is not recommended in her circumstance but anti-hormonal therapy is and we have discussed potential treatment with AI therapy being the most appropriate choice in this postmenopausal patient.  She's not additionally had any recent assessment for bone density, however we discussed having this done in the near future.     As result the patient was scheduled for bone density and this was obtained September 6 revealing evidence of osteoporosis involving the lumbar with T score of -3.4 and right hip with T score of -2.7.  The patient has been using Arimidex which we'll discontinue and we discussed institution of tamoxifen at this point.  She'll also need assessment of her vitamin D level which we went on to measure documenting a level of 45.5.                                      The patient is now seen a month after switching to tamoxifen and is tolerating it well thus far without significant hot flashes.  We have also discussed the use of Reclast under the circumstances and she is agreeable to treatment when seen October 29, 2018.   She did have myalgias and arthralgias following Reclast for several days but these then resolved.  As she seen January 21, 2019 she is feeling well and we have discussed weight loss, exercise and also she and her 's recognition of slowly worsening bilateral hand tremor  left greater than right.  This been present much before her diagnosis of breast cancer and actually is an issue in several members of her family.   The patient is next seen August 1, 2019.  In the interval she been found to have elevated liver function tests and ultimately liver biopsy that was significant for mild steatohepatitis.  She has been adjusting her diet but indicates that she is also has significant fatigue and while these might be related she believes the fatigue is in part secondary to tamoxifen.  Follow-up testing included total cholesterol 113, triglycerides of 263 with HDL down to 24, VLDL at 52.6, ferritin of 298, normal iron profile, CMP with glucose 193, creatinine 1.36 and hemoglobin A1c of 7.10.  She notes that her fatigue is not improved off tamoxifen.  We have discussed her findings indicating better control needed for her diabetes and thyroid dysfunction and also went on to treat a urinary tract infection.  She did see primary care who adjusted medications for thyroid hypertension.  The patient when seen December 12, 2019 feels considerably better and is thrilled to see the difference in her functioning.  She is having no difficulty with tamoxifen at this point.  The patient was seen by the NP August 7, 2020 feeling well.  She did undergo subsequent mammogram and DEXA scan with the latter (performed October 14, 2020) demonstrating a lumbar T score -2.4 (increased), left hip femoral neck T score -2.5 (unchanged) and right femoral neck T score of -1.8 (increased).  Again this was substantial increase concerning osteoporosis and bone density particular in the left hip.  The patient is additional mammography October 14 showed no evidence recurrent malignancy.     She is next seen March 15, 2021 again noting that she had started AI therapy August 23, 2018 but when her bone density was consistent with osteoporosis we switched to tamoxifen beginning September 24, 2018.  Fortunately she continues to  feel well overall though she did have a fall recently possibly in part related to reaction after recent COVID-19 vaccination-not completed.       The patient is next seen 6/7/2021.  She is feeling well having started OTC iron therapy and vitamin supplements on her own.  Fortunately she is feeling well though has not been following her diet very consistently.  We made plans for the patient be seen back and she is next reviewed 11/11/21.Initially her repeat iron studies were not available but we have had them completed and we find it currently that she is iron deficient with a saturation of 12%, TIBC 561 and iron 67 and with a ferritin that is dropped from  298-62.8.  For follow-up CBC including H&H of 11.5 and 35.6, MCV of 80.7 MCH of 26.1.    The patient is next assessed 3/24/2022.  Her CBC has continued to improve as she is used low-dose iron.  She believes her general performance status is good but she is presently concerned about difficulty with ongoing sleep disturbance.    Past Medical History:   Diagnosis Date   • Anxiety    • Anxiety and depression    • Asthma     SEASONAL ALLERGY RELATED   • Cancer of breast (HCC) 06/2018    LEFT   • Chronic kidney disease     Renal cyst, right kidney w/urology workup in past   • Depression    • Diabetes mellitus (HCC)     Type 2   • Disease of thyroid gland    • GERD (gastroesophageal reflux disease)    • History of prior pregnancies     x2   • History of transfusion     S/P HYST --AUTOLOGOUS    • Hx of radiation therapy    • Hyperlipidemia    • Hypertension    • IBS (irritable bowel syndrome)    • Seasonal allergies         Past Surgical History:   Procedure Laterality Date   • BREAST LUMPECTOMY WITH SENTINEL NODE BIOPSY Left 7/18/2018    Procedure: BREAST LUMPECTOMY WITH SENTINEL NODE BIOPSY;  Surgeon: João Zazueta MD;  Location: [facility];  Service: General   • COLONOSCOPY      refusing; negative cologuard 8/24/2017   • HYSTERECTOMY  1989   • KNEE SURGERY  Right 2014   • KNEE SURGERY Left 2016   • LIVER BIOPSY  2019    fatty liver   • ROTATOR CUFF REPAIR Left 2009   • ROTATOR CUFF REPAIR Right 2012   • TOE SURGERY  2009    ingrown         Current Outpatient Medications on File Prior to Visit   Medication Sig Dispense Refill   • albuterol (PROAIR RESPICLICK) 108 (90 Base) MCG/ACT inhaler Inhale 1 puff Every 4 (Four) Hours As Needed for Wheezing or Shortness of Air.     • B Complex Vitamins (VITAMIN B COMPLEX PO) Take 1 tablet by mouth Every Night.     • Blood Glucose Monitoring Suppl (Accu-Chek Guide) w/Device kit 1 kit Daily. Dg: E11.65 1 kit 0   • cetirizine (zyrTEC) 10 MG tablet Take 10 mg by mouth Daily.     • cholecalciferol (VITAMIN D3) 1000 units tablet Take 1,000 Units by mouth Every Night.     • Continuous Blood Gluc  (Dexcom G5 Mobile ) device 1 kit Daily. 1 each 0   • escitalopram (LEXAPRO) 20 MG tablet TAKE ONE TABLET BY MOUTH DAILY 90 tablet 1   • FeroSul 325 (65 Fe) MG tablet TAKE ONE TABLET BY MOUTH DAILY WITH BREAKFAST 30 tablet 1   • fluticasone (FLONASE) 50 MCG/ACT nasal spray 2 sprays into each nostril Daily.     • glipizide (GLUCOTROL XL) 5 MG ER tablet TAKE ONE TABLET BY MOUTH DAILY 90 tablet 0   • glucose blood (Accu-Chek Guide) test strip 1 each by Other route 3 (Three) Times a Day. Dg: E11.65 100 each 1   • hydroCHLOROthiazide (HYDRODIURIL) 25 MG tablet TAKE ONE TABLET BY MOUTH DAILY 90 tablet 0   • Insulin Degludec (Tresiba FlexTouch) 200 UNIT/ML solution pen-injector pen injection Inject 44 Units under the skin into the appropriate area as directed every night at bedtime. 3 pen 0   • [employer] Pen Needles 31G X 6 MM misc USE ONCE DAILY 100 each 1   • Lancets (accu-chek soft touch) lancets 1 each by Other route 3 (Three) Times a Day. Dg: E11.65 100 each 1   • levothyroxine (SYNTHROID, LEVOTHROID) 88 MCG tablet TAKE ONE TABLET BY MOUTH DAILY 90 tablet 0   • loperamide (IMODIUM A-D) 2 MG tablet Take 2 mg by mouth 4 (Four) Times a Day  As Needed for Diarrhea.     • melatonin 5 MG tablet tablet Take 10 mg by mouth At Night As Needed.     • metFORMIN ER (GLUCOPHAGE-XR) 500 MG 24 hr tablet Take 2 tablets by mouth 2 (two) times a day. 360 tablet 0   • omeprazole (priLOSEC) 40 MG capsule TAKE ONE CAPSULE BY MOUTH DAILY 90 capsule 0   • propranolol LA (INDERAL LA) 60 MG 24 hr capsule TAKE ONE CAPSULE BY MOUTH DAILY 90 capsule 0   • rosuvastatin (CRESTOR) 20 MG tablet Take 1 tablet by mouth every night at bedtime. 90 tablet 1   • tamoxifen (NOLVADEX) 20 MG chemo tablet TAKE ONE TABLET BY MOUTH DAILY 90 tablet 1   • traZODone (DESYREL) 50 MG tablet TAKE 2 TABLETS BY MOUTH ONCE NIGHTLY 30 tablet 0     No current facility-administered medications on file prior to visit.        ALLERGIES:    Allergies   Allergen Reactions   • Amlodipine Swelling   • Reclast [Zoledronic Acid] GI Intolerance        Social History     Socioeconomic History   • Marital status:      Spouse name: Mahin   • Number of children: 2   • Years of education: College   Tobacco Use   • Smoking status: Never Smoker   • Smokeless tobacco: Never Used   Substance and Sexual Activity   • Alcohol use: Yes     Alcohol/week: 1.0 standard drink     Types: 1 Cans of beer per week     Comment: 2-3 a month, social only   • Drug use: No   • Sexual activity: Yes     Comment:         Family History   Problem Relation Age of Onset   • Diabetes Mother    • Heart attack Mother    • Heart failure Mother    • Hyperlipidemia Mother    • Hyperthyroidism Mother         Has surgery   • Hypothyroidism Mother         Later in life   • Heart disease Mother    • Hypertension Mother    • Asthma Father    • COPD Father    • Hypertension Father    • Heart attack Maternal Uncle    • Heart failure Maternal Uncle    • Depression Maternal Grandmother    • Heart attack Maternal Grandfather    • Heart failure Maternal Grandfather    • Alcohol abuse Paternal Grandmother    • Alcohol abuse Paternal Grandfather   "  • Scleroderma Sister    • No Known Problems Daughter    • No Known Problems Son    • Malig Hyperthermia Neg Hx         Review of Systems   Constitutional: Positive for activity change and fatigue. Negative for unexpected weight change.        Modest hot flashes   Respiratory: Negative for chest tightness, shortness of breath and wheezing.    Gastrointestinal: Negative for abdominal pain, constipation, diarrhea, nausea and vomiting.   Skin:        Pruritus   Neurological: Positive for tremors and headaches. Negative for weakness.   Psychiatric/Behavioral: Positive for sleep disturbance.   All other systems reviewed and are negative.         Objective     Vitals:    03/24/22 1511   BP: 113/63   Pulse: 79   Resp: 16   Temp: 96.8 °F (36 °C)   TempSrc: Temporal   SpO2: 97%   Weight: 90.7 kg (200 lb)   Height: 160 cm (62.99\")   PainSc: 0-No pain     Current Status 3/24/2022   ECOG score 0       Physical Exam   Constitutional: She is oriented to person, place, and time. She appears well-developed.   HENT:   Head: Normocephalic and atraumatic.   Nose: Nose normal.   Eyes: Pupils are equal, round, and reactive to light. Conjunctivae are normal.   Cardiovascular: Normal rate, regular rhythm and normal heart sounds.   Pulmonary/Chest: Effort normal and breath sounds normal.   Abdominal: Soft. Bowel sounds are normal.   Musculoskeletal: Normal range of motion.   Neurological: She is alert and oriented to person, place, and time.   Resting tremor noted left greater than right upper extremities   Skin: Skin is warm and dry.   Psychiatric: Her behavior is normal. Judgment and thought content normal.     Breast exam: Patient status post left breast lumpectomy well healing without evidence of inflammation or discharge from wound, status post left sentinel node assessment also without inflammation or discharge    RECENT LABS:  Hematology WBC   Date Value Ref Range Status   03/24/2022 6.74 3.40 - 10.80 10*3/mm3 Final     RBC   Date " Value Ref Range Status   03/24/2022 4.51 3.77 - 5.28 10*6/mm3 Final     Hemoglobin   Date Value Ref Range Status   03/24/2022 12.4 12.0 - 15.9 g/dL Final     Hematocrit   Date Value Ref Range Status   03/24/2022 39.1 34.0 - 46.6 % Final     Platelets   Date Value Ref Range Status   03/24/2022 254 140 - 450 10*3/mm3 Final      BONE MINERAL DENSITOMETRY    October 14, 2020     HISTORY:  70 years-old female. Menopause at age 63. Previous diagnosis  of osteoporosis and breast cancer.     COMPARISON:  09/06/2018.     TECHNIQUE: The T score compares the patient's bone mineral density with  the peak bone mass of young normal patients. Patients with T-scores  between 1.0 and 2.5 standard deviations below the mean are osteopenic.  Patients with T-scores greater than 2.5 standard deviation below the  mean are osteoporotic.     The Z score compares the patient's bone mineral density with sex and age  matched patients. Z score of -2.0 and lower is an indication of low  mineral density for the patient's age.     FINDINGS: Lumbar T score is  -2.4, representing a 17.7% interval  increase..     Left hip density is lowest at the  femoral neck where the T score is  -2.5, unchanged.      Right hip density is lowest at the  femoral neck where the T score is  -1.8, representing a 19.8% interval increase.      IMPRESSION:  Osteoporosis at the left hip. Interval increase in bone  Density.      Assessment/Plan       72 year-old female with previous medical history of seasonal allergies, diabetes mellitus type 2, CKD3, hypothyroidism, hyperlipidemia, hypertension, IBS, history of anxiety and depression with recent development of left breast abnormality found by the patient herself.  This led to mammogram ultrasound and stereotactic biopsy confirming invasive ductal carcinoma grade 2 though ER, CO positive HER-2 negative.  Subsequent assessments via surgical review your no additional abnormalities seen on breast MRI and the patient proceeded  to lumpectomy July 18, 2018.  Her pathologic findings were consistent with a 2.5 cm grade 3 invasive mammary ductal carcinoma with associated DCIS.  The closest margin was 1 mm.  [place] nodes were negative with staging of pT2N0.  Dr. Zazueta saw the patient July 20 recognizing the closest margin was the anterior margin having taken this off the skin with no further removal possible.  There was concern about the need for additional tissue though the addended pathology revealed the DCIS to be intermediate grade.  It was ultimately determined that further surgery was not necessary.   The patient presents for medical oncology assessment and we discussed potential adjuvant therapy but in particular it is felt the patient requires further genomic assessment with Oncotype DX analysis.  We reviewed this in detail and she understands the additional information that we could obtain from such an approach in making decisions about adjuvant therapy. We proceeded with additional assessment including Oncotype and laboratory studies that, fortunate, revealed that she has an excellent prognosis including a 7% risk of recurrence at 10 years with the use of tamoxifen.  Chemotherapy, therefore, is not appropriate and we have discussed an alternative therapy in this postmenopausal patient with AI therapy in particular her Arimidex over 5 years.  She is currently undergoing review by radiation therapy and this can proceed at any point as we also plan to initiate Arimidex with a trial of the medication given over the next month.  We went on to have the patient tested for bone density finding evidence, unfortunate, of osteoporosis.  She's been tolerating Arimidex well without discontinue this and institute Tamoxifen.  The patient went on to institute treatment and underwent testing for TSH, vitamin D level and serum chemistries.  These are found to be acceptable and she now next returns October 29 tolerating tamoxifen well. We went on  to continue with Reclast at that visit.  The patient did have myalgias and arthralgias following the treatment for several days that resolved.     As she is seen January 21, 2019 she is tolerating tamoxifen overall well though we have discussed that she should continue to exercise, manage her diet and try to achieve weight loss overall.  Additionally she has what appears to be essential tremor left greater than right.  Plans to see her primary care to review these issues but will start exercising immediately.  We'll plan to see her back here in approximately 6 months, plan repeat Reclast in late October and then yearly follow-ups as she completes 5 years of tamoxifen.    The patient is next seen August 1, 2019 with complaints of fatigue and recent diagnostics that are consistent with mild to moderate steatohepatitis on liver biopsy.  It is not felt likely tamoxifen is the major issue here but we do plan to hold it briefly to see if she does not improve as we investigate both her liver function tests and etiology of her mild anemia.  It is hoped this might improve her degree of fatigue.  We plan additional laboratory studies as above we have discussed results with she and her  in some detail when seen September 12, 2019.  She needs better control of her diabetes as well as thyroid dysfunction and is urged to return to primary care to be assessed.  A copy this note will be sent to Dr. Whitfield and she will be asked otherwise to restart tamoxifen assessment approximately 3 months.    The patient was found to have symptoms of UTI and ultimately was treated with ciprofloxacin.  Thereafter she has follow-up with primary care and more effectively treated her blood pressure and thyroid dysfunction.       The patient is followed at 6-month intervals  August 7, 2020 feeling well.  She did undergo subsequent mammogram and DEXA scan with the latter (performed October 14, 2020) demonstrating a lumbar T score -2.4  (increased), left hip femoral neck T score -2.5 (unchanged) and right femoral neck T score of -1.8 (increased).  Again this was substantial increase concerning osteoporosis and bone density particular in the left hip.  The patient is additional mammography October 14 showed no evidence recurrent malignancy.     She is next seen March 15, 2021 again noting that she had started AI therapy August 23, 2018 but when her bone density was consistent with osteoporosis we switched to tamoxifen beginning September 24, 2018.  Fortunately the patient is doing fairly well when assessed March 15, 2021.  Plan to continue her current treatment plans and she agrees.  She does have a degree of mild worsening of her anemia and we have agreed to a follow-up 3-month assessment.     The patient assessed at 3 months later-6/7/2021-demonstrates normalization of hemoglobin hematocrit.  She will be asked to continue tamoxifen with a 6-month follow-up.     The patient is next seen 11/11/2021 demonstrating a mild drop in hemoglobin and modest iron deficiency.  She is contacted with these results we do plan additional iron supplementation.     The patient will continue tamoxifen now approximately 3-1/2 years since initiating endocrine therapy for her  2.5 cm grade 3 invasive mammary ductal carcinoma with associated DCIS.      *Patient's osteoporosis will be followed by repeat bone density late October 2022, MD follow-up 1 week later for potential Prolia    Start 1 week later MD and possible Prolia    *Current anemia remains in question with at least a component of iron deficiency.  She has had previous Cologuard in 2020 that was negative.  She continues 325 mg p.o. ferrous sulfate daily without GI upset.  Iron studies are currently pending.    *Plan to request sleep evaluation via pulmonary medicine and likely subsequent sleep study.

## 2022-03-28 DIAGNOSIS — F51.01 PRIMARY INSOMNIA: ICD-10-CM

## 2022-03-28 DIAGNOSIS — E11.65 UNCONTROLLED TYPE 2 DIABETES MELLITUS WITH HYPERGLYCEMIA: ICD-10-CM

## 2022-03-28 DIAGNOSIS — F33.1 MODERATE EPISODE OF RECURRENT MAJOR DEPRESSIVE DISORDER: ICD-10-CM

## 2022-03-28 DIAGNOSIS — I10 ESSENTIAL HYPERTENSION: ICD-10-CM

## 2022-03-28 RX ORDER — HYDROCHLOROTHIAZIDE 25 MG/1
TABLET ORAL
Qty: 90 TABLET | Refills: 0 | Status: SHIPPED | OUTPATIENT
Start: 2022-03-28 | End: 2022-06-12

## 2022-03-28 RX ORDER — TRAZODONE HYDROCHLORIDE 50 MG/1
TABLET ORAL
Qty: 90 TABLET | Refills: 0 | Status: SHIPPED | OUTPATIENT
Start: 2022-03-28 | End: 2022-05-20

## 2022-03-28 RX ORDER — ESCITALOPRAM OXALATE 20 MG/1
TABLET ORAL
Qty: 90 TABLET | Refills: 1 | Status: SHIPPED | OUTPATIENT
Start: 2022-03-28 | End: 2022-06-23

## 2022-03-28 RX ORDER — METFORMIN HYDROCHLORIDE 500 MG/1
TABLET, EXTENDED RELEASE ORAL
Qty: 360 TABLET | Refills: 0 | Status: SHIPPED | OUTPATIENT
Start: 2022-03-28 | End: 2022-07-26

## 2022-03-29 DIAGNOSIS — E11.65 UNCONTROLLED TYPE 2 DIABETES MELLITUS WITH HYPERGLYCEMIA: ICD-10-CM

## 2022-03-29 RX ORDER — INSULIN DEGLUDEC 200 U/ML
INJECTION, SOLUTION SUBCUTANEOUS
Qty: 9 PEN | Refills: 0 | Status: SHIPPED | OUTPATIENT
Start: 2022-03-29 | End: 2022-05-13 | Stop reason: SDUPTHER

## 2022-05-05 ENCOUNTER — LAB (OUTPATIENT)
Dept: LAB | Facility: HOSPITAL | Age: 72
End: 2022-05-05

## 2022-05-05 DIAGNOSIS — E03.9 ACQUIRED HYPOTHYROIDISM: ICD-10-CM

## 2022-05-05 DIAGNOSIS — E78.2 MIXED HYPERLIPIDEMIA: ICD-10-CM

## 2022-05-05 DIAGNOSIS — E11.8 TYPE 2 DIABETES MELLITUS WITH COMPLICATION, WITHOUT LONG-TERM CURRENT USE OF INSULIN: Primary | ICD-10-CM

## 2022-05-05 LAB
ALBUMIN SERPL-MCNC: 4.4 G/DL (ref 3.5–5.2)
ALBUMIN/GLOB SERPL: 1.4 G/DL
ALP SERPL-CCNC: 69 U/L (ref 39–117)
ALT SERPL W P-5'-P-CCNC: 39 U/L (ref 1–33)
ANION GAP SERPL CALCULATED.3IONS-SCNC: 13.9 MMOL/L (ref 5–15)
AST SERPL-CCNC: 49 U/L (ref 1–32)
BILIRUB SERPL-MCNC: 0.4 MG/DL (ref 0–1.2)
BUN SERPL-MCNC: 21 MG/DL (ref 8–23)
BUN/CREAT SERPL: 17.2 (ref 7–25)
CALCIUM SPEC-SCNC: 9.4 MG/DL (ref 8.6–10.5)
CHLORIDE SERPL-SCNC: 102 MMOL/L (ref 98–107)
CHOLEST SERPL-MCNC: 104 MG/DL (ref 0–200)
CO2 SERPL-SCNC: 26.1 MMOL/L (ref 22–29)
CREAT SERPL-MCNC: 1.22 MG/DL (ref 0.57–1)
EGFRCR SERPLBLD CKD-EPI 2021: 47.5 ML/MIN/1.73
GLOBULIN UR ELPH-MCNC: 3.1 GM/DL
GLUCOSE SERPL-MCNC: 126 MG/DL (ref 65–99)
HBA1C MFR BLD: 7.3 % (ref 3.5–5.6)
HDLC SERPL-MCNC: 31 MG/DL (ref 40–60)
LDLC SERPL CALC-MCNC: 40 MG/DL (ref 0–100)
LDLC/HDLC SERPL: 1.01 {RATIO}
POTASSIUM SERPL-SCNC: 4.4 MMOL/L (ref 3.5–5.2)
PROT SERPL-MCNC: 7.5 G/DL (ref 6–8.5)
SODIUM SERPL-SCNC: 142 MMOL/L (ref 136–145)
T4 FREE SERPL-MCNC: 1.28 NG/DL (ref 0.93–1.7)
TRIGL SERPL-MCNC: 209 MG/DL (ref 0–150)
TSH SERPL DL<=0.05 MIU/L-ACNC: 3.27 UIU/ML (ref 0.27–4.2)
VLDLC SERPL-MCNC: 33 MG/DL (ref 5–40)

## 2022-05-05 PROCEDURE — 84443 ASSAY THYROID STIM HORMONE: CPT | Performed by: FAMILY MEDICINE

## 2022-05-05 PROCEDURE — 83036 HEMOGLOBIN GLYCOSYLATED A1C: CPT | Performed by: FAMILY MEDICINE

## 2022-05-05 PROCEDURE — 80061 LIPID PANEL: CPT | Performed by: FAMILY MEDICINE

## 2022-05-05 PROCEDURE — 36415 COLL VENOUS BLD VENIPUNCTURE: CPT | Performed by: FAMILY MEDICINE

## 2022-05-05 PROCEDURE — 80053 COMPREHEN METABOLIC PANEL: CPT | Performed by: FAMILY MEDICINE

## 2022-05-05 PROCEDURE — 84439 ASSAY OF FREE THYROXINE: CPT | Performed by: FAMILY MEDICINE

## 2022-05-12 DIAGNOSIS — E11.65 UNCONTROLLED TYPE 2 DIABETES MELLITUS WITH HYPERGLYCEMIA: ICD-10-CM

## 2022-05-12 DIAGNOSIS — E78.2 MIXED HYPERLIPIDEMIA: ICD-10-CM

## 2022-05-12 RX ORDER — INSULIN DEGLUDEC 200 U/ML
INJECTION, SOLUTION SUBCUTANEOUS
Qty: 9 ML | OUTPATIENT
Start: 2022-05-12

## 2022-05-12 RX ORDER — FERROUS SULFATE 325(65) MG
TABLET ORAL
Qty: 30 TABLET | Refills: 1 | Status: SHIPPED | OUTPATIENT
Start: 2022-05-12 | End: 2022-06-23

## 2022-05-12 RX ORDER — ROSUVASTATIN CALCIUM 20 MG/1
TABLET, COATED ORAL
Qty: 90 TABLET | Refills: 1 | OUTPATIENT
Start: 2022-05-12

## 2022-05-13 ENCOUNTER — OFFICE VISIT (OUTPATIENT)
Dept: FAMILY MEDICINE CLINIC | Facility: CLINIC | Age: 72
End: 2022-05-13

## 2022-05-13 VITALS
TEMPERATURE: 97.5 F | HEIGHT: 63 IN | OXYGEN SATURATION: 95 % | DIASTOLIC BLOOD PRESSURE: 65 MMHG | WEIGHT: 200 LBS | HEART RATE: 84 BPM | RESPIRATION RATE: 16 BRPM | BODY MASS INDEX: 35.44 KG/M2 | SYSTOLIC BLOOD PRESSURE: 126 MMHG

## 2022-05-13 DIAGNOSIS — E78.2 MIXED HYPERLIPIDEMIA: ICD-10-CM

## 2022-05-13 DIAGNOSIS — E03.9 ACQUIRED HYPOTHYROIDISM: ICD-10-CM

## 2022-05-13 DIAGNOSIS — E03.4 HYPOTHYROIDISM DUE TO ACQUIRED ATROPHY OF THYROID: ICD-10-CM

## 2022-05-13 DIAGNOSIS — E11.8 TYPE 2 DIABETES MELLITUS WITH COMPLICATION, WITHOUT LONG-TERM CURRENT USE OF INSULIN: Primary | ICD-10-CM

## 2022-05-13 PROBLEM — E11.65 UNCONTROLLED TYPE 2 DIABETES MELLITUS WITH HYPERGLYCEMIA: Status: RESOLVED | Noted: 2021-06-24 | Resolved: 2022-05-13

## 2022-05-13 PROCEDURE — 99214 OFFICE O/P EST MOD 30 MIN: CPT | Performed by: FAMILY MEDICINE

## 2022-05-13 RX ORDER — INSULIN DEGLUDEC 200 U/ML
46 INJECTION, SOLUTION SUBCUTANEOUS DAILY
Qty: 9 PEN | Refills: 1 | Status: SHIPPED | OUTPATIENT
Start: 2022-05-13 | End: 2023-01-15

## 2022-05-13 RX ORDER — ROSUVASTATIN CALCIUM 20 MG/1
20 TABLET, COATED ORAL
Qty: 90 TABLET | Refills: 1 | Status: SHIPPED | OUTPATIENT
Start: 2022-05-13 | End: 2022-11-22

## 2022-05-13 RX ORDER — GLIPIZIDE 5 MG/1
5 TABLET, FILM COATED, EXTENDED RELEASE ORAL DAILY
Qty: 90 TABLET | Refills: 1 | Status: SHIPPED | OUTPATIENT
Start: 2022-05-13 | End: 2022-12-15 | Stop reason: SDUPTHER

## 2022-05-13 RX ORDER — LEVOTHYROXINE SODIUM 88 UG/1
88 TABLET ORAL DAILY
Qty: 90 TABLET | Refills: 1 | Status: SHIPPED | OUTPATIENT
Start: 2022-05-13 | End: 2022-09-19

## 2022-05-13 NOTE — PROGRESS NOTES
Subjective   Chief Complaint   Patient presents with   • Med Refill   • Follow-up   • Diabetes   • Hypothyroidism   • Hyperlipidemia   • Hypertension     Mariana Ansari is a 72 y.o. female.     Patient Care Team:  Luna Whitfield MD as PCP - General (Family Medicine)  Luna Whitfield MD as PCP - Family Medicine  Michel Guajardo MD as Consulting Physician (Hematology and Oncology)  Luna Whitfield MD as Referring Physician (Family Medicine)    She is coming in today to follow-up on her chronic medical problems and her medications and also reviewed and discussed her recent labs.  We are addressing her diabetes, hypertension, hyperlipidemia, and hypothyroidism.  She reports taking her medicines as directed and she denies any issues or side effects.  She checks her blood sugar periodically, but not on regular basis.  She overall feels pretty good. Patient does not report any chest pain, shortness of breath, dizziness, nausea, vomiting, or diarrhea, visual issues, headaches, numbness or tingling. No urinary issues reported like urgency, frequency, or discomfort upon urination.  No significant weight changes reported.  No swelling reported.  No rashes or any other skin issues reported. No emotional issues or insomnia.       The following portions of the patient's history were reviewed and updated as appropriate: allergies, current medications, past family history, past medical history, past social history, past surgical history and problem list.  Past Medical History:   Diagnosis Date   • Anxiety    • Anxiety and depression    • Asthma     SEASONAL ALLERGY RELATED   • Cancer of breast (HCC) 06/2018    LEFT   • Chronic kidney disease     Renal cyst, right kidney w/urology workup in past   • Depression    • Diabetes mellitus (HCC)     Type 2   • Disease of thyroid gland    • GERD (gastroesophageal reflux disease)    • History of prior pregnancies     x2   • History of transfusion     S/P HYST --AUTOLOGOUS    • Hx  of radiation therapy    • Hyperlipidemia    • Hypertension    • IBS (irritable bowel syndrome)    • Seasonal allergies      Past Surgical History:   Procedure Laterality Date   • BREAST LUMPECTOMY WITH SENTINEL NODE BIOPSY Left 7/18/2018    Procedure: BREAST LUMPECTOMY WITH SENTINEL NODE BIOPSY;  Surgeon: João Zazueta MD;  Location: [facility] OR;  Service: General   • COLONOSCOPY      refusing; negative cologuard 8/24/2017   • HYSTERECTOMY  1989   • KNEE SURGERY Right 2014   • KNEE SURGERY Left 2016   • LIVER BIOPSY  2019    fatty liver   • ROTATOR CUFF REPAIR Left 2009   • ROTATOR CUFF REPAIR Right 2012   • TOE SURGERY  2009    ingrown      The patient has a family history of  Family History   Problem Relation Age of Onset   • Diabetes Mother    • Heart attack Mother    • Heart failure Mother    • Hyperlipidemia Mother    • Hyperthyroidism Mother         Has surgery   • Hypothyroidism Mother         Later in life   • Heart disease Mother    • Hypertension Mother    • Asthma Father    • COPD Father    • Hypertension Father    • Heart attack Maternal Uncle    • Heart failure Maternal Uncle    • Depression Maternal Grandmother    • Heart attack Maternal Grandfather    • Heart failure Maternal Grandfather    • Alcohol abuse Paternal Grandmother    • Alcohol abuse Paternal Grandfather    • Scleroderma Sister    • No Known Problems Daughter    • No Known Problems Son    • Malig Hyperthermia Neg Hx      Social History     Socioeconomic History   • Marital status:      Spouse name: Mahin   • Number of children: 2   • Years of education: College   Tobacco Use   • Smoking status: Never Smoker   • Smokeless tobacco: Never Used   Substance and Sexual Activity   • Alcohol use: Yes     Alcohol/week: 1.0 standard drink     Types: 1 Cans of beer per week     Comment: 2-3 a month, social only   • Drug use: No   • Sexual activity: Yes     Comment:        Review of Systems   Constitutional: Negative for  "activity change, fatigue and fever.   Respiratory: Negative for shortness of breath and wheezing.    Cardiovascular: Negative for chest pain, palpitations and leg swelling.   Endocrine: Negative for cold intolerance, heat intolerance, polydipsia and polyphagia.   Skin: Negative for rash.   Neurological: Negative for headache.     Visit Vitals  /65 (BP Location: Left arm, Patient Position: Sitting, Cuff Size: Adult)   Pulse 84   Temp 97.5 °F (36.4 °C)   Resp 16   Ht 160 cm (62.99\")   Wt 90.7 kg (200 lb)   SpO2 95%   BMI 35.44 kg/m²       Current Outpatient Medications:   •  glipizide (GLUCOTROL XL) 5 MG ER tablet, Take 1 tablet by mouth Daily., Disp: 90 tablet, Rfl: 1  •  Insulin Degludec (Tresiba FlexTouch) 200 UNIT/ML solution pen-injector pen injection, Inject 46 Units under the skin into the appropriate area as directed Daily., Disp: 9 pen, Rfl: 1  •  levothyroxine (SYNTHROID, LEVOTHROID) 88 MCG tablet, Take 1 tablet by mouth Daily., Disp: 90 tablet, Rfl: 1  •  rosuvastatin (CRESTOR) 20 MG tablet, Take 1 tablet by mouth every night at bedtime., Disp: 90 tablet, Rfl: 1  •  albuterol (PROAIR RESPICLICK) 108 (90 Base) MCG/ACT inhaler, Inhale 1 puff Every 4 (Four) Hours As Needed for Wheezing or Shortness of Air., Disp: , Rfl:   •  B Complex Vitamins (VITAMIN B COMPLEX PO), Take 1 tablet by mouth Every Night., Disp: , Rfl:   •  Blood Glucose Monitoring Suppl (Accu-Chek Guide) w/Device kit, 1 kit Daily. Dg: E11.65, Disp: 1 kit, Rfl: 0  •  cetirizine (zyrTEC) 10 MG tablet, Take 10 mg by mouth Daily., Disp: , Rfl:   •  cholecalciferol (VITAMIN D3) 1000 units tablet, Take 1,000 Units by mouth Every Night., Disp: , Rfl:   •  Continuous Blood Gluc  (Dexcom G5 Mobile ) device, 1 kit Daily., Disp: 1 each, Rfl: 0  •  escitalopram (LEXAPRO) 20 MG tablet, TAKE ONE TABLET BY MOUTH DAILY, Disp: 90 tablet, Rfl: 1  •  FeroSul 325 (65 Fe) MG tablet, TAKE ONE TABLET BY MOUTH DAILY WITH BREAKFAST, Disp: 30 " tablet, Rfl: 1  •  fluticasone (FLONASE) 50 MCG/ACT nasal spray, 2 sprays into each nostril Daily., Disp: , Rfl:   •  glucose blood (Accu-Chek Guide) test strip, 1 each by Other route 3 (Three) Times a Day. Dg: E11.65, Disp: 100 each, Rfl: 1  •  hydroCHLOROthiazide (HYDRODIURIL) 25 MG tablet, TAKE ONE TABLET BY MOUTH DAILY, Disp: 90 tablet, Rfl: 0  •  [employer] Pen Needles 31G X 6 MM misc, USE ONCE DAILY, Disp: 100 each, Rfl: 1  •  Lancets (accu-chek soft touch) lancets, 1 each by Other route 3 (Three) Times a Day. Dg: E11.65, Disp: 100 each, Rfl: 1  •  loperamide (IMODIUM A-D) 2 MG tablet, Take 2 mg by mouth 4 (Four) Times a Day As Needed for Diarrhea., Disp: , Rfl:   •  melatonin 5 MG tablet tablet, Take 10 mg by mouth At Night As Needed., Disp: , Rfl:   •  metFORMIN ER (GLUCOPHAGE-XR) 500 MG 24 hr tablet, TAKE TWO TABLETS BY MOUTH TWICE A DAY, Disp: 360 tablet, Rfl: 0  •  omeprazole (priLOSEC) 40 MG capsule, TAKE ONE CAPSULE BY MOUTH DAILY, Disp: 90 capsule, Rfl: 0  •  propranolol LA (INDERAL LA) 60 MG 24 hr capsule, TAKE ONE CAPSULE BY MOUTH DAILY, Disp: 90 capsule, Rfl: 0  •  tamoxifen (NOLVADEX) 20 MG chemo tablet, TAKE ONE TABLET BY MOUTH DAILY, Disp: 90 tablet, Rfl: 1  •  traZODone (DESYREL) 50 MG tablet, TAKE TWO TABLETS BY MOUTH EVERY NIGHT AT BEDTIME, Disp: 90 tablet, Rfl: 0    Objective   Physical Exam  Constitutional:       General: She is not in acute distress.     Appearance: Normal appearance. She is well-developed. She is not ill-appearing or diaphoretic.      Comments: Patient is in no distress, patient has normal voice and speech.  Normal respiratory effort.   HENT:      Head: Normocephalic and atraumatic.   Pulmonary:      Effort: Pulmonary effort is normal.   Musculoskeletal:      Cervical back: Normal range of motion and neck supple.   Neurological:      General: No focal deficit present.      Mental Status: She is alert and oriented to person, place, and time. Mental status is at baseline.    Psychiatric:         Mood and Affect: Mood normal.         FOLLOWING LABS WERE REVIEWED TODAY:  CMP    CMP 1/3/22 3/24/22 5/5/22   Glucose 121 (A) 146 (A) 126 (A)   BUN 23 23 (A) 21   Creatinine 1.30 (A) 1.40 (A) 1.22 (A)   eGFR Non African Am 40 (A)     Sodium 142 142 142   Potassium 4.3 5.0 (A) 4.4   Chloride 102 102 102   Calcium 9.7 10.0 9.4   Albumin 4.40 4.50 4.40   Total Bilirubin 0.3 0.5 0.4   Alkaline Phosphatase 65 74 69   AST (SGOT) 33 (A) 59 (A) 49 (A)   ALT (SGPT) 33 41 (A) 39 (A)   (A) Abnormal value            Lipid Panel    Lipid Panel 9/20/21 1/3/22 5/5/22   Total Cholesterol 107 100 104   Triglycerides 102 123 209 (A)   HDL Cholesterol 40 36 (A) 31 (A)   VLDL Cholesterol 19 22 33   LDL Cholesterol  48 42 40   LDL/HDL Ratio 1.17 1.09 1.01   (A) Abnormal value            TSH    TSH 11/11/21 1/3/22 5/5/22   TSH 1.820 2.640 3.270           Most Recent A1C    HGBA1C Most Recent 5/5/22   Hemoglobin A1C 7.3 (A)   (A) Abnormal value                   Assessment & Plan   Diagnoses and all orders for this visit:    1. Type 2 diabetes mellitus with complication, without long-term current use of insulin (HCC) (Primary)  -     Insulin Degludec (Tresiba FlexTouch) 200 UNIT/ML solution pen-injector pen injection; Inject 46 Units under the skin into the appropriate area as directed Daily.  Dispense: 9 pen; Refill: 1  -     Comprehensive Metabolic Panel  -     Hemoglobin A1c  -     Lipid Panel  -     Microalbumin / Creatinine Urine Ratio - Urine, Clean Catch  -     glipizide (GLUCOTROL XL) 5 MG ER tablet; Take 1 tablet by mouth Daily.  Dispense: 90 tablet; Refill: 1    2. Mixed hyperlipidemia  -     rosuvastatin (CRESTOR) 20 MG tablet; Take 1 tablet by mouth every night at bedtime.  Dispense: 90 tablet; Refill: 1  -     Comprehensive Metabolic Panel  -     Lipid Panel    3. Acquired hypothyroidism  -     Lipid Panel  -     TSH  -     T4, Free    4. Hypothyroidism due to acquired atrophy of thyroid  -      levothyroxine (SYNTHROID, LEVOTHROID) 88 MCG tablet; Take 1 tablet by mouth Daily.  Dispense: 90 tablet; Refill: 1      I reviewed her medical problems and her medications.  I also reviewed her blood work results.  Her most recent hemoglobin A1c was 7.3, which was exactly the same as it was in 1/2022.  Her diabetes over the last year has improved significantly as her hemoglobin A1c in 6/2021 was 13.9.  She will continue glipizide and metformin at the same dose.  I will be increasing her Tresiba from 44 units to 46 units for hopefully even better blood sugar control.  Healthy lifestyle was reinforced.  She is up to speed with her eye exam.              Return in about 6 months (around 11/13/2022) for Next scheduled follow up.    Requested Prescriptions     Signed Prescriptions Disp Refills   • Insulin Degludec (Tresiba FlexTouch) 200 UNIT/ML solution pen-injector pen injection 9 pen 1     Sig: Inject 46 Units under the skin into the appropriate area as directed Daily.   • rosuvastatin (CRESTOR) 20 MG tablet 90 tablet 1     Sig: Take 1 tablet by mouth every night at bedtime.   • glipizide (GLUCOTROL XL) 5 MG ER tablet 90 tablet 1     Sig: Take 1 tablet by mouth Daily.   • levothyroxine (SYNTHROID, LEVOTHROID) 88 MCG tablet 90 tablet 1     Sig: Take 1 tablet by mouth Daily.

## 2022-05-20 DIAGNOSIS — F51.01 PRIMARY INSOMNIA: ICD-10-CM

## 2022-05-20 RX ORDER — TRAZODONE HYDROCHLORIDE 50 MG/1
TABLET ORAL
Qty: 90 TABLET | Refills: 0 | Status: SHIPPED | OUTPATIENT
Start: 2022-05-20 | End: 2022-06-12

## 2022-06-11 DIAGNOSIS — K21.9 GASTROESOPHAGEAL REFLUX DISEASE WITHOUT ESOPHAGITIS: ICD-10-CM

## 2022-06-11 DIAGNOSIS — F51.01 PRIMARY INSOMNIA: ICD-10-CM

## 2022-06-11 DIAGNOSIS — I10 ESSENTIAL HYPERTENSION: ICD-10-CM

## 2022-06-12 RX ORDER — PROPRANOLOL HCL 60 MG
CAPSULE, EXTENDED RELEASE 24HR ORAL
Qty: 90 CAPSULE | Refills: 0 | Status: SHIPPED | OUTPATIENT
Start: 2022-06-12 | End: 2022-06-23

## 2022-06-12 RX ORDER — HYDROCHLOROTHIAZIDE 25 MG/1
TABLET ORAL
Qty: 90 TABLET | Refills: 0 | Status: SHIPPED | OUTPATIENT
Start: 2022-06-12 | End: 2022-06-23

## 2022-06-12 RX ORDER — OMEPRAZOLE 40 MG/1
CAPSULE, DELAYED RELEASE ORAL
Qty: 90 CAPSULE | Refills: 0 | Status: SHIPPED | OUTPATIENT
Start: 2022-06-12 | End: 2022-06-23

## 2022-06-12 RX ORDER — TRAZODONE HYDROCHLORIDE 50 MG/1
TABLET ORAL
Qty: 90 TABLET | Refills: 0 | Status: SHIPPED | OUTPATIENT
Start: 2022-06-12 | End: 2022-09-19

## 2022-06-13 RX ORDER — TAMOXIFEN CITRATE 20 MG/1
TABLET ORAL
Qty: 30 TABLET | Refills: 1 | Status: SHIPPED | OUTPATIENT
Start: 2022-06-13 | End: 2022-09-06

## 2022-06-23 DIAGNOSIS — I10 ESSENTIAL HYPERTENSION: ICD-10-CM

## 2022-06-23 DIAGNOSIS — K21.9 GASTROESOPHAGEAL REFLUX DISEASE WITHOUT ESOPHAGITIS: ICD-10-CM

## 2022-06-23 DIAGNOSIS — F33.1 MODERATE EPISODE OF RECURRENT MAJOR DEPRESSIVE DISORDER: ICD-10-CM

## 2022-06-23 RX ORDER — HYDROCHLOROTHIAZIDE 25 MG/1
TABLET ORAL
Qty: 90 TABLET | Refills: 0 | Status: SHIPPED | OUTPATIENT
Start: 2022-06-23 | End: 2022-12-15 | Stop reason: SDUPTHER

## 2022-06-23 RX ORDER — PROPRANOLOL HCL 60 MG
CAPSULE, EXTENDED RELEASE 24HR ORAL
Qty: 90 CAPSULE | Refills: 0 | Status: SHIPPED | OUTPATIENT
Start: 2022-06-23 | End: 2022-12-06

## 2022-06-23 RX ORDER — FERROUS SULFATE 325(65) MG
TABLET ORAL
Qty: 30 TABLET | Refills: 1 | Status: SHIPPED | OUTPATIENT
Start: 2022-06-23 | End: 2022-09-14

## 2022-06-23 RX ORDER — ESCITALOPRAM OXALATE 20 MG/1
TABLET ORAL
Qty: 90 TABLET | Refills: 0 | Status: SHIPPED | OUTPATIENT
Start: 2022-06-23 | End: 2022-12-16

## 2022-06-23 RX ORDER — OMEPRAZOLE 40 MG/1
CAPSULE, DELAYED RELEASE ORAL
Qty: 90 CAPSULE | Refills: 0 | Status: SHIPPED | OUTPATIENT
Start: 2022-06-23 | End: 2022-12-12

## 2022-07-26 DIAGNOSIS — E11.65 UNCONTROLLED TYPE 2 DIABETES MELLITUS WITH HYPERGLYCEMIA: ICD-10-CM

## 2022-07-26 RX ORDER — METFORMIN HYDROCHLORIDE 500 MG/1
TABLET, EXTENDED RELEASE ORAL
Qty: 360 TABLET | Refills: 0 | Status: SHIPPED | OUTPATIENT
Start: 2022-07-26 | End: 2022-11-22

## 2022-09-06 RX ORDER — TAMOXIFEN CITRATE 20 MG/1
TABLET ORAL
Qty: 30 TABLET | Refills: 1 | Status: SHIPPED | OUTPATIENT
Start: 2022-09-06 | End: 2022-11-21

## 2022-09-14 RX ORDER — FERROUS SULFATE 325(65) MG
TABLET ORAL
Qty: 30 TABLET | Refills: 1 | Status: SHIPPED | OUTPATIENT
Start: 2022-09-14 | End: 2022-11-21

## 2022-09-14 RX ORDER — PEN NEEDLE, DIABETIC 31 G X1/4"
NEEDLE, DISPOSABLE MISCELLANEOUS
Qty: 100 EACH | Refills: 1 | Status: SHIPPED | OUTPATIENT
Start: 2022-09-14 | End: 2023-03-14

## 2022-09-15 ENCOUNTER — TELEPHONE (OUTPATIENT)
Dept: ONCOLOGY | Facility: CLINIC | Age: 72
End: 2022-09-15

## 2022-09-15 NOTE — TELEPHONE ENCOUNTER
Caller: Mariana Ansari    Relationship to patient: Self    Best call back number: [phone]    Chief complaint: PATIENT CALLED TO RESCHEDULE, SHE STATED HER BONE DENSITY TEST ISNT UNTIL 12-29-22 SO HAS TO BE AFTER THAT    Type of visit: LAB, FOLLOW UP, AND INFUSION     Requested date: AFTER 12-29-22, WOULD LIKE 1ST WEEK OF January 2023      If rescheduling, when is the original appointment: 10-27-22     Additional notes:PLEASE CALL PATIENT TO CONFIRM

## 2022-09-19 DIAGNOSIS — F51.01 PRIMARY INSOMNIA: ICD-10-CM

## 2022-09-19 DIAGNOSIS — E03.4 HYPOTHYROIDISM DUE TO ACQUIRED ATROPHY OF THYROID: ICD-10-CM

## 2022-09-19 RX ORDER — TRAZODONE HYDROCHLORIDE 50 MG/1
TABLET ORAL
Qty: 90 TABLET | Refills: 0 | Status: SHIPPED | OUTPATIENT
Start: 2022-09-19

## 2022-09-19 RX ORDER — LEVOTHYROXINE SODIUM 88 UG/1
TABLET ORAL
Qty: 90 TABLET | Refills: 0 | Status: SHIPPED | OUTPATIENT
Start: 2022-09-19 | End: 2022-12-15 | Stop reason: SDUPTHER

## 2022-10-17 ENCOUNTER — APPOINTMENT (OUTPATIENT)
Dept: BONE DENSITY | Facility: HOSPITAL | Age: 72
End: 2022-10-17

## 2022-10-26 ENCOUNTER — APPOINTMENT (OUTPATIENT)
Dept: GENERAL RADIOLOGY | Facility: HOSPITAL | Age: 72
End: 2022-10-26

## 2022-10-26 ENCOUNTER — HOSPITAL ENCOUNTER (OUTPATIENT)
Facility: HOSPITAL | Age: 72
Discharge: HOME | End: 2022-10-26
Attending: EMERGENCY MEDICINE | Admitting: EMERGENCY MEDICINE

## 2022-10-26 VITALS
HEART RATE: 72 BPM | HEIGHT: 63 IN | WEIGHT: 190 LBS | TEMPERATURE: 98.3 F | RESPIRATION RATE: 17 BRPM | BODY MASS INDEX: 33.66 KG/M2 | OXYGEN SATURATION: 97 % | DIASTOLIC BLOOD PRESSURE: 81 MMHG | SYSTOLIC BLOOD PRESSURE: 146 MMHG

## 2022-10-26 DIAGNOSIS — T07.XXXA ABRASIONS OF MULTIPLE SITES: ICD-10-CM

## 2022-10-26 DIAGNOSIS — S62.102A CLOSED FRACTURE OF LEFT WRIST, INITIAL ENCOUNTER: Primary | ICD-10-CM

## 2022-10-26 PROCEDURE — 73130 X-RAY EXAM OF HAND: CPT

## 2022-10-26 PROCEDURE — G0463 HOSPITAL OUTPT CLINIC VISIT: HCPCS | Performed by: EMERGENCY MEDICINE

## 2022-10-26 PROCEDURE — 99203 OFFICE O/P NEW LOW 30 MIN: CPT | Performed by: EMERGENCY MEDICINE

## 2022-10-26 PROCEDURE — 73110 X-RAY EXAM OF WRIST: CPT

## 2022-10-26 RX ORDER — HYDROCODONE BITARTRATE AND ACETAMINOPHEN 5; 325 MG/1; MG/1
1 TABLET ORAL EVERY 6 HOURS PRN
Qty: 16 TABLET | Refills: 0 | Status: SHIPPED | OUTPATIENT
Start: 2022-10-26

## 2022-10-26 RX ORDER — HYDROCODONE BITARTRATE AND ACETAMINOPHEN 5; 325 MG/1; MG/1
1 TABLET ORAL ONCE
Status: COMPLETED | OUTPATIENT
Start: 2022-10-26 | End: 2022-10-26

## 2022-10-26 RX ORDER — DIAPER,BRIEF,INFANT-TODD,DISP
1 EACH MISCELLANEOUS ONCE
Status: COMPLETED | OUTPATIENT
Start: 2022-10-26 | End: 2022-10-26

## 2022-10-26 RX ADMIN — HYDROCODONE BITARTRATE AND ACETAMINOPHEN 1 TABLET: 5; 325 TABLET ORAL at 15:50

## 2022-10-26 RX ADMIN — Medication 1 APPLICATION: at 15:50

## 2022-11-07 ENCOUNTER — TELEPHONE (OUTPATIENT)
Dept: FAMILY MEDICINE CLINIC | Facility: CLINIC | Age: 72
End: 2022-11-07

## 2022-11-07 NOTE — TELEPHONE ENCOUNTER
Caller: Mariana Ansari    Relationship to patient: Self    Patient is needing: PATIENT STATES THAT SHE HAD A FALL LAST WEEK, AND WANTS DR. MCKEON TO KNOW THAT SHE BROKE HER LEFT WRIST. PATIENT DESIRES TO RESCHEDULE HER UPCOMING LAB AND 6 MONTH FOLLOW UP VISIT UNTIL SHE IS A LITTLE BETTER.

## 2022-11-07 NOTE — TELEPHONE ENCOUNTER
Okay, I see that patient canceled her appointment scheduled for 11/15/2022 and reschedule it for 12/15/2022.  Thank you.

## 2022-11-21 RX ORDER — TAMOXIFEN CITRATE 20 MG/1
TABLET ORAL
Qty: 30 TABLET | Refills: 1 | Status: SHIPPED | OUTPATIENT
Start: 2022-11-21 | End: 2023-01-09 | Stop reason: SDUPTHER

## 2022-11-21 RX ORDER — FERROUS SULFATE 325(65) MG
TABLET ORAL
Qty: 30 TABLET | Refills: 1 | Status: SHIPPED | OUTPATIENT
Start: 2022-11-21 | End: 2023-01-19

## 2022-11-22 DIAGNOSIS — E78.2 MIXED HYPERLIPIDEMIA: ICD-10-CM

## 2022-11-22 DIAGNOSIS — E11.65 UNCONTROLLED TYPE 2 DIABETES MELLITUS WITH HYPERGLYCEMIA: ICD-10-CM

## 2022-11-22 RX ORDER — METFORMIN HYDROCHLORIDE 500 MG/1
TABLET, EXTENDED RELEASE ORAL
Qty: 360 TABLET | Refills: 0 | Status: SHIPPED | OUTPATIENT
Start: 2022-11-22 | End: 2023-02-24

## 2022-11-22 RX ORDER — ROSUVASTATIN CALCIUM 20 MG/1
TABLET, COATED ORAL
Qty: 90 TABLET | Refills: 0 | Status: SHIPPED | OUTPATIENT
Start: 2022-11-22 | End: 2022-12-15 | Stop reason: SDUPTHER

## 2022-12-06 DIAGNOSIS — I10 ESSENTIAL HYPERTENSION: ICD-10-CM

## 2022-12-06 RX ORDER — PROPRANOLOL HCL 60 MG
CAPSULE, EXTENDED RELEASE 24HR ORAL
Qty: 90 CAPSULE | Refills: 0 | Status: SHIPPED | OUTPATIENT
Start: 2022-12-06 | End: 2023-03-07

## 2022-12-12 DIAGNOSIS — K21.9 GASTROESOPHAGEAL REFLUX DISEASE WITHOUT ESOPHAGITIS: ICD-10-CM

## 2022-12-12 RX ORDER — OMEPRAZOLE 40 MG/1
CAPSULE, DELAYED RELEASE ORAL
Qty: 90 CAPSULE | Refills: 0 | Status: SHIPPED | OUTPATIENT
Start: 2022-12-12 | End: 2023-03-14

## 2022-12-13 ENCOUNTER — LAB (OUTPATIENT)
Dept: FAMILY MEDICINE CLINIC | Facility: CLINIC | Age: 72
End: 2022-12-13

## 2022-12-13 LAB
ALBUMIN SERPL-MCNC: 4.5 G/DL (ref 3.5–5.2)
ALBUMIN UR-MCNC: <1.2 MG/DL
ALBUMIN/GLOB SERPL: 1.6 G/DL
ALP SERPL-CCNC: 64 U/L (ref 39–117)
ALT SERPL W P-5'-P-CCNC: 21 U/L (ref 1–33)
ANION GAP SERPL CALCULATED.3IONS-SCNC: 14 MMOL/L (ref 5–15)
AST SERPL-CCNC: 32 U/L (ref 1–32)
BILIRUB SERPL-MCNC: 0.4 MG/DL (ref 0–1.2)
BUN SERPL-MCNC: 20 MG/DL (ref 8–23)
BUN/CREAT SERPL: 16 (ref 7–25)
CALCIUM SPEC-SCNC: 9.8 MG/DL (ref 8.6–10.5)
CHLORIDE SERPL-SCNC: 103 MMOL/L (ref 98–107)
CHOLEST SERPL-MCNC: 117 MG/DL (ref 0–200)
CO2 SERPL-SCNC: 25 MMOL/L (ref 22–29)
CREAT SERPL-MCNC: 1.25 MG/DL (ref 0.57–1)
CREAT UR-MCNC: 79.2 MG/DL
EGFRCR SERPLBLD CKD-EPI 2021: 46.2 ML/MIN/1.73
GLOBULIN UR ELPH-MCNC: 2.8 GM/DL
GLUCOSE SERPL-MCNC: 136 MG/DL (ref 65–99)
HBA1C MFR BLD: 7.6 % (ref 3.5–5.6)
HDLC SERPL-MCNC: 36 MG/DL (ref 40–60)
LDLC SERPL CALC-MCNC: 47 MG/DL (ref 0–100)
LDLC/HDLC SERPL: 1.09 {RATIO}
MICROALBUMIN/CREAT UR: NORMAL MG/G{CREAT}
POTASSIUM SERPL-SCNC: 4.5 MMOL/L (ref 3.5–5.2)
PROT SERPL-MCNC: 7.3 G/DL (ref 6–8.5)
SODIUM SERPL-SCNC: 142 MMOL/L (ref 136–145)
T4 FREE SERPL-MCNC: 1.29 NG/DL (ref 0.93–1.7)
TRIGL SERPL-MCNC: 208 MG/DL (ref 0–150)
TSH SERPL DL<=0.05 MIU/L-ACNC: 3.87 UIU/ML (ref 0.27–4.2)
VLDLC SERPL-MCNC: 34 MG/DL (ref 5–40)

## 2022-12-13 PROCEDURE — 80061 LIPID PANEL: CPT | Performed by: FAMILY MEDICINE

## 2022-12-13 PROCEDURE — 80053 COMPREHEN METABOLIC PANEL: CPT | Performed by: FAMILY MEDICINE

## 2022-12-13 PROCEDURE — 84443 ASSAY THYROID STIM HORMONE: CPT | Performed by: FAMILY MEDICINE

## 2022-12-13 PROCEDURE — 83036 HEMOGLOBIN GLYCOSYLATED A1C: CPT | Performed by: FAMILY MEDICINE

## 2022-12-13 PROCEDURE — 82043 UR ALBUMIN QUANTITATIVE: CPT | Performed by: FAMILY MEDICINE

## 2022-12-13 PROCEDURE — 82570 ASSAY OF URINE CREATININE: CPT | Performed by: FAMILY MEDICINE

## 2022-12-13 PROCEDURE — 36415 COLL VENOUS BLD VENIPUNCTURE: CPT | Performed by: FAMILY MEDICINE

## 2022-12-13 PROCEDURE — 84439 ASSAY OF FREE THYROXINE: CPT | Performed by: FAMILY MEDICINE

## 2022-12-15 ENCOUNTER — OFFICE VISIT (OUTPATIENT)
Dept: FAMILY MEDICINE CLINIC | Facility: CLINIC | Age: 72
End: 2022-12-15

## 2022-12-15 VITALS
TEMPERATURE: 98 F | OXYGEN SATURATION: 97 % | DIASTOLIC BLOOD PRESSURE: 76 MMHG | WEIGHT: 191.8 LBS | SYSTOLIC BLOOD PRESSURE: 125 MMHG | HEIGHT: 63 IN | HEART RATE: 102 BPM | RESPIRATION RATE: 16 BRPM | BODY MASS INDEX: 33.98 KG/M2

## 2022-12-15 DIAGNOSIS — E03.9 ACQUIRED HYPOTHYROIDISM: ICD-10-CM

## 2022-12-15 DIAGNOSIS — E78.2 MIXED HYPERLIPIDEMIA: ICD-10-CM

## 2022-12-15 DIAGNOSIS — E11.8 TYPE 2 DIABETES MELLITUS WITH COMPLICATION, WITHOUT LONG-TERM CURRENT USE OF INSULIN: Primary | ICD-10-CM

## 2022-12-15 DIAGNOSIS — I10 PRIMARY HYPERTENSION: ICD-10-CM

## 2022-12-15 PROCEDURE — 99214 OFFICE O/P EST MOD 30 MIN: CPT | Performed by: FAMILY MEDICINE

## 2022-12-15 RX ORDER — ROSUVASTATIN CALCIUM 20 MG/1
20 TABLET, COATED ORAL
Qty: 90 TABLET | Refills: 1 | Status: SHIPPED | OUTPATIENT
Start: 2022-12-15

## 2022-12-15 RX ORDER — LEVOTHYROXINE SODIUM 88 UG/1
88 TABLET ORAL DAILY
Qty: 90 TABLET | Refills: 1 | Status: SHIPPED | OUTPATIENT
Start: 2022-12-15

## 2022-12-15 RX ORDER — HYDROCHLOROTHIAZIDE 25 MG/1
25 TABLET ORAL DAILY
Qty: 90 TABLET | Refills: 1 | Status: SHIPPED | OUTPATIENT
Start: 2022-12-15

## 2022-12-15 RX ORDER — GLIPIZIDE 5 MG/1
5 TABLET, FILM COATED, EXTENDED RELEASE ORAL 2 TIMES DAILY
Qty: 180 TABLET | Refills: 1 | Status: SHIPPED | OUTPATIENT
Start: 2022-12-15

## 2022-12-15 NOTE — PROGRESS NOTES
Subjective   Chief Complaint   Patient presents with   • Diabetes     6 month follow up   • Hypertension   • Hyperlipidemia   • Hypothyroidism   • Med Refill     Mariana Ansari is a 72 y.o. female.     Patient Care Team:  Luna Whitfield MD as PCP - General (Family Medicine)  Luna Whitfield MD as PCP - Family Medicine  Michel Guajardo MD as Consulting Physician (Hematology and Oncology)  Luna Whitfield MD as Referring Physician (Family Medicine)    History of Present Illness  She is coming in today to follow-up on her chronic medical problems and her medications including diabetes, hypertension, hyperlipidemia, hypothyroidism, chronic kidney disease, and anxiety.  She took a fall about 6 weeks ago and broke her left forearm.  She was seen by orthopedist and had a internal fixation.  She has been recovering pretty well since the surgery, but she still has pain.  She reports taking all of her medicines as directed and she denies any issues or side effects.  She does not really check her blood sugar at home. Patient does not report any chest pain, shortness of breath, dizziness, nausea, vomiting, or diarrhea, visual issues, headaches, numbness or tingling. No urinary issues reported like urgency, frequency, or discomfort upon urination.  No significant weight changes reported.  No swelling reported.  No rashes or any other skin issues reported. No emotional issues or insomnia.       The following portions of the patient's history were reviewed and updated as appropriate: allergies, current medications, past family history, past medical history, past social history, past surgical history and problem list.  Past Medical History:   Diagnosis Date   • Anxiety    • Anxiety and depression    • Asthma     SEASONAL ALLERGY RELATED   • Cancer of breast (HCC) 06/2018    LEFT   • Chronic kidney disease     Renal cyst, right kidney w/urology workup in past   • Depression    • Diabetes mellitus (HCC)     Type 2   •  Disease of thyroid gland    • GERD (gastroesophageal reflux disease)    • History of prior pregnancies     x2   • History of transfusion     S/P HYST --AUTOLOGOUS    • Hx of radiation therapy    • Hyperlipidemia    • Hypertension    • IBS (irritable bowel syndrome)    • Seasonal allergies      Past Surgical History:   Procedure Laterality Date   • BREAST LUMPECTOMY WITH SENTINEL NODE BIOPSY Left 7/18/2018    Procedure: BREAST LUMPECTOMY WITH SENTINEL NODE BIOPSY;  Surgeon: João Zazueta MD;  Location: [facility] OR;  Service: General   • COLONOSCOPY      refusing; negative cologuard 8/24/2017   • HYSTERECTOMY  1989   • KNEE SURGERY Right 2014   • KNEE SURGERY Left 2016   • LIVER BIOPSY  2019    fatty liver   • ROTATOR CUFF REPAIR Left 2009   • ROTATOR CUFF REPAIR Right 2012   • TOE SURGERY  2009    ingrown      The patient has a family history of  Family History   Problem Relation Age of Onset   • Diabetes Mother    • Heart attack Mother    • Heart failure Mother    • Hyperlipidemia Mother    • Hyperthyroidism Mother         Has surgery   • Hypothyroidism Mother         Later in life   • Heart disease Mother    • Hypertension Mother    • Asthma Father    • COPD Father    • Hypertension Father    • Heart attack Maternal Uncle    • Heart failure Maternal Uncle    • Depression Maternal Grandmother    • Heart attack Maternal Grandfather    • Heart failure Maternal Grandfather    • Alcohol abuse Paternal Grandmother    • Alcohol abuse Paternal Grandfather    • Scleroderma Sister    • No Known Problems Daughter    • No Known Problems Son    • Malig Hyperthermia Neg Hx      Social History     Socioeconomic History   • Marital status:      Spouse name: Mahin   • Number of children: 2   • Years of education: College   Tobacco Use   • Smoking status: Never   • Smokeless tobacco: Never   Substance and Sexual Activity   • Alcohol use: Yes     Alcohol/week: 1.0 standard drink     Types: 1 Cans of beer per week     " Comment: 2-3 a month, social only   • Drug use: No   • Sexual activity: Yes     Comment:        Review of Systems   Constitutional: Negative for activity change, fatigue and fever.   Respiratory: Negative for shortness of breath and wheezing.    Cardiovascular: Negative for chest pain, palpitations and leg swelling.   Musculoskeletal: Negative for arthralgias and back pain.   Skin: Negative for rash.   Neurological: Negative for tremors and headache.     Visit Vitals  /76 (BP Location: Left arm, Patient Position: Sitting, Cuff Size: Adult)   Pulse 102   Temp 98 °F (36.7 °C) (Temporal)   Resp 16   Ht 160 cm (62.99\")   Wt 87 kg (191 lb 12.8 oz)   SpO2 97%   BMI 33.98 kg/m²       Current Outpatient Medications:   •  albuterol (PROAIR RESPICLICK) 108 (90 Base) MCG/ACT inhaler, Inhale 1 puff Every 4 (Four) Hours As Needed for Wheezing or Shortness of Air., Disp: , Rfl:   •  Blood Glucose Monitoring Suppl (Accu-Chek Guide) w/Device kit, 1 kit Daily. Dg: E11.65, Disp: 1 kit, Rfl: 0  •  cetirizine (zyrTEC) 10 MG tablet, Take 10 mg by mouth Daily., Disp: , Rfl:   •  cholecalciferol (VITAMIN D3) 1000 units tablet, Take 1,000 Units by mouth Every Night., Disp: , Rfl:   •  escitalopram (LEXAPRO) 20 MG tablet, TAKE ONE TABLET BY MOUTH DAILY, Disp: 90 tablet, Rfl: 0  •  FeroSul 325 (65 Fe) MG tablet, TAKE ONE TABLET BY MOUTH DAILY WITH BREAKFAST, Disp: 30 tablet, Rfl: 1  •  fluticasone (FLONASE) 50 MCG/ACT nasal spray, 2 sprays into each nostril Daily., Disp: , Rfl:   •  glipizide (GLUCOTROL XL) 5 MG ER tablet, Take 1 tablet by mouth 2 (Two) Times a Day., Disp: 180 tablet, Rfl: 1  •  glucose blood (Accu-Chek Guide) test strip, 1 each by Other route 3 (Three) Times a Day. Dg: E11.65, Disp: 100 each, Rfl: 1  •  hydroCHLOROthiazide (HYDRODIURIL) 25 MG tablet, Take 1 tablet by mouth Daily., Disp: 90 tablet, Rfl: 1  •  Insulin Degludec (Tresiba FlexTouch) 200 UNIT/ML solution pen-injector pen injection, Inject 46 Units " under the skin into the appropriate area as directed Daily., Disp: 9 pen, Rfl: 1  •  [employer] Pen Needles 31G X 6 MM misc, TEST ONCE A DAY, Disp: 100 each, Rfl: 1  •  levothyroxine (SYNTHROID, LEVOTHROID) 88 MCG tablet, Take 1 tablet by mouth Daily., Disp: 90 tablet, Rfl: 1  •  loperamide (IMODIUM A-D) 2 MG tablet, Take 2 mg by mouth 4 (Four) Times a Day As Needed for Diarrhea., Disp: , Rfl:   •  melatonin 5 MG tablet tablet, Take 10 mg by mouth At Night As Needed., Disp: , Rfl:   •  metFORMIN ER (GLUCOPHAGE-XR) 500 MG 24 hr tablet, TAKE TWO TABLETS BY MOUTH TWICE A DAY, Disp: 360 tablet, Rfl: 0  •  omeprazole (priLOSEC) 40 MG capsule, TAKE ONE CAPSULE BY MOUTH DAILY, Disp: 90 capsule, Rfl: 0  •  propranolol LA (INDERAL LA) 60 MG 24 hr capsule, TAKE ONE CAPSULE BY MOUTH DAILY, Disp: 90 capsule, Rfl: 0  •  rosuvastatin (CRESTOR) 20 MG tablet, Take 1 tablet by mouth every night at bedtime., Disp: 90 tablet, Rfl: 1  •  tamoxifen (NOLVADEX) 20 MG chemo tablet, TAKE ONE TABLET BY MOUTH DAILY, Disp: 30 tablet, Rfl: 1  •  B Complex Vitamins (VITAMIN B COMPLEX PO), Take 1 tablet by mouth Every Night., Disp: , Rfl:   •  Continuous Blood Gluc  (Dexcom G5 Mobile ) device, 1 kit Daily., Disp: 1 each, Rfl: 0  •  HYDROcodone-acetaminophen (NORCO) 5-325 MG per tablet, Take 1 tablet by mouth Every 6 (Six) Hours As Needed for Moderate Pain., Disp: 16 tablet, Rfl: 0  •  Lancets (accu-chek soft touch) lancets, 1 each by Other route 3 (Three) Times a Day. Dg: E11.65, Disp: 100 each, Rfl: 1  •  traZODone (DESYREL) 50 MG tablet, TAKE TWO TABLETS BY MOUTH EVERY NIGHT AT BEDTIME, Disp: 90 tablet, Rfl: 0    Objective   Physical Exam  Vitals and nursing note reviewed.   Constitutional:       General: She is not in acute distress.     Appearance: Normal appearance. She is well-developed. She is not ill-appearing.   HENT:      Head: Normocephalic and atraumatic.   Cardiovascular:      Rate and Rhythm: Normal rate and regular  rhythm.      Heart sounds: Normal heart sounds. No murmur heard.    No gallop.   Pulmonary:      Effort: Pulmonary effort is normal. No respiratory distress.      Breath sounds: Normal breath sounds. No wheezing, rhonchi or rales.   Chest:      Chest wall: No tenderness.   Musculoskeletal:      Cervical back: Normal range of motion and neck supple.   Neurological:      General: No focal deficit present.      Mental Status: She is alert and oriented to person, place, and time. Mental status is at baseline.   Psychiatric:         Mood and Affect: Mood normal.           FOLLOWING LABS WERE REVIEWED TODAY:  CMP    CMP 3/24/22 5/5/22 12/13/22   Glucose 146 (A) 126 (A) 136 (A)   BUN 23 (A) 21 20   Creatinine 1.40 (A) 1.22 (A) 1.25 (A)   Sodium 142 142 142   Potassium 5.0 (A) 4.4 4.5   Chloride 102 102 103   Calcium 10.0 9.4 9.8   Albumin 4.50 4.40 4.50   Total Bilirubin 0.5 0.4 0.4   Alkaline Phosphatase 74 69 64   AST (SGOT) 59 (A) 49 (A) 32   ALT (SGPT) 41 (A) 39 (A) 21   (A) Abnormal value            Lipid Panel    Lipid Panel 1/3/22 5/5/22 12/13/22   Total Cholesterol 100 104 117   Triglycerides 123 209 (A) 208 (A)   HDL Cholesterol 36 (A) 31 (A) 36 (A)   VLDL Cholesterol 22 33 34   LDL Cholesterol  42 40 47   LDL/HDL Ratio 1.09 1.01 1.09   (A) Abnormal value            TSH    TSH 1/3/22 5/5/22 12/13/22   TSH 2.640 3.270 3.870           Most Recent A1C    HGBA1C Most Recent 12/13/22   Hemoglobin A1C 7.6 (A)   (A) Abnormal value            Microalbumin    Microalbumin 12/13/22   Microalbumin, Urine <1.2                Assessment & Plan   Diagnoses and all orders for this visit:    1. Type 2 diabetes mellitus with complication, without long-term current use of insulin (HCC) (Primary)  -     glipizide (GLUCOTROL XL) 5 MG ER tablet; Take 1 tablet by mouth 2 (Two) Times a Day.  Dispense: 180 tablet; Refill: 1    2. Primary hypertension  -     hydroCHLOROthiazide (HYDRODIURIL) 25 MG tablet; Take 1 tablet by mouth Daily.   Dispense: 90 tablet; Refill: 1    3. Mixed hyperlipidemia  -     rosuvastatin (CRESTOR) 20 MG tablet; Take 1 tablet by mouth every night at bedtime.  Dispense: 90 tablet; Refill: 1    4. Acquired hypothyroidism  -     levothyroxine (SYNTHROID, LEVOTHROID) 88 MCG tablet; Take 1 tablet by mouth Daily.  Dispense: 90 tablet; Refill: 1      I reviewed her medical problems and her medications.  I also reviewed and discussed with the patient her recent blood work results.  Her diabetes is reasonably controlled, there is some increase of hemoglobin A1c since last check.  I will be increasing her glipizide from 5 mg once a day to 5 mg 2 times a day.  Compliance with medication and diet was also discussed and stressed.  Healthy lifestyle was reinforced.        Return in about 6 months (around 6/15/2023).    Requested Prescriptions     Signed Prescriptions Disp Refills   • glipizide (GLUCOTROL XL) 5 MG ER tablet 180 tablet 1     Sig: Take 1 tablet by mouth 2 (Two) Times a Day.   • levothyroxine (SYNTHROID, LEVOTHROID) 88 MCG tablet 90 tablet 1     Sig: Take 1 tablet by mouth Daily.   • hydroCHLOROthiazide (HYDRODIURIL) 25 MG tablet 90 tablet 1     Sig: Take 1 tablet by mouth Daily.   • rosuvastatin (CRESTOR) 20 MG tablet 90 tablet 1     Sig: Take 1 tablet by mouth every night at bedtime.

## 2022-12-16 DIAGNOSIS — F33.1 MODERATE EPISODE OF RECURRENT MAJOR DEPRESSIVE DISORDER: ICD-10-CM

## 2022-12-16 DIAGNOSIS — E11.8 TYPE 2 DIABETES MELLITUS WITH COMPLICATION, WITHOUT LONG-TERM CURRENT USE OF INSULIN: ICD-10-CM

## 2022-12-16 DIAGNOSIS — I10 PRIMARY HYPERTENSION: ICD-10-CM

## 2022-12-16 RX ORDER — HYDROCHLOROTHIAZIDE 25 MG/1
TABLET ORAL
Qty: 90 TABLET | Refills: 1 | OUTPATIENT
Start: 2022-12-16

## 2022-12-16 RX ORDER — GLIPIZIDE 5 MG/1
TABLET, FILM COATED, EXTENDED RELEASE ORAL
Qty: 90 TABLET | OUTPATIENT
Start: 2022-12-16

## 2022-12-16 RX ORDER — ESCITALOPRAM OXALATE 20 MG/1
TABLET ORAL
Qty: 90 TABLET | Refills: 0 | Status: SHIPPED | OUTPATIENT
Start: 2022-12-16 | End: 2023-03-14

## 2022-12-29 ENCOUNTER — HOSPITAL ENCOUNTER (OUTPATIENT)
Dept: BONE DENSITY | Facility: HOSPITAL | Age: 72
Discharge: HOME | End: 2022-12-29
Admitting: INTERNAL MEDICINE

## 2022-12-29 DIAGNOSIS — M81.0 AGE-RELATED OSTEOPOROSIS WITHOUT CURRENT PATHOLOGICAL FRACTURE: ICD-10-CM

## 2022-12-29 PROCEDURE — 77080 DXA BONE DENSITY AXIAL: CPT

## 2023-01-07 NOTE — PROGRESS NOTES
Subjective Discussed patient's recent left wrist injury    History of present illness:     Patient is a 72-year-old female with oted in late May 2018 a lump developing in the upper outer quadrant of the left breast without pain or nipple discharge.  This measured approximately 1 cm in size.  Mammogram indicated this asymmetric density in the same region and an ultrasound revealed a 1.8 cm irregular solid mass.  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  Additional history included no previous breast biopsies, a brief period of time with hormonal replacement and no history of breast or ovarian cancer with family.  She was seen by Dr. Zazueta on the 28th an MRI of both breasts was performed July 6.  Within the left anterior one third at the 12:30 position 3 cm from the nipple with irregular enhancing mass measuring 1.6 cm x 1.4 and 2.2 immediately lateral dimension.  There are other findings were seen in the left breast with no evidence of left axillary adenopathy and no findings suspicious for right breast.  The patient proceeded to a left breast lumpectomy July 18, 2018.      Pathologic findings were consistent with a 2.5 cm grade 3 invasive mammary ductal carcinoma with associated DCIS.  The closest margin was 1 mm.  [place] nodes were negative staging of pT2N0.  Dr. Zazueta saw the patient July 20 recognizing the closest margin was the anterior margin having taken this off the skin with no further removal possible.  There was concern about the need for additional tissue though the addended pathology revealed the DCIS to be intermediate grade.  It was determined not to take additional margins and have her seen by both medical oncology and radiation therapy.  She now presents with her  .      The patient's initial consultation was August 7 and potential adjuvant therapy discussed with the initial recommendation being an  Oncotype DX analysis to be process.  This is now reviewed with the patient and her  may return August 23, 2018.  Her recurrence score 10 with 10 year risk of distance recurrence at 7%.  She is clearly in the low risk category additional studies revealing ER score of 10.5, AK score of 8.6 which are both positive and HER-2 score of 8.7 which is negative.   Additional studies include  LH of 27.5, FSH of 57.6, estradiol of 9.9, CA 15-3 of 10.9, TSH of 4.64 hemoglobin A1c of 8.72.   The patient is advised of the findings per her risk of recurrence and she and her  are understandably elated.  Chemotherapy is not recommended in her circumstance but anti-hormonal therapy is and we have discussed potential treatment with AI therapy being the most appropriate choice in this postmenopausal patient.  She's not additionally had any recent assessment for bone density, however we discussed having this done in the near future.     As result the patient was scheduled for bone density and this was obtained September 6 revealing evidence of osteoporosis involving the lumbar with T score of -3.4 and right hip with T score of -2.7.  The patient has been using Arimidex which we'll discontinue and we discussed institution of tamoxifen at this point.  She'll also need assessment of her vitamin D level which we went on to measure documenting a level of 45.5.                                      The patient is now seen a month after switching to tamoxifen and is tolerating it well thus far without significant hot flashes.  We have also discussed the use of Reclast under the circumstances and she is agreeable to treatment when seen October 29, 2018.   She did have myalgias and arthralgias following Reclast for several days but these then resolved.  As she seen January 21, 2019 she is feeling well and we have discussed weight loss, exercise and also she and her 's recognition of slowly worsening bilateral hand tremor left  greater than right.  This been present much before her diagnosis of breast cancer and actually is an issue in several members of her family.   The patient is next seen August 1, 2019.  In the interval she been found to have elevated liver function tests and ultimately liver biopsy that was significant for mild steatohepatitis.  She has been adjusting her diet but indicates that she is also has significant fatigue and while these might be related she believes the fatigue is in part secondary to tamoxifen.  Follow-up testing included total cholesterol 113, triglycerides of 263 with HDL down to 24, VLDL at 52.6, ferritin of 298, normal iron profile, CMP with glucose 193, creatinine 1.36 and hemoglobin A1c of 7.10.  She notes that her fatigue is not improved off tamoxifen.  We have discussed her findings indicating better control needed for her diabetes and thyroid dysfunction and also went on to treat a urinary tract infection.  She did see primary care who adjusted medications for thyroid hypertension.  The patient when seen December 12, 2019 feels considerably better and is thrilled to see the difference in her functioning.  She is having no difficulty with tamoxifen at this point.  The patient was seen by the NP August 7, 2020 feeling well.  She did undergo subsequent mammogram and DEXA scan with the latter (performed October 14, 2020) demonstrating a lumbar T score -2.4 (increased), left hip femoral neck T score -2.5 (unchanged) and right femoral neck T score of -1.8 (increased).  Again this was substantial increase concerning osteoporosis and bone density particular in the left hip.  The patient is additional mammography October 14 showed no evidence recurrent malignancy.     She is next seen March 15, 2021 again noting that she had started AI therapy August 23, 2018 but when her bone density was consistent with osteoporosis we switched to tamoxifen beginning September 24, 2018.  Fortunately she continues to feel  well overall though she did have a fall recently possibly in part related to reaction after recent COVID-19 vaccination-not completed.       The patient is next seen 6/7/2021.  She is feeling well having started OTC iron therapy and vitamin supplements on her own.  Fortunately she is feeling well though has not been following her diet very consistently.  We made plans for the patient be seen back and she is next reviewed 11/11/21.Initially her repeat iron studies were not available but we have had them completed and we find it currently that she is iron deficient with a saturation of 12%, TIBC 561 and iron 67 and with a ferritin that is dropped from  298-62.8.  For follow-up CBC including H&H of 11.5 and 35.6, MCV of 80.7 MCH of 26.1.    The patient is next assessed 3/24/2022.  Her CBC has continued to improve as she is used low-dose iron.  She believes her general performance status is good but she is presently concerned about difficulty with ongoing sleep disturbance.    The patient is next assessed 1/9/2023.  Unfortunately she had fallen in late October striking her left wrist while in [place] and returned to [place] being seen in the emergency room 10/26/2022 with films showing a comminuted impacted fracture involving the distal left radial head as well as a fracture of the ulnar styloid.On 11/3/2022 she underwent open reduction internal fixation of a left three-part distal radius-Acumed standard plate.    The patient has gone on to recover, albeit slowly, and underwent a bone density 12/29/2022 showing osteoporosis lumbar spine with interval decrease in bone density including lumbar T score -3.0 with a 9% interval decrease, left hip density T score -1.3 and right hip density -2.5 again of 12.9% interval decrease.    She is seen back 1/9/2023 and we discussed institution of planned Prolia today.  She is otherwise tolerating tamoxifen well.  Additional testing now includes a normal CBC, no evidence of  ongoing iron deficiency.    Past Medical History:   Diagnosis Date   • Anxiety    • Anxiety and depression    • Asthma     SEASONAL ALLERGY RELATED   • Cancer of breast (HCC) 06/2018    LEFT   • Chronic kidney disease     Renal cyst, right kidney w/urology workup in past   • Depression    • Diabetes mellitus (HCC)     Type 2   • Disease of thyroid gland    • GERD (gastroesophageal reflux disease)    • History of prior pregnancies     x2   • History of transfusion     S/P HYST --AUTOLOGOUS    • Hx of radiation therapy    • Hyperlipidemia    • Hypertension    • IBS (irritable bowel syndrome)    • Seasonal allergies         Past Surgical History:   Procedure Laterality Date   • BREAST LUMPECTOMY WITH SENTINEL NODE BIOPSY Left 7/18/2018    Procedure: BREAST LUMPECTOMY WITH SENTINEL NODE BIOPSY;  Surgeon: João Zazueta MD;  Location: [facility] OR;  Service: General   • COLONOSCOPY      refusing; negative cologuard 8/24/2017   • HYSTERECTOMY  1989   • KNEE SURGERY Right 2014   • KNEE SURGERY Left 2016   • LIVER BIOPSY  2019    fatty liver   • ROTATOR CUFF REPAIR Left 2009   • ROTATOR CUFF REPAIR Right 2012   • TOE SURGERY  2009    ingrown         Current Outpatient Medications on File Prior to Visit   Medication Sig Dispense Refill   • albuterol (PROAIR RESPICLICK) 108 (90 Base) MCG/ACT inhaler Inhale 1 puff Every 4 (Four) Hours As Needed for Wheezing or Shortness of Air.     • B Complex Vitamins (VITAMIN B COMPLEX PO) Take 1 tablet by mouth Every Night.     • Blood Glucose Monitoring Suppl (Accu-Chek Guide) w/Device kit 1 kit Daily. Dg: E11.65 1 kit 0   • cetirizine (zyrTEC) 10 MG tablet Take 10 mg by mouth Daily.     • cholecalciferol (VITAMIN D3) 1000 units tablet Take 1,000 Units by mouth Every Night.     • escitalopram (LEXAPRO) 20 MG tablet TAKE ONE TABLET BY MOUTH DAILY 90 tablet 0   • FeroSul 325 (65 Fe) MG tablet TAKE ONE TABLET BY MOUTH DAILY WITH BREAKFAST 30 tablet 1   • fluticasone (FLONASE) 50  MCG/ACT nasal spray 2 sprays into each nostril Daily.     • glipizide (GLUCOTROL XL) 5 MG ER tablet Take 1 tablet by mouth 2 (Two) Times a Day. 180 tablet 1   • glucose blood (Accu-Chek Guide) test strip 1 each by Other route 3 (Three) Times a Day. Dg: E11.65 100 each 1   • hydroCHLOROthiazide (HYDRODIURIL) 25 MG tablet Take 1 tablet by mouth Daily. 90 tablet 1   • HYDROcodone-acetaminophen (NORCO) 5-325 MG per tablet Take 1 tablet by mouth Every 6 (Six) Hours As Needed for Moderate Pain. 16 tablet 0   • Insulin Degludec (Tresiba FlexTouch) 200 UNIT/ML solution pen-injector pen injection Inject 46 Units under the skin into the appropriate area as directed Daily. 9 pen 1   • [employer] Pen Needles 31G X 6 MM misc TEST ONCE A  each 1   • Lancets (accu-chek soft touch) lancets 1 each by Other route 3 (Three) Times a Day. Dg: E11.65 100 each 1   • levothyroxine (SYNTHROID, LEVOTHROID) 88 MCG tablet Take 1 tablet by mouth Daily. 90 tablet 1   • loperamide (IMODIUM A-D) 2 MG tablet Take 2 mg by mouth 4 (Four) Times a Day As Needed for Diarrhea.     • melatonin 5 MG tablet tablet Take 10 mg by mouth At Night As Needed.     • metFORMIN ER (GLUCOPHAGE-XR) 500 MG 24 hr tablet TAKE TWO TABLETS BY MOUTH TWICE A  tablet 0   • omeprazole (priLOSEC) 40 MG capsule TAKE ONE CAPSULE BY MOUTH DAILY 90 capsule 0   • propranolol LA (INDERAL LA) 60 MG 24 hr capsule TAKE ONE CAPSULE BY MOUTH DAILY 90 capsule 0   • rosuvastatin (CRESTOR) 20 MG tablet Take 1 tablet by mouth every night at bedtime. 90 tablet 1   • traZODone (DESYREL) 50 MG tablet TAKE TWO TABLETS BY MOUTH EVERY NIGHT AT BEDTIME 90 tablet 0   • [DISCONTINUED] tamoxifen (NOLVADEX) 20 MG chemo tablet TAKE ONE TABLET BY MOUTH DAILY 30 tablet 1   • Continuous Blood Gluc  (Dexcom G5 Mobile ) device 1 kit Daily. 1 each 0     No current facility-administered medications on file prior to visit.        ALLERGIES:    Allergies   Allergen Reactions   •  Amlodipine Swelling   • Reclast [Zoledronic Acid] GI Intolerance        Social History     Socioeconomic History   • Marital status:      Spouse name: Mahin   • Number of children: 2   • Years of education: College   Tobacco Use   • Smoking status: Never   • Smokeless tobacco: Never   Substance and Sexual Activity   • Alcohol use: Yes     Alcohol/week: 1.0 standard drink     Types: 1 Cans of beer per week     Comment: 2-3 a month, social only   • Drug use: No   • Sexual activity: Yes     Comment:         Family History   Problem Relation Age of Onset   • Diabetes Mother    • Heart attack Mother    • Heart failure Mother    • Hyperlipidemia Mother    • Hyperthyroidism Mother         Has surgery   • Hypothyroidism Mother         Later in life   • Heart disease Mother    • Hypertension Mother    • Asthma Father    • COPD Father    • Hypertension Father    • Heart attack Maternal Uncle    • Heart failure Maternal Uncle    • Depression Maternal Grandmother    • Heart attack Maternal Grandfather    • Heart failure Maternal Grandfather    • Alcohol abuse Paternal Grandmother    • Alcohol abuse Paternal Grandfather    • Scleroderma Sister    • No Known Problems Daughter    • No Known Problems Son    • Malig Hyperthermia Neg Hx         Review of Systems   Constitutional: Positive for activity change and fatigue. Negative for unexpected weight change.        Modest hot flashes   Respiratory: Negative for chest tightness, shortness of breath and wheezing.    Gastrointestinal: Negative for abdominal pain, constipation, diarrhea, nausea and vomiting.   Musculoskeletal:        See history of present illness   Skin:        Pruritus   Neurological: Positive for tremors and headaches. Negative for weakness.   Psychiatric/Behavioral: Positive for sleep disturbance.   All other systems reviewed and are negative.         Objective     Vitals:    01/09/23 1456   BP: 126/84   Pulse: 86   Resp: 18   Temp: 97.1 °F (36.2 °C)    TempSrc: Temporal   SpO2: 96%   Weight: 88 kg (193 lb 14.4 oz)   Height: 160 cm (62.99\")   PainSc: 0-No pain     Current Status 1/9/2023   ECOG score 0       Physical Exam   Constitutional: She is oriented to person, place, and time. She appears well-developed.   HENT:   Head: Normocephalic and atraumatic.   Nose: Nose normal.   Eyes: Pupils are equal, round, and reactive to light. Conjunctivae are normal.   Cardiovascular: Normal rate, regular rhythm and normal heart sounds.   Pulmonary/Chest: Effort normal and breath sounds normal.   Abdominal: Soft. Bowel sounds are normal.   Musculoskeletal: Normal range of motion. Tenderness (Left wrist, healing incision site, mild tenderness) present.   Neurological: She is alert and oriented to person, place, and time.   Resting tremor noted left greater than right upper extremities   Skin: Skin is warm and dry.   Psychiatric: Her behavior is normal. Judgment and thought content normal.     Breast exam: Patient status post left breast lumpectomy well healing without evidence of inflammation or discharge from wound, status post left sentinel node assessment also without inflammation or discharge    RECENT LABS:  Hematology WBC   Date Value Ref Range Status   01/09/2023 6.52 3.40 - 10.80 10*3/mm3 Final     RBC   Date Value Ref Range Status   01/09/2023 4.60 3.77 - 5.28 10*6/mm3 Final     Hemoglobin   Date Value Ref Range Status   01/09/2023 12.7 12.0 - 15.9 g/dL Final     Hematocrit   Date Value Ref Range Status   01/09/2023 40.2 34.0 - 46.6 % Final     Platelets   Date Value Ref Range Status   01/09/2023 276 140 - 450 10*3/mm3 Final      BONE MINERAL DENSITOMETRY    October 14, 2020     HISTORY:  70 years-old female. Menopause at age 63. Previous diagnosis  of osteoporosis and breast cancer.     COMPARISON:  09/06/2018.     TECHNIQUE: The T score compares the patient's bone mineral density with  the peak bone mass of young normal patients. Patients with T-scores  between 1.0  and 2.5 standard deviations below the mean are osteopenic.  Patients with T-scores greater than 2.5 standard deviation below the  mean are osteoporotic.     The Z score compares the patient's bone mineral density with sex and age  matched patients. Z score of -2.0 and lower is an indication of low  mineral density for the patient's age.     FINDINGS: Lumbar T score is  -2.4, representing a 17.7% interval  increase..     Left hip density is lowest at the  femoral neck where the T score is  -2.5, unchanged.      Right hip density is lowest at the  femoral neck where the T score is  -1.8, representing a 19.8% interval increase.      IMPRESSION:  Osteoporosis at the left hip. Interval increase in bone  Density.    DEXA Bone Density Axial (12/29/2022 10:30)    : Lumbar T score is  -3.0, representing a 9% interval decrease.     Left hip density is lowest at the femoral neck where the T score is  -1.3, unchanged.      Right hip density is lowest at the femoral neck where the T score is  -2.5, representing a 12.9% interval decrease.      IMPRESSION:  Osteoporosis at the lumbar spine and right hip. Interval  decrease in bone density      Assessment & Plan       72 year-old female with previous medical history of seasonal allergies, diabetes mellitus type 2, CKD3, hypothyroidism, hyperlipidemia, hypertension, IBS, history of anxiety and depression with recent development of left breast abnormality found by the patient herself.  This led to mammogram ultrasound and stereotactic biopsy confirming invasive ductal carcinoma grade 2 though ER, [place] positive HER-2 negative.  Subsequent assessments via surgical review your no additional abnormalities seen on breast MRI and the patient proceeded to lumpectomy July 18, 2018.  Her pathologic findings were consistent with a 2.5 cm grade 3 invasive mammary ductal carcinoma with associated DCIS.  The closest margin was 1 mm.  [place] nodes were negative with staging of pT2N0.  Dr. Zazueta  saw the patient July 20 recognizing the closest margin was the anterior margin having taken this off the skin with no further removal possible.  There was concern about the need for additional tissue though the addended pathology revealed the DCIS to be intermediate grade.  It was ultimately determined that further surgery was not necessary.   The patient presents for medical oncology assessment and we discussed potential adjuvant therapy but in particular it is felt the patient requires further genomic assessment with Oncotype DX analysis.  We reviewed this in detail and she understands the additional information that we could obtain from such an approach in making decisions about adjuvant therapy. We proceeded with additional assessment including Oncotype and laboratory studies that, fortunate, revealed that she has an excellent prognosis including a 7% risk of recurrence at 10 years with the use of tamoxifen.  Chemotherapy, therefore, is not appropriate and we have discussed an alternative therapy in this postmenopausal patient with AI therapy in particular her Arimidex over 5 years.  She is currently undergoing review by radiation therapy and this can proceed at any point as we also plan to initiate Arimidex with a trial of the medication given over the next month.  We went on to have the patient tested for bone density finding evidence, unfortunate, of osteoporosis.  She's been tolerating Arimidex well without discontinue this and institute Tamoxifen.  The patient went on to institute treatment and underwent testing for TSH, vitamin D level and serum chemistries.  These are found to be acceptable and she now next returns October 29 tolerating tamoxifen well. We went on to continue with Reclast at that visit.  The patient did have myalgias and arthralgias following the treatment for several days that resolved.     As she is seen January 21, 2019 she is tolerating tamoxifen overall well though we have discussed  that she should continue to exercise, manage her diet and try to achieve weight loss overall.  Additionally she has what appears to be essential tremor left greater than right.  Plans to see her primary care to review these issues but will start exercising immediately.  We'll plan to see her back here in approximately 6 months, plan repeat Reclast in late October and then yearly follow-ups as she completes 5 years of tamoxifen.    The patient is next seen August 1, 2019 with complaints of fatigue and recent diagnostics that are consistent with mild to moderate steatohepatitis on liver biopsy.  It is not felt likely tamoxifen is the major issue here but we do plan to hold it briefly to see if she does not improve as we investigate both her liver function tests and etiology of her mild anemia.  It is hoped this might improve her degree of fatigue.  We plan additional laboratory studies as above we have discussed results with she and her  in some detail when seen September 12, 2019.  She needs better control of her diabetes as well as thyroid dysfunction and is urged to return to primary care to be assessed.  A copy this note will be sent to Dr. Whitfield and she will be asked otherwise to restart tamoxifen assessment approximately 3 months.    The patient was found to have symptoms of UTI and ultimately was treated with ciprofloxacin.  Thereafter she has follow-up with primary care and more effectively treated her blood pressure and thyroid dysfunction.       The patient is followed at 6-month intervals  August 7, 2020 feeling well.  She did undergo subsequent mammogram and DEXA scan with the latter (performed October 14, 2020) demonstrating a lumbar T score -2.4 (increased), left hip femoral neck T score -2.5 (unchanged) and right femoral neck T score of -1.8 (increased).  Again this was substantial increase concerning osteoporosis and bone density particular in the left hip.  The patient is additional  mammography October 14 showed no evidence recurrent malignancy.     She is next seen March 15, 2021 again noting that she had started AI therapy August 23, 2018 but when her bone density was consistent with osteoporosis we switched to tamoxifen beginning September 24, 2018.  Fortunately the patient is doing fairly well when assessed March 15, 2021.  Plan to continue her current treatment plans and she agrees.  She does have a degree of mild worsening of her anemia and we have agreed to a follow-up 3-month assessment.     The patient assessed at 3 months later-6/7/2021-demonstrates normalization of hemoglobin hematocrit.  She will be asked to continue tamoxifen with a 6-month follow-up.     The patient is next seen 11/11/2021 demonstrating a mild drop in hemoglobin and modest iron deficiency.  She is contacted with these results we do plan additional iron supplementation.     The patient will continue tamoxifen now approximately 3-1/2 years since initiating endocrine therapy for her 2.5 cm grade 3 invasive mammary ductal carcinoma with associated DCIS.     We made plans for follow-up bone density, consideration of Prolia and reassessment of her deficiency.    The patient is next assessed 1/9/2023.  Unfortunately she had fallen in late October striking her left wrist while in [place] and returned to [place] being seen in the emergency room 10/26/2022 with films showing a comminuted impacted fracture involving the distal left radial head as well as a fracture of the ulnar styloid.On 11/3/2022 she underwent open reduction internal fixation of a left three-part distal radius-Acumed standard plate.    The patient has gone on to recover, albeit slowly, and underwent a bone density 12/29/2022 showing osteoporosis lumbar spine with interval decrease in bone density including lumbar T score -3.0 with a 9% interval decrease, left hip density T score -1.3 and right hip density -2.5 again of 12.9% interval  decrease.    She is seen back 1/9/2023 and we discussed institution of planned Prolia today.  She is otherwise tolerating tamoxifen well.  Additional testing now includes a normal CBC, no evidence of ongoing iron deficiency.      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      *Osteoporosis documented by bone density 12/29/2022.  Plans to proceed with Prolia 1/9/2023      *6-month follow-up MD, Prolia, consideration at that point of completion of endocrine therapy for her breast cancer.

## 2023-01-09 ENCOUNTER — INFUSION (OUTPATIENT)
Dept: ONCOLOGY | Facility: HOSPITAL | Age: 73
End: 2023-01-09
Payer: MEDICARE

## 2023-01-09 ENCOUNTER — LAB (OUTPATIENT)
Dept: LAB | Facility: HOSPITAL | Age: 73
End: 2023-01-09
Payer: MEDICARE

## 2023-01-09 ENCOUNTER — OFFICE VISIT (OUTPATIENT)
Dept: ONCOLOGY | Facility: CLINIC | Age: 73
End: 2023-01-09
Payer: MEDICARE

## 2023-01-09 VITALS
BODY MASS INDEX: 34.36 KG/M2 | HEART RATE: 86 BPM | TEMPERATURE: 97.1 F | WEIGHT: 193.9 LBS | OXYGEN SATURATION: 96 % | SYSTOLIC BLOOD PRESSURE: 126 MMHG | DIASTOLIC BLOOD PRESSURE: 84 MMHG | RESPIRATION RATE: 18 BRPM | HEIGHT: 63 IN

## 2023-01-09 DIAGNOSIS — Z17.0 MALIGNANT NEOPLASM OF UPPER-OUTER QUADRANT OF LEFT BREAST IN FEMALE, ESTROGEN RECEPTOR POSITIVE: Primary | ICD-10-CM

## 2023-01-09 DIAGNOSIS — Z17.0 MALIGNANT NEOPLASM OF UPPER-OUTER QUADRANT OF LEFT BREAST IN FEMALE, ESTROGEN RECEPTOR POSITIVE: ICD-10-CM

## 2023-01-09 DIAGNOSIS — C50.412 MALIGNANT NEOPLASM OF UPPER-OUTER QUADRANT OF LEFT BREAST IN FEMALE, ESTROGEN RECEPTOR POSITIVE: ICD-10-CM

## 2023-01-09 DIAGNOSIS — C50.912 INFILTRATING DUCTAL CARCINOMA OF LEFT BREAST: ICD-10-CM

## 2023-01-09 DIAGNOSIS — C50.412 MALIGNANT NEOPLASM OF UPPER-OUTER QUADRANT OF LEFT BREAST IN FEMALE, ESTROGEN RECEPTOR POSITIVE: Primary | ICD-10-CM

## 2023-01-09 DIAGNOSIS — M81.0 AGE-RELATED OSTEOPOROSIS WITHOUT CURRENT PATHOLOGICAL FRACTURE: Primary | ICD-10-CM

## 2023-01-09 LAB
ALBUMIN SERPL-MCNC: 4.4 G/DL (ref 3.5–5.2)
ALBUMIN/GLOB SERPL: 1.4 G/DL (ref 1.1–2.4)
ALP SERPL-CCNC: 65 U/L (ref 38–116)
ALT SERPL W P-5'-P-CCNC: 26 U/L (ref 0–33)
ANION GAP SERPL CALCULATED.3IONS-SCNC: 12.6 MMOL/L (ref 5–15)
AST SERPL-CCNC: 36 U/L (ref 0–32)
BASOPHILS # BLD AUTO: 0.03 10*3/MM3 (ref 0–0.2)
BASOPHILS NFR BLD AUTO: 0.5 % (ref 0–1.5)
BILIRUB SERPL-MCNC: 0.5 MG/DL (ref 0.2–1.2)
BUN SERPL-MCNC: 22 MG/DL (ref 6–20)
BUN/CREAT SERPL: 17.3 (ref 7.3–30)
CALCIUM SPEC-SCNC: 9.8 MG/DL (ref 8.5–10.2)
CHLORIDE SERPL-SCNC: 101 MMOL/L (ref 98–107)
CO2 SERPL-SCNC: 24.4 MMOL/L (ref 22–29)
CREAT SERPL-MCNC: 1.27 MG/DL (ref 0.6–1.1)
DEPRECATED RDW RBC AUTO: 44.3 FL (ref 37–54)
EGFRCR SERPLBLD CKD-EPI 2021: 45.3 ML/MIN/1.73
EOSINOPHIL # BLD AUTO: 0.18 10*3/MM3 (ref 0–0.4)
EOSINOPHIL NFR BLD AUTO: 2.8 % (ref 0.3–6.2)
ERYTHROCYTE [DISTWIDTH] IN BLOOD BY AUTOMATED COUNT: 13.8 % (ref 12.3–15.4)
GLOBULIN UR ELPH-MCNC: 3.1 GM/DL (ref 1.8–3.5)
GLUCOSE SERPL-MCNC: 238 MG/DL (ref 74–124)
HCT VFR BLD AUTO: 40.2 % (ref 34–46.6)
HGB BLD-MCNC: 12.7 G/DL (ref 12–15.9)
IMM GRANULOCYTES # BLD AUTO: 0.02 10*3/MM3 (ref 0–0.05)
IMM GRANULOCYTES NFR BLD AUTO: 0.3 % (ref 0–0.5)
LYMPHOCYTES # BLD AUTO: 1.95 10*3/MM3 (ref 0.7–3.1)
LYMPHOCYTES NFR BLD AUTO: 29.9 % (ref 19.6–45.3)
MAGNESIUM SERPL-MCNC: 1.8 MG/DL (ref 1.8–2.5)
MCH RBC QN AUTO: 27.6 PG (ref 26.6–33)
MCHC RBC AUTO-ENTMCNC: 31.6 G/DL (ref 31.5–35.7)
MCV RBC AUTO: 87.4 FL (ref 79–97)
MONOCYTES # BLD AUTO: 0.34 10*3/MM3 (ref 0.1–0.9)
MONOCYTES NFR BLD AUTO: 5.2 % (ref 5–12)
NEUTROPHILS NFR BLD AUTO: 4 10*3/MM3 (ref 1.7–7)
NEUTROPHILS NFR BLD AUTO: 61.3 % (ref 42.7–76)
NRBC BLD AUTO-RTO: 0 /100 WBC (ref 0–0.2)
PHOSPHATE SERPL-MCNC: 4.2 MG/DL (ref 2.5–4.5)
PLATELET # BLD AUTO: 276 10*3/MM3 (ref 140–450)
PMV BLD AUTO: 9.9 FL (ref 6–12)
POTASSIUM SERPL-SCNC: 4.7 MMOL/L (ref 3.5–4.7)
PROT SERPL-MCNC: 7.5 G/DL (ref 6.3–8)
RBC # BLD AUTO: 4.6 10*6/MM3 (ref 3.77–5.28)
SODIUM SERPL-SCNC: 138 MMOL/L (ref 134–145)
WBC NRBC COR # BLD: 6.52 10*3/MM3 (ref 3.4–10.8)

## 2023-01-09 PROCEDURE — 85025 COMPLETE CBC W/AUTO DIFF WBC: CPT

## 2023-01-09 PROCEDURE — 96372 THER/PROPH/DIAG INJ SC/IM: CPT

## 2023-01-09 PROCEDURE — 25010000002 DENOSUMAB 60 MG/ML SOLUTION PREFILLED SYRINGE: Performed by: INTERNAL MEDICINE

## 2023-01-09 PROCEDURE — 99214 OFFICE O/P EST MOD 30 MIN: CPT | Performed by: INTERNAL MEDICINE

## 2023-01-09 PROCEDURE — 83735 ASSAY OF MAGNESIUM: CPT

## 2023-01-09 PROCEDURE — 84100 ASSAY OF PHOSPHORUS: CPT

## 2023-01-09 PROCEDURE — 80053 COMPREHEN METABOLIC PANEL: CPT

## 2023-01-09 PROCEDURE — 36415 COLL VENOUS BLD VENIPUNCTURE: CPT

## 2023-01-09 RX ORDER — TAMOXIFEN CITRATE 20 MG/1
20 TABLET ORAL DAILY
Qty: 90 TABLET | Refills: 1 | Status: SHIPPED | OUTPATIENT
Start: 2023-01-09

## 2023-01-09 RX ADMIN — DENOSUMAB 60 MG: 60 INJECTION SUBCUTANEOUS at 15:49

## 2023-01-09 NOTE — LETTER
January 9, 2023     Luna Whitfield MD  [address]  [address]  [place] [id number]    Patient: Mariana Ansari   YOB: 1950   Date of Visit: 1/9/2023       Dear Dr. Roseann MD:    Thank you for referring Mariana Ansari to me for evaluation. Below are the relevant portions of my assessment and plan of care.    If you have questions, please do not hesitate to call me. I look forward to following Mariana along with you.         Sincerely,        Michel Guajardo MD        CC: No Recipients  Michel Guajardo MD  01/09/23 2058  Signed    Subjective  Discussed patient's recent left wrist injury    History of present illness:     Patient is a 72-year-old female with oted in late May 2018 a lump developing in the upper outer quadrant of the left breast without pain or nipple discharge.  This measured approximately 1 cm in size.  Mammogram indicated this asymmetric density in the same region and an ultrasound revealed a 1.8 cm irregular solid mass.  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  Additional history included no previous breast biopsies, a brief period of time with hormonal replacement and no history of breast or ovarian cancer with family.  She was seen by Dr. Zazueta on the 28th an MRI of both breasts was performed July 6.  Within the left anterior one third at the 12:30 position 3 cm from the nipple with irregular enhancing mass measuring 1.6 cm x 1.4 and 2.2 immediately lateral dimension.  There are other findings were seen in the left breast with no evidence of left axillary adenopathy and no findings suspicious for right breast.  The patient proceeded to a left breast lumpectomy July 18, 2018.      Pathologic findings were consistent with a 2.5 cm grade 3 invasive mammary ductal carcinoma with associated DCIS.  The closest margin was 1 mm.  [place] nodes were negative staging of pT2N0.    Marbin saw the patient July 20 recognizing the closest margin was the anterior margin having taken this off the skin with no further removal possible.  There was concern about the need for additional tissue though the addended pathology revealed the DCIS to be intermediate grade.  It was determined not to take additional margins and have her seen by both medical oncology and radiation therapy.  She now presents with her  .      The patient's initial consultation was August 7 and potential adjuvant therapy discussed with the initial recommendation being an Oncotype DX analysis to be process.  This is now reviewed with the patient and her  may return August 23, 2018.  Her recurrence score 10 with 10 year risk of distance recurrence at 7%.  She is clearly in the low risk category additional studies revealing ER score of 10.5, PA score of 8.6 which are both positive and HER-2 score of 8.7 which is negative.   Additional studies include  LH of 27.5, FSH of 57.6, estradiol of 9.9, CA 15-3 of 10.9, TSH of 4.64 hemoglobin A1c of 8.72.   The patient is advised of the findings per her risk of recurrence and she and her  are understandably elated.  Chemotherapy is not recommended in her circumstance but anti-hormonal therapy is and we have discussed potential treatment with AI therapy being the most appropriate choice in this postmenopausal patient.  She's not additionally had any recent assessment for bone density, however we discussed having this done in the near future.     As result the patient was scheduled for bone density and this was obtained September 6 revealing evidence of osteoporosis involving the lumbar with T score of -3.4 and right hip with T score of -2.7.  The patient has been using Arimidex which we'll discontinue and we discussed institution of tamoxifen at this point.  She'll also need assessment of her vitamin D level which we went on to measure documenting a level of 45.5.                                       The patient is now seen a month after switching to tamoxifen and is tolerating it well thus far without significant hot flashes.  We have also discussed the use of Reclast under the circumstances and she is agreeable to treatment when seen October 29, 2018.   She did have myalgias and arthralgias following Reclast for several days but these then resolved.  As she seen January 21, 2019 she is feeling well and we have discussed weight loss, exercise and also she and her 's recognition of slowly worsening bilateral hand tremor left greater than right.  This been present much before her diagnosis of breast cancer and actually is an issue in several members of her family.   The patient is next seen August 1, 2019.  In the interval she been found to have elevated liver function tests and ultimately liver biopsy that was significant for mild steatohepatitis.  She has been adjusting her diet but indicates that she is also has significant fatigue and while these might be related she believes the fatigue is in part secondary to tamoxifen.  Follow-up testing included total cholesterol 113, triglycerides of 263 with HDL down to 24, VLDL at 52.6, ferritin of 298, normal iron profile, CMP with glucose 193, creatinine 1.36 and hemoglobin A1c of 7.10.  She notes that her fatigue is not improved off tamoxifen.  We have discussed her findings indicating better control needed for her diabetes and thyroid dysfunction and also went on to treat a urinary tract infection.  She did see primary care who adjusted medications for thyroid hypertension.  The patient when seen December 12, 2019 feels considerably better and is thrilled to see the difference in her functioning.  She is having no difficulty with tamoxifen at this point.  The patient was seen by the NP August 7, 2020 feeling well.  She did undergo subsequent mammogram and DEXA scan with the latter (performed October 14, 2020) demonstrating a  lumbar T score -2.4 (increased), left hip femoral neck T score -2.5 (unchanged) and right femoral neck T score of -1.8 (increased).  Again this was substantial increase concerning osteoporosis and bone density particular in the left hip.  The patient is additional mammography October 14 showed no evidence recurrent malignancy.     She is next seen March 15, 2021 again noting that she had started AI therapy August 23, 2018 but when her bone density was consistent with osteoporosis we switched to tamoxifen beginning September 24, 2018.  Fortunately she continues to feel well overall though she did have a fall recently possibly in part related to reaction after recent COVID-19 vaccination-not completed.       The patient is next seen 6/7/2021.  She is feeling well having started OTC iron therapy and vitamin supplements on her own.  Fortunately she is feeling well though has not been following her diet very consistently.  We made plans for the patient be seen back and she is next reviewed 11/11/21.Initially her repeat iron studies were not available but we have had them completed and we find it currently that she is iron deficient with a saturation of 12%, TIBC 561 and iron 67 and with a ferritin that is dropped from  298-62.8.  For follow-up CBC including H&H of 11.5 and 35.6, MCV of 80.7 MCH of 26.1.    The patient is next assessed 3/24/2022.  Her CBC has continued to improve as she is used low-dose iron.  She believes her general performance status is good but she is presently concerned about difficulty with ongoing sleep disturbance.    The patient is next assessed 1/9/2023.  Unfortunately she had fallen in late October striking her left wrist while in [place] and returned to [facility] being seen in the emergency room 10/26/2022 with films showing a comminuted impacted fracture involving the distal left radial head as well as a fracture of the ulnar styloid.On 11/3/2022 she underwent open reduction internal  fixation of a left three-part distal radius-Acumed standard plate.    The patient has gone on to recover, albeit slowly, and underwent a bone density 12/29/2022 showing osteoporosis lumbar spine with interval decrease in bone density including lumbar T score -3.0 with a 9% interval decrease, left hip density T score -1.3 and right hip density -2.5 again of 12.9% interval decrease.    She is seen back 1/9/2023 and we discussed institution of planned Prolia today.  She is otherwise tolerating tamoxifen well.  Additional testing now includes a normal CBC, no evidence of ongoing iron deficiency.    Past Medical History:   Diagnosis Date   • Anxiety    • Anxiety and depression    • Asthma     SEASONAL ALLERGY RELATED   • Cancer of breast (HCC) 06/2018    LEFT   • Chronic kidney disease     Renal cyst, right kidney w/urology workup in past   • Depression    • Diabetes mellitus (HCC)     Type 2   • Disease of thyroid gland    • GERD (gastroesophageal reflux disease)    • History of prior pregnancies     x2   • History of transfusion     S/P HYST --AUTOLOGOUS    • Hx of radiation therapy    • Hyperlipidemia    • Hypertension    • IBS (irritable bowel syndrome)    • Seasonal allergies         Past Surgical History:   Procedure Laterality Date   • BREAST LUMPECTOMY WITH SENTINEL NODE BIOPSY Left 7/18/2018    Procedure: BREAST LUMPECTOMY WITH SENTINEL NODE BIOPSY;  Surgeon: João Zazueta MD;  Location: [facility];  Service: General   • COLONOSCOPY      refusing; negative cologuard 8/24/2017   • HYSTERECTOMY  1989   • KNEE SURGERY Right 2014   • KNEE SURGERY Left 2016   • LIVER BIOPSY  2019    fatty liver   • ROTATOR CUFF REPAIR Left 2009   • ROTATOR CUFF REPAIR Right 2012   • TOE SURGERY  2009    ingrown         Current Outpatient Medications on File Prior to Visit   Medication Sig Dispense Refill   • albuterol (PROAIR RESPICLICK) 108 (90 Base) MCG/ACT inhaler Inhale 1 puff Every 4 (Four) Hours As Needed for  Wheezing or Shortness of Air.     • B Complex Vitamins (VITAMIN B COMPLEX PO) Take 1 tablet by mouth Every Night.     • Blood Glucose Monitoring Suppl (Accu-Chek Guide) w/Device kit 1 kit Daily. Dg: E11.65 1 kit 0   • cetirizine (zyrTEC) 10 MG tablet Take 10 mg by mouth Daily.     • cholecalciferol (VITAMIN D3) 1000 units tablet Take 1,000 Units by mouth Every Night.     • escitalopram (LEXAPRO) 20 MG tablet TAKE ONE TABLET BY MOUTH DAILY 90 tablet 0   • FeroSul 325 (65 Fe) MG tablet TAKE ONE TABLET BY MOUTH DAILY WITH BREAKFAST 30 tablet 1   • fluticasone (FLONASE) 50 MCG/ACT nasal spray 2 sprays into each nostril Daily.     • glipizide (GLUCOTROL XL) 5 MG ER tablet Take 1 tablet by mouth 2 (Two) Times a Day. 180 tablet 1   • glucose blood (Accu-Chek Guide) test strip 1 each by Other route 3 (Three) Times a Day. Dg: E11.65 100 each 1   • hydroCHLOROthiazide (HYDRODIURIL) 25 MG tablet Take 1 tablet by mouth Daily. 90 tablet 1   • HYDROcodone-acetaminophen (NORCO) 5-325 MG per tablet Take 1 tablet by mouth Every 6 (Six) Hours As Needed for Moderate Pain. 16 tablet 0   • Insulin Degludec (Tresiba FlexTouch) 200 UNIT/ML solution pen-injector pen injection Inject 46 Units under the skin into the appropriate area as directed Daily. 9 pen 1   • [employer] Pen Needles 31G X 6 MM misc TEST ONCE A  each 1   • Lancets (accu-chek soft touch) lancets 1 each by Other route 3 (Three) Times a Day. Dg: E11.65 100 each 1   • levothyroxine (SYNTHROID, LEVOTHROID) 88 MCG tablet Take 1 tablet by mouth Daily. 90 tablet 1   • loperamide (IMODIUM A-D) 2 MG tablet Take 2 mg by mouth 4 (Four) Times a Day As Needed for Diarrhea.     • melatonin 5 MG tablet tablet Take 10 mg by mouth At Night As Needed.     • metFORMIN ER (GLUCOPHAGE-XR) 500 MG 24 hr tablet TAKE TWO TABLETS BY MOUTH TWICE A  tablet 0   • omeprazole (priLOSEC) 40 MG capsule TAKE ONE CAPSULE BY MOUTH DAILY 90 capsule 0   • propranolol LA (INDERAL LA) 60 MG 24 hr  capsule TAKE ONE CAPSULE BY MOUTH DAILY 90 capsule 0   • rosuvastatin (CRESTOR) 20 MG tablet Take 1 tablet by mouth every night at bedtime. 90 tablet 1   • traZODone (DESYREL) 50 MG tablet TAKE TWO TABLETS BY MOUTH EVERY NIGHT AT BEDTIME 90 tablet 0   • [DISCONTINUED] tamoxifen (NOLVADEX) 20 MG chemo tablet TAKE ONE TABLET BY MOUTH DAILY 30 tablet 1   • Continuous Blood Gluc  (Dexcom G5 Mobile ) device 1 kit Daily. 1 each 0     No current facility-administered medications on file prior to visit.        ALLERGIES:    Allergies   Allergen Reactions   • Amlodipine Swelling   • Reclast [Zoledronic Acid] GI Intolerance        Social History     Socioeconomic History   • Marital status:      Spouse name: Mahin   • Number of children: 2   • Years of education: College   Tobacco Use   • Smoking status: Never   • Smokeless tobacco: Never   Substance and Sexual Activity   • Alcohol use: Yes     Alcohol/week: 1.0 standard drink     Types: 1 Cans of beer per week     Comment: 2-3 a month, social only   • Drug use: No   • Sexual activity: Yes     Comment:         Family History   Problem Relation Age of Onset   • Diabetes Mother    • Heart attack Mother    • Heart failure Mother    • Hyperlipidemia Mother    • Hyperthyroidism Mother         Has surgery   • Hypothyroidism Mother         Later in life   • Heart disease Mother    • Hypertension Mother    • Asthma Father    • COPD Father    • Hypertension Father    • Heart attack Maternal Uncle    • Heart failure Maternal Uncle    • Depression Maternal Grandmother    • Heart attack Maternal Grandfather    • Heart failure Maternal Grandfather    • Alcohol abuse Paternal Grandmother    • Alcohol abuse Paternal Grandfather    • Scleroderma Sister    • No Known Problems Daughter    • No Known Problems Son    • Malig Hyperthermia Neg Hx         Review of Systems   Constitutional: Positive for activity change and fatigue. Negative for unexpected weight  change.        Modest hot flashes   Respiratory: Negative for chest tightness, shortness of breath and wheezing.    Gastrointestinal: Negative for abdominal pain, constipation, diarrhea, nausea and vomiting.   Musculoskeletal:        See history of present illness   Skin:        Pruritus   Neurological: Positive for tremors and headaches. Negative for weakness.   Psychiatric/Behavioral: Positive for sleep disturbance.   All other systems reviewed and are negative.         Objective      Vitals:    01/09/23 1456   BP: 126/84   Pulse: 86   Resp: 18   Temp: 97.1 °F (36.2 °C)   TempSrc: Temporal   SpO2: 96%   Weight: 88 kg (193 lb 14.4 oz)   Height: 160 cm (62.99\")   PainSc: 0-No pain     Current Status 1/9/2023   ECOG score 0       Physical Exam   Constitutional: She is oriented to person, place, and time. She appears well-developed.   HENT:   Head: Normocephalic and atraumatic.   Nose: Nose normal.   Eyes: Pupils are equal, round, and reactive to light. Conjunctivae are normal.   Cardiovascular: Normal rate, regular rhythm and normal heart sounds.   Pulmonary/Chest: Effort normal and breath sounds normal.   Abdominal: Soft. Bowel sounds are normal.   Musculoskeletal: Normal range of motion. Tenderness (Left wrist, healing incision site, mild tenderness) present.   Neurological: She is alert and oriented to person, place, and time.   Resting tremor noted left greater than right upper extremities   Skin: Skin is warm and dry.   Psychiatric: Her behavior is normal. Judgment and thought content normal.     Breast exam: Patient status post left breast lumpectomy well healing without evidence of inflammation or discharge from wound, status post left sentinel node assessment also without inflammation or discharge    RECENT LABS:  Hematology WBC   Date Value Ref Range Status   01/09/2023 6.52 3.40 - 10.80 10*3/mm3 Final     RBC   Date Value Ref Range Status   01/09/2023 4.60 3.77 - 5.28 10*6/mm3 Final     Hemoglobin   Date  Value Ref Range Status   01/09/2023 12.7 12.0 - 15.9 g/dL Final     Hematocrit   Date Value Ref Range Status   01/09/2023 40.2 34.0 - 46.6 % Final     Platelets   Date Value Ref Range Status   01/09/2023 276 140 - 450 10*3/mm3 Final      BONE MINERAL DENSITOMETRY    October 14, 2020     HISTORY:  70 years-old female. Menopause at age 63. Previous diagnosis  of osteoporosis and breast cancer.     COMPARISON:  09/06/2018.     TECHNIQUE: The T score compares the patient's bone mineral density with  the peak bone mass of young normal patients. Patients with T-scores  between 1.0 and 2.5 standard deviations below the mean are osteopenic.  Patients with T-scores greater than 2.5 standard deviation below the  mean are osteoporotic.     The Z score compares the patient's bone mineral density with sex and age  matched patients. Z score of -2.0 and lower is an indication of low  mineral density for the patient's age.     FINDINGS: Lumbar T score is  -2.4, representing a 17.7% interval  increase..     Left hip density is lowest at the  femoral neck where the T score is  -2.5, unchanged.      Right hip density is lowest at the  femoral neck where the T score is  -1.8, representing a 19.8% interval increase.      IMPRESSION:  Osteoporosis at the left hip. Interval increase in bone  Density.    DEXA Bone Density Axial (12/29/2022 10:30)    : Lumbar T score is  -3.0, representing a 9% interval decrease.     Left hip density is lowest at the femoral neck where the T score is  -1.3, unchanged.      Right hip density is lowest at the femoral neck where the T score is  -2.5, representing a 12.9% interval decrease.      IMPRESSION:  Osteoporosis at the lumbar spine and right hip. Interval  decrease in bone density      Assessment & Plan       72 year-old female with previous medical history of seasonal allergies, diabetes mellitus type 2, CKD3, hypothyroidism, hyperlipidemia, hypertension, IBS, history of anxiety and depression with  recent development of left breast abnormality found by the patient herself.  This led to mammogram ultrasound and stereotactic biopsy confirming invasive ductal carcinoma grade 2 though ER, MS positive HER-2 negative.  Subsequent assessments via surgical review your no additional abnormalities seen on breast MRI and the patient proceeded to lumpectomy July 18, 2018.  Her pathologic findings were consistent with a 2.5 cm grade 3 invasive mammary ductal carcinoma with associated DCIS.  The closest margin was 1 mm.  [place] nodes were negative with staging of pT2N0.  Dr. Zazueta saw the patient July 20 recognizing the closest margin was the anterior margin having taken this off the skin with no further removal possible.  There was concern about the need for additional tissue though the addended pathology revealed the DCIS to be intermediate grade.  It was ultimately determined that further surgery was not necessary.   The patient presents for medical oncology assessment and we discussed potential adjuvant therapy but in particular it is felt the patient requires further genomic assessment with Oncotype DX analysis.  We reviewed this in detail and she understands the additional information that we could obtain from such an approach in making decisions about adjuvant therapy. We proceeded with additional assessment including Oncotype and laboratory studies that, fortunate, revealed that she has an excellent prognosis including a 7% risk of recurrence at 10 years with the use of tamoxifen.  Chemotherapy, therefore, is not appropriate and we have discussed an alternative therapy in this postmenopausal patient with AI therapy in particular her Arimidex over 5 years.  She is currently undergoing review by radiation therapy and this can proceed at any point as we also plan to initiate Arimidex with a trial of the medication given over the next month.  We went on to have the patient tested for bone density finding evidence,  unfortunate, of osteoporosis.  She's been tolerating Arimidex well without discontinue this and institute Tamoxifen.  The patient went on to institute treatment and underwent testing for TSH, vitamin D level and serum chemistries.  These are found to be acceptable and she now next returns October 29 tolerating tamoxifen well. We went on to continue with Reclast at that visit.  The patient did have myalgias and arthralgias following the treatment for several days that resolved.     As she is seen January 21, 2019 she is tolerating tamoxifen overall well though we have discussed that she should continue to exercise, manage her diet and try to achieve weight loss overall.  Additionally she has what appears to be essential tremor left greater than right.  Plans to see her primary care to review these issues but will start exercising immediately.  We'll plan to see her back here in approximately 6 months, plan repeat Reclast in late October and then yearly follow-ups as she completes 5 years of tamoxifen.    The patient is next seen August 1, 2019 with complaints of fatigue and recent diagnostics that are consistent with mild to moderate steatohepatitis on liver biopsy.  It is not felt likely tamoxifen is the major issue here but we do plan to hold it briefly to see if she does not improve as we investigate both her liver function tests and etiology of her mild anemia.  It is hoped this might improve her degree of fatigue.  We plan additional laboratory studies as above we have discussed results with she and her  in some detail when seen September 12, 2019.  She needs better control of her diabetes as well as thyroid dysfunction and is urged to return to primary care to be assessed.  A copy this note will be sent to Dr. Whitfield and she will be asked otherwise to restart tamoxifen assessment approximately 3 months.    The patient was found to have symptoms of UTI and ultimately was treated with  ciprofloxacin.  Thereafter she has follow-up with primary care and more effectively treated her blood pressure and thyroid dysfunction.       The patient is followed at 6-month intervals  August 7, 2020 feeling well.  She did undergo subsequent mammogram and DEXA scan with the latter (performed October 14, 2020) demonstrating a lumbar T score -2.4 (increased), left hip femoral neck T score -2.5 (unchanged) and right femoral neck T score of -1.8 (increased).  Again this was substantial increase concerning osteoporosis and bone density particular in the left hip.  The patient is additional mammography October 14 showed no evidence recurrent malignancy.     She is next seen March 15, 2021 again noting that she had started AI therapy August 23, 2018 but when her bone density was consistent with osteoporosis we switched to tamoxifen beginning September 24, 2018.  Fortunately the patient is doing fairly well when assessed March 15, 2021.  Plan to continue her current treatment plans and she agrees.  She does have a degree of mild worsening of her anemia and we have agreed to a follow-up 3-month assessment.     The patient assessed at 3 months later-6/7/2021-demonstrates normalization of hemoglobin hematocrit.  She will be asked to continue tamoxifen with a 6-month follow-up.     The patient is next seen 11/11/2021 demonstrating a mild drop in hemoglobin and modest iron deficiency.  She is contacted with these results we do plan additional iron supplementation.     The patient will continue tamoxifen now approximately 3-1/2 years since initiating endocrine therapy for her 2.5 cm grade 3 invasive mammary ductal carcinoma with associated DCIS.     We made plans for follow-up bone density, consideration of Prolia and reassessment of her deficiency.    The patient is next assessed 1/9/2023.  Unfortunately she had fallen in late October striking her left wrist while in [place] and returned to [facility] being seen in the  emergency room 10/26/2022 with films showing a comminuted impacted fracture involving the distal left radial head as well as a fracture of the ulnar styloid.On 11/3/2022 she underwent open reduction internal fixation of a left three-part distal radius-Acumed standard plate.    The patient has gone on to recover, albeit slowly, and underwent a bone density 12/29/2022 showing osteoporosis lumbar spine with interval decrease in bone density including lumbar T score -3.0 with a 9% interval decrease, left hip density T score -1.3 and right hip density -2.5 again of 12.9% interval decrease.    She is seen back 1/9/2023 and we discussed institution of planned Prolia today.  She is otherwise tolerating tamoxifen well.  Additional testing now includes a normal CBC, no evidence of ongoing iron deficiency.      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      *Osteoporosis documented by bone density 12/29/2022.  Plans to proceed with Prolia 1/9/2023      *6-month follow-up MD, David, consideration at that point of completion of endocrine therapy for her breast cancer.

## 2023-01-09 NOTE — TELEPHONE ENCOUNTER
Pt requested a 90 day supply of Tamoxifen as she is traveling to [place] for an extended period of time. Refill sent in to [facility].

## 2023-01-15 DIAGNOSIS — E11.8 TYPE 2 DIABETES MELLITUS WITH COMPLICATION, WITHOUT LONG-TERM CURRENT USE OF INSULIN: ICD-10-CM

## 2023-01-15 RX ORDER — INSULIN DEGLUDEC 200 U/ML
INJECTION, SOLUTION SUBCUTANEOUS
Qty: 9 ML | Refills: 1 | Status: SHIPPED | OUTPATIENT
Start: 2023-01-15 | End: 2023-01-23 | Stop reason: SDUPTHER

## 2023-01-19 RX ORDER — FERROUS SULFATE 325(65) MG
TABLET ORAL
Qty: 30 TABLET | Refills: 1 | Status: SHIPPED | OUTPATIENT
Start: 2023-01-19

## 2023-01-23 ENCOUNTER — TELEPHONE (OUTPATIENT)
Dept: FAMILY MEDICINE CLINIC | Facility: CLINIC | Age: 73
End: 2023-01-23

## 2023-01-23 DIAGNOSIS — E11.8 TYPE 2 DIABETES MELLITUS WITH COMPLICATION, WITHOUT LONG-TERM CURRENT USE OF INSULIN: ICD-10-CM

## 2023-01-23 RX ORDER — INSULIN DEGLUDEC 200 U/ML
46 INJECTION, SOLUTION SUBCUTANEOUS DAILY
Qty: 9 ML | Refills: 1 | Status: SHIPPED | OUTPATIENT
Start: 2023-01-23 | End: 2023-01-27 | Stop reason: SDUPTHER

## 2023-01-23 NOTE — TELEPHONE ENCOUNTER
Caller: Mariana Ansari    Relationship: Self    Best call back number:     What is the best time to reach you:     Who are you requesting to speak with (clinical staff, provider,  specific staff member): JASWINDER    Do you know the name of the person who called:     What was the call regarding: PATIENT SAYS THAT THIS CANT BE PRESCRIBED FOR A 90 DAY SUPPLY IT NEEDS TO BE CHANGED TO A 30 DAY SUPPLY.  SHE SAYS THAT THEY ARE CHARGING HER DOUBLE.     Do you require a callback: YES

## 2023-01-23 NOTE — TELEPHONE ENCOUNTER
Caller: Mariana Ansari    Relationship: Self    Best call back number: [phone]    What was the call regarding: PATIENT WANTED TO LET JASWINDER KNOW SHE ALREADY HAS January'S SUPPLY OF MEDICATION, SHE DOES NOT NEED ANYMORE TRESIBA UNTIL FEB. 1.     Do you require a callback: IF ANY OTHER QUESTIONS

## 2023-01-27 ENCOUNTER — TELEPHONE (OUTPATIENT)
Dept: FAMILY MEDICINE CLINIC | Facility: CLINIC | Age: 73
End: 2023-01-27

## 2023-01-27 DIAGNOSIS — E11.8 TYPE 2 DIABETES MELLITUS WITH COMPLICATION, WITHOUT LONG-TERM CURRENT USE OF INSULIN: ICD-10-CM

## 2023-01-27 RX ORDER — INSULIN DEGLUDEC 200 U/ML
46 INJECTION, SOLUTION SUBCUTANEOUS DAILY
Qty: 9 ML | Refills: 1 | Status: SHIPPED | OUTPATIENT
Start: 2023-01-27 | End: 2023-01-30 | Stop reason: SDUPTHER

## 2023-01-27 RX ORDER — INSULIN DEGLUDEC 200 U/ML
46 INJECTION, SOLUTION SUBCUTANEOUS DAILY
Qty: 9 ML | Refills: 1 | Status: CANCELLED | OUTPATIENT
Start: 2023-01-27

## 2023-01-27 NOTE — TELEPHONE ENCOUNTER
Caller: Elan Mariana Z    Relationship: Self    Best call back number: [phone]    Requested Prescriptions:   Requested Prescriptions     Pending Prescriptions Disp Refills   • Insulin Degludec (Tresiba FlexTouch) 200 UNIT/ML solution pen-injector pen injection 9 mL 1     Sig: Inject 46 Units under the skin into the appropriate area as directed Daily.        Pharm[facility] where request should be sent: [id number] [address] AT [address]  - [phone] [facility] [phone] FX     Additional details provided by patient: PATIENT JUST LEFT FOR 3 WEEKS TO GO OUT OF STATE AND FORGOT THIS PRESCRIPTION.  SHE IS REQUESTING A NEW PRESCRIPTION BE SENT TO THE PHARMACY NEAR BY.  PATIENT WOULD LIKE TO PICK THIS UP TOMORROW AT THE PHARMACY.      Does the patient have less than a 3 day supply:  [x] Yes  [] No    Would you like a call back once the refill request has been completed: [x] Yes [] No    If the office needs to give you a call back, can they leave a voicemail: [x] Yes [] No    Grzegorz Arguello Rep   01/27/23 12:56 EST

## 2023-01-30 DIAGNOSIS — E11.8 TYPE 2 DIABETES MELLITUS WITH COMPLICATION, WITHOUT LONG-TERM CURRENT USE OF INSULIN: ICD-10-CM

## 2023-01-30 RX ORDER — INSULIN DEGLUDEC 200 U/ML
56 INJECTION, SOLUTION SUBCUTANEOUS DAILY
Qty: 9 ML | Refills: 1 | Status: SHIPPED | OUTPATIENT
Start: 2023-01-30

## 2023-02-24 DIAGNOSIS — E11.65 UNCONTROLLED TYPE 2 DIABETES MELLITUS WITH HYPERGLYCEMIA: ICD-10-CM

## 2023-02-24 RX ORDER — METFORMIN HYDROCHLORIDE 500 MG/1
TABLET, EXTENDED RELEASE ORAL
Qty: 360 TABLET | Refills: 0 | Status: SHIPPED | OUTPATIENT
Start: 2023-02-24

## 2023-03-07 DIAGNOSIS — I10 ESSENTIAL HYPERTENSION: ICD-10-CM

## 2023-03-07 RX ORDER — PROPRANOLOL HCL 60 MG
CAPSULE, EXTENDED RELEASE 24HR ORAL
Qty: 90 CAPSULE | Refills: 0 | Status: SHIPPED | OUTPATIENT
Start: 2023-03-07

## 2023-03-14 DIAGNOSIS — K21.9 GASTROESOPHAGEAL REFLUX DISEASE WITHOUT ESOPHAGITIS: ICD-10-CM

## 2023-03-14 DIAGNOSIS — F33.1 MODERATE EPISODE OF RECURRENT MAJOR DEPRESSIVE DISORDER: ICD-10-CM

## 2023-03-14 RX ORDER — PEN NEEDLE, DIABETIC 31 G X1/4"
NEEDLE, DISPOSABLE MISCELLANEOUS
Qty: 100 EACH | Refills: 1 | Status: SHIPPED | OUTPATIENT
Start: 2023-03-14

## 2023-03-14 RX ORDER — OMEPRAZOLE 40 MG/1
CAPSULE, DELAYED RELEASE ORAL
Qty: 90 CAPSULE | Refills: 0 | Status: SHIPPED | OUTPATIENT
Start: 2023-03-14

## 2023-03-14 RX ORDER — ESCITALOPRAM OXALATE 20 MG/1
TABLET ORAL
Qty: 90 TABLET | Refills: 0 | Status: SHIPPED | OUTPATIENT
Start: 2023-03-14

## 2023-05-21 DIAGNOSIS — E11.65 UNCONTROLLED TYPE 2 DIABETES MELLITUS WITH HYPERGLYCEMIA: ICD-10-CM

## 2023-05-21 RX ORDER — METFORMIN HYDROCHLORIDE 500 MG/1
TABLET, EXTENDED RELEASE ORAL
Qty: 360 TABLET | Refills: 0 | Status: SHIPPED | OUTPATIENT
Start: 2023-05-21

## 2023-05-25 ENCOUNTER — TELEPHONE (OUTPATIENT)
Dept: FAMILY MEDICINE CLINIC | Facility: CLINIC | Age: 73
End: 2023-05-25

## 2023-05-25 DIAGNOSIS — E11.8 TYPE 2 DIABETES MELLITUS WITH COMPLICATION, WITHOUT LONG-TERM CURRENT USE OF INSULIN: ICD-10-CM

## 2023-05-25 RX ORDER — INSULIN DEGLUDEC 200 U/ML
56 INJECTION, SOLUTION SUBCUTANEOUS DAILY
Qty: 9 ML | Refills: 1 | Status: SHIPPED | OUTPATIENT
Start: 2023-05-25

## 2023-05-25 NOTE — TELEPHONE ENCOUNTER
Patient had Tresiba last filled in [place] when she left her supply at home. Patient would like a one month supply called into her local pharmacy and it is because the co pay went up. Also she is going to  samples from us.      Thank you

## 2023-05-25 NOTE — TELEPHONE ENCOUNTER
Caller: Mariana Ansari    Relationship to patient: Self    Best call back number: [phone]    What is the best time to reach you: ANYTIME    Who are you requesting to speak with (clinical staff, provider,  specific staff member): JASWINDER    Do you know the name of the person who called: PATIENT     What was the call regarding: PATIENT SAID HER CO-PAY FOR TRESIBA HAS INCREASED SINCE SHE HAS BEEN GETTING A THREE MONTH SUPPLY    SHE SAID FOR 30 DAYS IT WAS AROUND $30 BUT NOW FOR 90 DAY IT IS AROUND $90     SHE IS WANTING TO SEE IF SHE COULD POSSIBLY SWITCH BACK TO A 30 DAY PRESCRIPTION     PHARMACY: [facility] [id number] [facility] [phone] [facility] [phone]   [phone]    SHE SAID SHE HAS ONE PEN LEFT RIGHT NOW    Do you require a callback: YES

## 2023-05-25 NOTE — TELEPHONE ENCOUNTER
I believe the prescription I sent for her is only for 30-day supply and not the 90-day supply.  Please clarify with the pharmacy.  Maybe her co-pay just simply went up.  I do want to change it if needed, however I would like to clarify with the pharmacy first.  We can give her some samples if available.  Thank you.

## 2023-06-03 DIAGNOSIS — I10 ESSENTIAL HYPERTENSION: ICD-10-CM

## 2023-06-03 RX ORDER — PROPRANOLOL HCL 60 MG
CAPSULE, EXTENDED RELEASE 24HR ORAL
Qty: 90 CAPSULE | Refills: 0 | Status: SHIPPED | OUTPATIENT
Start: 2023-06-03

## 2023-06-10 DIAGNOSIS — F33.1 MODERATE EPISODE OF RECURRENT MAJOR DEPRESSIVE DISORDER: ICD-10-CM

## 2023-06-10 DIAGNOSIS — K21.9 GASTROESOPHAGEAL REFLUX DISEASE WITHOUT ESOPHAGITIS: ICD-10-CM

## 2023-06-10 RX ORDER — ESCITALOPRAM OXALATE 20 MG/1
TABLET ORAL
Qty: 90 TABLET | Refills: 0 | Status: SHIPPED | OUTPATIENT
Start: 2023-06-10

## 2023-06-10 RX ORDER — OMEPRAZOLE 40 MG/1
CAPSULE, DELAYED RELEASE ORAL
Qty: 90 CAPSULE | Refills: 0 | Status: SHIPPED | OUTPATIENT
Start: 2023-06-10

## 2023-06-13 DIAGNOSIS — I10 PRIMARY HYPERTENSION: ICD-10-CM

## 2023-06-13 DIAGNOSIS — E11.8 TYPE 2 DIABETES MELLITUS WITH COMPLICATION, WITHOUT LONG-TERM CURRENT USE OF INSULIN: ICD-10-CM

## 2023-06-13 RX ORDER — HYDROCHLOROTHIAZIDE 25 MG/1
TABLET ORAL
Qty: 90 TABLET | Refills: 0 | Status: SHIPPED | OUTPATIENT
Start: 2023-06-13

## 2023-06-13 RX ORDER — GLIPIZIDE 5 MG/1
TABLET, FILM COATED, EXTENDED RELEASE ORAL
Qty: 180 TABLET | Refills: 0 | Status: SHIPPED | OUTPATIENT
Start: 2023-06-13

## 2023-06-19 ENCOUNTER — TELEPHONE (OUTPATIENT)
Dept: FAMILY MEDICINE CLINIC | Facility: CLINIC | Age: 73
End: 2023-06-19
Payer: MEDICARE

## 2023-06-19 DIAGNOSIS — E11.8 TYPE 2 DIABETES MELLITUS WITH COMPLICATION, WITHOUT LONG-TERM CURRENT USE OF INSULIN: Primary | ICD-10-CM

## 2023-06-19 DIAGNOSIS — E78.2 MIXED HYPERLIPIDEMIA: ICD-10-CM

## 2023-06-19 DIAGNOSIS — E03.9 ACQUIRED HYPOTHYROIDISM: ICD-10-CM

## 2023-06-21 LAB
ALBUMIN SERPL-MCNC: 4.4 G/DL (ref 3.5–5.2)
ALBUMIN UR-MCNC: 1.4 MG/DL
ALBUMIN/GLOB SERPL: 1.6 G/DL
ALP SERPL-CCNC: 58 U/L (ref 39–117)
ALT SERPL W P-5'-P-CCNC: 37 U/L (ref 1–33)
ANION GAP SERPL CALCULATED.3IONS-SCNC: 13 MMOL/L (ref 5–15)
AST SERPL-CCNC: 50 U/L (ref 1–32)
BASOPHILS # BLD AUTO: 0.03 10*3/MM3 (ref 0–0.2)
BASOPHILS NFR BLD AUTO: 0.6 % (ref 0–1.5)
BILIRUB SERPL-MCNC: 0.4 MG/DL (ref 0–1.2)
BUN SERPL-MCNC: 24 MG/DL (ref 8–23)
BUN/CREAT SERPL: 19 (ref 7–25)
CALCIUM SPEC-SCNC: 9.3 MG/DL (ref 8.6–10.5)
CHLORIDE SERPL-SCNC: 102 MMOL/L (ref 98–107)
CHOLEST SERPL-MCNC: 101 MG/DL (ref 0–200)
CO2 SERPL-SCNC: 26 MMOL/L (ref 22–29)
CREAT SERPL-MCNC: 1.26 MG/DL (ref 0.57–1)
CREAT UR-MCNC: 204.9 MG/DL
DEPRECATED RDW RBC AUTO: 41 FL (ref 37–54)
EGFRCR SERPLBLD CKD-EPI 2021: 45.5 ML/MIN/1.73
EOSINOPHIL # BLD AUTO: 0.18 10*3/MM3 (ref 0–0.4)
EOSINOPHIL NFR BLD AUTO: 3.4 % (ref 0.3–6.2)
ERYTHROCYTE [DISTWIDTH] IN BLOOD BY AUTOMATED COUNT: 13.3 % (ref 12.3–15.4)
GLOBULIN UR ELPH-MCNC: 2.8 GM/DL
GLUCOSE SERPL-MCNC: 161 MG/DL (ref 65–99)
HBA1C MFR BLD: 7.7 % (ref 4.8–5.6)
HCT VFR BLD AUTO: 36.9 % (ref 34–46.6)
HDLC SERPL-MCNC: 29 MG/DL (ref 40–60)
HGB BLD-MCNC: 12 G/DL (ref 12–15.9)
IMM GRANULOCYTES # BLD AUTO: 0.02 10*3/MM3 (ref 0–0.05)
IMM GRANULOCYTES NFR BLD AUTO: 0.4 % (ref 0–0.5)
LDLC SERPL CALC-MCNC: 41 MG/DL (ref 0–100)
LDLC/HDLC SERPL: 1.15 {RATIO}
LYMPHOCYTES # BLD AUTO: 1.74 10*3/MM3 (ref 0.7–3.1)
LYMPHOCYTES NFR BLD AUTO: 33 % (ref 19.6–45.3)
MCH RBC QN AUTO: 27.3 PG (ref 26.6–33)
MCHC RBC AUTO-ENTMCNC: 32.5 G/DL (ref 31.5–35.7)
MCV RBC AUTO: 84.1 FL (ref 79–97)
MICROALBUMIN/CREAT UR: 6.8 MG/G
MONOCYTES # BLD AUTO: 0.36 10*3/MM3 (ref 0.1–0.9)
MONOCYTES NFR BLD AUTO: 6.8 % (ref 5–12)
NEUTROPHILS NFR BLD AUTO: 2.94 10*3/MM3 (ref 1.7–7)
NEUTROPHILS NFR BLD AUTO: 55.8 % (ref 42.7–76)
NRBC BLD AUTO-RTO: 0 /100 WBC (ref 0–0.2)
PLATELET # BLD AUTO: 235 10*3/MM3 (ref 140–450)
PMV BLD AUTO: 10.4 FL (ref 6–12)
POTASSIUM SERPL-SCNC: 4.6 MMOL/L (ref 3.5–5.2)
PROT SERPL-MCNC: 7.2 G/DL (ref 6–8.5)
RBC # BLD AUTO: 4.39 10*6/MM3 (ref 3.77–5.28)
SODIUM SERPL-SCNC: 141 MMOL/L (ref 136–145)
TRIGL SERPL-MCNC: 193 MG/DL (ref 0–150)
TSH SERPL DL<=0.05 MIU/L-ACNC: 5.45 UIU/ML (ref 0.27–4.2)
VLDLC SERPL-MCNC: 31 MG/DL (ref 5–40)
WBC NRBC COR # BLD: 5.27 10*3/MM3 (ref 3.4–10.8)

## 2023-06-21 PROCEDURE — 36415 COLL VENOUS BLD VENIPUNCTURE: CPT | Performed by: FAMILY MEDICINE

## 2023-07-31 ENCOUNTER — INFUSION (OUTPATIENT)
Dept: ONCOLOGY | Facility: HOSPITAL | Age: 73
End: 2023-07-31
Payer: MEDICARE

## 2023-07-31 ENCOUNTER — OFFICE VISIT (OUTPATIENT)
Dept: ONCOLOGY | Facility: CLINIC | Age: 73
End: 2023-07-31
Payer: MEDICARE

## 2023-07-31 ENCOUNTER — LAB (OUTPATIENT)
Dept: LAB | Facility: HOSPITAL | Age: 73
End: 2023-07-31
Payer: MEDICARE

## 2023-07-31 ENCOUNTER — TELEPHONE (OUTPATIENT)
Dept: FAMILY MEDICINE CLINIC | Facility: CLINIC | Age: 73
End: 2023-07-31
Payer: MEDICARE

## 2023-07-31 VITALS
WEIGHT: 194.4 LBS | RESPIRATION RATE: 18 BRPM | TEMPERATURE: 98.4 F | HEART RATE: 80 BPM | DIASTOLIC BLOOD PRESSURE: 78 MMHG | SYSTOLIC BLOOD PRESSURE: 122 MMHG | HEIGHT: 63 IN | OXYGEN SATURATION: 95 % | BODY MASS INDEX: 34.45 KG/M2

## 2023-07-31 DIAGNOSIS — C50.412 MALIGNANT NEOPLASM OF UPPER-OUTER QUADRANT OF LEFT BREAST IN FEMALE, ESTROGEN RECEPTOR POSITIVE: ICD-10-CM

## 2023-07-31 DIAGNOSIS — Z17.0 MALIGNANT NEOPLASM OF UPPER-OUTER QUADRANT OF LEFT BREAST IN FEMALE, ESTROGEN RECEPTOR POSITIVE: Primary | ICD-10-CM

## 2023-07-31 DIAGNOSIS — E03.9 ACQUIRED HYPOTHYROIDISM: Primary | ICD-10-CM

## 2023-07-31 DIAGNOSIS — C50.912 INFILTRATING DUCTAL CARCINOMA OF LEFT BREAST: ICD-10-CM

## 2023-07-31 DIAGNOSIS — Z17.0 MALIGNANT NEOPLASM OF UPPER-OUTER QUADRANT OF LEFT BREAST IN FEMALE, ESTROGEN RECEPTOR POSITIVE: ICD-10-CM

## 2023-07-31 DIAGNOSIS — C50.412 MALIGNANT NEOPLASM OF UPPER-OUTER QUADRANT OF LEFT BREAST IN FEMALE, ESTROGEN RECEPTOR POSITIVE: Primary | ICD-10-CM

## 2023-07-31 LAB
ALBUMIN SERPL-MCNC: 4.4 G/DL (ref 3.5–5.2)
ALBUMIN/GLOB SERPL: 2.2 G/DL
ALP SERPL-CCNC: 57 U/L (ref 39–117)
ALT SERPL W P-5'-P-CCNC: 49 U/L (ref 1–33)
ANION GAP SERPL CALCULATED.3IONS-SCNC: 17.3 MMOL/L (ref 5–15)
AST SERPL-CCNC: 84 U/L (ref 1–32)
BASOPHILS # BLD AUTO: 0.03 10*3/MM3 (ref 0–0.2)
BASOPHILS NFR BLD AUTO: 0.5 % (ref 0–1.5)
BILIRUB SERPL-MCNC: 0.5 MG/DL (ref 0–1.2)
BUN SERPL-MCNC: 21 MG/DL (ref 8–23)
BUN/CREAT SERPL: 16.5 (ref 7–25)
CALCIUM SPEC-SCNC: 9.2 MG/DL (ref 8.6–10.5)
CHLORIDE SERPL-SCNC: 103 MMOL/L (ref 98–107)
CO2 SERPL-SCNC: 22.7 MMOL/L (ref 22–29)
CREAT SERPL-MCNC: 1.27 MG/DL (ref 0.6–1.1)
DEPRECATED RDW RBC AUTO: 42.7 FL (ref 37–54)
EGFRCR SERPLBLD CKD-EPI 2021: 45 ML/MIN/1.73
EOSINOPHIL # BLD AUTO: 0.15 10*3/MM3 (ref 0–0.4)
EOSINOPHIL NFR BLD AUTO: 2.7 % (ref 0.3–6.2)
ERYTHROCYTE [DISTWIDTH] IN BLOOD BY AUTOMATED COUNT: 13.5 % (ref 12.3–15.4)
GLOBULIN UR ELPH-MCNC: 2 GM/DL
GLUCOSE SERPL-MCNC: 131 MG/DL (ref 65–99)
HCT VFR BLD AUTO: 38.7 % (ref 34–46.6)
HGB BLD-MCNC: 12 G/DL (ref 12–15.9)
IMM GRANULOCYTES # BLD AUTO: 0.01 10*3/MM3 (ref 0–0.05)
IMM GRANULOCYTES NFR BLD AUTO: 0.2 % (ref 0–0.5)
LYMPHOCYTES # BLD AUTO: 1.52 10*3/MM3 (ref 0.7–3.1)
LYMPHOCYTES NFR BLD AUTO: 27.3 % (ref 19.6–45.3)
MAGNESIUM SERPL-MCNC: 1.9 MG/DL (ref 1.6–2.4)
MCH RBC QN AUTO: 27 PG (ref 26.6–33)
MCHC RBC AUTO-ENTMCNC: 31 G/DL (ref 31.5–35.7)
MCV RBC AUTO: 87 FL (ref 79–97)
MONOCYTES # BLD AUTO: 0.36 10*3/MM3 (ref 0.1–0.9)
MONOCYTES NFR BLD AUTO: 6.5 % (ref 5–12)
NEUTROPHILS NFR BLD AUTO: 3.5 10*3/MM3 (ref 1.7–7)
NEUTROPHILS NFR BLD AUTO: 62.8 % (ref 42.7–76)
NRBC BLD AUTO-RTO: 0 /100 WBC (ref 0–0.2)
PHOSPHATE SERPL-MCNC: 4 MG/DL (ref 2.5–4.5)
PLATELET # BLD AUTO: 246 10*3/MM3 (ref 140–450)
PMV BLD AUTO: 9.6 FL (ref 6–12)
POTASSIUM SERPL-SCNC: 5.5 MMOL/L (ref 3.5–5.2)
PROT SERPL-MCNC: 6.4 G/DL (ref 6–8.5)
RBC # BLD AUTO: 4.45 10*6/MM3 (ref 3.77–5.28)
SODIUM SERPL-SCNC: 143 MMOL/L (ref 136–145)
WBC NRBC COR # BLD: 5.57 10*3/MM3 (ref 3.4–10.8)

## 2023-07-31 PROCEDURE — 25010000002 DENOSUMAB 60 MG/ML SOLUTION PREFILLED SYRINGE: Performed by: INTERNAL MEDICINE

## 2023-07-31 PROCEDURE — 3074F SYST BP LT 130 MM HG: CPT | Performed by: INTERNAL MEDICINE

## 2023-07-31 PROCEDURE — 1126F AMNT PAIN NOTED NONE PRSNT: CPT | Performed by: INTERNAL MEDICINE

## 2023-07-31 PROCEDURE — 99214 OFFICE O/P EST MOD 30 MIN: CPT | Performed by: INTERNAL MEDICINE

## 2023-07-31 PROCEDURE — 96372 THER/PROPH/DIAG INJ SC/IM: CPT

## 2023-07-31 PROCEDURE — 80053 COMPREHEN METABOLIC PANEL: CPT

## 2023-07-31 PROCEDURE — 84100 ASSAY OF PHOSPHORUS: CPT

## 2023-07-31 PROCEDURE — 36415 COLL VENOUS BLD VENIPUNCTURE: CPT

## 2023-07-31 PROCEDURE — 85025 COMPLETE CBC W/AUTO DIFF WBC: CPT

## 2023-07-31 PROCEDURE — 83735 ASSAY OF MAGNESIUM: CPT

## 2023-07-31 PROCEDURE — 3078F DIAST BP <80 MM HG: CPT | Performed by: INTERNAL MEDICINE

## 2023-07-31 RX ADMIN — DENOSUMAB 60 MG: 60 INJECTION SUBCUTANEOUS at 13:18

## 2023-08-02 ENCOUNTER — LAB (OUTPATIENT)
Dept: FAMILY MEDICINE CLINIC | Facility: CLINIC | Age: 73
End: 2023-08-02
Payer: MEDICARE

## 2023-08-02 ENCOUNTER — OFFICE VISIT (OUTPATIENT)
Dept: FAMILY MEDICINE CLINIC | Facility: CLINIC | Age: 73
End: 2023-08-02
Payer: MEDICARE

## 2023-08-02 VITALS
OXYGEN SATURATION: 97 % | TEMPERATURE: 96.8 F | SYSTOLIC BLOOD PRESSURE: 131 MMHG | WEIGHT: 196.6 LBS | HEART RATE: 89 BPM | BODY MASS INDEX: 34.84 KG/M2 | HEIGHT: 63 IN | DIASTOLIC BLOOD PRESSURE: 70 MMHG | RESPIRATION RATE: 16 BRPM

## 2023-08-02 DIAGNOSIS — E03.9 ACQUIRED HYPOTHYROIDISM: ICD-10-CM

## 2023-08-02 DIAGNOSIS — R25.2 LEG CRAMPS: Primary | ICD-10-CM

## 2023-08-02 DIAGNOSIS — E83.42 HYPOMAGNESEMIA: ICD-10-CM

## 2023-08-02 DIAGNOSIS — E11.8 TYPE 2 DIABETES MELLITUS WITH COMPLICATION, WITHOUT LONG-TERM CURRENT USE OF INSULIN: ICD-10-CM

## 2023-08-02 LAB
ANION GAP SERPL CALCULATED.3IONS-SCNC: 12 MMOL/L (ref 5–15)
BUN SERPL-MCNC: 20 MG/DL (ref 8–23)
BUN/CREAT SERPL: 16.5 (ref 7–25)
CALCIUM SPEC-SCNC: 9.4 MG/DL (ref 8.6–10.5)
CHLORIDE SERPL-SCNC: 100 MMOL/L (ref 98–107)
CO2 SERPL-SCNC: 28 MMOL/L (ref 22–29)
CREAT SERPL-MCNC: 1.21 MG/DL (ref 0.57–1)
EGFRCR SERPLBLD CKD-EPI 2021: 47.7 ML/MIN/1.73
GLUCOSE SERPL-MCNC: 176 MG/DL (ref 65–99)
POTASSIUM SERPL-SCNC: 4.6 MMOL/L (ref 3.5–5.2)
SODIUM SERPL-SCNC: 140 MMOL/L (ref 136–145)
TSH SERPL DL<=0.05 MIU/L-ACNC: 4.63 UIU/ML (ref 0.27–4.2)

## 2023-08-02 PROCEDURE — 99214 OFFICE O/P EST MOD 30 MIN: CPT | Performed by: FAMILY MEDICINE

## 2023-08-02 PROCEDURE — 80048 BASIC METABOLIC PNL TOTAL CA: CPT | Performed by: FAMILY MEDICINE

## 2023-08-02 PROCEDURE — 36415 COLL VENOUS BLD VENIPUNCTURE: CPT | Performed by: FAMILY MEDICINE

## 2023-08-02 PROCEDURE — 3075F SYST BP GE 130 - 139MM HG: CPT | Performed by: FAMILY MEDICINE

## 2023-08-02 PROCEDURE — 3051F HG A1C>EQUAL 7.0%<8.0%: CPT | Performed by: FAMILY MEDICINE

## 2023-08-02 PROCEDURE — 84443 ASSAY THYROID STIM HORMONE: CPT | Performed by: FAMILY MEDICINE

## 2023-08-02 PROCEDURE — 3078F DIAST BP <80 MM HG: CPT | Performed by: FAMILY MEDICINE

## 2023-08-02 RX ORDER — CALCIUM CARBONATE 500 MG/1
1 TABLET, CHEWABLE ORAL DAILY
COMMUNITY

## 2023-08-02 RX ORDER — MAGNESIUM OXIDE 400 MG/1
400 TABLET ORAL DAILY
Qty: 30 TABLET | Refills: 0 | Status: SHIPPED | OUTPATIENT
Start: 2023-08-02

## 2023-08-02 RX ORDER — SEMAGLUTIDE 0.68 MG/ML
0.25 INJECTION, SOLUTION SUBCUTANEOUS WEEKLY
Qty: 3 ML | Refills: 0 | Status: SHIPPED | OUTPATIENT
Start: 2023-08-02

## 2023-08-04 ENCOUNTER — TELEPHONE (OUTPATIENT)
Dept: FAMILY MEDICINE CLINIC | Facility: CLINIC | Age: 73
End: 2023-08-04
Payer: MEDICARE

## 2023-08-20 DIAGNOSIS — E11.65 UNCONTROLLED TYPE 2 DIABETES MELLITUS WITH HYPERGLYCEMIA: ICD-10-CM

## 2023-08-20 DIAGNOSIS — E78.2 MIXED HYPERLIPIDEMIA: ICD-10-CM

## 2023-08-20 RX ORDER — ROSUVASTATIN CALCIUM 20 MG/1
TABLET, COATED ORAL
Qty: 90 TABLET | Refills: 1 | Status: SHIPPED | OUTPATIENT
Start: 2023-08-20

## 2023-08-20 RX ORDER — METFORMIN HYDROCHLORIDE 500 MG/1
TABLET, EXTENDED RELEASE ORAL
Qty: 360 TABLET | Refills: 1 | Status: SHIPPED | OUTPATIENT
Start: 2023-08-20

## 2023-08-28 DIAGNOSIS — E83.42 HYPOMAGNESEMIA: ICD-10-CM

## 2023-08-28 RX ORDER — MAGNESIUM OXIDE TAB 400 MG (241.3 MG ELEMENTAL MG) 400 (241.3 MG) MG
TAB ORAL
Qty: 30 TABLET | Refills: 1 | Status: SHIPPED | OUTPATIENT
Start: 2023-08-28

## 2023-08-30 DIAGNOSIS — I10 ESSENTIAL HYPERTENSION: ICD-10-CM

## 2023-08-30 RX ORDER — PROPRANOLOL HCL 60 MG
CAPSULE, EXTENDED RELEASE 24HR ORAL
Qty: 90 CAPSULE | Refills: 0 | Status: SHIPPED | OUTPATIENT
Start: 2023-08-30

## 2023-09-06 DIAGNOSIS — F33.1 MODERATE EPISODE OF RECURRENT MAJOR DEPRESSIVE DISORDER: ICD-10-CM

## 2023-09-06 DIAGNOSIS — K21.9 GASTROESOPHAGEAL REFLUX DISEASE WITHOUT ESOPHAGITIS: ICD-10-CM

## 2023-09-06 RX ORDER — OMEPRAZOLE 40 MG/1
CAPSULE, DELAYED RELEASE ORAL
Qty: 90 CAPSULE | Refills: 0 | Status: SHIPPED | OUTPATIENT
Start: 2023-09-06

## 2023-09-06 RX ORDER — ESCITALOPRAM OXALATE 20 MG/1
TABLET ORAL
Qty: 90 TABLET | Refills: 0 | Status: SHIPPED | OUTPATIENT
Start: 2023-09-06

## 2023-09-11 DIAGNOSIS — I10 PRIMARY HYPERTENSION: ICD-10-CM

## 2023-09-11 DIAGNOSIS — E11.8 TYPE 2 DIABETES MELLITUS WITH COMPLICATION, WITHOUT LONG-TERM CURRENT USE OF INSULIN: ICD-10-CM

## 2023-09-11 RX ORDER — GLIPIZIDE 5 MG/1
TABLET, FILM COATED, EXTENDED RELEASE ORAL
Qty: 180 TABLET | Refills: 0 | Status: SHIPPED | OUTPATIENT
Start: 2023-09-11

## 2023-09-11 RX ORDER — HYDROCHLOROTHIAZIDE 25 MG/1
TABLET ORAL
Qty: 90 TABLET | Refills: 0 | Status: SHIPPED | OUTPATIENT
Start: 2023-09-11

## 2023-09-25 DIAGNOSIS — E11.8 TYPE 2 DIABETES MELLITUS WITH COMPLICATION, WITHOUT LONG-TERM CURRENT USE OF INSULIN: ICD-10-CM

## 2023-09-25 RX ORDER — SEMAGLUTIDE 0.68 MG/ML
INJECTION, SOLUTION SUBCUTANEOUS
Qty: 3 ML | Refills: 0 | Status: SHIPPED | OUTPATIENT
Start: 2023-09-25

## 2023-09-25 RX ORDER — INSULIN DEGLUDEC 200 U/ML
INJECTION, SOLUTION SUBCUTANEOUS
Qty: 9 ML | Refills: 1 | Status: SHIPPED | OUTPATIENT
Start: 2023-09-25

## 2023-10-10 ENCOUNTER — TELEPHONE (OUTPATIENT)
Dept: FAMILY MEDICINE CLINIC | Facility: CLINIC | Age: 73
End: 2023-10-10
Payer: MEDICARE

## 2023-10-11 DIAGNOSIS — I10 PRIMARY HYPERTENSION: Primary | ICD-10-CM

## 2023-10-11 DIAGNOSIS — E11.8 TYPE 2 DIABETES MELLITUS WITH COMPLICATION, WITHOUT LONG-TERM CURRENT USE OF INSULIN: ICD-10-CM

## 2023-10-11 DIAGNOSIS — E03.9 ACQUIRED HYPOTHYROIDISM: ICD-10-CM

## 2023-10-11 DIAGNOSIS — E78.2 MIXED HYPERLIPIDEMIA: ICD-10-CM

## 2023-10-30 ENCOUNTER — TELEPHONE (OUTPATIENT)
Dept: FAMILY MEDICINE CLINIC | Facility: CLINIC | Age: 73
End: 2023-10-30

## 2023-10-30 DIAGNOSIS — K21.9 GASTROESOPHAGEAL REFLUX DISEASE WITHOUT ESOPHAGITIS: ICD-10-CM

## 2023-10-30 DIAGNOSIS — E83.42 HYPOMAGNESEMIA: ICD-10-CM

## 2023-10-30 RX ORDER — OMEPRAZOLE 40 MG/1
CAPSULE, DELAYED RELEASE ORAL
Qty: 90 CAPSULE | Refills: 0 | Status: SHIPPED | OUTPATIENT
Start: 2023-10-30

## 2023-10-30 RX ORDER — LANOLIN ALCOHOL/MO/W.PET/CERES
1 CREAM (GRAM) TOPICAL DAILY
Qty: 90 TABLET | Refills: 0 | Status: SHIPPED | OUTPATIENT
Start: 2023-10-30

## 2023-10-30 NOTE — TELEPHONE ENCOUNTER
Caller: Mariana Ansari    Relationship: Self    Best call back number:     [phone]           What was the call regarding: PATIENT GOING OUT OF TOWN CANNOT AFFORD THE:  Tresiba FlexTouch 200 UNIT/ML solution pen-injector pen injection   Ozempic, 0.25 or 0.5 MG/DOSE, 2 MG/3ML solution pen-injector     DO YOU HAVE ANY SAMPLES? GOING OUT OF TOWN THIS FRIDAY    I

## 2023-11-17 RX ORDER — PEN NEEDLE, DIABETIC 31 G X1/4"
NEEDLE, DISPOSABLE MISCELLANEOUS DAILY
Qty: 100 EACH | Refills: 1 | Status: SHIPPED | OUTPATIENT
Start: 2023-11-17

## 2023-11-26 DIAGNOSIS — I10 ESSENTIAL HYPERTENSION: ICD-10-CM

## 2023-11-26 RX ORDER — PROPRANOLOL HCL 60 MG
CAPSULE, EXTENDED RELEASE 24HR ORAL
Qty: 90 CAPSULE | Refills: 0 | Status: SHIPPED | OUTPATIENT
Start: 2023-11-26

## 2023-12-08 DIAGNOSIS — E11.8 TYPE 2 DIABETES MELLITUS WITH COMPLICATION, WITHOUT LONG-TERM CURRENT USE OF INSULIN: ICD-10-CM

## 2023-12-08 DIAGNOSIS — I10 PRIMARY HYPERTENSION: ICD-10-CM

## 2023-12-08 RX ORDER — HYDROCHLOROTHIAZIDE 25 MG/1
TABLET ORAL
Qty: 90 TABLET | Refills: 0 | Status: SHIPPED | OUTPATIENT
Start: 2023-12-08

## 2023-12-08 RX ORDER — GLIPIZIDE 5 MG/1
TABLET, FILM COATED, EXTENDED RELEASE ORAL
Qty: 180 TABLET | Refills: 0 | Status: SHIPPED | OUTPATIENT
Start: 2023-12-08

## 2023-12-14 ENCOUNTER — LAB (OUTPATIENT)
Dept: FAMILY MEDICINE CLINIC | Facility: CLINIC | Age: 73
End: 2023-12-14
Payer: MEDICARE

## 2023-12-14 LAB
ALBUMIN SERPL-MCNC: 4.8 G/DL (ref 3.5–5.2)
ALBUMIN/GLOB SERPL: 2.3 G/DL
ALP SERPL-CCNC: 64 U/L (ref 39–117)
ALT SERPL W P-5'-P-CCNC: 33 U/L (ref 1–33)
ANION GAP SERPL CALCULATED.3IONS-SCNC: 10 MMOL/L (ref 5–15)
AST SERPL-CCNC: 35 U/L (ref 1–32)
BILIRUB SERPL-MCNC: 0.4 MG/DL (ref 0–1.2)
BUN SERPL-MCNC: 22 MG/DL (ref 8–23)
BUN/CREAT SERPL: 19 (ref 7–25)
CALCIUM SPEC-SCNC: 9.7 MG/DL (ref 8.6–10.5)
CHLORIDE SERPL-SCNC: 103 MMOL/L (ref 98–107)
CHOLEST SERPL-MCNC: 136 MG/DL (ref 0–200)
CO2 SERPL-SCNC: 27 MMOL/L (ref 22–29)
CREAT SERPL-MCNC: 1.16 MG/DL (ref 0.57–1)
EGFRCR SERPLBLD CKD-EPI 2021: 50.2 ML/MIN/1.73
GLOBULIN UR ELPH-MCNC: 2.1 GM/DL
GLUCOSE SERPL-MCNC: 232 MG/DL (ref 65–99)
HBA1C MFR BLD: 9.4 % (ref 4.8–5.6)
HDLC SERPL-MCNC: 34 MG/DL (ref 40–60)
LDLC SERPL CALC-MCNC: 69 MG/DL (ref 0–100)
LDLC/HDLC SERPL: 1.83 {RATIO}
POTASSIUM SERPL-SCNC: 4.8 MMOL/L (ref 3.5–5.2)
PROT SERPL-MCNC: 6.9 G/DL (ref 6–8.5)
SODIUM SERPL-SCNC: 140 MMOL/L (ref 136–145)
T4 FREE SERPL-MCNC: 1.32 NG/DL (ref 0.93–1.7)
TRIGL SERPL-MCNC: 199 MG/DL (ref 0–150)
TSH SERPL DL<=0.05 MIU/L-ACNC: 4.94 UIU/ML (ref 0.27–4.2)
VLDLC SERPL-MCNC: 33 MG/DL (ref 5–40)

## 2023-12-14 PROCEDURE — 80061 LIPID PANEL: CPT | Performed by: FAMILY MEDICINE

## 2023-12-14 PROCEDURE — 83036 HEMOGLOBIN GLYCOSYLATED A1C: CPT | Performed by: FAMILY MEDICINE

## 2023-12-14 PROCEDURE — 84443 ASSAY THYROID STIM HORMONE: CPT | Performed by: FAMILY MEDICINE

## 2023-12-14 PROCEDURE — 84439 ASSAY OF FREE THYROXINE: CPT | Performed by: FAMILY MEDICINE

## 2023-12-14 PROCEDURE — 80053 COMPREHEN METABOLIC PANEL: CPT | Performed by: FAMILY MEDICINE

## 2023-12-21 ENCOUNTER — OFFICE VISIT (OUTPATIENT)
Dept: FAMILY MEDICINE CLINIC | Facility: CLINIC | Age: 73
End: 2023-12-21
Payer: MEDICARE

## 2023-12-21 VITALS
OXYGEN SATURATION: 95 % | TEMPERATURE: 97.5 F | RESPIRATION RATE: 16 BRPM | WEIGHT: 194.2 LBS | DIASTOLIC BLOOD PRESSURE: 81 MMHG | HEIGHT: 63 IN | BODY MASS INDEX: 34.41 KG/M2 | SYSTOLIC BLOOD PRESSURE: 132 MMHG | HEART RATE: 97 BPM

## 2023-12-21 DIAGNOSIS — I10 PRIMARY HYPERTENSION: ICD-10-CM

## 2023-12-21 DIAGNOSIS — E03.9 ACQUIRED HYPOTHYROIDISM: ICD-10-CM

## 2023-12-21 DIAGNOSIS — K21.9 GASTROESOPHAGEAL REFLUX DISEASE WITHOUT ESOPHAGITIS: ICD-10-CM

## 2023-12-21 DIAGNOSIS — F33.1 MODERATE EPISODE OF RECURRENT MAJOR DEPRESSIVE DISORDER: ICD-10-CM

## 2023-12-21 DIAGNOSIS — Z12.31 VISIT FOR SCREENING MAMMOGRAM: ICD-10-CM

## 2023-12-21 DIAGNOSIS — E78.2 MIXED HYPERLIPIDEMIA: ICD-10-CM

## 2023-12-21 DIAGNOSIS — E11.8 TYPE 2 DIABETES MELLITUS WITH COMPLICATION, WITHOUT LONG-TERM CURRENT USE OF INSULIN: Primary | ICD-10-CM

## 2023-12-21 RX ORDER — INSULIN DEGLUDEC 200 U/ML
56 INJECTION, SOLUTION SUBCUTANEOUS DAILY
Qty: 9 ML | Refills: 0 | Status: SHIPPED | OUTPATIENT
Start: 2023-12-21

## 2023-12-21 RX ORDER — LEVOTHYROXINE SODIUM 0.12 MG/1
125 TABLET ORAL DAILY
Qty: 90 TABLET | Refills: 0 | Status: SHIPPED | OUTPATIENT
Start: 2023-12-21

## 2023-12-21 RX ORDER — ESCITALOPRAM OXALATE 20 MG/1
20 TABLET ORAL DAILY
Qty: 90 TABLET | Refills: 0 | Status: SHIPPED | OUTPATIENT
Start: 2023-12-21

## 2023-12-21 RX ORDER — PROPRANOLOL HCL 60 MG
60 CAPSULE, EXTENDED RELEASE 24HR ORAL DAILY
Qty: 90 CAPSULE | Refills: 0 | Status: SHIPPED | OUTPATIENT
Start: 2023-12-21

## 2023-12-21 RX ORDER — OMEPRAZOLE 40 MG/1
40 CAPSULE, DELAYED RELEASE ORAL DAILY
Qty: 90 CAPSULE | Refills: 0 | Status: SHIPPED | OUTPATIENT
Start: 2023-12-21

## 2023-12-21 RX ORDER — LANOLIN ALCOHOL/MO/W.PET/CERES
1 CREAM (GRAM) TOPICAL DAILY
Qty: 90 TABLET | Refills: 0 | Status: SHIPPED | OUTPATIENT
Start: 2023-12-21

## 2023-12-21 RX ORDER — TAMOXIFEN CITRATE 20 MG/1
20 TABLET ORAL DAILY
Refills: 0 | OUTPATIENT
Start: 2023-12-21

## 2024-02-08 DIAGNOSIS — E11.8 TYPE 2 DIABETES MELLITUS WITH COMPLICATION, WITHOUT LONG-TERM CURRENT USE OF INSULIN: ICD-10-CM

## 2024-02-09 RX ORDER — INSULIN DEGLUDEC 200 U/ML
INJECTION, SOLUTION SUBCUTANEOUS
Qty: 9 ML | Refills: 0 | Status: SHIPPED | OUTPATIENT
Start: 2024-02-09

## 2024-02-19 ENCOUNTER — HOSPITAL ENCOUNTER (OUTPATIENT)
Dept: MAMMOGRAPHY | Facility: HOSPITAL | Age: 74
Discharge: HOME | End: 2024-02-19
Admitting: FAMILY MEDICINE
Payer: MEDICARE

## 2024-02-19 DIAGNOSIS — Z12.31 VISIT FOR SCREENING MAMMOGRAM: ICD-10-CM

## 2024-02-19 PROCEDURE — 77063 BREAST TOMOSYNTHESIS BI: CPT

## 2024-02-19 PROCEDURE — 77067 SCR MAMMO BI INCL CAD: CPT

## 2024-02-24 DIAGNOSIS — E78.2 MIXED HYPERLIPIDEMIA: ICD-10-CM

## 2024-02-24 DIAGNOSIS — E11.65 UNCONTROLLED TYPE 2 DIABETES MELLITUS WITH HYPERGLYCEMIA: ICD-10-CM

## 2024-02-24 RX ORDER — METFORMIN HYDROCHLORIDE 500 MG/1
TABLET, EXTENDED RELEASE ORAL
Qty: 360 TABLET | Refills: 0 | Status: SHIPPED | OUTPATIENT
Start: 2024-02-24

## 2024-02-24 RX ORDER — ROSUVASTATIN CALCIUM 20 MG/1
TABLET, COATED ORAL
Qty: 90 TABLET | Refills: 0 | Status: SHIPPED | OUTPATIENT
Start: 2024-02-24

## 2024-03-04 ENCOUNTER — LAB (OUTPATIENT)
Dept: LAB | Facility: HOSPITAL | Age: 74
End: 2024-03-04
Payer: MEDICARE

## 2024-03-04 ENCOUNTER — INFUSION (OUTPATIENT)
Dept: ONCOLOGY | Facility: HOSPITAL | Age: 74
End: 2024-03-04
Payer: MEDICARE

## 2024-03-04 ENCOUNTER — OFFICE VISIT (OUTPATIENT)
Dept: ONCOLOGY | Facility: CLINIC | Age: 74
End: 2024-03-04
Payer: MEDICARE

## 2024-03-04 VITALS
DIASTOLIC BLOOD PRESSURE: 81 MMHG | WEIGHT: 192.7 LBS | HEIGHT: 63 IN | BODY MASS INDEX: 34.14 KG/M2 | HEART RATE: 85 BPM | OXYGEN SATURATION: 95 % | RESPIRATION RATE: 16 BRPM | SYSTOLIC BLOOD PRESSURE: 118 MMHG | TEMPERATURE: 98 F

## 2024-03-04 DIAGNOSIS — M81.0 AGE-RELATED OSTEOPOROSIS WITHOUT CURRENT PATHOLOGICAL FRACTURE: ICD-10-CM

## 2024-03-04 DIAGNOSIS — C50.412 MALIGNANT NEOPLASM OF UPPER-OUTER QUADRANT OF LEFT BREAST IN FEMALE, ESTROGEN RECEPTOR POSITIVE: Primary | ICD-10-CM

## 2024-03-04 DIAGNOSIS — Z17.0 MALIGNANT NEOPLASM OF UPPER-OUTER QUADRANT OF LEFT BREAST IN FEMALE, ESTROGEN RECEPTOR POSITIVE: ICD-10-CM

## 2024-03-04 DIAGNOSIS — C50.412 MALIGNANT NEOPLASM OF UPPER-OUTER QUADRANT OF LEFT BREAST IN FEMALE, ESTROGEN RECEPTOR POSITIVE: ICD-10-CM

## 2024-03-04 DIAGNOSIS — Z17.0 MALIGNANT NEOPLASM OF UPPER-OUTER QUADRANT OF LEFT BREAST IN FEMALE, ESTROGEN RECEPTOR POSITIVE: Primary | ICD-10-CM

## 2024-03-04 DIAGNOSIS — Z79.899 HIGH RISK MEDICATION USE: ICD-10-CM

## 2024-03-04 DIAGNOSIS — E03.9 ACQUIRED HYPOTHYROIDISM: ICD-10-CM

## 2024-03-04 LAB
ALBUMIN SERPL-MCNC: 4.3 G/DL (ref 3.5–5.2)
ALBUMIN/GLOB SERPL: 1.5 G/DL
ALP SERPL-CCNC: 81 U/L (ref 39–117)
ALT SERPL W P-5'-P-CCNC: 30 U/L (ref 1–33)
ANION GAP SERPL CALCULATED.3IONS-SCNC: 12.7 MMOL/L (ref 5–15)
AST SERPL-CCNC: 45 U/L (ref 1–32)
BASOPHILS # BLD AUTO: 0.03 10*3/MM3 (ref 0–0.2)
BASOPHILS NFR BLD AUTO: 0.4 % (ref 0–1.5)
BILIRUB SERPL-MCNC: 0.4 MG/DL (ref 0–1.2)
BUN SERPL-MCNC: 17 MG/DL (ref 8–23)
BUN/CREAT SERPL: 15.7 (ref 7–25)
CALCIUM SPEC-SCNC: 10.1 MG/DL (ref 8.6–10.5)
CHLORIDE SERPL-SCNC: 99 MMOL/L (ref 98–107)
CO2 SERPL-SCNC: 27.3 MMOL/L (ref 22–29)
CREAT SERPL-MCNC: 1.08 MG/DL (ref 0.57–1)
DEPRECATED RDW RBC AUTO: 41.1 FL (ref 37–54)
EGFRCR SERPLBLD CKD-EPI 2021: 54.7 ML/MIN/1.73
EOSINOPHIL # BLD AUTO: 0.25 10*3/MM3 (ref 0–0.4)
EOSINOPHIL NFR BLD AUTO: 3.7 % (ref 0.3–6.2)
ERYTHROCYTE [DISTWIDTH] IN BLOOD BY AUTOMATED COUNT: 13.2 % (ref 12.3–15.4)
GLOBULIN UR ELPH-MCNC: 2.9 GM/DL
GLUCOSE SERPL-MCNC: 273 MG/DL (ref 65–99)
HCT VFR BLD AUTO: 39.8 % (ref 34–46.6)
HGB BLD-MCNC: 12.4 G/DL (ref 12–15.9)
IMM GRANULOCYTES # BLD AUTO: 0.02 10*3/MM3 (ref 0–0.05)
IMM GRANULOCYTES NFR BLD AUTO: 0.3 % (ref 0–0.5)
LYMPHOCYTES # BLD AUTO: 1.24 10*3/MM3 (ref 0.7–3.1)
LYMPHOCYTES NFR BLD AUTO: 18.5 % (ref 19.6–45.3)
MAGNESIUM SERPL-MCNC: 1.8 MG/DL (ref 1.6–2.4)
MCH RBC QN AUTO: 26.7 PG (ref 26.6–33)
MCHC RBC AUTO-ENTMCNC: 31.2 G/DL (ref 31.5–35.7)
MCV RBC AUTO: 85.8 FL (ref 79–97)
MONOCYTES # BLD AUTO: 0.31 10*3/MM3 (ref 0.1–0.9)
MONOCYTES NFR BLD AUTO: 4.6 % (ref 5–12)
NEUTROPHILS NFR BLD AUTO: 4.87 10*3/MM3 (ref 1.7–7)
NEUTROPHILS NFR BLD AUTO: 72.5 % (ref 42.7–76)
NRBC BLD AUTO-RTO: 0 /100 WBC (ref 0–0.2)
PHOSPHATE SERPL-MCNC: 4.4 MG/DL (ref 2.5–4.5)
PLATELET # BLD AUTO: 249 10*3/MM3 (ref 140–450)
PMV BLD AUTO: 9.8 FL (ref 6–12)
POTASSIUM SERPL-SCNC: 5.2 MMOL/L (ref 3.5–5.2)
PROT SERPL-MCNC: 7.2 G/DL (ref 6–8.5)
RBC # BLD AUTO: 4.64 10*6/MM3 (ref 3.77–5.28)
SODIUM SERPL-SCNC: 139 MMOL/L (ref 136–145)
WBC NRBC COR # BLD AUTO: 6.72 10*3/MM3 (ref 3.4–10.8)

## 2024-03-04 PROCEDURE — 25010000002 DENOSUMAB 60 MG/ML SOLUTION PREFILLED SYRINGE: Performed by: NURSE PRACTITIONER

## 2024-03-04 PROCEDURE — 80053 COMPREHEN METABOLIC PANEL: CPT

## 2024-03-04 PROCEDURE — 1159F MED LIST DOCD IN RCRD: CPT | Performed by: NURSE PRACTITIONER

## 2024-03-04 PROCEDURE — 84100 ASSAY OF PHOSPHORUS: CPT

## 2024-03-04 PROCEDURE — 36415 COLL VENOUS BLD VENIPUNCTURE: CPT

## 2024-03-04 PROCEDURE — 85025 COMPLETE CBC W/AUTO DIFF WBC: CPT

## 2024-03-04 PROCEDURE — 3074F SYST BP LT 130 MM HG: CPT | Performed by: NURSE PRACTITIONER

## 2024-03-04 PROCEDURE — 83735 ASSAY OF MAGNESIUM: CPT

## 2024-03-04 PROCEDURE — 1126F AMNT PAIN NOTED NONE PRSNT: CPT | Performed by: NURSE PRACTITIONER

## 2024-03-04 PROCEDURE — 99214 OFFICE O/P EST MOD 30 MIN: CPT | Performed by: NURSE PRACTITIONER

## 2024-03-04 PROCEDURE — 3079F DIAST BP 80-89 MM HG: CPT | Performed by: NURSE PRACTITIONER

## 2024-03-04 PROCEDURE — 96372 THER/PROPH/DIAG INJ SC/IM: CPT

## 2024-03-04 RX ADMIN — DENOSUMAB 60 MG: 60 INJECTION SUBCUTANEOUS at 14:23

## 2024-03-04 NOTE — PROGRESS NOTES
Subjective     HISTORY OF PRESENT ILLNESS:   The patient is a 73 y.o. female with the above-mentioned history, who returns to the office today for 6-month follow-up and Prolia.  When evaluated 6 months ago by Dr. Guajardo, she was advised to discontinue her tamoxifen having completed 5 years of endocrine therapy.  She reports she continues to feel very well.  She had essentially no toxicity to tamoxifen, therefore is feeling well.  She does continue to require Prolia every 6 months for osteoporosis.  She underwent annual mammogram earlier this month which thankfully was negative.  She has no new concerns today.  She is not in need of any dental work.  She has no new pain.    ONCOLOGY HISTORY:     Patient is a 73-year-old female with oted in late May 2018 a lump developing in the upper outer quadrant of the left breast without pain or nipple discharge.  This measured approximately 1 cm in size.  Mammogram indicated this asymmetric density in the same region and an ultrasound revealed a 1.8 cm irregular solid mass.  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  Additional history included no previous breast biopsies, a brief period of time with hormonal replacement and no history of breast or ovarian cancer with family.  She was seen by Dr. Zazueta on the 28th an MRI of both breasts was performed July 6.  Within the left anterior one third at the 12:30 position 3 cm from the nipple with irregular enhancing mass measuring 1.6 cm x 1.4 and 2.2 immediately lateral dimension.  There are other findings were seen in the left breast with no evidence of left axillary adenopathy and no findings suspicious for right breast.  The patient proceeded to a left breast lumpectomy July 18, 2018.      Pathologic findings were consistent with a 2.5 cm grade 3 invasive mammary ductal carcinoma with associated DCIS.  The closest margin was 1 mm.  [place]  nodes were negative staging of pT2N0.  Dr. Zazueta saw the patient July 20 recognizing the closest margin was the anterior margin having taken this off the skin with no further removal possible.  There was concern about the need for additional tissue though the addended pathology revealed the DCIS to be intermediate grade.  It was determined not to take additional margins and have her seen by both medical oncology and radiation therapy.  She now presents with her  .      The patient's initial consultation was August 7 and potential adjuvant therapy discussed with the initial recommendation being an Oncotype DX analysis to be process.  This is now reviewed with the patient and her  may return August 23, 2018.  Her recurrence score 10 with 10 year risk of distance recurrence at 7%.  She is clearly in the low risk category additional studies revealing ER score of 10.5, MN score of 8.6 which are both positive and HER-2 score of 8.7 which is negative.   Additional studies include  LH of 27.5, FSH of 57.6, estradiol of 9.9, CA 15-3 of 10.9, TSH of 4.64 hemoglobin A1c of 8.72.   The patient is advised of the findings per her risk of recurrence and she and her  are understandably elated.  Chemotherapy is not recommended in her circumstance but anti-hormonal therapy is and we have discussed potential treatment with AI therapy being the most appropriate choice in this postmenopausal patient.  She's not additionally had any recent assessment for bone density, however we discussed having this done in the near future.     As result the patient was scheduled for bone density and this was obtained September 6 revealing evidence of osteoporosis involving the lumbar with T score of -3.4 and right hip with T score of -2.7.  The patient has been using Arimidex which we'll discontinue and we discussed institution of tamoxifen at this point.  She'll also need assessment of her vitamin D level which we went on to  measure documenting a level of 45.5.                                      The patient is now seen a month after switching to tamoxifen and is tolerating it well thus far without significant hot flashes.  We have also discussed the use of Reclast under the circumstances and she is agreeable to treatment when seen October 29, 2018.   She did have myalgias and arthralgias following Reclast for several days but these then resolved.  As she seen January 21, 2019 she is feeling well and we have discussed weight loss, exercise and also she and her 's recognition of slowly worsening bilateral hand tremor left greater than right.  This been present much before her diagnosis of breast cancer and actually is an issue in several members of her family.   The patient is next seen August 1, 2019.  In the interval she been found to have elevated liver function tests and ultimately liver biopsy that was significant for mild steatohepatitis.  She has been adjusting her diet but indicates that she is also has significant fatigue and while these might be related she believes the fatigue is in part secondary to tamoxifen.  Follow-up testing included total cholesterol 113, triglycerides of 263 with HDL down to 24, VLDL at 52.6, ferritin of 298, normal iron profile, CMP with glucose 193, creatinine 1.36 and hemoglobin A1c of 7.10.  She notes that her fatigue is not improved off tamoxifen.  We have discussed her findings indicating better control needed for her diabetes and thyroid dysfunction and also went on to treat a urinary tract infection.  She did see primary care who adjusted medications for thyroid hypertension.  The patient when seen December 12, 2019 feels considerably better and is thrilled to see the difference in her functioning.  She is having no difficulty with tamoxifen at this point.  The patient was seen by the NP August 7, 2020 feeling well.  She did undergo subsequent mammogram and DEXA scan with the latter  (performed October 14, 2020) demonstrating a lumbar T score -2.4 (increased), left hip femoral neck T score -2.5 (unchanged) and right femoral neck T score of -1.8 (increased).  Again this was substantial increase concerning osteoporosis and bone density particular in the left hip.  The patient is additional mammography October 14 showed no evidence recurrent malignancy.     She is next seen March 15, 2021 again noting that she had started AI therapy August 23, 2018 but when her bone density was consistent with osteoporosis we switched to tamoxifen beginning September 24, 2018.  Fortunately she continues to feel well overall though she did have a fall recently possibly in part related to reaction after recent COVID-19 vaccination-not completed.       The patient is next seen 6/7/2021.  She is feeling well having started OTC iron therapy and vitamin supplements on her own.  Fortunately she is feeling well though has not been following her diet very consistently.  We made plans for the patient be seen back and she is next reviewed 11/11/21.Initially her repeat iron studies were not available but we have had them completed and we find it currently that she is iron deficient with a saturation of 12%, TIBC 561 and iron 67 and with a ferritin that is dropped from  298-62.8.  For follow-up CBC including H&H of 11.5 and 35.6, MCV of 80.7 MCH of 26.1.    The patient is next assessed 3/24/2022.  Her CBC has continued to improve as she is used low-dose iron.  She believes her general performance status is good but she is presently concerned about difficulty with ongoing sleep disturbance.    The patient is next assessed 1/9/2023.  Unfortunately she had fallen in late October striking her left wrist while in [place] and returned to [place] being seen in the emergency room 10/26/2022 with films showing a comminuted impacted fracture involving the distal left radial head as well as a fracture of the ulnar styloid.On  11/3/2022 she underwent open reduction internal fixation of a left three-part distal radius-Acumed standard plate.    The patient has gone on to recover, albeit slowly, and underwent a bone density 12/29/2022 showing osteoporosis lumbar spine with interval decrease in bone density including lumbar T score -3.0 with a 9% interval decrease, left hip density T score -1.3 and right hip density -2.5 again of 12.9% interval decrease.    She is seen back 1/9/2023 and we discussed institution of planned Prolia today.  She is otherwise tolerating tamoxifen well.  Additional testing now includes a normal CBC, no evidence of ongoing iron deficiency.    The patient is next evaluated 7/31/2023.  She underwent hardware removal 2/27/2023.  She had subsequent Medicare wellness visit 6/23/2023 with adjustment of her levothyroxine to 100 mcg daily, Mammographic screening to be scheduled, potentially plans Cologuard.  Repeat CBC is found to be normal.    Past Medical History:   Diagnosis Date    Anxiety     Anxiety and depression     Asthma     SEASONAL ALLERGY RELATED    Cancer of breast 06/2018    LEFT    Chronic kidney disease     Renal cyst, right kidney w/urology workup in past    Depression     Diabetes mellitus     Type 2    Disease of thyroid gland     GERD (gastroesophageal reflux disease)     History of prior pregnancies     x2    History of transfusion     S/P HYST --AUTOLOGOUS     Hx of radiation therapy     Hyperlipidemia     Hypertension     IBS (irritable bowel syndrome)     Seasonal allergies         Past Surgical History:   Procedure Laterality Date    BREAST LUMPECTOMY WITH SENTINEL NODE BIOPSY Left 7/18/2018    Procedure: BREAST LUMPECTOMY WITH SENTINEL NODE BIOPSY;  Surgeon: João Zazueta MD;  Location: [facility] OR;  Service: General    COLONOSCOPY      refusing; negative cologuard 8/24/2017    HYSTERECTOMY  1989    KNEE SURGERY Right 2014    KNEE SURGERY Left 2016    LIVER BIOPSY  2019    fatty liver     ROTATOR CUFF REPAIR Left 2009    ROTATOR CUFF REPAIR Right 2012    TOE SURGERY  2009    ingrown         Current Outpatient Medications on File Prior to Visit   Medication Sig Dispense Refill    albuterol (PROAIR RESPICLICK) 108 (90 Base) MCG/ACT inhaler Inhale 1 puff Every 4 (Four) Hours As Needed for Wheezing or Shortness of Air.      Blood Glucose Monitoring Suppl (Accu-Chek Guide) w/Device kit 1 kit Daily. Dg: E11.65 1 kit 0    calcium carbonate (TUMS) 500 MG chewable tablet Chew 1 tablet Daily.      cetirizine (zyrTEC) 10 MG tablet Take 1 tablet by mouth Daily.      cholecalciferol (VITAMIN D3) 1000 units tablet Take 1 tablet by mouth Every Night.      Continuous Blood Gluc  (Dexcom G5 Mobile ) device 1 kit Daily. 1 each 0    escitalopram (LEXAPRO) 20 MG tablet Take 1 tablet by mouth Daily. 90 tablet 0    fluticasone (FLONASE) 50 MCG/ACT nasal spray 2 sprays into the nostril(s) as directed by provider Daily.      glipizide (GLUCOTROL XL) 5 MG ER tablet TAKE 1 TABLET BY MOUTH TWICE A  tablet 0    glucose blood (Accu-Chek Guide) test strip 1 each by Other route 3 (Three) Times a Day. Dg: E11.65 100 each 1    hydroCHLOROthiazide (HYDRODIURIL) 25 MG tablet TAKE 1 TABLET BY MOUTH DAILY 90 tablet 0    HYDROcodone-acetaminophen (NORCO) 5-325 MG per tablet Take 1 tablet by mouth Every 6 (Six) Hours As Needed for Moderate Pain. 16 tablet 0    [employer] Pen Dawson 31G X 6 MM misc TEST ONCE DAILY 100 each 1    Lancets (accu-chek soft touch) lancets 1 each by Other route 3 (Three) Times a Day. Dg: E11.65 100 each 1    levothyroxine (Synthroid) 125 MCG tablet Take 1 tablet by mouth Daily. 90 tablet 0    loperamide (IMODIUM A-D) 2 MG tablet Take 1 tablet by mouth 4 (Four) Times a Day As Needed for Diarrhea.      Magnesium Oxide -Mg Supplement 400 (240 Mg) MG tablet Take 1 tablet by mouth Daily. 90 tablet 0    melatonin 5 MG tablet tablet Take 2 tablets by mouth At Night As Needed.      metFORMIN ER  (GLUCOPHAGE-XR) 500 MG 24 hr tablet TAKE 2 TABLETS BY MOUTH TWICE A  tablet 0    omeprazole (priLOSEC) 40 MG capsule Take 1 capsule by mouth Daily. 90 capsule 0    propranolol LA (INDERAL LA) 60 MG 24 hr capsule Take 1 capsule by mouth Daily. 90 capsule 0    rosuvastatin (CRESTOR) 20 MG tablet TAKE ONE TABLET BY MOUTH EVERY NIGHT AT BEDTIME 90 tablet 0    Tirzepatide (MOUNJARO) 2.5 MG/0.5ML solution pen-injector pen Inject 0.5 mL under the skin into the appropriate area as directed 1 (One) Time Per Week. 2 mL 1    Tresiba FlexTouch 200 UNIT/ML solution pen-injector pen injection INJECT 56 UNITS SUBCUTANEOUSLY AS DIRECTED DAILY 9 mL 0     Current Facility-Administered Medications on File Prior to Visit   Medication Dose Route Frequency Provider Last Rate Last Admin    [COMPLETED] denosumab (PROLIA) syringe 60 mg  60 mg Subcutaneous Once Yue Mcneill APRN   60 mg at 03/04/24 1423        ALLERGIES:    Allergies   Allergen Reactions    Amlodipine Swelling    Ozempic (0.25 Or 0.5 Mg-Dose) [Semaglutide(0.25 Or 0.5mg-Dos)] Nausea Only    Reclast [Zoledronic Acid] GI Intolerance        Social History     Socioeconomic History    Marital status:      Spouse name: Mahin    Number of children: 2    Years of education: College   Tobacco Use    Smoking status: Never    Smokeless tobacco: Never   Substance and Sexual Activity    Alcohol use: Yes     Alcohol/week: 1.0 standard drink of alcohol     Types: 1 Cans of beer per week     Comment: 2-3 a month, social only    Drug use: No    Sexual activity: Yes     Comment:         Family History   Problem Relation Age of Onset    Diabetes Mother     Heart attack Mother     Heart failure Mother     Hyperlipidemia Mother     Hyperthyroidism Mother         Has surgery    Hypothyroidism Mother         Later in life    Heart disease Mother     Hypertension Mother     Asthma Father     COPD Father     Hypertension Father     Heart attack Maternal Uncle      "Heart failure Maternal Uncle     Depression Maternal Grandmother     Heart attack Maternal Grandfather     Heart failure Maternal Grandfather     Alcohol abuse Paternal Grandmother     Alcohol abuse Paternal Grandfather     Scleroderma Sister     No Known Problems Daughter     No Known Problems Son     Malsharmila Hyperthermia Neg Hx         Review of Systems   ROS as per HPI    Objective     Vitals:    03/04/24 1339   BP: 118/81   Pulse: 85   Resp: 16   Temp: 98 °F (36.7 °C)   TempSrc: Temporal   SpO2: 95%   Weight: 87.4 kg (192 lb 11.2 oz)   Height: 160 cm (62.99\")   PainSc: 0-No pain         3/4/2024     1:39 PM   Current Status   ECOG score 0       Physical Exam   Constitutional: She is oriented to person, place, and time. She appears well-developed.   HENT:   Head: Normocephalic and atraumatic.   Nose: Nose normal.   Eyes: Pupils are equal, round, and reactive to light. Conjunctivae are normal.   Cardiovascular: Normal rate, regular rhythm and normal heart sounds.   Pulmonary/Chest: Effort normal and breath sounds normal.   Abdominal: Soft. Bowel sounds are normal.   Musculoskeletal: Normal range of motion. Tenderness (Left wrist healed wound) present.   Neurological: She is alert and oriented to person, place, and time.   Resting tremor noted left greater than right upper extremities   Skin: Skin is warm and dry.   Psychiatric: Her behavior is normal. Judgment and thought content normal.       Breast exam: Patient status post left breast lumpectomy     RECENT LABS:  Results from last 7 days   Lab Units 03/04/24  1329   WBC 10*3/mm3 6.72   NEUTROS ABS 10*3/mm3 4.87   HEMOGLOBIN g/dL 12.4   HEMATOCRIT % 39.8   PLATELETS 10*3/mm3 249     Results from last 7 days   Lab Units 03/04/24  1329   SODIUM mmol/L 139   POTASSIUM mmol/L 5.2   CHLORIDE mmol/L 99   CO2 mmol/L 27.3   BUN mg/dL 17   CREATININE mg/dL 1.08*   CALCIUM mg/dL 10.1   ALBUMIN g/dL 4.3   BILIRUBIN mg/dL 0.4   ALK PHOS U/L 81   ALT (SGPT) U/L 30   AST (SGOT) " U/L 45*   GLUCOSE mg/dL 273*   MAGNESIUM mg/dL 1.8           BONE MINERAL DENSITOMETRY    October 14, 2020     HISTORY:  70 years-old female. Menopause at age 63. Previous diagnosis  of osteoporosis and breast cancer.     COMPARISON:  09/06/2018.     TECHNIQUE: The T score compares the patient's bone mineral density with  the peak bone mass of young normal patients. Patients with T-scores  between 1.0 and 2.5 standard deviations below the mean are osteopenic.  Patients with T-scores greater than 2.5 standard deviation below the  mean are osteoporotic.     The Z score compares the patient's bone mineral density with sex and age  matched patients. Z score of -2.0 and lower is an indication of low  mineral density for the patient's age.     FINDINGS: Lumbar T score is  -2.4, representing a 17.7% interval  increase..     Left hip density is lowest at the  femoral neck where the T score is  -2.5, unchanged.      Right hip density is lowest at the  femoral neck where the T score is  -1.8, representing a 19.8% interval increase.      IMPRESSION:  Osteoporosis at the left hip. Interval increase in bone  Density.    DEXA Bone Density Axial (12/29/2022 10:30)    Mammo Screening Digital Tomosynthesis Bilateral With CAD (02/19/2024 13:27)     Assessment & Plan       The patient is a 73 y.o. female  with previous medical history of seasonal allergies, diabetes mellitus type 2, CKD3, hypothyroidism, hyperlipidemia, hypertension, IBS, history of anxiety and depression with recent development of left breast abnormality found by the patient herself.      Invasive mammary ductal carcinoma with associated DCIS  This led to mammogram ultrasound and stereotactic biopsy confirming invasive ductal carcinoma grade 2 though ER, WI positive HER-2 negative.    Subsequent assessments via surgical review your no additional abnormalities seen on breast MRI and the patient proceeded to lumpectomy July 18, 2018.  Her pathologic findings were  consistent with a 2.5 cm grade 3 invasive mammary ductal carcinoma with associated DCIS.  The closest margin was 1 mm.  [place] nodes were negative with staging of pT2N0.    Dr. Zazueta saw the patient July 20 recognizing the closest margin was the anterior margin having taken this off the skin with no further removal possible.  There was concern about the need for additional tissue though the addended pathology revealed the DCIS to be intermediate grade.  It was ultimately determined that further surgery was not necessary.  We proceeded with additional assessment including Oncotype and laboratory studies that, fortunate, revealed that she has an excellent prognosis including a 7% risk of recurrence at 10 years with the use of tamoxifen.  Chemotherapy, therefore, is not appropriate and we have discussed an alternative therapy in this postmenopausal patient with AI therapy in particular her Arimidex over 5 years.    Completed radiation therapy  Ultimately due to osteoporosis, Arimidex discontinued with initiation of tamoxifen  She is next seen March 15, 2021 again noting that she had started AI therapy August 23, 2018 but when her bone density was consistent with osteoporosis we switched to tamoxifen beginning September 24, 2018.  Tamoxifen discontinued July 2023 after completing 5 years of therapy  Mammogram 2/19/2024 for benign    2.  Osteoporosis   Mammogram and DEXA scan with the latter (performed October 14, 2020) demonstrating a lumbar T score -2.4 (increased), left hip femoral neck T score -2.5 (unchanged) and right femoral neck T score of -1.8 (increased).  Again this was substantial increase concerning osteoporosis and bone density particular in the left hip.  Bbone density 12/29/2022 showing osteoporosis lumbar spine with interval decrease in bone density including lumbar T score -3.0 with a 9% interval decrease, left hip density T score -1.3 and right hip density -2.5 again of 12.9% interval  decrease.  1/9/2023 and we discussed institution of planned Prolia .  Proceed today with Prolia which is continued every 6 months    3.  Hypothyroidism  Continue on levothyroxine, dose adjusted by her primary care provider.  Current dosing of 125 mcg daily      Plan:  The patient will remain in observation regarding her previous malignancy  Proceed today with Prolia which is continued every 6 months  Annual mammogram reviewed today, due again February 2025  DEXA scan due December 2024  Return in 6 months for CBC, CMP, magnesium, phosphorus, MD follow-up with Dr. Guajardo and probably    Patient continues to talk risk medication requiring close monitoring for toxicity     Yue Mcneill, APRN  03/04/2024

## 2024-03-15 ENCOUNTER — LAB (OUTPATIENT)
Dept: LAB | Facility: HOSPITAL | Age: 74
End: 2024-03-15
Payer: MEDICARE

## 2024-03-15 LAB
ALBUMIN SERPL-MCNC: 4.7 G/DL (ref 3.5–5.2)
ALBUMIN UR-MCNC: <1.2 MG/DL
ALBUMIN/GLOB SERPL: 1.7 G/DL
ALP SERPL-CCNC: 100 U/L (ref 39–117)
ALT SERPL W P-5'-P-CCNC: 32 U/L (ref 1–33)
ANION GAP SERPL CALCULATED.3IONS-SCNC: 12 MMOL/L (ref 5–15)
AST SERPL-CCNC: 35 U/L (ref 1–32)
BILIRUB SERPL-MCNC: 0.5 MG/DL (ref 0–1.2)
BUN SERPL-MCNC: 21 MG/DL (ref 8–23)
BUN/CREAT SERPL: 17.1 (ref 7–25)
CALCIUM SPEC-SCNC: 9.5 MG/DL (ref 8.6–10.5)
CHLORIDE SERPL-SCNC: 101 MMOL/L (ref 98–107)
CHOLEST SERPL-MCNC: 163 MG/DL (ref 0–200)
CO2 SERPL-SCNC: 27 MMOL/L (ref 22–29)
CREAT SERPL-MCNC: 1.23 MG/DL (ref 0.57–1)
CREAT UR-MCNC: 91.9 MG/DL
EGFRCR SERPLBLD CKD-EPI 2021: 46.5 ML/MIN/1.73
GLOBULIN UR ELPH-MCNC: 2.7 GM/DL
GLUCOSE SERPL-MCNC: 196 MG/DL (ref 65–99)
HBA1C MFR BLD: 9.9 % (ref 4.8–5.6)
HDLC SERPL-MCNC: 40 MG/DL (ref 40–60)
LDLC SERPL CALC-MCNC: 89 MG/DL (ref 0–100)
LDLC/HDLC SERPL: 2.08 {RATIO}
MICROALBUMIN/CREAT UR: NORMAL MG/G{CREAT}
POTASSIUM SERPL-SCNC: 5.3 MMOL/L (ref 3.5–5.2)
PROT SERPL-MCNC: 7.4 G/DL (ref 6–8.5)
SODIUM SERPL-SCNC: 140 MMOL/L (ref 136–145)
T4 FREE SERPL-MCNC: 1.5 NG/DL (ref 0.93–1.7)
TRIGL SERPL-MCNC: 199 MG/DL (ref 0–150)
TSH SERPL DL<=0.05 MIU/L-ACNC: 2.5 UIU/ML (ref 0.27–4.2)
VLDLC SERPL-MCNC: 34 MG/DL (ref 5–40)

## 2024-03-15 PROCEDURE — 82570 ASSAY OF URINE CREATININE: CPT | Performed by: FAMILY MEDICINE

## 2024-03-15 PROCEDURE — 80053 COMPREHEN METABOLIC PANEL: CPT | Performed by: FAMILY MEDICINE

## 2024-03-15 PROCEDURE — 83036 HEMOGLOBIN GLYCOSYLATED A1C: CPT | Performed by: FAMILY MEDICINE

## 2024-03-15 PROCEDURE — 84443 ASSAY THYROID STIM HORMONE: CPT | Performed by: FAMILY MEDICINE

## 2024-03-15 PROCEDURE — 82043 UR ALBUMIN QUANTITATIVE: CPT | Performed by: FAMILY MEDICINE

## 2024-03-15 PROCEDURE — 84439 ASSAY OF FREE THYROXINE: CPT | Performed by: FAMILY MEDICINE

## 2024-03-15 PROCEDURE — 80061 LIPID PANEL: CPT | Performed by: FAMILY MEDICINE

## 2024-03-18 DIAGNOSIS — F33.1 MODERATE EPISODE OF RECURRENT MAJOR DEPRESSIVE DISORDER: ICD-10-CM

## 2024-03-18 DIAGNOSIS — E03.9 ACQUIRED HYPOTHYROIDISM: ICD-10-CM

## 2024-03-18 DIAGNOSIS — I10 PRIMARY HYPERTENSION: ICD-10-CM

## 2024-03-18 DIAGNOSIS — E11.8 TYPE 2 DIABETES MELLITUS WITH COMPLICATION, WITHOUT LONG-TERM CURRENT USE OF INSULIN: ICD-10-CM

## 2024-03-18 RX ORDER — GLIPIZIDE 5 MG/1
TABLET, FILM COATED, EXTENDED RELEASE ORAL
Qty: 180 TABLET | Refills: 0 | Status: SHIPPED | OUTPATIENT
Start: 2024-03-18

## 2024-03-18 RX ORDER — LEVOTHYROXINE SODIUM 0.12 MG/1
125 TABLET ORAL DAILY
Qty: 90 TABLET | Refills: 0 | Status: SHIPPED | OUTPATIENT
Start: 2024-03-18

## 2024-03-18 RX ORDER — ESCITALOPRAM OXALATE 20 MG/1
20 TABLET ORAL DAILY
Qty: 90 TABLET | Refills: 0 | Status: SHIPPED | OUTPATIENT
Start: 2024-03-18

## 2024-03-18 RX ORDER — HYDROCHLOROTHIAZIDE 25 MG/1
TABLET ORAL
Qty: 90 TABLET | Refills: 0 | Status: SHIPPED | OUTPATIENT
Start: 2024-03-18

## 2024-03-21 ENCOUNTER — TELEPHONE (OUTPATIENT)
Dept: FAMILY MEDICINE CLINIC | Facility: CLINIC | Age: 74
End: 2024-03-21

## 2024-03-21 ENCOUNTER — OFFICE VISIT (OUTPATIENT)
Dept: FAMILY MEDICINE CLINIC | Facility: CLINIC | Age: 74
End: 2024-03-21
Payer: MEDICARE

## 2024-03-21 VITALS
BODY MASS INDEX: 34.52 KG/M2 | TEMPERATURE: 97.8 F | DIASTOLIC BLOOD PRESSURE: 81 MMHG | HEIGHT: 63 IN | WEIGHT: 194.8 LBS | SYSTOLIC BLOOD PRESSURE: 128 MMHG | HEART RATE: 87 BPM | RESPIRATION RATE: 16 BRPM | OXYGEN SATURATION: 95 %

## 2024-03-21 DIAGNOSIS — Z00.00 MEDICARE ANNUAL WELLNESS VISIT, SUBSEQUENT: Primary | ICD-10-CM

## 2024-03-21 DIAGNOSIS — E11.8 TYPE 2 DIABETES MELLITUS WITH COMPLICATION, WITHOUT LONG-TERM CURRENT USE OF INSULIN: ICD-10-CM

## 2024-03-21 PROCEDURE — G0439 PPPS, SUBSEQ VISIT: HCPCS | Performed by: FAMILY MEDICINE

## 2024-03-21 PROCEDURE — 3046F HEMOGLOBIN A1C LEVEL >9.0%: CPT | Performed by: FAMILY MEDICINE

## 2024-03-21 PROCEDURE — 1170F FXNL STATUS ASSESSED: CPT | Performed by: FAMILY MEDICINE

## 2024-03-21 PROCEDURE — 3079F DIAST BP 80-89 MM HG: CPT | Performed by: FAMILY MEDICINE

## 2024-03-21 PROCEDURE — 3074F SYST BP LT 130 MM HG: CPT | Performed by: FAMILY MEDICINE

## 2024-03-21 RX ORDER — DAPAGLIFLOZIN 5 MG/1
5 TABLET, FILM COATED ORAL DAILY
Qty: 30 TABLET | Refills: 2 | Status: SHIPPED | OUTPATIENT
Start: 2024-03-21

## 2024-03-21 RX ORDER — INSULIN DEGLUDEC 200 U/ML
56 INJECTION, SOLUTION SUBCUTANEOUS NIGHTLY
Qty: 9 ML | Refills: 2 | Status: SHIPPED | OUTPATIENT
Start: 2024-03-21 | End: 2024-03-25 | Stop reason: SDUPTHER

## 2024-03-21 NOTE — PROGRESS NOTES
Subsequent Medicare Wellness Visit   The ABC's of the Annual Wellness Visit    Chief Complaint   Patient presents with    Medicare Wellness-subsequent       HPI:  Mariana Ansari, -1950, is a 74 y.o. female who presents for a Subsequent Medicare Wellness Visit.  She lives with her , she is fully independent with her activities of daily living.  No issues with memory or depressive symptoms are being reported.  Patient recently had fasting blood work done and these results are being reviewed.  Her diabetes is poorly controlled and her hemoglobin A1c went up again.  Patient reports being compliant with her medications including Tresiba, metformin, and glipizide.  She was given previously prescription for Mounjaro, but she did not get this filled due to insurance not covering it.  She is however not compliant with her diabetic diet and she is not eating very healthy.  She is followed by oncology due to her prior diagnosis of breast cancer and she keeps up with her mammograms through that office. Patient does not report any chest pain, shortness of breath, dizziness, nausea, vomiting, or diarrhea, visual issues, headaches, numbness or tingling. No urinary issues reported like urgency, frequency, or discomfort upon urination.  No significant weight changes reported.  No swelling reported.  No rashes or any other skin issues reported. No emotional issues or insomnia.    Recent Hospitalizations:  No hospitalization(s) within the last year..    Current Medical Providers:  Patient Care Team:  Luna Whitfield MD as PCP - General (Family Medicine)  Luna Whitfield MD as PCP - Family Medicine  Michel Guajardo MD as Consulting Physician (Hematology and Oncology)  Luna Whitfield MD as Referring Physician (Family Medicine)    Health Habits and Functional and Cognitive Screening and Depression Screening:      3/21/2024     9:00 AM   Functional & Cognitive Status   Do you have difficulty preparing food and  eating? No   Do you have difficulty bathing yourself, getting dressed or grooming yourself? No   Do you have difficulty using the toilet? No   Do you have difficulty moving around from place to place? No   Do you have trouble with steps or getting out of a bed or a chair? No   Current Diet Diabetic Diet   Dental Exam Up to date   Eye Exam Up to date   Exercise (times per week) 0 times per week   Current Exercises Include No Regular Exercise   Do you need help using the phone?  No   Are you deaf or do you have serious difficulty hearing?  No   Do you need help to go to places out of walking distance? No   Do you need help shopping? No   Do you need help preparing meals?  No   Do you need help with housework?  No   Do you need help with laundry? No   Do you need help taking your medications? No   Do you need help managing money? No   Do you ever drive or ride in a car without wearing a seat belt? No   Have you felt unusual stress, anger or loneliness in the last month? No   Who do you live with? Spouse   If you need help, do you have trouble finding someone available to you? No   Do you have difficulty concentrating, remembering or making decisions? No       Compared to one year ago, the patient feels her physical health is the same and her mental health is the same.    Depression Screen:      3/4/2024     1:39 PM   PHQ-2/PHQ-9 Depression Screening   Little Interest or Pleasure in Doing Things 0-->not at all   Feeling Down, Depressed or Hopeless 0-->not at all   PHQ-9: Brief Depression Severity Measure Score 0         Past Medical/Family/Social History:  The following portions of the patient's history were reviewed and updated as appropriate: allergies, current medications, past family history, past medical history, past social history, past surgical history, and problem list.    Allergies   Allergen Reactions    Amlodipine Swelling    Ozempic (0.25 Or 0.5 Mg-Dose) [Semaglutide(0.25 Or 0.5mg-Dos)] Nausea Only     Reclast [Zoledronic Acid] GI Intolerance         Current Outpatient Medications:     albuterol (PROAIR RESPICLICK) 108 (90 Base) MCG/ACT inhaler, Inhale 1 puff Every 4 (Four) Hours As Needed for Wheezing or Shortness of Air., Disp: 1 each, Rfl: 0    Blood Glucose Monitoring Suppl (Accu-Chek Guide) w/Device kit, 1 kit Daily. Dg: E11.65, Disp: 1 kit, Rfl: 0    calcium carbonate (TUMS) 500 MG chewable tablet, Chew 1 tablet Daily., Disp: , Rfl:     cetirizine (zyrTEC) 10 MG tablet, Take 1 tablet by mouth Daily., Disp: , Rfl:     cholecalciferol (VITAMIN D3) 1000 units tablet, Take 1 tablet by mouth Every Night., Disp: , Rfl:     Continuous Blood Gluc  (Dexcom G5 Mobile ) device, 1 kit Daily., Disp: 1 each, Rfl: 0    escitalopram (LEXAPRO) 20 MG tablet, Take 1 tablet by mouth Daily., Disp: 90 tablet, Rfl: 0    fluticasone (FLONASE) 50 MCG/ACT nasal spray, 2 sprays into the nostril(s) as directed by provider Daily., Disp: , Rfl:     glipizide (GLUCOTROL XL) 5 MG ER tablet, TAKE 1 TABLET BY MOUTH TWICE A DAY, Disp: 180 tablet, Rfl: 0    glucose blood (Accu-Chek Guide) test strip, 1 each by Other route 3 (Three) Times a Day. Dg: E11.65, Disp: 100 each, Rfl: 1    hydroCHLOROthiazide 25 MG tablet, TAKE 1 TABLET BY MOUTH DAILY, Disp: 90 tablet, Rfl: 0    HYDROcodone-acetaminophen (NORCO) 5-325 MG per tablet, Take 1 tablet by mouth Every 6 (Six) Hours As Needed for Moderate Pain., Disp: 16 tablet, Rfl: 0    Insulin Degludec (Tresiba FlexTouch) 200 UNIT/ML solution pen-injector pen injection, Inject 56 Units under the skin into the appropriate area as directed Every Night., Disp: 9 mL, Rfl: 2    [employer] Pen Needles 31G X 6 MM misc, TEST ONCE DAILY, Disp: 100 each, Rfl: 1    Lancets (accu-chek soft touch) lancets, 1 each by Other route 3 (Three) Times a Day. Dg: E11.65, Disp: 100 each, Rfl: 1    levothyroxine (SYNTHROID, LEVOTHROID) 125 MCG tablet, TAKE 1 TABLET BY MOUTH DAILY, Disp: 90 tablet, Rfl: 0    loperamide  (IMODIUM A-D) 2 MG tablet, Take 1 tablet by mouth 4 (Four) Times a Day As Needed for Diarrhea., Disp: , Rfl:     Magnesium Oxide -Mg Supplement 400 (240 Mg) MG tablet, Take 1 tablet by mouth Daily., Disp: 90 tablet, Rfl: 0    melatonin 5 MG tablet tablet, Take 2 tablets by mouth At Night As Needed., Disp: , Rfl:     metFORMIN ER (GLUCOPHAGE-XR) 500 MG 24 hr tablet, TAKE 2 TABLETS BY MOUTH TWICE A DAY, Disp: 360 tablet, Rfl: 0    omeprazole (priLOSEC) 40 MG capsule, Take 1 capsule by mouth Daily., Disp: 90 capsule, Rfl: 0    propranolol LA (INDERAL LA) 60 MG 24 hr capsule, Take 1 capsule by mouth Daily., Disp: 90 capsule, Rfl: 0    rosuvastatin (CRESTOR) 20 MG tablet, TAKE ONE TABLET BY MOUTH EVERY NIGHT AT BEDTIME, Disp: 90 tablet, Rfl: 0    dapagliflozin (Farxiga) 5 MG tablet tablet, Take 1 tablet by mouth Daily., Disp: 30 tablet, Rfl: 2    Aspirin use counseling: Does not need ASA (and currently is not on it)    Current medication list contains no high risk medications.  No harmful drug interactions have been identified.     Family History   Problem Relation Age of Onset    Diabetes Mother     Heart attack Mother     Heart failure Mother     Hyperlipidemia Mother     Hyperthyroidism Mother         Has surgery    Hypothyroidism Mother         Later in life    Heart disease Mother     Hypertension Mother     Asthma Father     COPD Father     Hypertension Father     Heart attack Maternal Uncle     Heart failure Maternal Uncle     Depression Maternal Grandmother     Heart attack Maternal Grandfather     Heart failure Maternal Grandfather     Alcohol abuse Paternal Grandmother     Alcohol abuse Paternal Grandfather     Scleroderma Sister     No Known Problems Daughter     No Known Problems Son     Malig Hyperthermia Neg Hx        Social History     Tobacco Use    Smoking status: Never    Smokeless tobacco: Never   Substance Use Topics    Alcohol use: Yes     Alcohol/week: 1.0 standard drink of alcohol     Types: 1 Cans  of beer per week     Comment: 2-3 a month, social only       Past Surgical History:   Procedure Laterality Date    BREAST LUMPECTOMY WITH SENTINEL NODE BIOPSY Left 7/18/2018    Procedure: BREAST LUMPECTOMY WITH SENTINEL NODE BIOPSY;  Surgeon: João Zazueta MD;  Location: [facility];  Service: General    COLONOSCOPY      refusing; negative cologuard 8/24/2017    HYSTERECTOMY  1989    KNEE SURGERY Right 2014    KNEE SURGERY Left 2016    LIVER BIOPSY  2019    fatty liver    ROTATOR CUFF REPAIR Left 2009    ROTATOR CUFF REPAIR Right 2012    TOE SURGERY  2009    ingrown        Patient Active Problem List   Diagnosis    Malignant neoplasm of upper-outer quadrant of left breast in female, estrogen receptor positive    Type 2 diabetes mellitus with complication, without long-term current use of insulin    High risk medication use    Age-related osteoporosis without current pathological fracture    Tremor of both hands    Other specified anemias    Allergic rhinitis    Asthma    Chronic kidney disease, stage II (mild)    Depression    Medicare annual wellness visit, subsequent    Gastroesophageal reflux disease    Hyperlipidemia    Hypertension    Hypothyroidism    Infiltrating ductal carcinoma of breast    Renal cyst, right    Fatty liver    Vitamin D deficiency    Moderate episode of recurrent major depressive disorder    Colon cancer screening    Combined form of senile cataract    Visit for screening mammogram    Primary insomnia    Sleep disorder    Leg cramps    Hypomagnesemia       Review of Systems   Constitutional:  Negative for activity change, fatigue and fever.   Respiratory:  Negative for shortness of breath and wheezing.    Cardiovascular:  Negative for chest pain, palpitations and leg swelling.   Endocrine: Negative for polydipsia and polyphagia.   Musculoskeletal:  Negative for arthralgias and back pain.   Skin:  Negative for rash.   Neurological:  Negative for tremors and headache.       Objective  "    Vitals:    03/21/24 0949   BP: 128/81   BP Location: Left arm   Patient Position: Sitting   Cuff Size: Large Adult   Pulse: 87   Resp: 16   Temp: 97.8 °F (36.6 °C)   TempSrc: Infrared   SpO2: 95%   Weight: 88.4 kg (194 lb 12.8 oz)   Height: 160 cm (62.99\")   PainSc:   2              No results found.    The patient has no evidence of cognitve impairment.     Physical Exam  Constitutional:       General: She is not in acute distress.     Appearance: Normal appearance. She is well-developed. She is not ill-appearing or diaphoretic.      Comments: Patient is in no distress, patient has normal voice and speech.  Normal respiratory effort.   HENT:      Head: Normocephalic and atraumatic.   Pulmonary:      Effort: Pulmonary effort is normal.   Musculoskeletal:      Cervical back: Normal range of motion and neck supple.   Neurological:      General: No focal deficit present.      Mental Status: She is alert and oriented to person, place, and time. Mental status is at baseline.   Psychiatric:         Mood and Affect: Mood normal.         Recent Lab Results:     Lab Results   Component Value Date    CHOL 163 03/15/2024    TRIG 199 (H) 03/15/2024    HDL 40 03/15/2024    VLDL 34 03/15/2024    LDLHDL 2.08 03/15/2024       Assessment & Plan   Age-appropriate Screening Schedule:  Refer to the list below for future screening recommendations based on patient's age, sex and/or medical conditions.      Health Maintenance   Topic Date Due    DIABETIC FOOT EXAM  11/15/2023    DIABETIC EYE EXAM  11/15/2023    BMI FOLLOWUP  06/23/2024    HEMOGLOBIN A1C  09/15/2024    DXA SCAN  12/29/2024    MAMMOGRAM  02/19/2025    LIPID PANEL  03/15/2025    URINE MICROALBUMIN  03/15/2025    ANNUAL WELLNESS VISIT  03/21/2025    COLORECTAL CANCER SCREENING  06/24/2027    TDAP/TD VACCINES (3 - Td or Tdap) 10/05/2031    HEPATITIS C SCREENING  Completed    COVID-19 Vaccine  Completed    RSV Vaccine - Adults  Completed    INFLUENZA VACCINE  Completed    " Pneumococcal Vaccine 65+  Completed    ZOSTER VACCINE  Completed       Medicare Risks and Personalized Health Plan:  Advance Directive Discussion  Breast Cancer/Mammogram Screening  Cardiovascular risk  Colon Cancer Screening  Dementia/Memory   Depression/Dysphoria  Diabetic Lab Screening   Fall Risk  Immunizations Discussed/Encouraged (specific immunizations; Shingrix, COVID19, and RSV (Respiratory Syncytial Virus) )  Obesity/Overweight   Osteoporosis Risk      CMS-Preventive Services Quick Reference  Medicare Preventive Services Addressed:  Annual Wellness Visit (AWV)  Bone Density Measurements  Cardiovascular Disease Screening Tests (may do this order every 5 years in beneficiaries without signs or symptoms of cardiovascular disease)  Colorectal Cancer Screening, Colonoscopy  Screening Mammography     Advance Care Planning:  ACP discussion was held with the patient during this visit. Patient has an advance directive in EMR which is still valid.     Diagnoses and all orders for this visit:    1. Medicare annual wellness visit, subsequent (Primary)    2. Type 2 diabetes mellitus with complication, without long-term current use of insulin  -     dapagliflozin (Farxiga) 5 MG tablet tablet; Take 1 tablet by mouth Daily.  Dispense: 30 tablet; Refill: 2  -     Insulin Degludec (Tresiba FlexTouch) 200 UNIT/ML solution pen-injector pen injection; Inject 56 Units under the skin into the appropriate area as directed Every Night.  Dispense: 9 mL; Refill: 2  -     Comprehensive Metabolic Panel  -     Hemoglobin A1c    Other orders  -     albuterol (PROAIR RESPICLICK) 108 (90 Base) MCG/ACT inhaler; Inhale 1 puff Every 4 (Four) Hours As Needed for Wheezing or Shortness of Air.  Dispense: 1 each; Refill: 0      Medicare wellness exam was done today.  I reviewed her health maintenance.  Her last mammogram was in 2/2024.  Her last DEXA scan was in 12/2022.  [facility]  10/[facility].  Vaccination was also reviewed and recommended and patient is up to speed with her shots.  I also reviewed and discussed with the patient her recent blood work results.  Her diabetes is poorly controlled, per patient report there is a lot of room for improvement if it comes to her diet.  I will be adding Farxiga to her regimen.  This is indicated from the standpoint of poorly controlled diabetes and also chronic kidney disease.  Compliance with medication and diet was reinforced.  I even suggested for the patient to see the endocrinologist, but she wants to wait at this point until repeat blood work is done in 3 months.  She was also advised to keep up with her diabetic eye exams.      An After Visit Summary and PPPS with all of these plans were given to the patient.      Follow Up:  Return in about 3 months (around 6/21/2024) for Next scheduled follow up.        Requested Prescriptions     Signed Prescriptions Disp Refills    dapagliflozin (Farxiga) 5 MG tablet tablet 30 tablet 2     Sig: Take 1 tablet by mouth Daily.    Insulin Degludec (Tresiba FlexTouch) 200 UNIT/ML solution pen-injector pen injection 9 mL 2     Sig: Inject 56 Units under the skin into the appropriate area as directed Every Night.    albuterol (PROAIR RESPICLICK) 108 (90 Base) MCG/ACT inhaler 1 each 0     Sig: Inhale 1 puff Every 4 (Four) Hours As Needed for Wheezing or Shortness of Air.

## 2024-03-25 ENCOUNTER — TELEPHONE (OUTPATIENT)
Dept: FAMILY MEDICINE CLINIC | Facility: CLINIC | Age: 74
End: 2024-03-25
Payer: MEDICARE

## 2024-03-25 DIAGNOSIS — E11.8 TYPE 2 DIABETES MELLITUS WITH COMPLICATION, WITHOUT LONG-TERM CURRENT USE OF INSULIN: ICD-10-CM

## 2024-03-25 RX ORDER — INSULIN DEGLUDEC 200 U/ML
56 INJECTION, SOLUTION SUBCUTANEOUS NIGHTLY
Qty: 8.4 ML | Refills: 0 | Status: SHIPPED | OUTPATIENT
Start: 2024-03-25 | End: 2024-04-24

## 2024-03-25 NOTE — TELEPHONE ENCOUNTER
Caller: Sherwin Ansari    Relationship: Emergency Contact    Best call back number:    [phone] (Mobile)       Requested Prescriptions:     Insulin Degludec (Tresiba FlexTouch) 200 UNIT/ML solution pen-injector pen injection        Pharmacy where request should be sent:  [facility] [id number] [place] [id number] - [phone]  - [phone] FX     Last office visit with prescribing clinician: 3/21/2024   Last telemedicine visit with prescribing clinician: Visit date not found   Next office visit with prescribing clinician: 6/28/2024     Additional details provided by patient: [facility] PATIENT'S  CALLED TO ADVISE THAT THE CURRENT PRESCRIPTION HAS A 32 DAY SUPPLY ON IT, AND IT NEEDS TO READ WITH A 30 DAY SUPPLY. PATIENT IS BEING CHARGED A 2ND CO-PAY FOR THE EXTRA 2 DAYS. PATIENT HAS A 1 DAY SUPPLY LEFT.      Does the patient have less than a 3 day supply:  [x] Yes  [] No    Would you like a call back once the refill request has been completed: [x] Yes [] No    If the office needs to give you a call back, can they leave a voicemail: [x] Yes [] No    Grzegorz Hall Rep   03/25/24 10:18 EDT         THANKS

## 2024-04-22 ENCOUNTER — TELEPHONE (OUTPATIENT)
Dept: FAMILY MEDICINE CLINIC | Facility: CLINIC | Age: 74
End: 2024-04-22

## 2024-04-22 NOTE — TELEPHONE ENCOUNTER
Caller: Mariana Ansari    Relationship: Self    Best call back number: [phone]    What is the best time to reach you: ANYTIME    Who are you requesting to speak with (clinical staff, provider,  specific staff member): CLINICAL  STAFF     Do you know the name of the person who called: SELF     What was the call regarding: PATIENT WOULD LIKE FOR YOU TO CALL HER REGARDING PRESCRIPTIONS.     Is it okay if the provider responds through MyChart: YES

## 2024-04-26 DIAGNOSIS — E11.8 TYPE 2 DIABETES MELLITUS WITH COMPLICATION, WITHOUT LONG-TERM CURRENT USE OF INSULIN: ICD-10-CM

## 2024-04-26 DIAGNOSIS — I10 PRIMARY HYPERTENSION: ICD-10-CM

## 2024-04-26 RX ORDER — INSULIN DEGLUDEC 200 U/ML
56 INJECTION, SOLUTION SUBCUTANEOUS NIGHTLY
Qty: 8.4 ML | Refills: 0 | Status: SHIPPED | OUTPATIENT
Start: 2024-04-26 | End: 2024-05-26

## 2024-04-26 RX ORDER — LANOLIN ALCOHOL/MO/W.PET/CERES
1 CREAM (GRAM) TOPICAL DAILY
Qty: 90 TABLET | Refills: 0 | Status: SHIPPED | OUTPATIENT
Start: 2024-04-26

## 2024-04-29 NOTE — TELEPHONE ENCOUNTER
Hub staff attempted to follow warm transfer process and was unsuccessful     Caller: Mariana Ansari    Relationship to patient: Self    Best call back number: [phone]    Patient is needing: PATIENT WAS CALLING JASWINDER BACK BUT HUB WAS UNABLE TO WARM TRANSFER. PATIENT IS REQUESTING A CALLBACK TO SPEAK WITH JASWINDER.

## 2024-05-07 DIAGNOSIS — K21.9 GASTROESOPHAGEAL REFLUX DISEASE WITHOUT ESOPHAGITIS: ICD-10-CM

## 2024-05-07 RX ORDER — OMEPRAZOLE 40 MG/1
40 CAPSULE, DELAYED RELEASE ORAL DAILY
Qty: 90 CAPSULE | Refills: 0 | Status: SHIPPED | OUTPATIENT
Start: 2024-05-07

## 2024-05-23 DIAGNOSIS — E78.2 MIXED HYPERLIPIDEMIA: ICD-10-CM

## 2024-05-23 DIAGNOSIS — I10 PRIMARY HYPERTENSION: ICD-10-CM

## 2024-05-23 DIAGNOSIS — E11.65 UNCONTROLLED TYPE 2 DIABETES MELLITUS WITH HYPERGLYCEMIA: ICD-10-CM

## 2024-05-23 RX ORDER — METFORMIN HYDROCHLORIDE 500 MG/1
TABLET, EXTENDED RELEASE ORAL
Qty: 360 TABLET | Refills: 0 | Status: SHIPPED | OUTPATIENT
Start: 2024-05-23

## 2024-05-23 RX ORDER — PROPRANOLOL HCL 60 MG
60 CAPSULE, EXTENDED RELEASE 24HR ORAL DAILY
Qty: 90 CAPSULE | Refills: 0 | Status: SHIPPED | OUTPATIENT
Start: 2024-05-23

## 2024-05-23 RX ORDER — ROSUVASTATIN CALCIUM 20 MG/1
20 TABLET, COATED ORAL
Qty: 90 TABLET | Refills: 0 | Status: SHIPPED | OUTPATIENT
Start: 2024-05-23

## 2024-06-03 RX ORDER — PEN NEEDLE, DIABETIC 31 G X1/4"
NEEDLE, DISPOSABLE MISCELLANEOUS DAILY
Qty: 100 EACH | Refills: 1 | Status: SHIPPED | OUTPATIENT
Start: 2024-06-03

## 2024-06-14 DIAGNOSIS — F33.1 MODERATE EPISODE OF RECURRENT MAJOR DEPRESSIVE DISORDER: ICD-10-CM

## 2024-06-14 DIAGNOSIS — I10 PRIMARY HYPERTENSION: ICD-10-CM

## 2024-06-14 DIAGNOSIS — E11.8 TYPE 2 DIABETES MELLITUS WITH COMPLICATION, WITHOUT LONG-TERM CURRENT USE OF INSULIN: ICD-10-CM

## 2024-06-14 DIAGNOSIS — E03.9 ACQUIRED HYPOTHYROIDISM: ICD-10-CM

## 2024-06-14 RX ORDER — LEVOTHYROXINE SODIUM 0.12 MG/1
125 TABLET ORAL DAILY
Qty: 90 TABLET | Refills: 0 | Status: SHIPPED | OUTPATIENT
Start: 2024-06-14

## 2024-06-14 RX ORDER — ESCITALOPRAM OXALATE 20 MG/1
20 TABLET ORAL DAILY
Qty: 90 TABLET | Refills: 0 | Status: SHIPPED | OUTPATIENT
Start: 2024-06-14

## 2024-06-14 RX ORDER — GLIPIZIDE 5 MG/1
TABLET, FILM COATED, EXTENDED RELEASE ORAL
Qty: 180 TABLET | Refills: 0 | Status: SHIPPED | OUTPATIENT
Start: 2024-06-14

## 2024-06-14 RX ORDER — HYDROCHLOROTHIAZIDE 25 MG/1
TABLET ORAL
Qty: 90 TABLET | Refills: 0 | Status: SHIPPED | OUTPATIENT
Start: 2024-06-14

## 2024-06-18 ENCOUNTER — TELEPHONE (OUTPATIENT)
Dept: FAMILY MEDICINE CLINIC | Facility: CLINIC | Age: 74
End: 2024-06-18

## 2024-06-18 NOTE — TELEPHONE ENCOUNTER
This is actually all what she needs.  Just the orders which already in her chart which is CMP and hemoglobin A1c.  Thank you.

## 2024-06-18 NOTE — TELEPHONE ENCOUNTER
Hub staff attempted to follow warm transfer process and was unsuccessful     Caller: Mariana Ansari    Relationship to patient: Self    Best call back number: [phone]    Patient is needing: SCHEDULE LAB APPOINTMENT FOR, FRIDAY, 6/21. PLEASE CALL PATIENT BACK.

## 2024-06-18 NOTE — TELEPHONE ENCOUNTER
PT WOULD LIKE TO HAVE LABS DONE BEFORE HER MWV. I SAW A COUPLE ORDERS IN FROM MARCH, 2024, BUT I FIGURED THERE WERE MORE YOU'D LIKE TO HAVE DONE. COULD YOU PLEASE PUT THESE ORDERS IN?    THANKS

## 2024-06-21 ENCOUNTER — LAB (OUTPATIENT)
Dept: FAMILY MEDICINE CLINIC | Facility: CLINIC | Age: 74
End: 2024-06-21
Payer: MEDICARE

## 2024-06-21 LAB
ALBUMIN SERPL-MCNC: 4.7 G/DL (ref 3.5–5.2)
ALBUMIN/GLOB SERPL: 1.4 G/DL
ALP SERPL-CCNC: 70 U/L (ref 39–117)
ALT SERPL W P-5'-P-CCNC: 30 U/L (ref 1–33)
ANION GAP SERPL CALCULATED.3IONS-SCNC: 13.1 MMOL/L (ref 5–15)
AST SERPL-CCNC: 25 U/L (ref 1–32)
BILIRUB SERPL-MCNC: 0.4 MG/DL (ref 0–1.2)
BUN SERPL-MCNC: 19 MG/DL (ref 8–23)
BUN/CREAT SERPL: 13.5 (ref 7–25)
CALCIUM SPEC-SCNC: 9.9 MG/DL (ref 8.6–10.5)
CHLORIDE SERPL-SCNC: 100 MMOL/L (ref 98–107)
CO2 SERPL-SCNC: 25.9 MMOL/L (ref 22–29)
CREAT SERPL-MCNC: 1.41 MG/DL (ref 0.57–1)
EGFRCR SERPLBLD CKD-EPI 2021: 39.5 ML/MIN/1.73
GLOBULIN UR ELPH-MCNC: 3.3 GM/DL
GLUCOSE SERPL-MCNC: 164 MG/DL (ref 65–99)
HBA1C MFR BLD: 9.4 % (ref 4.8–5.6)
POTASSIUM SERPL-SCNC: 5.2 MMOL/L (ref 3.5–5.2)
PROT SERPL-MCNC: 8 G/DL (ref 6–8.5)
SODIUM SERPL-SCNC: 139 MMOL/L (ref 136–145)
TSH SERPL DL<=0.05 MIU/L-ACNC: 0.75 UIU/ML (ref 0.27–4.2)

## 2024-06-21 PROCEDURE — 84443 ASSAY THYROID STIM HORMONE: CPT | Performed by: INTERNAL MEDICINE

## 2024-06-21 PROCEDURE — 83036 HEMOGLOBIN GLYCOSYLATED A1C: CPT | Performed by: FAMILY MEDICINE

## 2024-06-21 PROCEDURE — 80053 COMPREHEN METABOLIC PANEL: CPT | Performed by: INTERNAL MEDICINE

## 2024-06-28 ENCOUNTER — OFFICE VISIT (OUTPATIENT)
Dept: FAMILY MEDICINE CLINIC | Facility: CLINIC | Age: 74
End: 2024-06-28
Payer: MEDICARE

## 2024-06-28 VITALS
HEART RATE: 90 BPM | OXYGEN SATURATION: 93 % | TEMPERATURE: 97.5 F | HEIGHT: 63 IN | RESPIRATION RATE: 16 BRPM | DIASTOLIC BLOOD PRESSURE: 82 MMHG | SYSTOLIC BLOOD PRESSURE: 118 MMHG | WEIGHT: 193 LBS | BODY MASS INDEX: 34.2 KG/M2

## 2024-06-28 DIAGNOSIS — E11.65 TYPE 2 DIABETES MELLITUS WITH HYPERGLYCEMIA, WITH LONG-TERM CURRENT USE OF INSULIN: Primary | ICD-10-CM

## 2024-06-28 DIAGNOSIS — Z79.4 TYPE 2 DIABETES MELLITUS WITH HYPERGLYCEMIA, WITH LONG-TERM CURRENT USE OF INSULIN: Primary | ICD-10-CM

## 2024-06-28 DIAGNOSIS — E78.2 MIXED HYPERLIPIDEMIA: ICD-10-CM

## 2024-06-28 DIAGNOSIS — I10 PRIMARY HYPERTENSION: ICD-10-CM

## 2024-06-28 PROCEDURE — 3079F DIAST BP 80-89 MM HG: CPT | Performed by: FAMILY MEDICINE

## 2024-06-28 PROCEDURE — 1125F AMNT PAIN NOTED PAIN PRSNT: CPT | Performed by: FAMILY MEDICINE

## 2024-06-28 PROCEDURE — 3046F HEMOGLOBIN A1C LEVEL >9.0%: CPT | Performed by: FAMILY MEDICINE

## 2024-06-28 PROCEDURE — 99214 OFFICE O/P EST MOD 30 MIN: CPT | Performed by: FAMILY MEDICINE

## 2024-06-28 PROCEDURE — 3074F SYST BP LT 130 MM HG: CPT | Performed by: FAMILY MEDICINE

## 2024-06-28 PROCEDURE — G2211 COMPLEX E/M VISIT ADD ON: HCPCS | Performed by: FAMILY MEDICINE

## 2024-06-28 NOTE — PROGRESS NOTES
Subjective   Chief Complaint   Patient presents with    Follow-up    Diabetes    Hyperlipidemia    Hypertension    Hypothyroidism    Med Refill     Mariana Ansari is a 74 y.o. female.     Patient Care Team:  Luna Whitfield MD as PCP - General (Family Medicine)  Luna Whitfield MD as PCP - Family Medicine  Michel Guajardo MD as Consulting Physician (Hematology and Oncology)  Luna Whitfield MD as Referring Physician (Family Medicine)    History of Present Illness  She is coming in today to follow-up on her chronic medical problems and her medications including diabetes, hypertension, hyperlipidemia, and hypothyroidism.  Patient recently had fasting blood work done and these results are being reviewed today.  She was seen in our office last time 3 months ago at that time she was given some samples of Farxiga, she tells me that she only took couple of pills and stopped taking it as the medication caused some stomach upset.  She is still on Tresiba 56 units once a day, glipizide 2 times a day, and also takes metformin 500 mg 1 pill 2 times a day, however my records indicate that patient should have been taking 2 tablets 2 times a day.  She previously tried Mounjaro and Ozempic and these had to be discontinued due to some GI side effects.  She does not really monitor her blood sugar at home.       The following portions of the patient's history were reviewed and updated as appropriate: allergies, current medications, past family history, past medical history, past social history, past surgical history, and problem list.  Past Medical History:   Diagnosis Date    Anxiety     Anxiety and depression     Asthma     SEASONAL ALLERGY RELATED    Cancer of breast 06/2018    LEFT    Chronic kidney disease     Renal cyst, right kidney w/urology workup in past    Depression     Diabetes mellitus     Type 2    Disease of thyroid gland     GERD (gastroesophageal reflux disease)     History of prior pregnancies     x2     History of transfusion     S/P HYST --AUTOLOGOUS     Hx of radiation therapy     Hyperlipidemia     Hypertension     IBS (irritable bowel syndrome)     Seasonal allergies      Past Surgical History:   Procedure Laterality Date    BREAST LUMPECTOMY WITH SENTINEL NODE BIOPSY Left 7/18/2018    Procedure: BREAST LUMPECTOMY WITH SENTINEL NODE BIOPSY;  Surgeon: João Zazueta MD;  Location: [facility];  Service: General    COLONOSCOPY      refusing; negative cologuard 8/24/2017    HYSTERECTOMY  1989    KNEE SURGERY Right 2014    KNEE SURGERY Left 2016    LIVER BIOPSY  2019    fatty liver    ROTATOR CUFF REPAIR Left 2009    ROTATOR CUFF REPAIR Right 2012    TOE SURGERY  2009    ingrown      The patient has a family history of  Family History   Problem Relation Age of Onset    Diabetes Mother     Heart attack Mother     Heart failure Mother     Hyperlipidemia Mother     Hyperthyroidism Mother         Has surgery    Hypothyroidism Mother         Later in life    Heart disease Mother     Hypertension Mother     Asthma Father     COPD Father     Hypertension Father     Heart attack Maternal Uncle     Heart failure Maternal Uncle     Depression Maternal Grandmother     Heart attack Maternal Grandfather     Heart failure Maternal Grandfather     Alcohol abuse Paternal Grandmother     Alcohol abuse Paternal Grandfather     Scleroderma Sister     No Known Problems Daughter     No Known Problems Son     Malig Hyperthermia Neg Hx      Social History     Socioeconomic History    Marital status:      Spouse name: Mahin    Number of children: 2    Years of education: College   Tobacco Use    Smoking status: Never    Smokeless tobacco: Never   Vaping Use    Vaping status: Never Used   Substance and Sexual Activity    Alcohol use: Yes     Alcohol/week: 1.0 standard drink of alcohol     Types: 1 Cans of beer per week     Comment: 2-3 a month, social only    Drug use: No    Sexual activity: Yes     Comment:   "      Review of Systems   Constitutional:  Negative for activity change, fatigue and fever.   Respiratory:  Negative for shortness of breath and wheezing.    Cardiovascular:  Negative for chest pain, palpitations and leg swelling.   Musculoskeletal:  Negative for arthralgias and back pain.   Skin:  Negative for rash.   Neurological:  Negative for tremors and headache.     Visit Vitals  /82 (BP Location: Left arm, Patient Position: Sitting, Cuff Size: Adult)   Pulse 90   Temp 97.5 °F (36.4 °C) (Infrared)   Resp 16   Ht 160 cm (62.99\")   Wt 87.5 kg (193 lb)   SpO2 93%   BMI 34.20 kg/m²       BMI is >= 30 and <35. (Class 1 Obesity). The following options were offered after discussion;: exercise counseling/recommendations      Current Outpatient Medications:     albuterol (PROAIR RESPICLICK) 108 (90 Base) MCG/ACT inhaler, Inhale 1 puff Every 4 (Four) Hours As Needed for Wheezing or Shortness of Air., Disp: 1 each, Rfl: 0    Blood Glucose Monitoring Suppl (Accu-Chek Guide) w/Device kit, 1 kit Daily. Dg: E11.65, Disp: 1 kit, Rfl: 0    calcium carbonate (TUMS) 500 MG chewable tablet, Chew 1 tablet Daily., Disp: , Rfl:     cetirizine (zyrTEC) 10 MG tablet, Take 1 tablet by mouth Daily., Disp: , Rfl:     cholecalciferol (VITAMIN D3) 1000 units tablet, Take 1 tablet by mouth Every Night., Disp: , Rfl:     Continuous Blood Gluc  (Dexcom G5 Mobile ) device, 1 kit Daily., Disp: 1 each, Rfl: 0    escitalopram (LEXAPRO) 20 MG tablet, TAKE 1 TABLET BY MOUTH DAILY, Disp: 90 tablet, Rfl: 0    fluticasone (FLONASE) 50 MCG/ACT nasal spray, 2 sprays into the nostril(s) as directed by provider Daily., Disp: , Rfl:     glipizide (GLUCOTROL XL) 5 MG ER tablet, TAKE 1 TABLET BY MOUTH TWICE A DAY, Disp: 180 tablet, Rfl: 0    glucose blood (Accu-Chek Guide) test strip, 1 each by Other route 3 (Three) Times a Day. Dg: E11.65, Disp: 100 each, Rfl: 1    hydroCHLOROthiazide 25 MG tablet, TAKE 1 TABLET BY MOUTH DAILY, Disp: " 90 tablet, Rfl: 0    Insulin Degludec (TRESIBA FLEXTOUCH) 200 UNIT/ML solution pen-injector pen injection, Inject 56 Units under the skin into the appropriate area as directed Every Night., Disp: 9 mL, Rfl: 2    [employer] Pen Needles 31G X 6 MM misc, TEST ONCE DAILY, Disp: 100 each, Rfl: 1    Lancets (accu-chek soft touch) lancets, 1 each by Other route 3 (Three) Times a Day. Dg: E11.65, Disp: 100 each, Rfl: 1    levothyroxine (SYNTHROID, LEVOTHROID) 125 MCG tablet, TAKE 1 TABLET BY MOUTH DAILY, Disp: 90 tablet, Rfl: 0    loperamide (IMODIUM A-D) 2 MG tablet, Take 1 tablet by mouth 4 (Four) Times a Day As Needed for Diarrhea., Disp: , Rfl:     Magnesium Oxide -Mg Supplement 400 (240 Mg) MG tablet, Take 1 tablet by mouth Daily., Disp: 90 tablet, Rfl: 0    melatonin 5 MG tablet tablet, Take 2 tablets by mouth At Night As Needed., Disp: , Rfl:     metFORMIN ER (GLUCOPHAGE-XR) 500 MG 24 hr tablet, TAKE 2 TABLETS BY MOUTH TWICE A DAY, Disp: 360 tablet, Rfl: 0    omeprazole (priLOSEC) 40 MG capsule, TAKE 1 CAPSULE BY MOUTH DAILY, Disp: 90 capsule, Rfl: 0    propranolol LA (INDERAL LA) 60 MG 24 hr capsule, Take 1 capsule by mouth Daily., Disp: 90 capsule, Rfl: 0    rosuvastatin (CRESTOR) 20 MG tablet, Take 1 tablet by mouth every night at bedtime., Disp: 90 tablet, Rfl: 0    dapagliflozin (Farxiga) 5 MG tablet tablet, Take 1 tablet by mouth Daily. (Patient not taking: Reported on 6/28/2024), Disp: 30 tablet, Rfl: 2    Objective   Physical Exam  Constitutional:       General: She is not in acute distress.     Appearance: Normal appearance. She is well-developed. She is not ill-appearing or diaphoretic.      Comments: Patient is in no distress, patient has normal voice and speech.  Normal respiratory effort.   HENT:      Head: Normocephalic and atraumatic.   Pulmonary:      Effort: Pulmonary effort is normal.   Musculoskeletal:      Cervical back: Normal range of motion and neck supple.   Neurological:      General: No focal  deficit present.      Mental Status: She is alert and oriented to person, place, and time. Mental status is at baseline.   Psychiatric:         Mood and Affect: Mood normal.         FOLLOWING LABS WERE REVIEWED TODAY:  CMP          3/4/2024    13:29 3/15/2024    12:44 6/21/2024    10:08   CMP   Glucose 273  196  164    BUN 17  21  19    Creatinine 1.08  1.23  1.41    EGFR 54.7  46.5  39.5    Sodium 139  140  139    Potassium 5.2  5.3  5.2    Chloride 99  101  100    Calcium 10.1  9.5  9.9    Total Protein 7.2  7.4  8.0    Albumin 4.3  4.7  4.7    Globulin 2.9  2.7  3.3    Total Bilirubin 0.4  0.5  0.4    Alkaline Phosphatase 81  100  70    AST (SGOT) 45  35  25    ALT (SGPT) 30  32  30    Albumin/Globulin Ratio 1.5  1.7  1.4    BUN/Creatinine Ratio 15.7  17.1  13.5    Anion Gap 12.7  12.0  13.1      Lipid Panel          12/14/2023    10:11 3/15/2024    12:44   Lipid Panel   Total Cholesterol 136  163    Triglycerides 199  199    HDL Cholesterol 34  40    VLDL Cholesterol 33  34    LDL Cholesterol  69  89    LDL/HDL Ratio 1.83  2.08      TSH          12/14/2023    10:11 3/15/2024    12:44 6/21/2024    10:08   TSH   TSH 4.940  2.500  0.747      Most Recent A1C          6/21/2024    10:08   HGBA1C Most Recent   Hemoglobin A1C 9.40          Assessment & Plan   Diagnoses and all orders for this visit:    1. Type 2 diabetes mellitus with hyperglycemia, with long-term current use of insulin (Primary)  -     Cancel: Ambulatory Referral to Endocrinology  -     Ambulatory Referral to Endocrinology  -     Insulin Degludec (TRESIBA FLEXTOUCH) 200 UNIT/ML solution pen-injector pen injection; Inject 56 Units under the skin into the appropriate area as directed Every Night.  Dispense: 9 mL; Refill: 2    2. Primary hypertension  -     propranolol LA (INDERAL LA) 60 MG 24 hr capsule; Take 1 capsule by mouth Daily.  Dispense: 90 capsule; Refill: 0    3. Mixed hyperlipidemia  -     rosuvastatin (CRESTOR) 20 MG tablet; Take 1 tablet by  mouth every night at bedtime.  Dispense: 90 tablet; Refill: 0      I reviewed her medical problems and her medications and I also reviewed and discussed with the patient her recent fasting blood work results.  Her diabetes has been poorly controlled, her hemoglobin A1c is now 9.4, it was 9.9 in 3/2024.  Patient discontinued Farxiga after couple of days due to some possible GI side effects, she wants to however try it again.  She will continue metformin and glipizide as well as Tresiba.  Patient previously tried GLP-1 and GLP-1/GIP agonist category and developed side effects.  I again expressed my concerns about her poorly controlled diabetes and implications on her overall health and increased cardiovascular risk.  Patient previously declined endocrinology referral, but now she agrees to see the endocrinologist and the referral was given.        Return in about 3 months (around 9/28/2024) for Next scheduled follow up.    Requested Prescriptions     Signed Prescriptions Disp Refills    Insulin Degludec (TRESIBA FLEXTOUCH) 200 UNIT/ML solution pen-injector pen injection 9 mL 2     Sig: Inject 56 Units under the skin into the appropriate area as directed Every Night.    rosuvastatin (CRESTOR) 20 MG tablet 90 tablet 0     Sig: Take 1 tablet by mouth every night at bedtime.    propranolol LA (INDERAL LA) 60 MG 24 hr capsule 90 capsule 0     Sig: Take 1 capsule by mouth Daily.       Luna Whitfield MD  06/28/2024

## 2024-06-29 RX ORDER — PROPRANOLOL HCL 60 MG
60 CAPSULE, EXTENDED RELEASE 24HR ORAL DAILY
Qty: 90 CAPSULE | Refills: 0 | Status: SHIPPED | OUTPATIENT
Start: 2024-06-29

## 2024-06-29 RX ORDER — ROSUVASTATIN CALCIUM 20 MG/1
20 TABLET, COATED ORAL
Qty: 90 TABLET | Refills: 0 | Status: SHIPPED | OUTPATIENT
Start: 2024-06-29

## 2024-07-19 ENCOUNTER — TELEPHONE (OUTPATIENT)
Dept: FAMILY MEDICINE CLINIC | Facility: CLINIC | Age: 74
End: 2024-07-19
Payer: MEDICARE

## 2024-07-19 DIAGNOSIS — E11.8 TYPE 2 DIABETES MELLITUS WITH COMPLICATION, WITHOUT LONG-TERM CURRENT USE OF INSULIN: ICD-10-CM

## 2024-07-19 RX ORDER — DAPAGLIFLOZIN 5 MG/1
5 TABLET, FILM COATED ORAL DAILY
Qty: 90 TABLET | Refills: 0 | Status: SHIPPED | OUTPATIENT
Start: 2024-07-19

## 2024-07-19 NOTE — TELEPHONE ENCOUNTER
Please advise the patient that I sent prescription for Farxiga 90-day supply to her pharmacy as requested.  Thank you.

## 2024-07-25 DIAGNOSIS — I10 PRIMARY HYPERTENSION: ICD-10-CM

## 2024-07-25 RX ORDER — LANOLIN ALCOHOL/MO/W.PET/CERES
1 CREAM (GRAM) TOPICAL DAILY
Qty: 90 TABLET | Refills: 0 | Status: SHIPPED | OUTPATIENT
Start: 2024-07-25

## 2024-08-04 DIAGNOSIS — K21.9 GASTROESOPHAGEAL REFLUX DISEASE WITHOUT ESOPHAGITIS: ICD-10-CM

## 2024-08-04 RX ORDER — OMEPRAZOLE 40 MG/1
40 CAPSULE, DELAYED RELEASE ORAL DAILY
Qty: 90 CAPSULE | Refills: 0 | Status: SHIPPED | OUTPATIENT
Start: 2024-08-04

## 2024-08-19 DIAGNOSIS — I10 PRIMARY HYPERTENSION: ICD-10-CM

## 2024-08-19 RX ORDER — PROPRANOLOL HCL 60 MG
60 CAPSULE, EXTENDED RELEASE 24HR ORAL DAILY
Qty: 90 CAPSULE | Refills: 0 | Status: SHIPPED | OUTPATIENT
Start: 2024-08-19

## 2024-09-04 ENCOUNTER — INFUSION (OUTPATIENT)
Dept: ONCOLOGY | Facility: HOSPITAL | Age: 74
End: 2024-09-04
Payer: MEDICARE

## 2024-09-04 ENCOUNTER — LAB (OUTPATIENT)
Dept: LAB | Facility: HOSPITAL | Age: 74
End: 2024-09-04
Payer: MEDICARE

## 2024-09-04 ENCOUNTER — OFFICE VISIT (OUTPATIENT)
Dept: ONCOLOGY | Facility: CLINIC | Age: 74
End: 2024-09-04
Payer: MEDICARE

## 2024-09-04 VITALS
HEART RATE: 85 BPM | DIASTOLIC BLOOD PRESSURE: 75 MMHG | TEMPERATURE: 98.2 F | RESPIRATION RATE: 16 BRPM | WEIGHT: 199 LBS | SYSTOLIC BLOOD PRESSURE: 125 MMHG | HEIGHT: 63 IN | OXYGEN SATURATION: 95 % | BODY MASS INDEX: 35.26 KG/M2

## 2024-09-04 DIAGNOSIS — C50.412 MALIGNANT NEOPLASM OF UPPER-OUTER QUADRANT OF LEFT BREAST IN FEMALE, ESTROGEN RECEPTOR POSITIVE: Primary | ICD-10-CM

## 2024-09-04 DIAGNOSIS — Z17.0 MALIGNANT NEOPLASM OF UPPER-OUTER QUADRANT OF LEFT BREAST IN FEMALE, ESTROGEN RECEPTOR POSITIVE: ICD-10-CM

## 2024-09-04 DIAGNOSIS — M81.8 OTHER OSTEOPOROSIS WITHOUT CURRENT PATHOLOGICAL FRACTURE: ICD-10-CM

## 2024-09-04 DIAGNOSIS — Z17.0 MALIGNANT NEOPLASM OF UPPER-OUTER QUADRANT OF LEFT BREAST IN FEMALE, ESTROGEN RECEPTOR POSITIVE: Primary | ICD-10-CM

## 2024-09-04 DIAGNOSIS — C50.412 MALIGNANT NEOPLASM OF UPPER-OUTER QUADRANT OF LEFT BREAST IN FEMALE, ESTROGEN RECEPTOR POSITIVE: ICD-10-CM

## 2024-09-04 LAB
ALBUMIN SERPL-MCNC: 4.4 G/DL (ref 3.5–5.2)
ALBUMIN/GLOB SERPL: 1.4 G/DL
ALP SERPL-CCNC: 67 U/L (ref 39–117)
ALT SERPL W P-5'-P-CCNC: 25 U/L (ref 1–33)
ANION GAP SERPL CALCULATED.3IONS-SCNC: 13.2 MMOL/L (ref 5–15)
AST SERPL-CCNC: 31 U/L (ref 1–32)
BASOPHILS # BLD AUTO: 0.03 10*3/MM3 (ref 0–0.2)
BASOPHILS NFR BLD AUTO: 0.4 % (ref 0–1.5)
BILIRUB SERPL-MCNC: 0.6 MG/DL (ref 0–1.2)
BUN SERPL-MCNC: 24 MG/DL (ref 8–23)
BUN/CREAT SERPL: 19 (ref 7–25)
CALCIUM SPEC-SCNC: 9.8 MG/DL (ref 8.6–10.5)
CHLORIDE SERPL-SCNC: 97 MMOL/L (ref 98–107)
CO2 SERPL-SCNC: 27.8 MMOL/L (ref 22–29)
CREAT SERPL-MCNC: 1.26 MG/DL (ref 0.57–1)
DEPRECATED RDW RBC AUTO: 44.7 FL (ref 37–54)
EGFRCR SERPLBLD CKD-EPI 2021: 45.2 ML/MIN/1.73
EOSINOPHIL # BLD AUTO: 0.22 10*3/MM3 (ref 0–0.4)
EOSINOPHIL NFR BLD AUTO: 3.3 % (ref 0.3–6.2)
ERYTHROCYTE [DISTWIDTH] IN BLOOD BY AUTOMATED COUNT: 14.8 % (ref 12.3–15.4)
GLOBULIN UR ELPH-MCNC: 3.1 GM/DL
GLUCOSE SERPL-MCNC: 187 MG/DL (ref 65–99)
HCT VFR BLD AUTO: 40 % (ref 34–46.6)
HGB BLD-MCNC: 12.5 G/DL (ref 12–15.9)
IMM GRANULOCYTES # BLD AUTO: 0.03 10*3/MM3 (ref 0–0.05)
IMM GRANULOCYTES NFR BLD AUTO: 0.4 % (ref 0–0.5)
LYMPHOCYTES # BLD AUTO: 1.58 10*3/MM3 (ref 0.7–3.1)
LYMPHOCYTES NFR BLD AUTO: 23.4 % (ref 19.6–45.3)
MAGNESIUM SERPL-MCNC: 2 MG/DL (ref 1.6–2.4)
MCH RBC QN AUTO: 26 PG (ref 26.6–33)
MCHC RBC AUTO-ENTMCNC: 31.3 G/DL (ref 31.5–35.7)
MCV RBC AUTO: 83.2 FL (ref 79–97)
MONOCYTES # BLD AUTO: 0.41 10*3/MM3 (ref 0.1–0.9)
MONOCYTES NFR BLD AUTO: 6.1 % (ref 5–12)
NEUTROPHILS NFR BLD AUTO: 4.48 10*3/MM3 (ref 1.7–7)
NEUTROPHILS NFR BLD AUTO: 66.4 % (ref 42.7–76)
NRBC BLD AUTO-RTO: 0 /100 WBC (ref 0–0.2)
PHOSPHATE SERPL-MCNC: 4.4 MG/DL (ref 2.5–4.5)
PLATELET # BLD AUTO: 289 10*3/MM3 (ref 140–450)
PMV BLD AUTO: 10 FL (ref 6–12)
POTASSIUM SERPL-SCNC: 4.9 MMOL/L (ref 3.5–5.2)
PROT SERPL-MCNC: 7.5 G/DL (ref 6–8.5)
RBC # BLD AUTO: 4.81 10*6/MM3 (ref 3.77–5.28)
SODIUM SERPL-SCNC: 138 MMOL/L (ref 136–145)
WBC NRBC COR # BLD AUTO: 6.75 10*3/MM3 (ref 3.4–10.8)

## 2024-09-04 PROCEDURE — 83735 ASSAY OF MAGNESIUM: CPT

## 2024-09-04 PROCEDURE — 96372 THER/PROPH/DIAG INJ SC/IM: CPT

## 2024-09-04 PROCEDURE — 25010000002 DENOSUMAB 60 MG/ML SOLUTION PREFILLED SYRINGE: Performed by: NURSE PRACTITIONER

## 2024-09-04 PROCEDURE — 85025 COMPLETE CBC W/AUTO DIFF WBC: CPT

## 2024-09-04 PROCEDURE — 3074F SYST BP LT 130 MM HG: CPT | Performed by: NURSE PRACTITIONER

## 2024-09-04 PROCEDURE — 1126F AMNT PAIN NOTED NONE PRSNT: CPT | Performed by: NURSE PRACTITIONER

## 2024-09-04 PROCEDURE — 3078F DIAST BP <80 MM HG: CPT | Performed by: NURSE PRACTITIONER

## 2024-09-04 PROCEDURE — 84100 ASSAY OF PHOSPHORUS: CPT

## 2024-09-04 PROCEDURE — 36415 COLL VENOUS BLD VENIPUNCTURE: CPT

## 2024-09-04 PROCEDURE — 99214 OFFICE O/P EST MOD 30 MIN: CPT | Performed by: NURSE PRACTITIONER

## 2024-09-04 PROCEDURE — 80053 COMPREHEN METABOLIC PANEL: CPT

## 2024-09-04 RX ADMIN — DENOSUMAB 60 MG: 60 INJECTION SUBCUTANEOUS at 13:28

## 2024-09-04 NOTE — PROGRESS NOTES
Subjective     HISTORY OF PRESENT ILLNESS:   The patient is a 74 y.o. female with the above-mentioned history, who returns to the office today for 6-month follow-up and Prolia.  She completed tamoxifen therapy in 2023 and continues otherwise on observation now.  Most recent mammogram performed in February was benign.  We discussed that she will be due for repeat DEXA scan this December.  She notes however that she and her  will be in [place] until early February and we will defer it till then.  She otherwise denies any new concerns regards to her breast.  She is doing well overall denies other concerns today.      ONCOLOGY HISTORY:     Patient is a 73-year-old female with oted in late May 2018 a lump developing in the upper outer quadrant of the left breast without pain or nipple discharge.  This measured approximately 1 cm in size.  Mammogram indicated this asymmetric density in the same region and an ultrasound revealed a 1.8 cm irregular solid mass.  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  Additional history included no previous breast biopsies, a brief period of time with hormonal replacement and no history of breast or ovarian cancer with family.  She was seen by Dr. Zazueta on the 28th an MRI of both breasts was performed July 6.  Within the left anterior one third at the 12:30 position 3 cm from the nipple with irregular enhancing mass measuring 1.6 cm x 1.4 and 2.2 immediately lateral dimension.  There are other findings were seen in the left breast with no evidence of left axillary adenopathy and no findings suspicious for right breast.  The patient proceeded to a left breast lumpectomy July 18, 2018.      Pathologic findings were consistent with a 2.5 cm grade 3 invasive mammary ductal carcinoma with associated DCIS.  The closest margin was 1 mm.  Hull nodes were negative staging of pT2N0.  Dr. Zazueta  saw the patient July 20 recognizing the closest margin was the anterior margin having taken this off the skin with no further removal possible.  There was concern about the need for additional tissue though the addended pathology revealed the DCIS to be intermediate grade.  It was determined not to take additional margins and have her seen by both medical oncology and radiation therapy.  She now presents with her  .      The patient's initial consultation was August 7 and potential adjuvant therapy discussed with the initial recommendation being an Oncotype DX analysis to be process.  This is now reviewed with the patient and her  may return August 23, 2018.  Her recurrence score 10 with 10 year risk of distance recurrence at 7%.  She is clearly in the low risk category additional studies revealing ER score of 10.5, NC score of 8.6 which are both positive and HER-2 score of 8.7 which is negative.   Additional studies include  LH of 27.5, FSH of 57.6, estradiol of 9.9, CA 15-3 of 10.9, TSH of 4.64 hemoglobin A1c of 8.72.   The patient is advised of the findings per her risk of recurrence and she and her  are understandably elated.  Chemotherapy is not recommended in her circumstance but anti-hormonal therapy is and we have discussed potential treatment with AI therapy being the most appropriate choice in this postmenopausal patient.  She's not additionally had any recent assessment for bone density, however we discussed having this done in the near future.     As result the patient was scheduled for bone density and this was obtained September 6 revealing evidence of osteoporosis involving the lumbar with T score of -3.4 and right hip with T score of -2.7.  The patient has been using Arimidex which we'll discontinue and we discussed institution of tamoxifen at this point.  She'll also need assessment of her vitamin D level which we went on to measure documenting a level of 45.5.                                       The patient is now seen a month after switching to tamoxifen and is tolerating it well thus far without significant hot flashes.  We have also discussed the use of Reclast under the circumstances and she is agreeable to treatment when seen October 29, 2018.   She did have myalgias and arthralgias following Reclast for several days but these then resolved.  As she seen January 21, 2019 she is feeling well and we have discussed weight loss, exercise and also she and her 's recognition of slowly worsening bilateral hand tremor left greater than right.  This been present much before her diagnosis of breast cancer and actually is an issue in several members of her family.   The patient is next seen August 1, 2019.  In the interval she been found to have elevated liver function tests and ultimately liver biopsy that was significant for mild steatohepatitis.  She has been adjusting her diet but indicates that she is also has significant fatigue and while these might be related she believes the fatigue is in part secondary to tamoxifen.  Follow-up testing included total cholesterol 113, triglycerides of 263 with HDL down to 24, VLDL at 52.6, ferritin of 298, normal iron profile, CMP with glucose 193, creatinine 1.36 and hemoglobin A1c of 7.10.  She notes that her fatigue is not improved off tamoxifen.  We have discussed her findings indicating better control needed for her diabetes and thyroid dysfunction and also went on to treat a urinary tract infection.  She did see primary care who adjusted medications for thyroid hypertension.  The patient when seen December 12, 2019 feels considerably better and is thrilled to see the difference in her functioning.  She is having no difficulty with tamoxifen at this point.  The patient was seen by the NP August 7, 2020 feeling well.  She did undergo subsequent mammogram and DEXA scan with the latter (performed October 14, 2020) demonstrating a lumbar T score  -2.4 (increased), left hip femoral neck T score -2.5 (unchanged) and right femoral neck T score of -1.8 (increased).  Again this was substantial increase concerning osteoporosis and bone density particular in the left hip.  The patient is additional mammography October 14 showed no evidence recurrent malignancy.     She is next seen March 15, 2021 again noting that she had started AI therapy August 23, 2018 but when her bone density was consistent with osteoporosis we switched to tamoxifen beginning September 24, 2018.  Fortunately she continues to feel well overall though she did have a fall recently possibly in part related to reaction after recent COVID-19 vaccination-not completed.       The patient is next seen 6/7/2021.  She is feeling well having started OTC iron therapy and vitamin supplements on her own.  Fortunately she is feeling well though has not been following her diet very consistently.  We made plans for the patient be seen back and she is next reviewed 11/11/21.Initially her repeat iron studies were not available but we have had them completed and we find it currently that she is iron deficient with a saturation of 12%, TIBC 561 and iron 67 and with a ferritin that is dropped from  298-62.8.  For follow-up CBC including H&H of 11.5 and 35.6, MCV of 80.7 MCH of 26.1.    The patient is next assessed 3/24/2022.  Her CBC has continued to improve as she is used low-dose iron.  She believes her general performance status is good but she is presently concerned about difficulty with ongoing sleep disturbance.    The patient is next assessed 1/9/2023.  Unfortunately she had fallen in late October striking her left wrist while in [place] and returned to [place] being seen in the emergency room 10/26/2022 with films showing a comminuted impacted fracture involving the distal left radial head as well as a fracture of the ulnar styloid.On 11/3/2022 she underwent open reduction internal fixation of a  left three-part distal radius-Acumed standard plate.    The patient has gone on to recover, albeit slowly, and underwent a bone density 12/29/2022 showing osteoporosis lumbar spine with interval decrease in bone density including lumbar T score -3.0 with a 9% interval decrease, left hip density T score -1.3 and right hip density -2.5 again of 12.9% interval decrease.    She is seen back 1/9/2023 and we discussed institution of planned Prolia today.  She is otherwise tolerating tamoxifen well.  Additional testing now includes a normal CBC, no evidence of ongoing iron deficiency.    The patient is next evaluated 7/31/2023.  She underwent hardware removal 2/27/2023.  She had subsequent Medicare wellness visit 6/23/2023 with adjustment of her levothyroxine to 100 mcg daily, Mammographic screening to be scheduled, potentially plans Cologuard.  Repeat CBC is found to be normal.    Past Medical History:   Diagnosis Date    Anxiety     Anxiety and depression     Asthma     SEASONAL ALLERGY RELATED    Cancer of breast 06/2018    LEFT    Chronic kidney disease     Renal cyst, right kidney w/urology workup in past    Depression     Diabetes mellitus     Type 2    Disease of thyroid gland     GERD (gastroesophageal reflux disease)     History of prior pregnancies     x2    History of transfusion     S/P HYST --AUTOLOGOUS     Hx of radiation therapy     Hyperlipidemia     Hypertension     IBS (irritable bowel syndrome)     Seasonal allergies         Past Surgical History:   Procedure Laterality Date    BREAST LUMPECTOMY WITH SENTINEL NODE BIOPSY Left 7/18/2018    Procedure: BREAST LUMPECTOMY WITH SENTINEL NODE BIOPSY;  Surgeon: João Zazueta MD;  Location: [facility];  Service: General    COLONOSCOPY      refusing; negative cologuard 8/24/2017    HYSTERECTOMY  1989    KNEE SURGERY Right 2014    KNEE SURGERY Left 2016    LIVER BIOPSY  2019    fatty liver    ROTATOR CUFF REPAIR Left 2009    ROTATOR CUFF REPAIR Right  2012    TOE SURGERY  2009    ingrown         Current Outpatient Medications on File Prior to Visit   Medication Sig Dispense Refill    albuterol (PROAIR RESPICLICK) 108 (90 Base) MCG/ACT inhaler Inhale 1 puff Every 4 (Four) Hours As Needed for Wheezing or Shortness of Air. 1 each 0    Blood Glucose Monitoring Suppl (Accu-Chek Guide) w/Device kit 1 kit Daily. Dg: E11.65 1 kit 0    calcium carbonate (TUMS) 500 MG chewable tablet Chew 1 tablet Daily.      cetirizine (zyrTEC) 10 MG tablet Take 1 tablet by mouth Daily.      cholecalciferol (VITAMIN D3) 1000 units tablet Take 1 tablet by mouth Every Night.      Continuous Blood Gluc  (Dexcom G5 Mobile ) device 1 kit Daily. 1 each 0    dapagliflozin (Farxiga) 5 MG tablet tablet Take 1 tablet by mouth Daily. 90 tablet 0    escitalopram (LEXAPRO) 20 MG tablet TAKE 1 TABLET BY MOUTH DAILY 90 tablet 0    fluticasone (FLONASE) 50 MCG/ACT nasal spray 2 sprays into the nostril(s) as directed by provider Daily.      glipizide (GLUCOTROL XL) 5 MG ER tablet TAKE 1 TABLET BY MOUTH TWICE A  tablet 0    glucose blood (Accu-Chek Guide) test strip 1 each by Other route 3 (Three) Times a Day. Dg: E11.65 100 each 1    hydroCHLOROthiazide 25 MG tablet TAKE 1 TABLET BY MOUTH DAILY 90 tablet 0    Insulin Degludec (TRESIBA FLEXTOUCH) 200 UNIT/ML solution pen-injector pen injection Inject 56 Units under the skin into the appropriate area as directed Every Night. 9 mL 2    [employer] Pen Needles 31G X 6 MM misc TEST ONCE DAILY 100 each 1    Lancets (accu-chek soft touch) lancets 1 each by Other route 3 (Three) Times a Day. Dg: E11.65 100 each 1    levothyroxine (SYNTHROID, LEVOTHROID) 125 MCG tablet TAKE 1 TABLET BY MOUTH DAILY 90 tablet 0    loperamide (IMODIUM A-D) 2 MG tablet Take 1 tablet by mouth 4 (Four) Times a Day As Needed for Diarrhea.      Magnesium Oxide -Mg Supplement 400 (240 Mg) MG tablet TAKE 1 TABLET BY MOUTH DAILY 90 tablet 0    melatonin 5 MG tablet tablet  Take 2 tablets by mouth At Night As Needed.      metFORMIN ER (GLUCOPHAGE-XR) 500 MG 24 hr tablet TAKE 2 TABLETS BY MOUTH TWICE A  tablet 0    omeprazole (priLOSEC) 40 MG capsule TAKE 1 CAPSULE BY MOUTH DAILY 90 capsule 0    propranolol LA (INDERAL LA) 60 MG 24 hr capsule TAKE 1 CAPSULE BY MOUTH DAILY 90 capsule 0    rosuvastatin (CRESTOR) 20 MG tablet Take 1 tablet by mouth every night at bedtime. 90 tablet 0     Current Facility-Administered Medications on File Prior to Visit   Medication Dose Route Frequency Provider Last Rate Last Admin    [COMPLETED] denosumab (PROLIA) syringe 60 mg  60 mg Subcutaneous Once Mildred Castro APRN   60 mg at 09/04/24 1328        ALLERGIES:    Allergies   Allergen Reactions    Amlodipine Swelling    Ozempic (0.25 Or 0.5 Mg-Dose) [Semaglutide(0.25 Or 0.5mg-Dos)] Nausea Only    Reclast [Zoledronic Acid] GI Intolerance        Social History     Socioeconomic History    Marital status:      Spouse name: Mahin    Number of children: 2    Years of education: College   Tobacco Use    Smoking status: Never    Smokeless tobacco: Never   Vaping Use    Vaping status: Never Used   Substance and Sexual Activity    Alcohol use: Yes     Alcohol/week: 1.0 standard drink of alcohol     Types: 1 Cans of beer per week     Comment: 2-3 a month, social only    Drug use: No    Sexual activity: Yes     Comment:         Family History   Problem Relation Age of Onset    Diabetes Mother     Heart attack Mother     Heart failure Mother     Hyperlipidemia Mother     Hyperthyroidism Mother         Has surgery    Hypothyroidism Mother         Later in life    Heart disease Mother     Hypertension Mother     Asthma Father     COPD Father     Hypertension Father     Heart attack Maternal Uncle     Heart failure Maternal Uncle     Depression Maternal Grandmother     Heart attack Maternal Grandfather     Heart failure Maternal Grandfather     Alcohol abuse Paternal Grandmother      "Alcohol abuse Paternal Grandfather     Scleroderma Sister     No Known Problems Daughter     No Known Problems Son     Ren Hyperthermia Neg Hx         Objective     Vitals:    09/04/24 1256   BP: 125/75   Pulse: 85   Resp: 16   Temp: 98.2 °F (36.8 °C)   TempSrc: Oral   SpO2: 95%   Weight: 90.3 kg (199 lb)   Height: 160 cm (62.99\")   PainSc: 0-No pain         9/4/2024     1:01 PM   Current Status   ECOG score 0       Physical Exam   Constitutional: She is oriented to person, place, and time. She appears well-developed.   HENT:   Head: Normocephalic and atraumatic.   Nose: Nose normal.   Eyes: Pupils are equal, round, and reactive to light. Conjunctivae are normal.   Cardiovascular: Normal rate, regular rhythm and normal heart sounds.   Pulmonary/Chest: Effort normal and breath sounds normal.   Abdominal: Soft. Bowel sounds are normal.   Musculoskeletal: Normal range of motion.   Neurological: She is alert and oriented to person, place, and time.   Resting tremor noted left greater than right upper extremities   Skin: Skin is warm and dry.   Psychiatric: Her behavior is normal. Judgment and thought content normal.       Breast exam: Patient status post left breast lumpectomy with well-healed surgical scars.  No new palpable abnormalities in either breast.  No supraclavicular, infraclavicular, or axillary adenopathy.  Chronic radiation skin changes to the left breast.    RECENT LABS:  Results from last 7 days   Lab Units 09/04/24  1252   WBC 10*3/mm3 6.75   NEUTROS ABS 10*3/mm3 4.48   HEMOGLOBIN g/dL 12.5   HEMATOCRIT % 40.0   PLATELETS 10*3/mm3 289     Results from last 7 days   Lab Units 09/04/24  1252   SODIUM mmol/L 138   POTASSIUM mmol/L 4.9   CHLORIDE mmol/L 97*   CO2 mmol/L 27.8   BUN mg/dL 24*   CREATININE mg/dL 1.26*   CALCIUM mg/dL 9.8   ALBUMIN g/dL 4.4   BILIRUBIN mg/dL 0.6   ALK PHOS U/L 67   ALT (SGPT) U/L 25   AST (SGOT) U/L 31   GLUCOSE mg/dL 187*   MAGNESIUM mg/dL 2.0             BONE MINERAL " DENSITOMETRY    October 14, 2020     HISTORY:  70 years-old female. Menopause at age 63. Previous diagnosis  of osteoporosis and breast cancer.     COMPARISON:  09/06/2018.     TECHNIQUE: The T score compares the patient's bone mineral density with  the peak bone mass of young normal patients. Patients with T-scores  between 1.0 and 2.5 standard deviations below the mean are osteopenic.  Patients with T-scores greater than 2.5 standard deviation below the  mean are osteoporotic.     The Z score compares the patient's bone mineral density with sex and age  matched patients. Z score of -2.0 and lower is an indication of low  mineral density for the patient's age.     FINDINGS: Lumbar T score is  -2.4, representing a 17.7% interval  increase..     Left hip density is lowest at the  femoral neck where the T score is  -2.5, unchanged.      Right hip density is lowest at the  femoral neck where the T score is  -1.8, representing a 19.8% interval increase.      IMPRESSION:  Osteoporosis at the left hip. Interval increase in bone  Density.    DEXA Bone Density Axial (12/29/2022 10:30)    Mammo Screening Digital Tomosynthesis Bilateral With CAD (02/19/2024 13:27)     Assessment & Plan       The patient is a 74 y.o. female  with previous medical history of seasonal allergies, diabetes mellitus type 2, CKD3, hypothyroidism, hyperlipidemia, hypertension, IBS, history of anxiety and depression with recent development of left breast abnormality found by the patient herself.      Invasive mammary ductal carcinoma of the LEFT breast with associated DCIS  This led to mammogram ultrasound and stereotactic biopsy confirming invasive ductal carcinoma grade 2 though ER, DC positive HER-2 negative.    Subsequent assessments via surgical review your no additional abnormalities seen on breast MRI and the patient proceeded to lumpectomy July 18, 2018.  Her pathologic findings were consistent with a 2.5 cm grade 3 invasive mammary ductal  carcinoma with associated DCIS.  The closest margin was 1 mm.  [place] nodes were negative with staging of pT2N0.    Dr. Zazueta saw the patient July 20 recognizing the closest margin was the anterior margin having taken this off the skin with no further removal possible.  There was concern about the need for additional tissue though the addended pathology revealed the DCIS to be intermediate grade.  It was ultimately determined that further surgery was not necessary.  We proceeded with additional assessment including Oncotype and laboratory studies that, fortunate, revealed that she has an excellent prognosis including a 7% risk of recurrence at 10 years with the use of tamoxifen.  Chemotherapy, therefore, is not appropriate and we have discussed an alternative therapy in this postmenopausal patient with AI therapy in particular her Arimidex over 5 years.    Completed radiation therapy  Ultimately due to osteoporosis, Arimidex discontinued with initiation of tamoxifen  She is next seen March 15, 2021 again noting that she had started AI therapy August 23, 2018 but when her bone density was consistent with osteoporosis we switched to tamoxifen beginning September 24, 2018.  Tamoxifen discontinued July 2023 after completing 5 years of therapy  Mammogram 2/19/2024 benign  9/4/2024 MYNOR on exam.    2.  Osteoporosis   Mammogram and DEXA scan with the latter (performed October 14, 2020) demonstrating a lumbar T score -2.4 (increased), left hip femoral neck T score -2.5 (unchanged) and right femoral neck T score of -1.8 (increased).  Again this was substantial increase concerning osteoporosis and bone density particular in the left hip.  Bbone density 12/29/2022 showing osteoporosis lumbar spine with interval decrease in bone density including lumbar T score -3.0 with a 9% interval decrease, left hip density T score -1.3 and right hip density -2.5 again of 12.9% interval decrease.  1/9/2023 and we discussed institution  of planned Prolia .  Proceed today with Prolia which is continued every 6 months.  Next DEXA scan due December 2024.    3.  Hypothyroidism  Continue on levothyroxine, dose adjusted by her primary care provider.  Current dosing of 125 mcg daily      Plan:  The patient will remain in observation regarding her previous malignancy  Proceed today with Prolia which is continued every 6 months  Annual mammogram due again in February 2025, ordered through her PCP.    DEXA scan due December 2024, however patient and her  will be in [place] until February 2025 and we will defer till then.  Order placed today.  Return in 6 months for follow-up with Dr. Guajardo with CBC, CMP, magnesium, phosphorus and schedule Prolia.  Reviewed DEXA scan and mammogram at that visit.  Patient encouraged to call with any questions or concerns prior to scheduled return.      This patient is on high risk drug therapy requiring intensive monitoring for toxicity.         Mildred Castro, APRN  09/04/2024

## 2024-09-09 ENCOUNTER — TELEPHONE (OUTPATIENT)
Dept: FAMILY MEDICINE CLINIC | Facility: CLINIC | Age: 74
End: 2024-09-09
Payer: MEDICARE

## 2024-09-09 DIAGNOSIS — Z79.4 TYPE 2 DIABETES MELLITUS WITH HYPERGLYCEMIA, WITH LONG-TERM CURRENT USE OF INSULIN: Primary | ICD-10-CM

## 2024-09-09 DIAGNOSIS — E78.2 MIXED HYPERLIPIDEMIA: ICD-10-CM

## 2024-09-09 DIAGNOSIS — E11.65 TYPE 2 DIABETES MELLITUS WITH HYPERGLYCEMIA, WITH LONG-TERM CURRENT USE OF INSULIN: Primary | ICD-10-CM

## 2024-09-09 DIAGNOSIS — E03.9 ACQUIRED HYPOTHYROIDISM: ICD-10-CM

## 2024-09-09 NOTE — TELEPHONE ENCOUNTER
Caller: Mariana Ansari    Relationship: Self    Best call back number: [phone]    What orders are you requesting (i.e. lab or imaging):   3 MONTH FOLLOW UP LABS    In what timeframe would the patient need to come in: PRIOR TO 11.20.24    Where will you receive your lab/imaging services: IN OFFICE    Additional notes:   PLEASE CALL TO SCHEDULE.

## 2024-09-12 DIAGNOSIS — F33.1 MODERATE EPISODE OF RECURRENT MAJOR DEPRESSIVE DISORDER: ICD-10-CM

## 2024-09-12 DIAGNOSIS — E11.8 TYPE 2 DIABETES MELLITUS WITH COMPLICATION, WITHOUT LONG-TERM CURRENT USE OF INSULIN: ICD-10-CM

## 2024-09-12 DIAGNOSIS — I10 PRIMARY HYPERTENSION: ICD-10-CM

## 2024-09-12 DIAGNOSIS — E03.9 ACQUIRED HYPOTHYROIDISM: ICD-10-CM

## 2024-09-12 RX ORDER — GLIPIZIDE 5 MG/1
TABLET, FILM COATED, EXTENDED RELEASE ORAL
Qty: 180 TABLET | Refills: 0 | Status: SHIPPED | OUTPATIENT
Start: 2024-09-12 | End: 2024-09-16

## 2024-09-12 RX ORDER — LEVOTHYROXINE SODIUM 125 UG/1
125 TABLET ORAL DAILY
Qty: 90 TABLET | Refills: 0 | Status: SHIPPED | OUTPATIENT
Start: 2024-09-12

## 2024-09-12 RX ORDER — HYDROCHLOROTHIAZIDE 25 MG/1
TABLET ORAL
Qty: 90 TABLET | Refills: 0 | Status: SHIPPED | OUTPATIENT
Start: 2024-09-12

## 2024-09-12 RX ORDER — ESCITALOPRAM OXALATE 20 MG/1
20 TABLET ORAL DAILY
Qty: 90 TABLET | Refills: 0 | Status: SHIPPED | OUTPATIENT
Start: 2024-09-12

## 2024-09-16 ENCOUNTER — PATIENT ROUNDING (BHMG ONLY) (OUTPATIENT)
Dept: ENDOCRINOLOGY | Facility: CLINIC | Age: 74
End: 2024-09-16
Payer: MEDICARE

## 2024-09-16 ENCOUNTER — OFFICE VISIT (OUTPATIENT)
Dept: ENDOCRINOLOGY | Facility: CLINIC | Age: 74
End: 2024-09-16
Payer: MEDICARE

## 2024-09-16 VITALS
WEIGHT: 196 LBS | DIASTOLIC BLOOD PRESSURE: 76 MMHG | SYSTOLIC BLOOD PRESSURE: 140 MMHG | OXYGEN SATURATION: 96 % | BODY MASS INDEX: 34.73 KG/M2 | HEIGHT: 63 IN | HEART RATE: 95 BPM

## 2024-09-16 DIAGNOSIS — E11.42 TYPE 2 DIABETES MELLITUS WITH DIABETIC POLYNEUROPATHY, WITH LONG-TERM CURRENT USE OF INSULIN: Primary | ICD-10-CM

## 2024-09-16 DIAGNOSIS — E55.9 VITAMIN D DEFICIENCY: ICD-10-CM

## 2024-09-16 DIAGNOSIS — E03.9 ACQUIRED HYPOTHYROIDISM: ICD-10-CM

## 2024-09-16 DIAGNOSIS — E78.2 MIXED HYPERLIPIDEMIA: ICD-10-CM

## 2024-09-16 DIAGNOSIS — Z79.4 TYPE 2 DIABETES MELLITUS WITH DIABETIC POLYNEUROPATHY, WITH LONG-TERM CURRENT USE OF INSULIN: Primary | ICD-10-CM

## 2024-09-16 LAB — GLUCOSE BLDC GLUCOMTR-MCNC: 213 MG/DL (ref 70–105)

## 2024-09-16 PROCEDURE — 82948 REAGENT STRIP/BLOOD GLUCOSE: CPT | Performed by: INTERNAL MEDICINE

## 2024-09-16 RX ORDER — METFORMIN HCL 500 MG
TABLET, EXTENDED RELEASE 24 HR ORAL
Start: 2024-09-16

## 2024-09-16 RX ORDER — GLIPIZIDE 5 MG/1
TABLET, FILM COATED, EXTENDED RELEASE ORAL
Start: 2024-09-16

## 2024-09-16 RX ORDER — DAPAGLIFLOZIN 10 MG/1
TABLET, FILM COATED ORAL
Qty: 90 TABLET | Refills: 3 | Status: SHIPPED | OUTPATIENT
Start: 2024-09-16

## 2024-09-22 PROBLEM — E11.8 TYPE 2 DIABETES MELLITUS WITH COMPLICATION, WITHOUT LONG-TERM CURRENT USE OF INSULIN: Status: ACTIVE | Noted: 2019-08-01

## 2024-09-26 ENCOUNTER — TELEPHONE (OUTPATIENT)
Dept: ENDOCRINOLOGY | Facility: CLINIC | Age: 74
End: 2024-09-26
Payer: MEDICARE

## 2024-09-26 DIAGNOSIS — Z79.4 TYPE 2 DIABETES MELLITUS WITH DIABETIC POLYNEUROPATHY, WITH LONG-TERM CURRENT USE OF INSULIN: Primary | ICD-10-CM

## 2024-09-26 DIAGNOSIS — E11.42 TYPE 2 DIABETES MELLITUS WITH DIABETIC POLYNEUROPATHY, WITH LONG-TERM CURRENT USE OF INSULIN: Primary | ICD-10-CM

## 2024-09-26 RX ORDER — KETOROLAC TROMETHAMINE 30 MG/ML
1 INJECTION, SOLUTION INTRAMUSCULAR; INTRAVENOUS SEE ADMIN INSTRUCTIONS
Qty: 1 EACH | Refills: 0 | Status: SHIPPED | OUTPATIENT
Start: 2024-09-26

## 2024-09-26 RX ORDER — ACYCLOVIR 400 MG/1
1 TABLET ORAL
Qty: 9 EACH | Refills: 3 | Status: CANCELLED | OUTPATIENT
Start: 2024-09-26

## 2024-09-26 RX ORDER — ACYCLOVIR 400 MG/1
1 TABLET ORAL SEE ADMIN INSTRUCTIONS
Qty: 1 EACH | Refills: 0 | Status: CANCELLED | OUTPATIENT
Start: 2024-09-26

## 2024-09-26 RX ORDER — BLOOD-GLUCOSE SENSOR
EACH MISCELLANEOUS
Qty: 6 EACH | Refills: 3 | Status: SHIPPED | OUTPATIENT
Start: 2024-09-26

## 2024-10-26 DIAGNOSIS — I10 PRIMARY HYPERTENSION: ICD-10-CM

## 2024-10-26 RX ORDER — LANOLIN ALCOHOL/MO/W.PET/CERES
1 CREAM (GRAM) TOPICAL DAILY
Qty: 90 TABLET | Refills: 0 | Status: SHIPPED | OUTPATIENT
Start: 2024-10-26

## 2024-11-03 DIAGNOSIS — K21.9 GASTROESOPHAGEAL REFLUX DISEASE WITHOUT ESOPHAGITIS: ICD-10-CM

## 2024-11-03 DIAGNOSIS — I10 PRIMARY HYPERTENSION: ICD-10-CM

## 2024-11-03 RX ORDER — PROPRANOLOL HYDROCHLORIDE 60 MG/1
60 CAPSULE, EXTENDED RELEASE ORAL DAILY
Qty: 90 CAPSULE | Refills: 0 | Status: SHIPPED | OUTPATIENT
Start: 2024-11-03

## 2024-11-03 RX ORDER — OMEPRAZOLE 40 MG/1
40 CAPSULE, DELAYED RELEASE ORAL DAILY
Qty: 90 CAPSULE | Refills: 0 | Status: SHIPPED | OUTPATIENT
Start: 2024-11-03

## 2024-11-07 DIAGNOSIS — E11.65 TYPE 2 DIABETES MELLITUS WITH HYPERGLYCEMIA, WITH LONG-TERM CURRENT USE OF INSULIN: ICD-10-CM

## 2024-11-07 DIAGNOSIS — Z79.4 TYPE 2 DIABETES MELLITUS WITH HYPERGLYCEMIA, WITH LONG-TERM CURRENT USE OF INSULIN: ICD-10-CM

## 2024-11-08 RX ORDER — INSULIN DEGLUDEC 200 U/ML
INJECTION, SOLUTION SUBCUTANEOUS
Qty: 9 ML | Refills: 2 | Status: SHIPPED | OUTPATIENT
Start: 2024-11-08

## 2024-11-13 ENCOUNTER — LAB (OUTPATIENT)
Dept: FAMILY MEDICINE CLINIC | Facility: CLINIC | Age: 74
End: 2024-11-13
Payer: MEDICARE

## 2024-11-13 LAB
ALBUMIN SERPL-MCNC: 4.5 G/DL (ref 3.5–5.2)
ALBUMIN/GLOB SERPL: 1.5 G/DL
ALP SERPL-CCNC: 76 U/L (ref 39–117)
ALT SERPL W P-5'-P-CCNC: 26 U/L (ref 1–33)
ANION GAP SERPL CALCULATED.3IONS-SCNC: 16 MMOL/L (ref 5–15)
AST SERPL-CCNC: 21 U/L (ref 1–32)
BILIRUB SERPL-MCNC: 0.4 MG/DL (ref 0–1.2)
BUN SERPL-MCNC: 27 MG/DL (ref 8–23)
BUN/CREAT SERPL: 22.3 (ref 7–25)
CALCIUM SPEC-SCNC: 10.3 MG/DL (ref 8.6–10.5)
CHLORIDE SERPL-SCNC: 95 MMOL/L (ref 98–107)
CHOLEST SERPL-MCNC: 157 MG/DL (ref 0–200)
CO2 SERPL-SCNC: 28 MMOL/L (ref 22–29)
CREAT SERPL-MCNC: 1.21 MG/DL (ref 0.57–1)
EGFRCR SERPLBLD CKD-EPI 2021: 47.4 ML/MIN/1.73
GLOBULIN UR ELPH-MCNC: 3.1 GM/DL
GLUCOSE SERPL-MCNC: 159 MG/DL (ref 65–99)
HBA1C MFR BLD: 9.5 % (ref 4.8–5.6)
HDLC SERPL-MCNC: 34 MG/DL (ref 40–60)
LDLC SERPL CALC-MCNC: 73 MG/DL (ref 0–100)
LDLC/HDLC SERPL: 1.79 {RATIO}
POTASSIUM SERPL-SCNC: 4.3 MMOL/L (ref 3.5–5.2)
PROT SERPL-MCNC: 7.6 G/DL (ref 6–8.5)
SODIUM SERPL-SCNC: 139 MMOL/L (ref 136–145)
T4 FREE SERPL-MCNC: 1.58 NG/DL (ref 0.92–1.68)
TRIGL SERPL-MCNC: 311 MG/DL (ref 0–150)
TSH SERPL DL<=0.05 MIU/L-ACNC: 1.5 UIU/ML (ref 0.27–4.2)
VLDLC SERPL-MCNC: 50 MG/DL (ref 5–40)

## 2024-11-13 PROCEDURE — 84443 ASSAY THYROID STIM HORMONE: CPT | Performed by: FAMILY MEDICINE

## 2024-11-13 PROCEDURE — 83036 HEMOGLOBIN GLYCOSYLATED A1C: CPT | Performed by: FAMILY MEDICINE

## 2024-11-13 PROCEDURE — 84439 ASSAY OF FREE THYROXINE: CPT | Performed by: FAMILY MEDICINE

## 2024-11-13 PROCEDURE — 80061 LIPID PANEL: CPT | Performed by: FAMILY MEDICINE

## 2024-11-13 PROCEDURE — 80053 COMPREHEN METABOLIC PANEL: CPT | Performed by: FAMILY MEDICINE

## 2024-11-20 ENCOUNTER — OFFICE VISIT (OUTPATIENT)
Dept: FAMILY MEDICINE CLINIC | Facility: CLINIC | Age: 74
End: 2024-11-20
Payer: MEDICARE

## 2024-11-20 VITALS
HEART RATE: 86 BPM | HEIGHT: 63 IN | OXYGEN SATURATION: 93 % | BODY MASS INDEX: 34.84 KG/M2 | SYSTOLIC BLOOD PRESSURE: 148 MMHG | DIASTOLIC BLOOD PRESSURE: 89 MMHG | RESPIRATION RATE: 18 BRPM | WEIGHT: 196.6 LBS | TEMPERATURE: 98.3 F

## 2024-11-20 DIAGNOSIS — E11.42 TYPE 2 DIABETES MELLITUS WITH DIABETIC POLYNEUROPATHY, WITH LONG-TERM CURRENT USE OF INSULIN: Primary | ICD-10-CM

## 2024-11-20 DIAGNOSIS — E03.9 ACQUIRED HYPOTHYROIDISM: ICD-10-CM

## 2024-11-20 DIAGNOSIS — F33.1 MODERATE EPISODE OF RECURRENT MAJOR DEPRESSIVE DISORDER: ICD-10-CM

## 2024-11-20 DIAGNOSIS — E78.2 MIXED HYPERLIPIDEMIA: ICD-10-CM

## 2024-11-20 DIAGNOSIS — Z79.4 TYPE 2 DIABETES MELLITUS WITH DIABETIC POLYNEUROPATHY, WITH LONG-TERM CURRENT USE OF INSULIN: Primary | ICD-10-CM

## 2024-11-20 DIAGNOSIS — I10 PRIMARY HYPERTENSION: ICD-10-CM

## 2024-11-20 RX ORDER — ESCITALOPRAM OXALATE 20 MG/1
20 TABLET ORAL DAILY
Qty: 90 TABLET | Refills: 0 | Status: SHIPPED | OUTPATIENT
Start: 2024-11-20

## 2024-11-20 RX ORDER — KETOROLAC TROMETHAMINE 30 MG/ML
1 INJECTION, SOLUTION INTRAMUSCULAR; INTRAVENOUS SEE ADMIN INSTRUCTIONS
Qty: 1 EACH | Refills: 0 | Status: SHIPPED | OUTPATIENT
Start: 2024-11-20

## 2024-11-20 RX ORDER — LEVOTHYROXINE SODIUM 125 UG/1
125 TABLET ORAL DAILY
Qty: 90 TABLET | Refills: 0 | Status: SHIPPED | OUTPATIENT
Start: 2024-11-20

## 2024-11-20 RX ORDER — BLOOD-GLUCOSE SENSOR
EACH MISCELLANEOUS
Qty: 6 EACH | Refills: 3 | Status: SHIPPED | OUTPATIENT
Start: 2024-11-20

## 2024-11-20 RX ORDER — HYDROCHLOROTHIAZIDE 25 MG/1
25 TABLET ORAL DAILY
Qty: 90 TABLET | Refills: 0 | Status: SHIPPED | OUTPATIENT
Start: 2024-11-20

## 2024-11-20 RX ORDER — ROSUVASTATIN CALCIUM 20 MG/1
20 TABLET, COATED ORAL
Qty: 90 TABLET | Refills: 0 | Status: SHIPPED | OUTPATIENT
Start: 2024-11-20 | End: 2024-11-24

## 2024-11-20 RX ORDER — TIRZEPATIDE 2.5 MG/.5ML
2.5 INJECTION, SOLUTION SUBCUTANEOUS WEEKLY
Qty: 2 ML | Refills: 0 | Status: SHIPPED | OUTPATIENT
Start: 2024-11-20

## 2024-11-20 NOTE — PROGRESS NOTES
Subjective   Chief Complaint   Patient presents with    Follow-up    Diabetes    Hyperlipidemia    Hypertension    Hypothyroidism    Depression    Med Refill     Mariana Ansari is a 74 y.o. female.     Patient Care Team:  Luna Whitfield MD as PCP - General (Family Medicine)  Luna Whitfield MD as PCP - Family Medicine  Michel Guajardo MD as Consulting Physician (Hematology and Oncology)  Luna Whitfield MD as Referring Physician (Family Medicine)    History of Present Illness  She is coming in today to follow-up on her chronic medical problems and her medications including diabetes, hypertension, hyperlipidemia, hypothyroidism, and depression.  She recently had blood work done and these results are being reviewed and discussed today with the patient.  Patient has been struggling with poorly controlled diabetes for several years now, she was previously referred to see the endocrinologist and she went for the appointment couple of months ago, but she tells me that she does not want to go back and wants the diabetes to be managed through our office.  Patient is currently on Tresiba, metformin, Farxiga, and glipizide.  She tells me that her endocrinologist increased her Farxiga from 5 mg to 10 mg.  Her hemoglobin A1c again is noted to be quite high.  Patient was previously on Ozempic, but she had some nausea with it and decided to discontinue, she tells me that she is open to reintroduce some new medication and even possibly try Ozempic again.  She is really trying to lose weight and has been struggling with that for some time.       The following portions of the patient's history were reviewed and updated as appropriate: allergies, current medications, past family history, past medical history, past social history, past surgical history, and problem list.  Past Medical History:   Diagnosis Date    Anxiety     Anxiety and depression     Asthma     SEASONAL ALLERGY RELATED    Cancer of breast 06/2018    LEFT     Chronic kidney disease     Renal cyst, right kidney w/urology workup in past    Depression     Diabetes mellitus     Type 2    Disease of thyroid gland     GERD (gastroesophageal reflux disease)     History of prior pregnancies     x2    History of transfusion     S/P HYST --AUTOLOGOUS     Hx of radiation therapy     Hyperlipidemia     Hypertension     IBS (irritable bowel syndrome)     Seasonal allergies     Thyroid disease      Past Surgical History:   Procedure Laterality Date    BREAST LUMPECTOMY WITH SENTINEL NODE BIOPSY Left 07/18/2018    Procedure: BREAST LUMPECTOMY WITH SENTINEL NODE BIOPSY;  Surgeon: João Zazueta MD;  Location: [facility] OR;  Service: General    COLONOSCOPY      refusing; negative cologuard 8/24/2017    HYSTERECTOMY  1989    KNEE SURGERY Right 2014    KNEE SURGERY Left 2016    LIVER BIOPSY  2019    fatty liver    ROTATOR CUFF REPAIR Left 2009    ROTATOR CUFF REPAIR Right 2012    TOE SURGERY  2009    ingrown     WRIST SURGERY  2022     The patient has a family history of  Family History   Problem Relation Age of Onset    Diabetes Mother     Heart attack Mother     Heart failure Mother     Hyperlipidemia Mother     Hyperthyroidism Mother         Has surgery    Hypothyroidism Mother         Later in life    Heart disease Mother     Hypertension Mother     Asthma Father     COPD Father     Hypertension Father     Heart attack Maternal Uncle     Heart failure Maternal Uncle     Depression Maternal Grandmother     Heart attack Maternal Grandfather     Heart failure Maternal Grandfather     Alcohol abuse Paternal Grandmother     Alcohol abuse Paternal Grandfather     Scleroderma Sister     No Known Problems Daughter     No Known Problems Son     Malsharmila Hyperthermia Neg Hx      Social History     Socioeconomic History    Marital status:      Spouse name: Mahin    Number of children: 2    Years of education: College   Tobacco Use    Smoking status: Never    Smokeless tobacco:  "Never   Vaping Use    Vaping status: Never Used   Substance and Sexual Activity    Alcohol use: Yes     Alcohol/week: 1.0 standard drink of alcohol     Types: 1 Cans of beer per week     Comment: 2-3 a month, social only    Drug use: No    Sexual activity: Yes     Comment:        Review of Systems   Constitutional:  Negative for activity change, fatigue and fever.   Respiratory:  Negative for shortness of breath and wheezing.    Cardiovascular:  Negative for chest pain, palpitations and leg swelling.   Endocrine: Negative for polydipsia and polyphagia.   Musculoskeletal:  Negative for arthralgias and back pain.   Skin:  Negative for rash.   Neurological:  Negative for tremors and headache.     Visit Vitals  /89 (BP Location: Left arm, Patient Position: Sitting, Cuff Size: Large Adult)   Pulse 86   Temp 98.3 °F (36.8 °C) (Temporal)   Resp 18   Ht 160 cm (63\")   Wt 89.2 kg (196 lb 9.6 oz)   SpO2 93%   BMI 34.83 kg/m²              Current Outpatient Medications:     albuterol (PROAIR RESPICLICK) 108 (90 Base) MCG/ACT inhaler, Inhale 1 puff Every 4 (Four) Hours As Needed for Wheezing or Shortness of Air., Disp: 1 each, Rfl: 0    Blood Glucose Monitoring Suppl (Accu-Chek Guide) w/Device kit, 1 kit Daily. Dg: E11.65, Disp: 1 kit, Rfl: 0    calcium carbonate (TUMS) 500 MG chewable tablet, Chew 1 tablet Daily., Disp: , Rfl:     cetirizine (zyrTEC) 10 MG tablet, Take 1 tablet by mouth Daily., Disp: , Rfl:     cholecalciferol (VITAMIN D3) 1000 units tablet, Take 1 tablet by mouth Every Night., Disp: , Rfl:     Continuous Glucose  (FreeStyle Deirdre 3 [place]) device, Use 1 each See Admin Instructions. E11.42, Disp: 1 each, Rfl: 0    Continuous Glucose Sensor (FreeStyle Deirdre 3 Plus Sensor), Use Every 14 (Fourteen) Days. E11.42, Disp: 6 each, Rfl: 3    dapagliflozin Propanediol (Farxiga) 10 MG tablet, Take one tab before breakfast daily, Disp: 90 tablet, Rfl: 3    escitalopram (LEXAPRO) 20 MG tablet, Take 1 " tablet by mouth Daily., Disp: 90 tablet, Rfl: 0    fluticasone (FLONASE) 50 MCG/ACT nasal spray, Administer 2 sprays into the nostril(s) as directed by provider Daily., Disp: , Rfl:     glipizide (GLUCOTROL XL) 5 MG ER tablet, Take one tab ac breakfast & supper., Disp: , Rfl:     glucose blood (Accu-Chek Guide) test strip, 1 each by Other route 3 (Three) Times a Day. Dg: E11.65, Disp: 100 each, Rfl: 1    hydroCHLOROthiazide 25 MG tablet, Take 1 tablet by mouth Daily., Disp: 90 tablet, Rfl: 0    [employer] Pen Needles 31G X 6 MM misc, TEST ONCE DAILY, Disp: 100 each, Rfl: 1    Lancets (accu-chek soft touch) lancets, 1 each by Other route 3 (Three) Times a Day. Dg: E11.65, Disp: 100 each, Rfl: 1    levothyroxine (SYNTHROID, LEVOTHROID) 125 MCG tablet, Take 1 tablet by mouth Daily., Disp: 90 tablet, Rfl: 0    loperamide (IMODIUM A-D) 2 MG tablet, Take 1 tablet by mouth 4 (Four) Times a Day As Needed for Diarrhea., Disp: , Rfl:     Magnesium Oxide -Mg Supplement 400 (240 Mg) MG tablet, TAKE 1 TABLET BY MOUTH DAILY, Disp: 90 tablet, Rfl: 0    melatonin 5 MG tablet tablet, Take 2 tablets by mouth At Night As Needed., Disp: , Rfl:     metFORMIN ER (GLUCOPHAGE-XR) 500 MG 24 hr tablet, Take 2 tabs ac breakfast & supper, Disp: , Rfl:     omeprazole (priLOSEC) 40 MG capsule, TAKE 1 CAPSULE BY MOUTH DAILY, Disp: 90 capsule, Rfl: 0    propranolol LA (INDERAL LA) 60 MG 24 hr capsule, TAKE 1 CAPSULE BY MOUTH DAILY, Disp: 90 capsule, Rfl: 0    rosuvastatin (CRESTOR) 20 MG tablet, Take 1 tablet by mouth every night at bedtime., Disp: 90 tablet, Rfl: 0    Tresiba FlexTouch 200 UNIT/ML solution pen-injector pen injection, INJECT 56 UNITS UNDER THE SKIN INTO THE APPROPRIATE AREA AS DIRECTED EVERY NIGHT FOR 30 DAYS., Disp: 9 mL, Rfl: 2    Mounjaro 2.5 MG/0.5ML solution auto-injector, Inject 2.5 mg under the skin into the appropriate area as directed 1 (One) Time Per Week., Disp: 2 mL, Rfl: 0    Objective   Physical Exam  Constitutional:        General: She is not in acute distress.     Appearance: Normal appearance. She is well-developed. She is not ill-appearing or diaphoretic.      Comments: Patient is in no distress, patient has normal voice and speech.  Normal respiratory effort.   HENT:      Head: Normocephalic and atraumatic.   Pulmonary:      Effort: Pulmonary effort is normal.   Musculoskeletal:      Cervical back: Normal range of motion and neck supple.   Neurological:      General: No focal deficit present.      Mental Status: She is alert and oriented to person, place, and time. Mental status is at baseline.   Psychiatric:         Mood and Affect: Mood normal.         FOLLOWING LABS WERE REVIEWED TODAY:  CMP          6/21/2024    10:08 9/4/2024    12:52 11/13/2024    08:27   CMP   Glucose 164  187  159    BUN 19  24  27    Creatinine 1.41  1.26  1.21    EGFR 39.5  45.2  47.4    Sodium 139  138  139    Potassium 5.2  4.9  4.3    Chloride 100  97  95    Calcium 9.9  9.8  10.3    Total Protein 8.0  7.5  7.6    Albumin 4.7  4.4  4.5    Globulin 3.3  3.1  3.1    Total Bilirubin 0.4  0.6  0.4    Alkaline Phosphatase 70  67  76    AST (SGOT) 25  31  21    ALT (SGPT) 30  25  26    Albumin/Globulin Ratio 1.4  1.4  1.5    BUN/Creatinine Ratio 13.5  19.0  22.3    Anion Gap 13.1  13.2  16.0      Lipid Panel          12/14/2023    10:11 3/15/2024    12:44 11/13/2024    08:27   Lipid Panel   Total Cholesterol 136  163  157    Triglycerides 199  199  311    HDL Cholesterol 34  40  34    VLDL Cholesterol 33  34  50    LDL Cholesterol  69  89  73    LDL/HDL Ratio 1.83  2.08  1.79      TSH          3/15/2024    12:44 6/21/2024    10:08 11/13/2024    08:27   TSH   TSH 2.500  0.747  1.500      Most Recent A1C          11/13/2024    08:27   HGBA1C Most Recent   Hemoglobin A1C 9.50            Assessment & Plan   Diagnoses and all orders for this visit:    1. Type 2 diabetes mellitus with diabetic polyneuropathy, with long-term current use of insulin (Primary)  -      Continuous Glucose Sensor (FreeStyle Deirdre 3 Plus Sensor); Use Every 14 (Fourteen) Days. E11.42  Dispense: 6 each; Refill: 3  -     Continuous Glucose  (FreeStyle Deirdre 3 [place]) device; Use 1 each See Admin Instructions. E11.42  Dispense: 1 each; Refill: 0  -     Mounjaro 2.5 MG/0.5ML solution auto-injector; Inject 2.5 mg under the skin into the appropriate area as directed 1 (One) Time Per Week.  Dispense: 2 mL; Refill: 0  -     Comprehensive Metabolic Panel  -     Hemoglobin A1c  -     Lipid Panel  -     Microalbumin / Creatinine Urine Ratio - Urine, Clean Catch    2. Mixed hyperlipidemia  -     rosuvastatin (CRESTOR) 20 MG tablet; Take 1 tablet by mouth every night at bedtime.  Dispense: 90 tablet; Refill: 0  -     Comprehensive Metabolic Panel  -     Lipid Panel    3. Acquired hypothyroidism  -     levothyroxine (SYNTHROID, LEVOTHROID) 125 MCG tablet; Take 1 tablet by mouth Daily.  Dispense: 90 tablet; Refill: 0  -     TSH    4. Primary hypertension  -     hydroCHLOROthiazide 25 MG tablet; Take 1 tablet by mouth Daily.  Dispense: 90 tablet; Refill: 0    5. Moderate episode of recurrent major depressive disorder  -     escitalopram (LEXAPRO) 20 MG tablet; Take 1 tablet by mouth Daily.  Dispense: 90 tablet; Refill: 0      I reviewed her medical problems and her medications as well as her recent blood work results.  Her diabetes has been poorly controlled for some time.  Patient was previously seen by endocrinologist couple of months ago, but she does not wish to follow-up with them anymore.  She is currently on Tresiba, metformin, Farxiga, and glipizide.  Patient previously had some nausea with Ozempic, we will try Mounjaro to see if she will be able to tolerate it better as patient would definitely benefit also from some weight loss.  I expressed my concerns about her poorly controlled diabetes due to associated complications down the road.      Return in about 3 months (around 2/20/2025) for Medicare  Wellness.    Requested Prescriptions     Signed Prescriptions Disp Refills    rosuvastatin (CRESTOR) 20 MG tablet 90 tablet 0     Sig: Take 1 tablet by mouth every night at bedtime.    levothyroxine (SYNTHROID, LEVOTHROID) 125 MCG tablet 90 tablet 0     Sig: Take 1 tablet by mouth Daily.    Continuous Glucose Sensor (FreeStyle Deirdre 3 Plus Sensor) 6 each 3     Sig: Use Every 14 (Fourteen) Days. E11.42    Continuous Glucose  (FreeStyle Deirdre 3 [place]) device 1 each 0     Sig: Use 1 each See Admin Instructions. E11.42    Mounjaro 2.5 MG/0.5ML solution auto-injector 2 mL 0     Sig: Inject 2.5 mg under the skin into the appropriate area as directed 1 (One) Time Per Week.    hydroCHLOROthiazide 25 MG tablet 90 tablet 0     Sig: Take 1 tablet by mouth Daily.    escitalopram (LEXAPRO) 20 MG tablet 90 tablet 0     Sig: Take 1 tablet by mouth Daily.       Luna Whitfield MD  11/20/2024

## 2024-11-22 ENCOUNTER — TELEPHONE (OUTPATIENT)
Dept: FAMILY MEDICINE CLINIC | Facility: CLINIC | Age: 74
End: 2024-11-22

## 2024-11-22 NOTE — TELEPHONE ENCOUNTER
PATIENT CALLED AND STATES SHE DID NOT GET HER MEDICATION REFILL OF    Mounjaro 2.5 MG/0.5ML solution auto-injector     HER PHARMACY DID NOT FILL THE PRESCRIPTION. SHE HAS VOUCHER FOR THE MEDICATION.      [facility] [id number] - [place], [place] - [address] [place] [address] [address] - [phone]  - [phone]     CALL BACK NUMBER  [phone]

## 2024-11-22 NOTE — TELEPHONE ENCOUNTER
Please contact the Mounjaro drug rep.  I gave her prescription for Mounjaro 2.5 mg earlier this week and gave her the voucher.  I was previously advised by the drug rep that this should bypass the insurance and patient should be able to fill it without any prior authorization.  Patient however is not able to get it through the pharmacy, can the drug rep assist us with this?

## 2024-11-24 DIAGNOSIS — E78.2 MIXED HYPERLIPIDEMIA: ICD-10-CM

## 2024-11-24 RX ORDER — ROSUVASTATIN CALCIUM 20 MG/1
20 TABLET, COATED ORAL
Qty: 90 TABLET | Refills: 0 | Status: SHIPPED | OUTPATIENT
Start: 2024-11-24

## 2024-11-26 NOTE — TELEPHONE ENCOUNTER
Spoke with Drug Rep for mounjaemilia Chappell. . She is going to talk patient pharmacy to see why the patient isn't able to get her prescription but Yue did leave a sample of mounjaro for the patient and an extra one as well. I spoke with the patient I let her know that I need to get the ok from Dr. Whitfield before I can release the sample to her. She was ok with that; I let the patient know I will call her when I hear Dr. Whitfield

## 2024-12-08 DIAGNOSIS — E11.42 TYPE 2 DIABETES MELLITUS WITH DIABETIC POLYNEUROPATHY, WITH LONG-TERM CURRENT USE OF INSULIN: ICD-10-CM

## 2024-12-08 DIAGNOSIS — Z79.4 TYPE 2 DIABETES MELLITUS WITH DIABETIC POLYNEUROPATHY, WITH LONG-TERM CURRENT USE OF INSULIN: ICD-10-CM

## 2024-12-09 RX ORDER — GLIPIZIDE 5 MG/1
TABLET, FILM COATED, EXTENDED RELEASE ORAL
Qty: 180 TABLET | Refills: 0 | Status: SHIPPED | OUTPATIENT
Start: 2024-12-09

## 2024-12-15 DIAGNOSIS — E03.9 ACQUIRED HYPOTHYROIDISM: ICD-10-CM

## 2024-12-16 RX ORDER — LEVOTHYROXINE SODIUM 125 UG/1
125 TABLET ORAL DAILY
Qty: 90 TABLET | Refills: 0 | Status: SHIPPED | OUTPATIENT
Start: 2024-12-16

## 2024-12-16 RX ORDER — PEN NEEDLE, DIABETIC 31 G X1/4"
1 NEEDLE, DISPOSABLE MISCELLANEOUS DAILY
Qty: 100 EACH | Refills: 1 | Status: SHIPPED | OUTPATIENT
Start: 2024-12-16

## 2025-01-12 DIAGNOSIS — I10 PRIMARY HYPERTENSION: ICD-10-CM

## 2025-01-12 RX ORDER — LANOLIN ALCOHOL/MO/W.PET/CERES
1 CREAM (GRAM) TOPICAL DAILY
Qty: 90 TABLET | Refills: 0 | Status: SHIPPED | OUTPATIENT
Start: 2025-01-12

## 2025-02-03 DIAGNOSIS — K21.9 GASTROESOPHAGEAL REFLUX DISEASE WITHOUT ESOPHAGITIS: ICD-10-CM

## 2025-02-03 RX ORDER — OMEPRAZOLE 40 MG/1
40 CAPSULE, DELAYED RELEASE ORAL DAILY
Qty: 90 CAPSULE | Refills: 0 | Status: SHIPPED | OUTPATIENT
Start: 2025-02-03

## 2025-03-05 DIAGNOSIS — C50.412 MALIGNANT NEOPLASM OF UPPER-OUTER QUADRANT OF LEFT BREAST IN FEMALE, ESTROGEN RECEPTOR POSITIVE: Primary | ICD-10-CM

## 2025-03-05 DIAGNOSIS — Z17.0 MALIGNANT NEOPLASM OF UPPER-OUTER QUADRANT OF LEFT BREAST IN FEMALE, ESTROGEN RECEPTOR POSITIVE: Primary | ICD-10-CM

## 2025-03-08 NOTE — PROGRESS NOTES
HISTORY OF PRESENT ILLNESS:   The patient is a 74 y.o. female with the above-mentioned history, who returns to the office today for 6-month follow-up and Prolia.  She completed tamoxifen therapy in 2023 and continues otherwise on observation now.  Most recent mammogram performed in February was benign.  We discussed that she will be due for repeat DEXA scan this December.  She notes however that she and her  will be in [place] until early February and we will defer it till then.  She otherwise denies any new concerns regards to her breast.  She is doing well overall denies other concerns today.      ONCOLOGY HISTORY:     Patient is a 74-year-old female with oted in late May 2018 a lump developing in the upper outer quadrant of the left breast without pain or nipple discharge.  This measured approximately 1 cm in size.  Mammogram indicated this asymmetric density in the same region and an ultrasound revealed a 1.8 cm irregular solid mass.  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  Additional history included no previous breast biopsies, a brief period of time with hormonal replacement and no history of breast or ovarian cancer with family.  She was seen by Dr. Zazueta on the 28th an MRI of both breasts was performed July 6.  Within the left anterior one third at the 12:30 position 3 cm from the nipple with irregular enhancing mass measuring 1.6 cm x 1.4 and 2.2 immediately lateral dimension.  There are other findings were seen in the left breast with no evidence of left axillary adenopathy and no findings suspicious for right breast.  The patient proceeded to a left breast lumpectomy July 18, 2018.      Pathologic findings were consistent with a 2.5 cm grade 3 invasive mammary ductal carcinoma with associated DCIS.  The closest margin was 1 mm.  [place] nodes were negative staging of pT2N0.  Dr. Zazueta saw the  patient July 20 recognizing the closest margin was the anterior margin having taken this off the skin with no further removal possible.  There was concern about the need for additional tissue though the addended pathology revealed the DCIS to be intermediate grade.  It was determined not to take additional margins and have her seen by both medical oncology and radiation therapy.  She now presents with her  .      The patient's initial consultation was August 7 and potential adjuvant therapy discussed with the initial recommendation being an Oncotype DX analysis to be process.  This is now reviewed with the patient and her  may return August 23, 2018.  Her recurrence score 10 with 10 year risk of distance recurrence at 7%.  She is clearly in the low risk category additional studies revealing ER score of 10.5, MO score of 8.6 which are both positive and HER-2 score of 8.7 which is negative.   Additional studies include  LH of 27.5, FSH of 57.6, estradiol of 9.9, CA 15-3 of 10.9, TSH of 4.64 hemoglobin A1c of 8.72.   The patient is advised of the findings per her risk of recurrence and she and her  are understandably elated.  Chemotherapy is not recommended in her circumstance but anti-hormonal therapy is and we have discussed potential treatment with AI therapy being the most appropriate choice in this postmenopausal patient.  She's not additionally had any recent assessment for bone density, however we discussed having this done in the near future.     As result the patient was scheduled for bone density and this was obtained September 6 revealing evidence of osteoporosis involving the lumbar with T score of -3.4 and right hip with T score of -2.7.  The patient has been using Arimidex which we'll discontinue and we discussed institution of tamoxifen at this point.  She'll also need assessment of her vitamin D level which we went on to measure documenting a level of 45.5.                                       The patient is now seen a month after switching to tamoxifen and is tolerating it well thus far without significant hot flashes.  We have also discussed the use of Reclast under the circumstances and she is agreeable to treatment when seen October 29, 2018.   She did have myalgias and arthralgias following Reclast for several days but these then resolved.  As she seen January 21, 2019 she is feeling well and we have discussed weight loss, exercise and also she and her 's recognition of slowly worsening bilateral hand tremor left greater than right.  This been present much before her diagnosis of breast cancer and actually is an issue in several members of her family.   The patient is next seen August 1, 2019.  In the interval she been found to have elevated liver function tests and ultimately liver biopsy that was significant for mild steatohepatitis.  She has been adjusting her diet but indicates that she is also has significant fatigue and while these might be related she believes the fatigue is in part secondary to tamoxifen.  Follow-up testing included total cholesterol 113, triglycerides of 263 with HDL down to 24, VLDL at 52.6, ferritin of 298, normal iron profile, CMP with glucose 193, creatinine 1.36 and hemoglobin A1c of 7.10.  She notes that her fatigue is not improved off tamoxifen.  We have discussed her findings indicating better control needed for her diabetes and thyroid dysfunction and also went on to treat a urinary tract infection.  She did see primary care who adjusted medications for thyroid hypertension.  The patient when seen December 12, 2019 feels considerably better and is thrilled to see the difference in her functioning.  She is having no difficulty with tamoxifen at this point.  The patient was seen by the NP August 7, 2020 feeling well.  She did undergo subsequent mammogram and DEXA scan with the latter (performed October 14, 2020) demonstrating a lumbar T score -2.4  (increased), left hip femoral neck T score -2.5 (unchanged) and right femoral neck T score of -1.8 (increased).  Again this was substantial increase concerning osteoporosis and bone density particular in the left hip.  The patient is additional mammography October 14 showed no evidence recurrent malignancy.     She is next seen March 15, 2021 again noting that she had started AI therapy August 23, 2018 but when her bone density was consistent with osteoporosis we switched to tamoxifen beginning September 24, 2018.  Fortunately she continues to feel well overall though she did have a fall recently possibly in part related to reaction after recent COVID-19 vaccination-not completed.       The patient is next seen 6/7/2021.  She is feeling well having started OTC iron therapy and vitamin supplements on her own.  Fortunately she is feeling well though has not been following her diet very consistently.  We made plans for the patient be seen back and she is next reviewed 11/11/21.Initially her repeat iron studies were not available but we have had them completed and we find it currently that she is iron deficient with a saturation of 12%, TIBC 561 and iron 67 and with a ferritin that is dropped from  298-62.8.  For follow-up CBC including H&H of 11.5 and 35.6, MCV of 80.7 MCH of 26.1.    The patient is next assessed 3/24/2022.  Her CBC has continued to improve as she is used low-dose iron.  She believes her general performance status is good but she is presently concerned about difficulty with ongoing sleep disturbance.    The patient is next assessed 1/9/2023.  Unfortunately she had fallen in late October striking her left wrist while in [place] and returned to [place] being seen in the emergency room 10/26/2022 with films showing a comminuted impacted fracture involving the distal left radial head as well as a fracture of the ulnar styloid.On 11/3/2022 she underwent open reduction internal fixation of a left  three-part distal radius-Acumed standard plate.    The patient has gone on to recover, albeit slowly, and underwent a bone density 12/29/2022 showing osteoporosis lumbar spine with interval decrease in bone density including lumbar T score -3.0 with a 9% interval decrease, left hip density T score -1.3 and right hip density -2.5 again of 12.9% interval decrease.    She is seen back 1/9/2023 and we discussed institution of planned Prolia today.  She is otherwise tolerating tamoxifen well.  Additional testing now includes a normal CBC, no evidence of ongoing iron deficiency.    The patient is next evaluated 7/31/2023.  She underwent hardware removal 2/27/2023.  She had subsequent Medicare wellness visit 6/23/2023 with adjustment of her levothyroxine to 100 mcg daily, Mammographic screening to be scheduled, potentially plans Cologuard.  Repeat CBC is found to be normal.    The patient is next seen 3/10/2025.  She had taken a trip to [place] and had no difficulties there now just recently returning. She has not been able to have her bone density completed.  Her general performance status, however, is excellent.    Past Medical History:   Diagnosis Date    Anxiety     Anxiety and depression     Asthma     SEASONAL ALLERGY RELATED    Cancer of breast 06/2018    LEFT    Chronic kidney disease     Renal cyst, right kidney w/urology workup in past    Depression     Diabetes mellitus     Type 2    Disease of thyroid gland     GERD (gastroesophageal reflux disease)     History of prior pregnancies     x2    History of transfusion     S/P HYST --AUTOLOGOUS     Hx of radiation therapy     Hyperlipidemia     Hypertension     IBS (irritable bowel syndrome)     Seasonal allergies     Thyroid disease         Past Surgical History:   Procedure Laterality Date    BREAST LUMPECTOMY WITH SENTINEL NODE BIOPSY Left 07/18/2018    Procedure: BREAST LUMPECTOMY WITH SENTINEL NODE BIOPSY;  Surgeon: João Zazueta MD;  Location: [facility]  MAIN OR;  Service: General    COLONOSCOPY      refusing; negative nicole 8/24/2017    HYSTERECTOMY  1989    KNEE SURGERY Right 2014    KNEE SURGERY Left 2016    LIVER BIOPSY  2019    fatty liver    ROTATOR CUFF REPAIR Left 2009    ROTATOR CUFF REPAIR Right 2012    TOE SURGERY  2009    ingrown     WRIST SURGERY  2022        Current Outpatient Medications on File Prior to Visit   Medication Sig Dispense Refill    albuterol (PROAIR RESPICLICK) 108 (90 Base) MCG/ACT inhaler Inhale 1 puff Every 4 (Four) Hours As Needed for Wheezing or Shortness of Air. 1 each 0    Blood Glucose Monitoring Suppl (Accu-Chek Guide) w/Device kit 1 kit Daily. Dg: E11.65 1 kit 0    calcium carbonate (TUMS) 500 MG chewable tablet Chew 1 tablet Daily.      cetirizine (zyrTEC) 10 MG tablet Take 1 tablet by mouth Daily.      cholecalciferol (VITAMIN D3) 1000 units tablet Take 1 tablet by mouth Every Night.      Continuous Glucose  (FreeStyle Deirdre 3 [place]) device Use 1 each See Admin Instructions. E11.42 1 each 0    Continuous Glucose Sensor (FreeStyle Deirdre 3 Plus Sensor) Use Every 14 (Fourteen) Days. E11.42 6 each 3    dapagliflozin Propanediol (Farxiga) 10 MG tablet Take one tab before breakfast daily 90 tablet 3    escitalopram (LEXAPRO) 20 MG tablet Take 1 tablet by mouth Daily. 90 tablet 0    fluticasone (FLONASE) 50 MCG/ACT nasal spray Administer 2 sprays into the nostril(s) as directed by provider Daily.      glipizide (GLUCOTROL XL) 5 MG ER tablet TAKE 1 TABLET BY MOUTH TWICE A  tablet 0    glucose blood (Accu-Chek Guide) test strip 1 each by Other route 3 (Three) Times a Day. Dg: E11.65 100 each 1    hydroCHLOROthiazide 25 MG tablet Take 1 tablet by mouth Daily. 90 tablet 0    Lancets (accu-chek soft touch) lancets 1 each by Other route 3 (Three) Times a Day. Dg: E11.65 100 each 1    levothyroxine (SYNTHROID, LEVOTHROID) 125 MCG tablet TAKE 1 TABLET BY MOUTH DAILY 90 tablet 0    loperamide (IMODIUM A-D) 2 MG tablet  Take 1 tablet by mouth 4 (Four) Times a Day As Needed for Diarrhea.      Magnesium Oxide -Mg Supplement 400 (240 Mg) MG tablet TAKE 1 TABLET BY MOUTH DAILY 90 tablet 0    melatonin 5 MG tablet tablet Take 2 tablets by mouth At Night As Needed.      metFORMIN ER (GLUCOPHAGE-XR) 500 MG 24 hr tablet Take 2 tabs ac breakfast & supper      Mounjaro 2.5 MG/0.5ML solution auto-injector Inject 2.5 mg under the skin into the appropriate area as directed 1 (One) Time Per Week. 2 mL 0    omeprazole (priLOSEC) 40 MG capsule TAKE 1 CAPSULE BY MOUTH DAILY 90 capsule 0    propranolol LA (INDERAL LA) 60 MG 24 hr capsule TAKE 1 CAPSULE BY MOUTH DAILY 90 capsule 0    rosuvastatin (CRESTOR) 20 MG tablet TAKE 1 TABLET BY MOUTH EVERY NIGHT AT BEDTIME 90 tablet 0    Tresiba FlexTouch 200 UNIT/ML solution pen-injector pen injection INJECT 56 UNITS UNDER THE SKIN INTO THE APPROPRIATE AREA AS DIRECTED EVERY NIGHT FOR 30 DAYS. 9 mL 2    Unifine Ultra Pen Needle 31G X 6 MM misc USE TO TEST ONCE DAILY 100 each 1     No current facility-administered medications on file prior to visit.        ALLERGIES:    Allergies   Allergen Reactions    Amlodipine Swelling    Ozempic (0.25 Or 0.5 Mg-Dose) [Semaglutide(0.25 Or 0.5mg-Dos)] Nausea Only    Reclast [Zoledronic Acid] GI Intolerance        Social History     Socioeconomic History    Marital status:      Spouse name: Mahin    Number of children: 2    Years of education: College   Tobacco Use    Smoking status: Never    Smokeless tobacco: Never   Vaping Use    Vaping status: Never Used   Substance and Sexual Activity    Alcohol use: Yes     Alcohol/week: 1.0 standard drink of alcohol     Types: 1 Cans of beer per week     Comment: 2-3 a month, social only    Drug use: No    Sexual activity: Yes     Comment:         Family History   Problem Relation Age of Onset    Diabetes Mother     Heart attack Mother     Heart failure Mother     Hyperlipidemia Mother     Hyperthyroidism Mother         " Has surgery    Hypothyroidism Mother         Later in life    Heart disease Mother     Hypertension Mother     Asthma Father     COPD Father     Hypertension Father     Heart attack Maternal Uncle     Heart failure Maternal Uncle     Depression Maternal Grandmother     Heart attack Maternal Grandfather     Heart failure Maternal Grandfather     Alcohol abuse Paternal Grandmother     Alcohol abuse Paternal Grandfather     Scleroderma Sister     No Known Problems Daughter     No Known Problems Son     Ren Hyperthermia Neg Hx         Objective     Vitals:    03/10/25 1325   BP: 145/83   Pulse: 89   Resp: 16   Temp: 97.3 °F (36.3 °C)   TempSrc: Oral   SpO2: 94%   Weight: 88.2 kg (194 lb 8 oz)   Height: 160 cm (62.99\")   PainSc: 0-No pain           3/10/2025     1:28 PM   Current Status   ECOG score 0       Physical Exam   Constitutional: She is oriented to person, place, and time. She appears well-developed.   HENT:   Head: Normocephalic and atraumatic.   Nose: Nose normal.   Eyes: Pupils are equal, round, and reactive to light. Conjunctivae are normal.   Cardiovascular: Normal rate, regular rhythm and normal heart sounds.   Pulmonary/Chest: Effort normal and breath sounds normal.   Abdominal: Soft. Bowel sounds are normal.   Musculoskeletal: Normal range of motion.   Neurological: She is alert and oriented to person, place, and time.   Resting tremor noted left greater than right upper extremities   Skin: Skin is warm and dry.   Psychiatric: Her behavior is normal. Judgment and thought content normal.       Breast exam: Patient status post left breast lumpectomy with well-healed surgical scars.  No new palpable abnormalities in either breast.  No supraclavicular, infraclavicular, or axillary adenopathy.  Chronic radiation skin changes to the left breast.    RECENT LABS:  Results from last 7 days   Lab Units 03/10/25  1314   WBC 10*3/mm3 6.96   NEUTROS ABS 10*3/mm3 4.80   HEMOGLOBIN g/dL 12.9   HEMATOCRIT % 41.8 "   PLATELETS 10*3/mm3 286       Results from last 7 days   Lab Units 03/10/25  1314   SODIUM mmol/L 141   POTASSIUM mmol/L 4.8   CHLORIDE mmol/L 99   CO2 mmol/L 29.6*   BUN mg/dL 22   CREATININE mg/dL 1.21*   CALCIUM mg/dL 10.2   ALBUMIN g/dL 4.4   BILIRUBIN mg/dL 0.5   ALK PHOS U/L 77   ALT (SGPT) U/L 22   AST (SGOT) U/L 25   GLUCOSE mg/dL 198*   MAGNESIUM mg/dL 2.1               BONE MINERAL DENSITOMETRY    October 14, 2020     HISTORY:  70 years-old female. Menopause at age 63. Previous diagnosis  of osteoporosis and breast cancer.     COMPARISON:  09/06/2018.     TECHNIQUE: The T score compares the patient's bone mineral density with  the peak bone mass of young normal patients. Patients with T-scores  between 1.0 and 2.5 standard deviations below the mean are osteopenic.  Patients with T-scores greater than 2.5 standard deviation below the  mean are osteoporotic.     The Z score compares the patient's bone mineral density with sex and age  matched patients. Z score of -2.0 and lower is an indication of low  mineral density for the patient's age.     FINDINGS: Lumbar T score is  -2.4, representing a 17.7% interval  increase..     Left hip density is lowest at the  femoral neck where the T score is  -2.5, unchanged.      Right hip density is lowest at the  femoral neck where the T score is  -1.8, representing a 19.8% interval increase.      IMPRESSION:  Osteoporosis at the left hip. Interval increase in bone  Density.    DEXA Bone Density Axial (12/29/2022 10:30)    Mammo Screening Digital Tomosynthesis Bilateral With CAD (02/19/2024 13:27)     Assessment & Plan       The patient is a 74 y.o. female  with previous medical history of seasonal allergies, diabetes mellitus type 2, CKD3, hypothyroidism, hyperlipidemia, hypertension, IBS, history of anxiety and depression with recent development of left breast abnormality found by the patient herself.      Invasive mammary ductal carcinoma of the LEFT breast with  associated DCIS  This led to mammogram ultrasound and stereotactic biopsy confirming invasive ductal carcinoma grade 2 though ER, DC positive HER-2 negative.    Subsequent assessments via surgical review your no additional abnormalities seen on breast MRI and the patient proceeded to lumpectomy July 18, 2018.  Her pathologic findings were consistent with a 2.5 cm grade 3 invasive mammary ductal carcinoma with associated DCIS.  The closest margin was 1 mm.  [place] nodes were negative with staging of pT2N0.    Dr. Zazueta saw the patient July 20 recognizing the closest margin was the anterior margin having taken this off the skin with no further removal possible.  There was concern about the need for additional tissue though the addended pathology revealed the DCIS to be intermediate grade.  It was ultimately determined that further surgery was not necessary.  We proceeded with additional assessment including Oncotype and laboratory studies that, fortunate, revealed that she has an excellent prognosis including a 7% risk of recurrence at 10 years with the use of tamoxifen.  Chemotherapy, therefore, is not appropriate and we have discussed an alternative therapy in this postmenopausal patient with AI therapy in particular her Arimidex over 5 years.    Completed radiation therapy  Ultimately due to osteoporosis, Arimidex discontinued with initiation of tamoxifen  She is next seen March 15, 2021 again noting that she had started AI therapy August 23, 2018 but when her bone density was consistent with osteoporosis we switched to tamoxifen beginning September 24, 2018.  Tamoxifen discontinued July 2023 after completing 5 years of therapy  Mammogram 2/19/2024 benign  9/4/2024, 3/10/2025 MYNOR on exam.    2.  Osteoporosis   Mammogram and DEXA scan with the latter (performed October 14, 2020) demonstrating a lumbar T score -2.4 (increased), left hip femoral neck T score -2.5 (unchanged) and right femoral neck T score of -1.8  (increased).  Again this was substantial increase concerning osteoporosis and bone density particular in the left hip.  Bbone density 12/29/2022 showing osteoporosis lumbar spine with interval decrease in bone density including lumbar T score -3.0 with a 9% interval decrease, left hip density T score -1.3 and right hip density -2.5 again of 12.9% interval decrease.  1/9/2023 and we discussed institution of planned Prolia .  Proceed today with Prolia which is continued every 6 months.  Next DEXA scan due December 2024.  Prolia planned 3/10/2025, follow-up bone density in the next 1 to 2 months.    3.  Hypothyroidism  Continue on levothyroxine, dose adjusted by her primary care provider.  Current dosing of 125 mcg daily      Plan:  The patient will remain in observation regarding her previous malignancy  Proceed today with Prolia which is continued every 6 months  Annual mammogram ordered through her PCP.    DEXA scan due-plan to reschedule this as she is seen with 3/10/2025  Return in 6 months for follow-up with MD or NP, possible Prolia.  Patient encouraged to call with any questions or concerns prior to scheduled return.      This patient is on high risk drug therapy requiring intensive monitoring for toxicity.         Michel Guajardo MD  03/10/2025

## 2025-03-10 ENCOUNTER — OFFICE VISIT (OUTPATIENT)
Dept: ONCOLOGY | Facility: CLINIC | Age: 75
End: 2025-03-10
Payer: MEDICARE

## 2025-03-10 ENCOUNTER — INFUSION (OUTPATIENT)
Dept: ONCOLOGY | Facility: HOSPITAL | Age: 75
End: 2025-03-10
Payer: MEDICARE

## 2025-03-10 ENCOUNTER — LAB (OUTPATIENT)
Dept: LAB | Facility: HOSPITAL | Age: 75
End: 2025-03-10
Payer: MEDICARE

## 2025-03-10 VITALS
DIASTOLIC BLOOD PRESSURE: 83 MMHG | HEIGHT: 63 IN | RESPIRATION RATE: 16 BRPM | TEMPERATURE: 97.3 F | HEART RATE: 89 BPM | OXYGEN SATURATION: 94 % | WEIGHT: 194.5 LBS | SYSTOLIC BLOOD PRESSURE: 145 MMHG | BODY MASS INDEX: 34.46 KG/M2

## 2025-03-10 DIAGNOSIS — C50.912 INFILTRATING DUCTAL CARCINOMA OF LEFT BREAST: Primary | ICD-10-CM

## 2025-03-10 DIAGNOSIS — C50.412 MALIGNANT NEOPLASM OF UPPER-OUTER QUADRANT OF LEFT BREAST IN FEMALE, ESTROGEN RECEPTOR POSITIVE: ICD-10-CM

## 2025-03-10 DIAGNOSIS — C50.412 MALIGNANT NEOPLASM OF UPPER-OUTER QUADRANT OF LEFT BREAST IN FEMALE, ESTROGEN RECEPTOR POSITIVE: Primary | ICD-10-CM

## 2025-03-10 DIAGNOSIS — Z17.0 MALIGNANT NEOPLASM OF UPPER-OUTER QUADRANT OF LEFT BREAST IN FEMALE, ESTROGEN RECEPTOR POSITIVE: Primary | ICD-10-CM

## 2025-03-10 DIAGNOSIS — Z17.0 MALIGNANT NEOPLASM OF UPPER-OUTER QUADRANT OF LEFT BREAST IN FEMALE, ESTROGEN RECEPTOR POSITIVE: ICD-10-CM

## 2025-03-10 LAB
ALBUMIN SERPL-MCNC: 4.4 G/DL (ref 3.5–5.2)
ALBUMIN/GLOB SERPL: 1.3 G/DL
ALP SERPL-CCNC: 77 U/L (ref 39–117)
ALT SERPL W P-5'-P-CCNC: 22 U/L (ref 1–33)
ANION GAP SERPL CALCULATED.3IONS-SCNC: 12.4 MMOL/L (ref 5–15)
AST SERPL-CCNC: 25 U/L (ref 1–32)
BASOPHILS # BLD AUTO: 0.03 10*3/MM3 (ref 0–0.2)
BASOPHILS NFR BLD AUTO: 0.4 % (ref 0–1.5)
BILIRUB SERPL-MCNC: 0.5 MG/DL (ref 0–1.2)
BUN SERPL-MCNC: 22 MG/DL (ref 8–23)
BUN/CREAT SERPL: 18.2 (ref 7–25)
CALCIUM SPEC-SCNC: 10.2 MG/DL (ref 8.6–10.5)
CHLORIDE SERPL-SCNC: 99 MMOL/L (ref 98–107)
CO2 SERPL-SCNC: 29.6 MMOL/L (ref 22–29)
CREAT SERPL-MCNC: 1.21 MG/DL (ref 0.57–1)
DEPRECATED RDW RBC AUTO: 43.1 FL (ref 37–54)
EGFRCR SERPLBLD CKD-EPI 2021: 47.4 ML/MIN/1.73
EOSINOPHIL # BLD AUTO: 0.26 10*3/MM3 (ref 0–0.4)
EOSINOPHIL NFR BLD AUTO: 3.7 % (ref 0.3–6.2)
ERYTHROCYTE [DISTWIDTH] IN BLOOD BY AUTOMATED COUNT: 14.5 % (ref 12.3–15.4)
GLOBULIN UR ELPH-MCNC: 3.4 GM/DL
GLUCOSE SERPL-MCNC: 198 MG/DL (ref 65–99)
HCT VFR BLD AUTO: 41.8 % (ref 34–46.6)
HGB BLD-MCNC: 12.9 G/DL (ref 12–15.9)
IMM GRANULOCYTES # BLD AUTO: 0.02 10*3/MM3 (ref 0–0.05)
IMM GRANULOCYTES NFR BLD AUTO: 0.3 % (ref 0–0.5)
LYMPHOCYTES # BLD AUTO: 1.42 10*3/MM3 (ref 0.7–3.1)
LYMPHOCYTES NFR BLD AUTO: 20.4 % (ref 19.6–45.3)
MAGNESIUM SERPL-MCNC: 2.1 MG/DL (ref 1.6–2.4)
MCH RBC QN AUTO: 25.4 PG (ref 26.6–33)
MCHC RBC AUTO-ENTMCNC: 30.9 G/DL (ref 31.5–35.7)
MCV RBC AUTO: 82.3 FL (ref 79–97)
MONOCYTES # BLD AUTO: 0.43 10*3/MM3 (ref 0.1–0.9)
MONOCYTES NFR BLD AUTO: 6.2 % (ref 5–12)
NEUTROPHILS NFR BLD AUTO: 4.8 10*3/MM3 (ref 1.7–7)
NEUTROPHILS NFR BLD AUTO: 69 % (ref 42.7–76)
NRBC BLD AUTO-RTO: 0 /100 WBC (ref 0–0.2)
PHOSPHATE SERPL-MCNC: 3.9 MG/DL (ref 2.5–4.5)
PLATELET # BLD AUTO: 286 10*3/MM3 (ref 140–450)
PMV BLD AUTO: 9.7 FL (ref 6–12)
POTASSIUM SERPL-SCNC: 4.8 MMOL/L (ref 3.5–5.2)
PROT SERPL-MCNC: 7.8 G/DL (ref 6–8.5)
RBC # BLD AUTO: 5.08 10*6/MM3 (ref 3.77–5.28)
SODIUM SERPL-SCNC: 141 MMOL/L (ref 136–145)
WBC NRBC COR # BLD AUTO: 6.96 10*3/MM3 (ref 3.4–10.8)

## 2025-03-10 PROCEDURE — 84100 ASSAY OF PHOSPHORUS: CPT | Performed by: INTERNAL MEDICINE

## 2025-03-10 PROCEDURE — 80053 COMPREHEN METABOLIC PANEL: CPT | Performed by: INTERNAL MEDICINE

## 2025-03-10 PROCEDURE — 25010000002 DENOSUMAB 60 MG/ML SOLUTION PREFILLED SYRINGE: Performed by: INTERNAL MEDICINE

## 2025-03-10 PROCEDURE — 3077F SYST BP >= 140 MM HG: CPT | Performed by: INTERNAL MEDICINE

## 2025-03-10 PROCEDURE — 36415 COLL VENOUS BLD VENIPUNCTURE: CPT

## 2025-03-10 PROCEDURE — 96372 THER/PROPH/DIAG INJ SC/IM: CPT

## 2025-03-10 PROCEDURE — 3079F DIAST BP 80-89 MM HG: CPT | Performed by: INTERNAL MEDICINE

## 2025-03-10 PROCEDURE — 1126F AMNT PAIN NOTED NONE PRSNT: CPT | Performed by: INTERNAL MEDICINE

## 2025-03-10 PROCEDURE — 85025 COMPLETE CBC W/AUTO DIFF WBC: CPT

## 2025-03-10 PROCEDURE — 99213 OFFICE O/P EST LOW 20 MIN: CPT | Performed by: INTERNAL MEDICINE

## 2025-03-10 PROCEDURE — 83735 ASSAY OF MAGNESIUM: CPT | Performed by: INTERNAL MEDICINE

## 2025-03-10 RX ADMIN — DENOSUMAB 60 MG: 60 INJECTION SUBCUTANEOUS at 14:11

## 2025-03-13 DIAGNOSIS — I10 PRIMARY HYPERTENSION: ICD-10-CM

## 2025-03-13 DIAGNOSIS — E11.42 TYPE 2 DIABETES MELLITUS WITH DIABETIC POLYNEUROPATHY, WITH LONG-TERM CURRENT USE OF INSULIN: ICD-10-CM

## 2025-03-13 DIAGNOSIS — E03.9 ACQUIRED HYPOTHYROIDISM: ICD-10-CM

## 2025-03-13 DIAGNOSIS — F33.1 MODERATE EPISODE OF RECURRENT MAJOR DEPRESSIVE DISORDER: ICD-10-CM

## 2025-03-13 DIAGNOSIS — Z79.4 TYPE 2 DIABETES MELLITUS WITH DIABETIC POLYNEUROPATHY, WITH LONG-TERM CURRENT USE OF INSULIN: ICD-10-CM

## 2025-03-13 RX ORDER — HYDROCHLOROTHIAZIDE 25 MG/1
25 TABLET ORAL DAILY
Qty: 90 TABLET | Refills: 0 | Status: SHIPPED | OUTPATIENT
Start: 2025-03-13

## 2025-03-13 RX ORDER — GLIPIZIDE 5 MG/1
5 TABLET, FILM COATED, EXTENDED RELEASE ORAL 2 TIMES DAILY
Qty: 180 TABLET | Refills: 0 | Status: SHIPPED | OUTPATIENT
Start: 2025-03-13

## 2025-03-13 RX ORDER — LEVOTHYROXINE SODIUM 125 UG/1
125 TABLET ORAL DAILY
Qty: 90 TABLET | Refills: 0 | Status: SHIPPED | OUTPATIENT
Start: 2025-03-13

## 2025-03-13 RX ORDER — ESCITALOPRAM OXALATE 20 MG/1
20 TABLET ORAL DAILY
Qty: 90 TABLET | Refills: 0 | Status: SHIPPED | OUTPATIENT
Start: 2025-03-13

## 2025-03-17 ENCOUNTER — LAB (OUTPATIENT)
Dept: FAMILY MEDICINE CLINIC | Facility: CLINIC | Age: 75
End: 2025-03-17
Payer: MEDICARE

## 2025-03-17 LAB
ALBUMIN SERPL-MCNC: 4.3 G/DL (ref 3.5–5.2)
ALBUMIN UR-MCNC: 1.6 MG/DL
ALBUMIN/GLOB SERPL: 1.3 G/DL
ALP SERPL-CCNC: 79 U/L (ref 39–117)
ALT SERPL W P-5'-P-CCNC: 22 U/L (ref 1–33)
ANION GAP SERPL CALCULATED.3IONS-SCNC: 11.4 MMOL/L (ref 5–15)
AST SERPL-CCNC: 22 U/L (ref 1–32)
BILIRUB SERPL-MCNC: 0.4 MG/DL (ref 0–1.2)
BUN SERPL-MCNC: 26 MG/DL (ref 8–23)
BUN/CREAT SERPL: 21.8 (ref 7–25)
CALCIUM SPEC-SCNC: 9.5 MG/DL (ref 8.6–10.5)
CHLORIDE SERPL-SCNC: 101 MMOL/L (ref 98–107)
CHOLEST SERPL-MCNC: 160 MG/DL (ref 0–200)
CO2 SERPL-SCNC: 27.6 MMOL/L (ref 22–29)
CREAT SERPL-MCNC: 1.19 MG/DL (ref 0.57–1)
CREAT UR-MCNC: 62.1 MG/DL
EGFRCR SERPLBLD CKD-EPI 2021: 48.1 ML/MIN/1.73
GLOBULIN UR ELPH-MCNC: 3.4 GM/DL
GLUCOSE SERPL-MCNC: 193 MG/DL (ref 65–99)
HBA1C MFR BLD: 9.7 % (ref 4.8–5.6)
HDLC SERPL-MCNC: 38 MG/DL (ref 40–60)
LDLC SERPL CALC-MCNC: 79 MG/DL (ref 0–100)
LDLC/HDLC SERPL: 1.82 {RATIO}
MICROALBUMIN/CREAT UR: 25.8 MG/G (ref 0–29)
POTASSIUM SERPL-SCNC: 4.9 MMOL/L (ref 3.5–5.2)
PROT SERPL-MCNC: 7.7 G/DL (ref 6–8.5)
SODIUM SERPL-SCNC: 140 MMOL/L (ref 136–145)
TRIGL SERPL-MCNC: 264 MG/DL (ref 0–150)
TSH SERPL DL<=0.05 MIU/L-ACNC: 1.87 UIU/ML (ref 0.27–4.2)
VLDLC SERPL-MCNC: 43 MG/DL (ref 5–40)

## 2025-03-17 PROCEDURE — 82570 ASSAY OF URINE CREATININE: CPT | Performed by: FAMILY MEDICINE

## 2025-03-17 PROCEDURE — 82043 UR ALBUMIN QUANTITATIVE: CPT | Performed by: FAMILY MEDICINE

## 2025-03-17 PROCEDURE — 83036 HEMOGLOBIN GLYCOSYLATED A1C: CPT | Performed by: FAMILY MEDICINE

## 2025-03-17 PROCEDURE — 80053 COMPREHEN METABOLIC PANEL: CPT | Performed by: FAMILY MEDICINE

## 2025-03-17 PROCEDURE — 80061 LIPID PANEL: CPT | Performed by: FAMILY MEDICINE

## 2025-03-17 PROCEDURE — 84443 ASSAY THYROID STIM HORMONE: CPT | Performed by: FAMILY MEDICINE

## 2025-03-20 ENCOUNTER — OFFICE VISIT (OUTPATIENT)
Dept: FAMILY MEDICINE CLINIC | Facility: CLINIC | Age: 75
End: 2025-03-20
Payer: MEDICARE

## 2025-03-20 VITALS
HEART RATE: 90 BPM | DIASTOLIC BLOOD PRESSURE: 76 MMHG | WEIGHT: 191.6 LBS | OXYGEN SATURATION: 93 % | RESPIRATION RATE: 17 BRPM | TEMPERATURE: 97.9 F | HEIGHT: 63 IN | BODY MASS INDEX: 33.95 KG/M2 | SYSTOLIC BLOOD PRESSURE: 155 MMHG

## 2025-03-20 DIAGNOSIS — Z79.4 TYPE 2 DIABETES MELLITUS WITH HYPERGLYCEMIA, WITH LONG-TERM CURRENT USE OF INSULIN: ICD-10-CM

## 2025-03-20 DIAGNOSIS — Z13.6 SCREENING FOR CARDIOVASCULAR CONDITION: ICD-10-CM

## 2025-03-20 DIAGNOSIS — E11.65 TYPE 2 DIABETES MELLITUS WITH HYPERGLYCEMIA, WITH LONG-TERM CURRENT USE OF INSULIN: ICD-10-CM

## 2025-03-20 DIAGNOSIS — Z12.31 VISIT FOR SCREENING MAMMOGRAM: ICD-10-CM

## 2025-03-20 DIAGNOSIS — Z00.00 MEDICARE ANNUAL WELLNESS VISIT, SUBSEQUENT: Primary | ICD-10-CM

## 2025-03-20 NOTE — ASSESSMENT & PLAN NOTE
Orders:    CT Cardiac Calcium Score Without Dye; Future    Vascular Screening (Bundle) CAR; Future

## 2025-03-20 NOTE — PROGRESS NOTES
Subjective   The ABCs of the Annual Wellness Visit  Medicare Wellness Visit    Chief Complaint   Patient presents with    Diabetes    Medicare Wellness-subsequent       Mariana Ansari is a 75 y.o. patient who presents for a Medicare Wellness Visit. She lives with her , she is fully independent with her ADL's. No issues with depression or memory problems are being reported. She recently had fasting labs and theses are being reviewed today. Her diabetes is poorly controlled. She was previously seen by [facility], but she does not want to go back.  Patient was previously given prescription for Mounjaro, but she tells me that she still has not started taking it as she had some family gatherings and did not want to have some possible GI side effects with this medication can cause.  She is overdue for the eye exam, but she tells me that she plans to schedule it in near future.  She gets her DEXA scans done through her oncology office and she reports to be up to speed with that.  She is now on Prolia shots for osteoporosis and this is being given to her through to her oncology office. Patient does not report any chest pain, shortness of breath, dizziness, nausea, vomiting, or diarrhea, visual issues, headaches, numbness or tingling. No urinary issues reported like urgency, frequency, or discomfort upon urination.  No significant weight changes reported.  No swelling reported.  No rashes or any other skin issues reported. No emotional issues or insomnia.    The following portions of the patient's history were reviewed and   updated as appropriate: allergies, current medications, past family history, past medical history, past social history, past surgical history, and problem list.    Compared to one year ago, the patient's physical   health is the same.  Compared to one year ago, the patient's mental   health is the same.    Recent Hospitalizations:  She was not admitted to the hospital during the last year.     Current  Medical Providers:  Patient Care Team:  Luna Whitfield MD as PCP - General (Family Medicine)  Luna Whitfield MD as PCP - Family Medicine  Michel Guajardo MD as Consulting Physician (Hematology and Oncology)  Luna Whitfield MD as Referring Physician (Family Medicine)  Marco Rae MD as Consulting Physician (Endocrinology)  Nicolas Francisco MD as Consulting Physician (Urology)    Outpatient Medications Prior to Visit   Medication Sig Dispense Refill    albuterol (PROAIR RESPICLICK) 108 (90 Base) MCG/ACT inhaler Inhale 1 puff Every 4 (Four) Hours As Needed for Wheezing or Shortness of Air. 1 each 0    Blood Glucose Monitoring Suppl (Accu-Chek Guide) w/Device kit 1 kit Daily. Dg: E11.65 1 kit 0    calcium carbonate (TUMS) 500 MG chewable tablet Chew 1 tablet As Needed.      cetirizine (zyrTEC) 10 MG tablet Take 1 tablet by mouth Daily.      cholecalciferol (VITAMIN D3) 1000 units tablet Take 1 tablet by mouth Every Night.      Continuous Glucose  (FreeStyle Deirdre 3 [place]) device Use 1 each See Admin Instructions. E11.42 1 each 0    Continuous Glucose Sensor (FreeStyle Deirdre 3 Plus Sensor) Use Every 14 (Fourteen) Days. E11.42 6 each 3    dapagliflozin Propanediol (Farxiga) 10 MG tablet Take one tab before breakfast daily 90 tablet 3    escitalopram (LEXAPRO) 20 MG tablet TAKE 1 TABLET BY MOUTH DAILY 90 tablet 0    fluticasone (FLONASE) 50 MCG/ACT nasal spray Administer 2 sprays into the nostril(s) as directed by provider Daily.      glipizide (GLUCOTROL XL) 5 MG ER tablet TAKE 1 TABLET BY MOUTH TWICE A  tablet 0    glucose blood (Accu-Chek Guide) test strip 1 each by Other route 3 (Three) Times a Day. Dg: E11.65 100 each 1    hydroCHLOROthiazide 25 MG tablet TAKE 1 TABLET BY MOUTH DAILY 90 tablet 0    Lancets (accu-chek soft touch) lancets 1 each by Other route 3 (Three) Times a Day. Dg: E11.65 100 each 1    levothyroxine (SYNTHROID, LEVOTHROID) 125 MCG tablet TAKE 1 TABLET BY MOUTH  DAILY 90 tablet 0    loperamide (IMODIUM A-D) 2 MG tablet Take 1 tablet by mouth 4 (Four) Times a Day As Needed for Diarrhea.      Magnesium Oxide -Mg Supplement 400 (240 Mg) MG tablet TAKE 1 TABLET BY MOUTH DAILY 90 tablet 0    melatonin 5 MG tablet tablet Take 2 tablets by mouth At Night As Needed.      metFORMIN ER (GLUCOPHAGE-XR) 500 MG 24 hr tablet Take 2 tabs ac breakfast & supper      Mounjaro 2.5 MG/0.5ML solution auto-injector Inject 2.5 mg under the skin into the appropriate area as directed 1 (One) Time Per Week. 2 mL 0    omeprazole (priLOSEC) 40 MG capsule TAKE 1 CAPSULE BY MOUTH DAILY 90 capsule 0    propranolol LA (INDERAL LA) 60 MG 24 hr capsule TAKE 1 CAPSULE BY MOUTH DAILY 90 capsule 0    rosuvastatin (CRESTOR) 20 MG tablet TAKE 1 TABLET BY MOUTH EVERY NIGHT AT BEDTIME 90 tablet 0    Tresiba FlexTouch 200 UNIT/ML solution pen-injector pen injection INJECT 56 UNITS UNDER THE SKIN INTO THE APPROPRIATE AREA AS DIRECTED EVERY NIGHT FOR 30 DAYS. 9 mL 2    Unifine Ultra Pen Needle 31G X 6 MM misc USE TO TEST ONCE DAILY 100 each 1     No facility-administered medications prior to visit.     No opioid medication identified on active medication list. I have reviewed chart for other potential  high risk medication/s and harmful drug interactions in the elderly.      Aspirin is not on active medication list.  Aspirin use is not indicated based on review of current medical condition/s. Risk of harm outweighs potential benefits.  .    Patient Active Problem List   Diagnosis    Malignant neoplasm of upper-outer quadrant of left breast in female, estrogen receptor positive    Type 2 diabetes mellitus with hyperglycemia, with long-term current use of insulin    High risk medication use    Age-related osteoporosis without current pathological fracture    Tremor of both hands    Other specified anemias    Type 2 diabetes mellitus with complication, without long-term current use of insulin    Allergic rhinitis     "Asthma    Chronic kidney disease, stage II (mild)    Depression    Medicare annual wellness visit, subsequent    Gastroesophageal reflux disease    Hyperlipidemia    Hypertension    Hypothyroidism    Infiltrating ductal carcinoma of breast    Renal cyst, right    Fatty liver    Vitamin D deficiency    Moderate episode of recurrent major depressive disorder    Colon cancer screening    Combined form of senile cataract    Uncontrolled type 2 diabetes mellitus with hyperglycemia    Visit for screening mammogram    Primary insomnia    Sleep disorder    Leg cramps    Hypomagnesemia    Type 2 diabetes mellitus with diabetic polyneuropathy, with long-term current use of insulin    Screening for cardiovascular condition     Advance Care Planning Advance Directive is on file.  ACP discussion was held with the patient during this visit. Patient has an advance directive in EMR which is still valid.         Review of Systems   Constitutional:  Negative for activity change, fatigue and fever.   Respiratory:  Negative for cough, shortness of breath and wheezing.    Cardiovascular:  Negative for chest pain, palpitations and leg swelling.   Gastrointestinal:  Negative for constipation, diarrhea and indigestion.   Skin:  Negative for color change, dry skin and rash.   Neurological:  Negative for tremors and headache.         Objective   Vitals:    03/20/25 1250   BP: 155/76   BP Location: Left arm   Patient Position: Sitting   Cuff Size: Adult   Pulse: 90   Resp: 17   Temp: 97.9 °F (36.6 °C)   TempSrc: Infrared   SpO2: 93%   Weight: 86.9 kg (191 lb 9.6 oz)   Height: 160 cm (62.99\")   PainSc: 0-No pain       Estimated body mass index is 33.95 kg/m² as calculated from the following:    Height as of this encounter: 160 cm (62.99\").    Weight as of this encounter: 86.9 kg (191 lb 9.6 oz).       Physical Exam  Vitals and nursing note reviewed.   Constitutional:       General: She is not in acute distress.     Appearance: Normal " appearance. She is well-developed. She is not ill-appearing.   HENT:      Head: Normocephalic and atraumatic.   Cardiovascular:      Rate and Rhythm: Normal rate and regular rhythm.      Heart sounds: Normal heart sounds. No murmur heard.     No gallop.   Pulmonary:      Effort: Pulmonary effort is normal. No respiratory distress.      Breath sounds: Normal breath sounds. No wheezing, rhonchi or rales.   Chest:      Chest wall: No tenderness.   Musculoskeletal:      Cervical back: Normal range of motion and neck supple.   Neurological:      General: No focal deficit present.      Mental Status: She is alert and oriented to person, place, and time. Mental status is at baseline.   Psychiatric:         Mood and Affect: Mood normal.              Does the patient have evidence of cognitive impairment? No  Lab Results   Component Value Date    TRIG 264 (H) 03/17/2025    HDL 38 (L) 03/17/2025    LDL 79 03/17/2025    VLDL 43 (H) 03/17/2025    HGBA1C 9.70 (H) 03/17/2025                                                                                               Health  Risk Assessment    Smoking Status:  Social History     Tobacco Use   Smoking Status Never   Smokeless Tobacco Never     Alcohol Consumption:  Social History     Substance and Sexual Activity   Alcohol Use Yes    Alcohol/week: 1.0 standard drink of alcohol    Types: 1 Cans of beer per week    Comment: 2-3 a month, social only       Fall Risk Screen  STEADI Fall Risk Assessment was completed, and patient is at LOW risk for falls.Assessment completed on:3/20/2025    Depression Screening   The PHQ has not been completed during this encounter.     Health Habits and Functional and Cognitive Screening:      3/20/2025    12:00 PM   Functional & Cognitive Status   Do you have difficulty preparing food and eating? No   Do you have difficulty bathing yourself, getting dressed or grooming yourself? No   Do you have difficulty using the toilet? No   Do you have difficulty  moving around from place to place? No   Do you have trouble with steps or getting out of a bed or a chair? No   Current Diet Well Balanced Diet   Dental Exam Up to date   Eye Exam Up to date   Exercise (times per week) 0 times per week   Current Exercises Include No Regular Exercise   Do you need help using the phone?  No   Are you deaf or do you have serious difficulty hearing?  No   Do you need help to go to places out of walking distance? No   Do you need help shopping? No   Do you need help preparing meals?  No   Do you need help with housework?  No   Do you need help with laundry? No   Do you need help taking your medications? No   Do you need help managing money? No   Do you ever drive or ride in a car without wearing a seat belt? No   Have you felt unusual stress, anger or loneliness in the last month? No   Who do you live with? Spouse   If you need help, do you have trouble finding someone available to you? No   Do you have difficulty concentrating, remembering or making decisions? No           Age-appropriate Screening Schedule:  Refer to the list below for future screening recommendations based on patient's age, sex and/or medical conditions. Orders for these recommended tests are listed in the plan section. The patient has been provided with a written plan.    Health Maintenance List  Health Maintenance   Topic Date Due    DIABETIC EYE EXAM  11/15/2023    DXA SCAN  12/29/2024    COVID-19 Vaccine (8 - 2024-25 season) 03/12/2025    BMI FOLLOWUP  06/28/2025    HEMOGLOBIN A1C  09/17/2025    MAMMOGRAM  02/19/2026    LIPID PANEL  03/17/2026    URINE MICROALBUMIN-CREATININE RATIO (uACR)  03/17/2026    ANNUAL WELLNESS VISIT  03/20/2026    COLORECTAL CANCER SCREENING  06/24/2027    TDAP/TD VACCINES (3 - Td or Tdap) 10/05/2031    HEPATITIS C SCREENING  Completed    INFLUENZA VACCINE  Completed    Pneumococcal Vaccine 50+  Completed    ZOSTER VACCINE  Completed                                                                                                                                                 CMS Preventative Services Quick Reference  Risk Factors Identified During Encounter  Fall Risk-High or Moderate: Discussed Fall Prevention in the home  Immunizations Discussed/Encouraged: COVID19 and RSV (Respiratory Syncytial Virus)  Dental Screening Recommended  Vision Screening Recommended    The above risks/problems have been discussed with the patient.  Pertinent information has been shared with the patient in the After Visit Summary.  An After Visit Summary and PPPS were made available to the patient.    Follow Up:     Next Medicare Wellness visit to be scheduled in 1 year.     Assessment & Plan  Medicare annual wellness visit, subsequent    Visit for screening mammogram    Orders:    Mammo Screening Digital Tomosynthesis Bilateral With CAD; Future    Type 2 diabetes mellitus with hyperglycemia, with long-term current use of insulin      Orders:    Comprehensive Metabolic Panel    Hemoglobin A1c    Lipid Panel    Screening for cardiovascular condition    Orders:    CT Cardiac Calcium Score Without Dye; Future    Vascular Screening (Bundle) CAR; Future        Medicare wellness exam was done today.  I reviewed and discussed with the patient her recent fasting blood work results.  Her diabetes has been poorly controlled and her hemoglobin A1c is again noted to be high.  Patient has not started taking Mounjaro as previously prescribed.  She previously was also seen by endocrinology and she does not wish to follow-up there and does not wish to get any new endocrinology referral.  Importance of compliance with medication and diet was also discussed and reinforced.  I also discussed with the patient her increased cardiovascular risk due to her chronic medical issues and poorly controlled diabetes.  I will be getting further cardiovascular screening and orders were given to the patient.  Health maintenance was also reviewed.  Her  last colon cancer screening was done through negative Cologuard in 10/2023.  Her last mammogram was in 2/2024 and the new order was given to the patient today and she will schedule the mammogram..  Her last DEXA scan was in 12/2022, patient is already scheduled for her follow-up DEXA scan in near future.  She is on Prolia shots as per her oncology office.  Immunization was also reviewed and is recommended.  Healthy lifestyle was reinforced.      Follow Up:     Return in about 3 months (around 6/20/2025) for Next scheduled follow up.    Requested Prescriptions      No prescriptions requested or ordered in this encounter     Luna Whitfield MD  03/20/2025  12:52 EDT

## 2025-03-21 DIAGNOSIS — E11.65 TYPE 2 DIABETES MELLITUS WITH HYPERGLYCEMIA, WITH LONG-TERM CURRENT USE OF INSULIN: ICD-10-CM

## 2025-03-21 DIAGNOSIS — Z79.4 TYPE 2 DIABETES MELLITUS WITH HYPERGLYCEMIA, WITH LONG-TERM CURRENT USE OF INSULIN: ICD-10-CM

## 2025-03-22 RX ORDER — INSULIN DEGLUDEC 200 U/ML
INJECTION, SOLUTION SUBCUTANEOUS
Qty: 9 ML | Refills: 2 | Status: SHIPPED | OUTPATIENT
Start: 2025-03-22

## 2025-04-05 DIAGNOSIS — I10 PRIMARY HYPERTENSION: ICD-10-CM

## 2025-04-05 RX ORDER — PROPRANOLOL HYDROCHLORIDE 60 MG/1
60 CAPSULE, EXTENDED RELEASE ORAL DAILY
Qty: 90 CAPSULE | Refills: 0 | Status: SHIPPED | OUTPATIENT
Start: 2025-04-05

## 2025-04-10 ENCOUNTER — HOSPITAL ENCOUNTER (OUTPATIENT)
Dept: MAMMOGRAPHY | Facility: HOSPITAL | Age: 75
Discharge: HOME | End: 2025-04-10
Admitting: FAMILY MEDICINE
Payer: MEDICARE

## 2025-04-10 DIAGNOSIS — Z12.31 VISIT FOR SCREENING MAMMOGRAM: ICD-10-CM

## 2025-04-10 PROCEDURE — 77063 BREAST TOMOSYNTHESIS BI: CPT

## 2025-04-10 PROCEDURE — 77067 SCR MAMMO BI INCL CAD: CPT

## 2025-04-23 DIAGNOSIS — I10 PRIMARY HYPERTENSION: ICD-10-CM

## 2025-04-23 RX ORDER — LANOLIN ALCOHOL/MO/W.PET/CERES
1 CREAM (GRAM) TOPICAL DAILY
Qty: 90 TABLET | Refills: 0 | Status: SHIPPED | OUTPATIENT
Start: 2025-04-23

## 2025-05-15 DIAGNOSIS — E78.2 MIXED HYPERLIPIDEMIA: ICD-10-CM

## 2025-05-15 DIAGNOSIS — E03.9 ACQUIRED HYPOTHYROIDISM: ICD-10-CM

## 2025-05-15 RX ORDER — LEVOTHYROXINE SODIUM 125 UG/1
125 TABLET ORAL DAILY
Qty: 90 TABLET | Refills: 0 | Status: SHIPPED | OUTPATIENT
Start: 2025-05-15

## 2025-05-15 RX ORDER — ROSUVASTATIN CALCIUM 20 MG/1
20 TABLET, COATED ORAL
Qty: 90 TABLET | Refills: 0 | Status: SHIPPED | OUTPATIENT
Start: 2025-05-15

## 2025-05-21 ENCOUNTER — HOSPITAL ENCOUNTER (OUTPATIENT)
Dept: BONE DENSITY | Facility: HOSPITAL | Age: 75
Discharge: HOME | End: 2025-05-21
Admitting: NURSE PRACTITIONER
Payer: MEDICARE

## 2025-05-21 DIAGNOSIS — M81.8 OTHER OSTEOPOROSIS WITHOUT CURRENT PATHOLOGICAL FRACTURE: ICD-10-CM

## 2025-05-21 DIAGNOSIS — Z17.0 MALIGNANT NEOPLASM OF UPPER-OUTER QUADRANT OF LEFT BREAST IN FEMALE, ESTROGEN RECEPTOR POSITIVE: ICD-10-CM

## 2025-05-21 DIAGNOSIS — C50.412 MALIGNANT NEOPLASM OF UPPER-OUTER QUADRANT OF LEFT BREAST IN FEMALE, ESTROGEN RECEPTOR POSITIVE: ICD-10-CM

## 2025-05-21 PROCEDURE — 77080 DXA BONE DENSITY AXIAL: CPT

## 2025-05-23 ENCOUNTER — RESULTS FOLLOW-UP (OUTPATIENT)
Dept: ONCOLOGY | Facility: CLINIC | Age: 75
End: 2025-05-23
Payer: MEDICARE

## 2025-05-23 NOTE — TELEPHONE ENCOUNTER
----- Message from Mildred Castro sent at 5/22/2025  4:33 PM EDT -----  Bone density improved per this most recent DEXA scan. Please make patient aware. Thanks!  ----- Message -----  From: Casie, Rad Results [place]  Sent: 5/21/2025   1:10 PM EDT  To: ALBINA Uribe

## 2025-05-23 NOTE — TELEPHONE ENCOUNTER
Hub staff attempted to follow warm transfer process and was unsuccessful     Caller: Mariana Ansari    Relationship to patient: Self    Best call back number:     [phone]     Patient is needing: PT CALLED BACK, BUT JUST RECALLED NUMBER THAT CALLED HER. PT DID NOT HAVE ANYTHING TO WRITE THE NUMBER DOWN IN PREVIOUS MESSAGE. PT HAS BEEN ADVISED DIRECT NUMBER SHOULD BE ON HER VM. PT WILL TRY BACK SOMETIME TUESDAY IF SHE DOES NOT HEAR ANYTHING BACK.

## 2025-05-27 NOTE — TELEPHONE ENCOUNTER
Called pt to make her aware. She v/u.     ----- Message from Mildred Castro sent at 5/22/2025  4:33 PM EDT -----  Bone density improved per this most recent DEXA scan. Please make patient aware. Thanks!  ----- Message -----  From: Interface, Rad Results [place] In  Sent: 5/21/2025   1:10 PM EDT  To: ALBINA Uribe

## 2025-06-11 DIAGNOSIS — F33.1 MODERATE EPISODE OF RECURRENT MAJOR DEPRESSIVE DISORDER: ICD-10-CM

## 2025-06-11 DIAGNOSIS — I10 PRIMARY HYPERTENSION: ICD-10-CM

## 2025-06-11 DIAGNOSIS — E11.42 TYPE 2 DIABETES MELLITUS WITH DIABETIC POLYNEUROPATHY, WITH LONG-TERM CURRENT USE OF INSULIN: ICD-10-CM

## 2025-06-11 DIAGNOSIS — Z79.4 TYPE 2 DIABETES MELLITUS WITH DIABETIC POLYNEUROPATHY, WITH LONG-TERM CURRENT USE OF INSULIN: ICD-10-CM

## 2025-06-11 RX ORDER — GLIPIZIDE 5 MG/1
5 TABLET, FILM COATED, EXTENDED RELEASE ORAL 2 TIMES DAILY
Qty: 180 TABLET | Refills: 0 | Status: SHIPPED | OUTPATIENT
Start: 2025-06-11

## 2025-06-11 RX ORDER — HYDROCHLOROTHIAZIDE 25 MG/1
25 TABLET ORAL DAILY
Qty: 90 TABLET | Refills: 0 | Status: SHIPPED | OUTPATIENT
Start: 2025-06-11

## 2025-06-11 RX ORDER — ESCITALOPRAM OXALATE 20 MG/1
20 TABLET ORAL DAILY
Qty: 90 TABLET | Refills: 0 | Status: SHIPPED | OUTPATIENT
Start: 2025-06-11

## 2025-06-16 DIAGNOSIS — Z79.4 TYPE 2 DIABETES MELLITUS WITH DIABETIC POLYNEUROPATHY, WITH LONG-TERM CURRENT USE OF INSULIN: ICD-10-CM

## 2025-06-16 DIAGNOSIS — E11.42 TYPE 2 DIABETES MELLITUS WITH DIABETIC POLYNEUROPATHY, WITH LONG-TERM CURRENT USE OF INSULIN: ICD-10-CM

## 2025-06-16 RX ORDER — TIRZEPATIDE 2.5 MG/.5ML
2.5 INJECTION, SOLUTION SUBCUTANEOUS WEEKLY
Qty: 2 ML | Refills: 0 | Status: SHIPPED | OUTPATIENT
Start: 2025-06-16

## 2025-06-16 NOTE — TELEPHONE ENCOUNTER
Caller: Mariana Ansari    Relationship: Self    Best call back number: [phone]     Requested Prescriptions:   Requested Prescriptions     Pending Prescriptions Disp Refills    Mounjaro 2.5 MG/0.5ML solution auto-injector 2 mL 0     Sig: Inject 2.5 mg under the skin into the appropriate area as directed 1 (One) Time Per Week.        Pharmacy where request should be sent: [facility] [id number] [address] - [phone]  - [phone] FX     Last office visit with prescribing clinician: 3/20/2025   Last telemedicine visit with prescribing clinician: Visit date not found   Next office visit with prescribing clinician: 7/21/2025     Additional details provided by patient:     Does the patient have less than a 3 day supply:  X Yes  [] No      April Grzegorz Us Rep   06/16/25 14:32 EDT

## 2025-06-24 ENCOUNTER — HOSPITAL ENCOUNTER (OUTPATIENT)
Dept: CT IMAGING | Facility: HOSPITAL | Age: 75
Discharge: HOME | End: 2025-06-24

## 2025-06-24 ENCOUNTER — HOSPITAL ENCOUNTER (OUTPATIENT)
Dept: CARDIOLOGY | Facility: HOSPITAL | Age: 75
Discharge: HOME | End: 2025-06-24

## 2025-06-24 DIAGNOSIS — Z13.6 SCREENING FOR CARDIOVASCULAR CONDITION: ICD-10-CM

## 2025-06-24 PROCEDURE — 75571 CT HRT W/O DYE W/CA TEST: CPT

## 2025-06-24 PROCEDURE — 93799 UNLISTED CV SVC/PROCEDURE: CPT

## 2025-06-24 PROCEDURE — VASCULARSCN2 VASCULAR SCREENING (BUNDLE) CAR: Performed by: INTERNAL MEDICINE

## 2025-06-25 LAB
BH CV VAS SCREENING CAROTID CCA LEFT: 70 CM/SEC
BH CV VAS SCREENING CAROTID CCA RIGHT: 88 CM/SEC
BH CV VAS SCREENING CAROTID ICA LEFT: 73 CM/SEC
BH CV VAS SCREENING CAROTID ICA RIGHT: 69 CM/SEC
BH CV XLRA MEAS - MID AO DIAM: 1.9 CM
BH CV XLRA MEAS - PAD LEFT ABI PT: 1.33
BH CV XLRA MEAS - PAD LEFT ARM: 116 MMHG
BH CV XLRA MEAS - PAD LEFT LEG PT: 170 MMHG
BH CV XLRA MEAS - PAD RIGHT ABI PT: 1.27
BH CV XLRA MEAS - PAD RIGHT ARM: 128 MMHG
BH CV XLRA MEAS - PAD RIGHT LEG PT: 163 MMHG
BH CV XLRA MEAS LEFT DIST CCA EDV: 13.7 CM/SEC
BH CV XLRA MEAS LEFT DIST CCA PSV: 69.6 CM/SEC
BH CV XLRA MEAS LEFT ICA/CCA RATIO: 1
BH CV XLRA MEAS LEFT PROX ICA EDV: 11.2 CM/SEC
BH CV XLRA MEAS LEFT PROX ICA PSV: 72.7 CM/SEC
BH CV XLRA MEAS RIGHT DIST CCA EDV: 16.2 CM/SEC
BH CV XLRA MEAS RIGHT DIST CCA PSV: 88.2 CM/SEC
BH CV XLRA MEAS RIGHT ICA/CCA RATIO: 0.8
BH CV XLRA MEAS RIGHT PROX ICA EDV: -21.7 CM/SEC
BH CV XLRA MEAS RIGHT PROX ICA PSV: -69 CM/SEC

## 2025-07-02 DIAGNOSIS — Z79.4 TYPE 2 DIABETES MELLITUS WITH HYPERGLYCEMIA, WITH LONG-TERM CURRENT USE OF INSULIN: ICD-10-CM

## 2025-07-02 DIAGNOSIS — E11.65 TYPE 2 DIABETES MELLITUS WITH HYPERGLYCEMIA, WITH LONG-TERM CURRENT USE OF INSULIN: ICD-10-CM

## 2025-07-02 RX ORDER — PEN NEEDLE, DIABETIC 31 G X1/4"
NEEDLE, DISPOSABLE MISCELLANEOUS
Qty: 100 EACH | Refills: 1 | Status: SHIPPED | OUTPATIENT
Start: 2025-07-02

## 2025-07-02 RX ORDER — INSULIN DEGLUDEC 200 U/ML
INJECTION, SOLUTION SUBCUTANEOUS
Qty: 9 ML | Refills: 0 | Status: SHIPPED | OUTPATIENT
Start: 2025-07-02

## 2025-07-06 DIAGNOSIS — K21.9 GASTROESOPHAGEAL REFLUX DISEASE WITHOUT ESOPHAGITIS: ICD-10-CM

## 2025-07-06 DIAGNOSIS — I10 PRIMARY HYPERTENSION: ICD-10-CM

## 2025-07-06 RX ORDER — PROPRANOLOL HYDROCHLORIDE 60 MG/1
60 CAPSULE, EXTENDED RELEASE ORAL DAILY
Qty: 90 CAPSULE | Refills: 0 | Status: SHIPPED | OUTPATIENT
Start: 2025-07-06

## 2025-07-06 RX ORDER — OMEPRAZOLE 40 MG/1
40 CAPSULE, DELAYED RELEASE ORAL DAILY
Qty: 90 CAPSULE | Refills: 0 | Status: SHIPPED | OUTPATIENT
Start: 2025-07-06

## 2025-07-14 ENCOUNTER — LAB (OUTPATIENT)
Dept: FAMILY MEDICINE CLINIC | Facility: CLINIC | Age: 75
End: 2025-07-14
Payer: MEDICARE

## 2025-07-14 LAB
ALBUMIN SERPL-MCNC: 4.5 G/DL (ref 3.5–5.2)
ALBUMIN/GLOB SERPL: 1.4 G/DL
ALP SERPL-CCNC: 59 U/L (ref 39–117)
ALT SERPL W P-5'-P-CCNC: 23 U/L (ref 1–33)
ANION GAP SERPL CALCULATED.3IONS-SCNC: 12.8 MMOL/L (ref 5–15)
AST SERPL-CCNC: 21 U/L (ref 1–32)
BILIRUB SERPL-MCNC: 0.4 MG/DL (ref 0–1.2)
BUN SERPL-MCNC: 27 MG/DL (ref 8–23)
BUN/CREAT SERPL: 20.8 (ref 7–25)
CALCIUM SPEC-SCNC: 10.1 MG/DL (ref 8.6–10.5)
CHLORIDE SERPL-SCNC: 99 MMOL/L (ref 98–107)
CHOLEST SERPL-MCNC: 138 MG/DL (ref 0–200)
CO2 SERPL-SCNC: 26.2 MMOL/L (ref 22–29)
CREAT SERPL-MCNC: 1.3 MG/DL (ref 0.57–1)
EGFRCR SERPLBLD CKD-EPI 2021: 43.2 ML/MIN/1.73
GLOBULIN UR ELPH-MCNC: 3.2 GM/DL
GLUCOSE SERPL-MCNC: 161 MG/DL (ref 65–99)
HBA1C MFR BLD: 7.1 % (ref 4.8–5.6)
HDLC SERPL-MCNC: 34 MG/DL (ref 40–60)
LDLC SERPL CALC-MCNC: 59 MG/DL (ref 0–100)
LDLC/HDLC SERPL: 1.39 {RATIO}
POTASSIUM SERPL-SCNC: 4.3 MMOL/L (ref 3.5–5.2)
PROT SERPL-MCNC: 7.7 G/DL (ref 6–8.5)
SODIUM SERPL-SCNC: 138 MMOL/L (ref 136–145)
TRIGL SERPL-MCNC: 283 MG/DL (ref 0–150)
VLDLC SERPL-MCNC: 45 MG/DL (ref 5–40)

## 2025-07-14 PROCEDURE — 83036 HEMOGLOBIN GLYCOSYLATED A1C: CPT | Performed by: FAMILY MEDICINE

## 2025-07-14 PROCEDURE — 80053 COMPREHEN METABOLIC PANEL: CPT | Performed by: FAMILY MEDICINE

## 2025-07-14 PROCEDURE — 80061 LIPID PANEL: CPT | Performed by: FAMILY MEDICINE

## 2025-07-16 DIAGNOSIS — I10 PRIMARY HYPERTENSION: ICD-10-CM

## 2025-07-16 RX ORDER — LANOLIN ALCOHOL/MO/W.PET/CERES
1 CREAM (GRAM) TOPICAL DAILY
Qty: 90 TABLET | Refills: 0 | Status: SHIPPED | OUTPATIENT
Start: 2025-07-16

## 2025-07-21 ENCOUNTER — OFFICE VISIT (OUTPATIENT)
Dept: FAMILY MEDICINE CLINIC | Facility: CLINIC | Age: 75
End: 2025-07-21
Payer: MEDICARE

## 2025-07-21 VITALS
HEART RATE: 90 BPM | TEMPERATURE: 98.2 F | RESPIRATION RATE: 17 BRPM | SYSTOLIC BLOOD PRESSURE: 125 MMHG | OXYGEN SATURATION: 95 % | DIASTOLIC BLOOD PRESSURE: 79 MMHG | BODY MASS INDEX: 33.66 KG/M2 | WEIGHT: 190 LBS | HEIGHT: 63 IN

## 2025-07-21 DIAGNOSIS — E11.8 TYPE 2 DIABETES MELLITUS WITH COMPLICATION, WITHOUT LONG-TERM CURRENT USE OF INSULIN: Primary | ICD-10-CM

## 2025-07-21 DIAGNOSIS — F33.1 MODERATE EPISODE OF RECURRENT MAJOR DEPRESSIVE DISORDER: ICD-10-CM

## 2025-07-21 DIAGNOSIS — E03.9 ACQUIRED HYPOTHYROIDISM: ICD-10-CM

## 2025-07-21 DIAGNOSIS — I10 PRIMARY HYPERTENSION: ICD-10-CM

## 2025-07-21 PROCEDURE — 3074F SYST BP LT 130 MM HG: CPT | Performed by: FAMILY MEDICINE

## 2025-07-21 PROCEDURE — G2211 COMPLEX E/M VISIT ADD ON: HCPCS | Performed by: FAMILY MEDICINE

## 2025-07-21 PROCEDURE — 3051F HG A1C>EQUAL 7.0%<8.0%: CPT | Performed by: FAMILY MEDICINE

## 2025-07-21 PROCEDURE — 1126F AMNT PAIN NOTED NONE PRSNT: CPT | Performed by: FAMILY MEDICINE

## 2025-07-21 PROCEDURE — 1159F MED LIST DOCD IN RCRD: CPT | Performed by: FAMILY MEDICINE

## 2025-07-21 PROCEDURE — 3078F DIAST BP <80 MM HG: CPT | Performed by: FAMILY MEDICINE

## 2025-07-21 PROCEDURE — 1160F RVW MEDS BY RX/DR IN RCRD: CPT | Performed by: FAMILY MEDICINE

## 2025-07-21 PROCEDURE — 99214 OFFICE O/P EST MOD 30 MIN: CPT | Performed by: FAMILY MEDICINE

## 2025-07-21 RX ORDER — ESCITALOPRAM OXALATE 20 MG/1
20 TABLET ORAL DAILY
Qty: 90 TABLET | Refills: 0 | Status: SHIPPED | OUTPATIENT
Start: 2025-07-21

## 2025-07-21 RX ORDER — LEVOTHYROXINE SODIUM 125 UG/1
125 TABLET ORAL DAILY
Qty: 90 TABLET | Refills: 0 | Status: SHIPPED | OUTPATIENT
Start: 2025-07-21

## 2025-07-21 RX ORDER — TIRZEPATIDE 5 MG/.5ML
5 INJECTION, SOLUTION SUBCUTANEOUS WEEKLY
Qty: 2 ML | Refills: 1 | Status: SHIPPED | OUTPATIENT
Start: 2025-07-21

## 2025-07-21 NOTE — PROGRESS NOTES
Subjective   Chief Complaint   Patient presents with    Diabetes    Hypothyroidism    Hyperlipidemia    Hypertension    Follow-up    Med Refill    Depression     Mariana Ansari is a 75 y.o. female.     Patient Care Team:  Luna Whitfield MD as PCP - General (Family Medicine)  Luna Whitfield MD as PCP - Family Medicine  Michel Guajardo MD as Consulting Physician (Hematology and Oncology)  Luna Whitfield MD as Referring Physician (Family Medicine)  Marco Rae MD as Consulting Physician (Endocrinology)  Nicolas Francisco MD as Consulting Physician (Urology)    History of Present Illness     History of Present Illness  The patient presents for a follow-up on her chronic medical issues and her medications including diabetes, hypertension, hyperlipidemia, and anxiety.  She reports taking all of her medications as directed and denies any side effects.  She reports that she finally started taking Mounjaro shots about 2 months ago and she denies any side effects. Her A1c has improved from 9.7% three months ago to 7.1%. She also takes Farxiga 10 mg, glipizide twice daily, Tresiba, and metformin  tablets daily.  She reports overall doing well, she denies any new symptoms or concerns. She has been on Crestor 20 mg for several years but has been taking only half the dose (10 mg) for a long time. Her recent mammogram results were normal. She believes her Cologuard test was negative, but she is unsure as she did not receive the results.    The following portions of the patient's history were reviewed and updated as appropriate: allergies, current medications, past family history, past medical history, past social history, past surgical history, and problem list.  Past Medical History:   Diagnosis Date    Anxiety     Anxiety and depression     Asthma     SEASONAL ALLERGY RELATED    Cancer of breast 06/2018    LEFT    Chronic kidney disease     Renal cyst, right kidney w/urology workup in past    Depression      Diabetes mellitus     Type 2    Disease of thyroid gland     GERD (gastroesophageal reflux disease)     History of prior pregnancies     x2    History of transfusion     S/P HYST --AUTOLOGOUS     Hx of radiation therapy     Hyperlipidemia     Hypertension     IBS (irritable bowel syndrome)     Seasonal allergies     Thyroid disease      Past Surgical History:   Procedure Laterality Date    BREAST LUMPECTOMY WITH SENTINEL NODE BIOPSY Left 07/18/2018    Procedure: BREAST LUMPECTOMY WITH SENTINEL NODE BIOPSY;  Surgeon: João Zazueta MD;  Location: [facility] OR;  Service: General    COLONOSCOPY      refusing; negative cologuard 8/24/2017    HYSTERECTOMY  1989    KNEE SURGERY Right 2014    KNEE SURGERY Left 2016    LIVER BIOPSY  2019    fatty liver    ROTATOR CUFF REPAIR Left 2009    ROTATOR CUFF REPAIR Right 2012    TOE SURGERY  2009    ingrown     WRIST SURGERY  2022     The patient has a family history of  Family History   Problem Relation Age of Onset    Diabetes Mother     Heart attack Mother     Heart failure Mother     Hyperlipidemia Mother     Hyperthyroidism Mother         Has surgery    Hypothyroidism Mother         Later in life    Heart disease Mother     Hypertension Mother     Asthma Father     COPD Father     Hypertension Father     Heart attack Maternal Uncle     Heart failure Maternal Uncle     Depression Maternal Grandmother     Heart attack Maternal Grandfather     Heart failure Maternal Grandfather     Alcohol abuse Paternal Grandmother     Alcohol abuse Paternal Grandfather     Scleroderma Sister     No Known Problems Daughter     No Known Problems Son     Malig Hyperthermia Neg Hx      Social History     Socioeconomic History    Marital status:      Spouse name: Mahin    Number of children: 2    Years of education: College   Tobacco Use    Smoking status: Never    Smokeless tobacco: Never   Vaping Use    Vaping status: Never Used   Substance and Sexual Activity    Alcohol use:  "Yes     Alcohol/week: 1.0 standard drink of alcohol     Types: 1 Cans of beer per week     Comment: 2-3 a month, social only    Drug use: No    Sexual activity: Yes     Comment:        Review of Systems   Constitutional:  Negative for activity change, fatigue and fever.   Respiratory:  Negative for shortness of breath and wheezing.    Cardiovascular:  Negative for chest pain, palpitations and leg swelling.   Endocrine: Negative for cold intolerance, heat intolerance, polydipsia and polyphagia.   Musculoskeletal:  Negative for arthralgias and back pain.   Skin:  Negative for rash.   Neurological:  Negative for tremors and headache.     Visit Vitals  /79 (BP Location: Left arm, Patient Position: Sitting, Cuff Size: Large Adult)   Pulse 90   Temp 98.2 °F (36.8 °C) (Infrared)   Resp 17   Ht 160 cm (62.99\")   Wt 86.2 kg (190 lb)   SpO2 95%   BMI 33.67 kg/m²       BMI is >= 30 and <35. (Class 1 Obesity). The following options were offered after discussion;: exercise counseling/recommendations      Current Outpatient Medications:     albuterol (PROAIR RESPICLICK) 108 (90 Base) MCG/ACT inhaler, Inhale 1 puff Every 4 (Four) Hours As Needed for Wheezing or Shortness of Air., Disp: 1 each, Rfl: 0    Blood Glucose Monitoring Suppl (Accu-Chek Guide) w/Device kit, 1 kit Daily. Dg: E11.65, Disp: 1 kit, Rfl: 0    calcium carbonate (TUMS) 500 MG chewable tablet, Chew 1 tablet As Needed., Disp: , Rfl:     cetirizine (zyrTEC) 10 MG tablet, Take 1 tablet by mouth Daily., Disp: , Rfl:     cholecalciferol (VITAMIN D3) 1000 units tablet, Take 1 tablet by mouth Every Night., Disp: , Rfl:     dapagliflozin Propanediol (Farxiga) 10 MG tablet, Take one tab before breakfast daily, Disp: 90 tablet, Rfl: 3    escitalopram (LEXAPRO) 20 MG tablet, Take 1 tablet by mouth Daily., Disp: 90 tablet, Rfl: 0    fluticasone (FLONASE) 50 MCG/ACT nasal spray, Administer 2 sprays into the nostril(s) as directed by provider Daily., Disp: , Rfl: "     glucose blood (Accu-Chek Guide) test strip, 1 each by Other route 3 (Three) Times a Day. Dg: E11.65, Disp: 100 each, Rfl: 1    hydroCHLOROthiazide 25 MG tablet, TAKE 1 TABLET BY MOUTH DAILY, Disp: 90 tablet, Rfl: 0    Insulin Degludec (Tresiba FlexTouch) 200 UNIT/ML solution pen-injector pen injection, INJECT 56 UNITS UNDER THE SKIN INTO THE APPROPRIATE AREA AS DIRECTED EVERY NIGHT FOR 30 DAYS., Disp: 9 mL, Rfl: 0    Lancets (accu-chek soft touch) lancets, 1 each by Other route 3 (Three) Times a Day. Dg: E11.65, Disp: 100 each, Rfl: 1    levothyroxine (SYNTHROID, LEVOTHROID) 125 MCG tablet, Take 1 tablet by mouth Daily., Disp: 90 tablet, Rfl: 0    loperamide (IMODIUM A-D) 2 MG tablet, Take 1 tablet by mouth 4 (Four) Times a Day As Needed for Diarrhea., Disp: , Rfl:     Magnesium Oxide -Mg Supplement 400 (240 Mg) MG tablet, TAKE 1 TABLET BY MOUTH DAILY, Disp: 90 tablet, Rfl: 0    melatonin 5 MG tablet tablet, Take 2 tablets by mouth At Night As Needed., Disp: , Rfl:     metFORMIN ER (GLUCOPHAGE-XR) 500 MG 24 hr tablet, Take 2 tabs ac breakfast & supper, Disp: , Rfl:     omeprazole (priLOSEC) 40 MG capsule, TAKE 1 CAPSULE BY MOUTH DAILY, Disp: 90 capsule, Rfl: 0    propranolol LA (INDERAL LA) 60 MG 24 hr capsule, TAKE 1 CAPSULE BY MOUTH DAILY, Disp: 90 capsule, Rfl: 0    rosuvastatin (CRESTOR) 20 MG tablet, TAKE 1 TABLET BY MOUTH EVERY NIGHT AT BEDTIME, Disp: 90 tablet, Rfl: 0    Unifine Ultra Pen Needle 31G X 6 MM misc, [place] TO TEST ONCE DAILY, Disp: 100 each, Rfl: 1    Mounjaro 5 MG/0.5ML solution auto-injector, Inject 5 mg under the skin into the appropriate area as directed 1 (One) Time Per Week., Disp: 2 mL, Rfl: 1    Objective   Physical Exam  Constitutional:       General: She is not in acute distress.     Appearance: Normal appearance. She is well-developed. She is not ill-appearing or diaphoretic.      Comments: Patient is in no distress, patient has normal voice and speech.  Normal respiratory effort.    HENT:      Head: Normocephalic and atraumatic.   Pulmonary:      Effort: Pulmonary effort is normal.   Musculoskeletal:      Cervical back: Normal range of motion and neck supple.   Neurological:      General: No focal deficit present.      Mental Status: She is alert and oriented to person, place, and time. Mental status is at baseline.   Psychiatric:         Mood and Affect: Mood normal.         CT Cardiac Calcium Score Without Dye  Result Date: 6/25/2025  Impression: Impression: Total coronary calcium score 81.4 Calcium Score Interpretation 0 -- Negative examination, no identifiable atherosclerotic plaque.  Very low risk of cardiac events in the next 5 years. 1-10 -- Minimal plaque burden.  Low risk of cardiac events in the next 5 years. 11- 100 -- Mild Plaque burden.  Mild risk of cardiac events in the next 5 years. 101 - 400 -- Moderate plaque burden.  Moderate risk of cardiac events in the next 5 years. Over 400 -- Extensive plaque burden.  High risk of cardiac events in the next 5 years. Electronically Signed: Makeda Davison MD  6/25/2025 4:19 PM EDT  Workstation ID: [id number]      FOLLOWING LABS WERE REVIEWED TODAY:  CMP          3/10/2025    13:14 3/17/2025    07:59 7/14/2025    09:45   CMP   Glucose 198  193  161    BUN 22  26  27.0    Creatinine 1.21  1.19  1.30    EGFR 47.4  48.1  43.2    Sodium 141  140  138    Potassium 4.8  4.9  4.3    Chloride 99  101  99    Calcium 10.2  9.5  10.1    Total Protein 7.8  7.7  7.7    Albumin 4.4  4.3  4.5    Globulin 3.4  3.4  3.2    Total Bilirubin 0.5  0.4  0.4    Alkaline Phosphatase 77  79  59    AST (SGOT) 25  22  21    ALT (SGPT) 22  22  23    Albumin/Globulin Ratio 1.3  1.3  1.4    BUN/Creatinine Ratio 18.2  21.8  20.8    Anion Gap 12.4  11.4  12.8      Lipid Panel          11/13/2024    08:27 3/17/2025    07:59 7/14/2025    09:45   Lipid Panel   Total Cholesterol 157  160  138    Triglycerides 311  264  283    HDL Cholesterol 34  38  34    VLDL Cholesterol 50   43  45    LDL Cholesterol  73  79  59    LDL/HDL Ratio 1.79  1.82  1.39      TSH          11/13/2024    08:27 3/17/2025    07:59   TSH   TSH 1.500  1.870      Most Recent A1C          7/14/2025    09:45   HGBA1C Most Recent   Hemoglobin A1C 7.10           Assessment & Plan   Diagnoses and all orders for this visit:    1. Type 2 diabetes mellitus with complication, without long-term current use of insulin (Primary)  -     Mounjaro 5 MG/0.5ML solution auto-injector; Inject 5 mg under the skin into the appropriate area as directed 1 (One) Time Per Week.  Dispense: 2 mL; Refill: 1  -     Comprehensive Metabolic Panel  -     Hemoglobin A1c    2. Primary hypertension  -     Comprehensive Metabolic Panel    3. Acquired hypothyroidism  -     levothyroxine (SYNTHROID, LEVOTHROID) 125 MCG tablet; Take 1 tablet by mouth Daily.  Dispense: 90 tablet; Refill: 0    4. Moderate episode of recurrent major depressive disorder  -     escitalopram (LEXAPRO) 20 MG tablet; Take 1 tablet by mouth Daily.  Dispense: 90 tablet; Refill: 0        I reviewed her chronic medical problems and her medications.  I also reviewed and discussed with the patient her recent fasting blood work results.  Her hemoglobin A1c has improved significantly when compared to the blood work done few months ago.  I will be increasing her Mounjaro from 2.5 mg to 5 mg and patient was advised to discontinue glipizide when she starts increased dose of Mounjaro.  She was encouraged to continue to work on her diet and healthy lifestyle.  She was also advised to keep up with her diabetic eye exams.      Return in about 3 months (around 10/21/2025) for Next scheduled follow up.    Requested Prescriptions     Signed Prescriptions Disp Refills    Mounjaro 5 MG/0.5ML solution auto-injector 2 mL 1     Sig: Inject 5 mg under the skin into the appropriate area as directed 1 (One) Time Per Week.    levothyroxine (SYNTHROID, LEVOTHROID) 125 MCG tablet 90 tablet 0     Sig: Take 1  room air tablet by mouth Daily.    escitalopram (LEXAPRO) 20 MG tablet 90 tablet 0     Sig: Take 1 tablet by mouth Daily.       Luna Whitfield MD  07/21/2025  10:56 EDT      Patient or patient representative verbalized consent for the use of Ambient Listening during the visit with  Luna Whitfield MD for chart documentation. 7/21/2025  10:56 EDT

## 2025-08-07 DIAGNOSIS — Z79.4 TYPE 2 DIABETES MELLITUS WITH HYPERGLYCEMIA, WITH LONG-TERM CURRENT USE OF INSULIN: ICD-10-CM

## 2025-08-07 DIAGNOSIS — E11.65 TYPE 2 DIABETES MELLITUS WITH HYPERGLYCEMIA, WITH LONG-TERM CURRENT USE OF INSULIN: ICD-10-CM

## 2025-08-07 RX ORDER — INSULIN DEGLUDEC 200 U/ML
INJECTION, SOLUTION SUBCUTANEOUS
Qty: 9 ML | Refills: 0 | Status: SHIPPED | OUTPATIENT
Start: 2025-08-07

## 2025-08-14 DIAGNOSIS — E78.2 MIXED HYPERLIPIDEMIA: ICD-10-CM

## 2025-08-14 RX ORDER — ROSUVASTATIN CALCIUM 20 MG/1
20 TABLET, COATED ORAL
Qty: 90 TABLET | Refills: 0 | Status: SHIPPED | OUTPATIENT
Start: 2025-08-14

## (undated) DEVICE — 3M™ STERI-STRIP™ COMPOUND BENZOIN TINCTURE 40 BAGS/CARTON 4 CARTONS/CASE C1544: Brand: 3M™ STERI-STRIP™

## (undated) DEVICE — PENCL E/S HNDSWTCH SMOKEEVAC HOLSTR 10FT

## (undated) DEVICE — SYR LUERLOK 5CC

## (undated) DEVICE — NDL HYPO ECLPS SFTY 22G 1 1/2IN

## (undated) DEVICE — NDL HYPO PRECISIONGLIDE REG 25G 1 1/2

## (undated) DEVICE — SUT SILK 2/0 FS BLK 18IN 685G

## (undated) DEVICE — IRRIGATOR BULB ASEPTO 60CC STRL

## (undated) DEVICE — SKIN PREP TRAY W/CHG: Brand: MEDLINE INDUSTRIES, INC.

## (undated) DEVICE — ANTIBACTERIAL UNDYED BRAIDED (POLYGLACTIN 910), SYNTHETIC ABSORBABLE SUTURE: Brand: COATED VICRYL

## (undated) DEVICE — CVR TRANSD CIV FLX TPR 11.9 TO 3.8X61CM

## (undated) DEVICE — SUT VIC 3/0 TIES 18IN J110T

## (undated) DEVICE — INTENDED FOR TISSUE SEPARATION, AND OTHER PROCEDURES THAT REQUIRE A SHARP SURGICAL BLADE TO PUNCTURE OR CUT.: Brand: BARD-PARKER ® CARBON RIB-BACK BLADES

## (undated) DEVICE — ELECTRD BLD EDGE/INSUL1P 2.4X5.1MM STRL

## (undated) DEVICE — TUBING, SUCTION, 1/4" X 20', STRAIGHT: Brand: MEDLINE INDUSTRIES, INC.

## (undated) DEVICE — 3M™ STERI-STRIP™ REINFORCED ADHESIVE SKIN CLOSURES, R1547, 1/2 IN X 4 IN (12 MM X 100 MM), 6 STRIPS/ENVELOPE: Brand: 3M™ STERI-STRIP™

## (undated) DEVICE — PK CHST BRST 40

## (undated) DEVICE — SPNG GZ WOVN 4X4IN 12PLY 10/BX STRL

## (undated) DEVICE — ENCORE® LATEX ORTHO SIZE 8, STERILE LATEX POWDER-FREE SURGICAL GLOVE: Brand: ENCORE